# Patient Record
Sex: FEMALE | Race: WHITE | Employment: PART TIME | URBAN - METROPOLITAN AREA
[De-identification: names, ages, dates, MRNs, and addresses within clinical notes are randomized per-mention and may not be internally consistent; named-entity substitution may affect disease eponyms.]

---

## 2017-06-27 ENCOUNTER — TRANSFERRED RECORDS (OUTPATIENT)
Dept: HEALTH INFORMATION MANAGEMENT | Facility: CLINIC | Age: 52
End: 2017-06-27

## 2017-09-07 ENCOUNTER — OFFICE VISIT (OUTPATIENT)
Dept: OTHER | Facility: OUTPATIENT CENTER | Age: 52
End: 2017-09-07

## 2017-09-07 DIAGNOSIS — F64.9 GENDER IDENTITY DISORDER, UNSPECIFIED: ICD-10-CM

## 2017-09-07 DIAGNOSIS — F52.21 ERECTILE DISORDER, LIFELONG, GENERALIZED, MODERATE: Primary | ICD-10-CM

## 2017-09-07 ASSESSMENT — ANXIETY QUESTIONNAIRES
1. FEELING NERVOUS, ANXIOUS, OR ON EDGE: SEVERAL DAYS
7. FEELING AFRAID AS IF SOMETHING AWFUL MIGHT HAPPEN: SEVERAL DAYS
GAD7 TOTAL SCORE: 4
3. WORRYING TOO MUCH ABOUT DIFFERENT THINGS: SEVERAL DAYS
5. BEING SO RESTLESS THAT IT IS HARD TO SIT STILL: NOT AT ALL
6. BECOMING EASILY ANNOYED OR IRRITABLE: NOT AT ALL
2. NOT BEING ABLE TO STOP OR CONTROL WORRYING: SEVERAL DAYS

## 2017-09-07 ASSESSMENT — PATIENT HEALTH QUESTIONNAIRE - PHQ9: 5. POOR APPETITE OR OVEREATING: NOT AT ALL

## 2017-09-07 NOTE — MR AVS SNAPSHOT
After Visit Summary   9/7/2017    Fred Flores    MRN: [id number]           Patient Information     Date Of Birth          1965        Visit Information        Provider Department      9/7/2017 3:00 PM Brooklynn Zhang, PhD [facility]        Today's Diagnoses     Erectile disorder, lifelong, generalized, moderate    -  1    Gender dysphoria, unspecified           Follow-ups after your visit        Your next 10 appointments already scheduled     Oct 02, 2017 11:00 AM CDT   INDIVIDUAL THERAPY with Brooklynn Zhang, PhD   [facility] ([facility])    [address]  [address]  [facility] [address]   [phone]            Oct 16, 2017 11:00 AM CDT   INDIVIDUAL THERAPY with Brooklynn Zhang, PhD   [facility] ([facility])    [address]  [address]  [facility] [address]   [phone]            Nov 02, 2017  5:00 PM CDT   INDIVIDUAL THERAPY with Brooklynn Zhang, PhD   [facility] ([facility])    [address]  [address]  [facility] [address]   [phone]            Nov 16, 2017  4:00 PM CST   INDIVIDUAL THERAPY with Brooklynn Zhang, PhD   [facility] ([facility])    [address]  [address]  [facility] [address]   [phone]            Nov 30, 2017  4:00 PM CST   INDIVIDUAL THERAPY with Brooklynn Zhang, PhD   [facility] ([facility])    [address]  [address]  [facility] [address]   [phone]              Who to contact     Please call your clinic at [phone] to:    Ask questions about your health    Make or cancel appointments    Discuss your medicines    Learn about your test results    Speak to your doctor   If you have compliments or concerns about an experience at your clinic, or if you wish to file a complaint, please contact [facility] Patient Relations at [phone] or email us at [email or web address]         Additional Information About Your Visit        [employer] Information     [email or web address] is an electronic gateway that provides easy, online access to your medical records. With [email or web address], you can request a clinic appointment, read your test results, renew a prescription or communicate with your care team.     To sign up for [email or web address] visit the website at [email or web address]   You will be asked to enter the access code listed below, as well as some personal information. Please follow the directions to create your username and password.     Your access code is: [email or web address]  Expires: 2017 12:17 PM     Your access code will  in 90 days. If you need help or a new code, please contact your [facility] or call [phone] for assistance.        Care EveryWhere ID     This is your Care EveryWhere ID. This could be used by other organizations to access your [facility] medical records  [id number]         Blood Pressure from Last 3 Encounters:   No data found for BP    Weight from Last 3 Encounters:   No data found for Wt              We Performed the Following     Diagnostic Assessment (complete) [31777]        Primary Care Provider    None Specified       No primary provider on file.        Equal Access to Services     REINALDO GLOVER : Hadii rao yipo Soamandaali, waaxda luqadaha, qaybta kaalmada adeegyada, charisse ruelas. So [facility] [phone].    ATENCIÓN: Si habla español, tiene a smith disposición servicios gratuitos de asistencia lingüística. Llcarlito al [phone].    We comply with applicable federal civil rights laws and [place] laws. We do not discriminate on the basis of race, color, national origin, age, disability sex, sexual orientation or gender identity.            Thank you!     Thank you for choosing [facility]  for your care. Our  goal is always to provide you with excellent care. Hearing back from our patients is one way we can continue to improve our services. Please take a few minutes to complete the written survey that you may receive in the mail after your visit with us. Thank you!             Your Updated Medication List - Protect others around you: Learn how to safely use, store and throw away your medicines at www.disposemymeds.org.      Notice  As of 9/7/2017 11:59 PM    You have not been prescribed any medications.

## 2017-09-11 NOTE — PROGRESS NOTES
"[facility]  Program in Human Sexuality  Department of Family Medicine & Community Health  [employer] [employer] [facility] [employer]  [address]  [place] [address]  Phone: [phone]  Fax: [phone]  [email or web address]       Diagnostic Assessment Interview    Date of Service: 9/07/17  Client Name: Fred Flores  YOB: 1965  Age: 52 year old  MRN:  [id number]  Sexual Orientation: Heterosexual (attracted to women)  Treating Provider: Brooklynn Zhang, PhD, LP  Program: REST, TG  Type of Session: Assessment  Present in Session: Client only  Number of Minutes: 60   Referral Source: Doctor       Ethnicity:    _X_Caucasian  __   __Latino/  __Native American        __Asian American  __ Multi-racial: __ Other (Describe): _______________________________________________________________    Any relevant cultural issues? (Minority stress, immigration history, level of acculturation, social orientation, time orientation, verbal communication style, spiritual beliefs, etc.) __Fred reported a \"Religious background\" but no current vasiliy/Mandaen affiliation___________________________________ ________________________________________________________________________________________        A description of the assessment process, the 50-55 minute hour, and client confidentiality was reviewed.      PRESENTING PROBLEM & GOALS  \"Exploration of both the physiological and psychological factors that leave me erectionless\" - Fred shared that his wife of many years passed away in February 2016 and he has since started dating, only to find that longstanding sexual functioning concerns have continued. In additional to erectile concerns, Fred also noted during the assessment that he is interested in exploring questions about his gender identity and sexual interests. Fred reported the following goals for treatment: (1) peace of mind, (2) improved physical health, and " "(3) erections.    History of Presenting Problem: Fred stated that his \"entire adult life (since first partnered sexual experience at age 21) achieving and maintaining erection has been difficult.\" He shared that he has had 3 sexual partners (all women, see Relationship & Sexual History below) in his life and experienced erectile difficulties with each of them. He further noted that erectile concerns have worsened with age. See Sexual Behaviors & Functioning, as well as Gender-Related Concerns sections below for additional background and history.     RELATIONSHIP & SEXUAL HISTORY    Fred reported having 3 sexual partners (all cisgender women) in his life to date. He shared that his first partnered sexual experience occurred at age 21 with his college girlfriend (Bailey) who was \"someone I loved.\" He noted that \"it didn't want to work,\" clarifying that he was only briefly able to achieve erection during this first sexual experience, but not able to maintain the erection long enough for penetrative sex. He noted that it \"worked the next morning\" when he experienced a spontaneous morning erection. Fred reported dating Bailey for about four years before she ended the relationship because \"we were young and stupid.\"    Fred reported that he  his second sexual partner, Inez, when he was 29 years old after the two had dated for about a year. He shared that sexual activity with Inez \"typically felt like a race\" between achieving erection through foreplay and \"getting to\" penetration without first losing the erection. Fred and Inez were together for 22 years before her death last year (February 2016) due to complications of chemotherapy (infection) to treat lymphoma. He reported that, while their marriage \"was wonderful,\" their sexual life \"was the pits.\" Fred shared that he disliked partnered sex due to feelings of embarrassment and anxiety about being able to \"perform.\" Between the erectile functioning " "concerns and Fred's disclosure of interest in forced feminization erotica (see Gender-Related Concerns below), he noted that Inez believed him to be more interested in the gender-based erotica than her. He shared with tearfulness that his \"biggest regret\" in the marriage was that the partners \"never really talked about\" their sexual concerns or interests. He noted that, once Inez became ill, her health issues \"made it easier to sleep in separate rooms\" and to further ignore sexual concerns within the relationship.     Fred shared that he was \"surprised\" to find that he has \"fallen in love again\" since his wife's death last year and was tearful discussing these experiences. He reported meeting his current partner, Tawny, through mutual friends and shared that they have been dating for approximately 6 months. Fred described an \"excellent\" relationship with Tawny, noting that \"good communication is very important to both of us\" and, thus, he has disclosed \"pretty much everything\" to her about his gender exploration and sexual interests. Rfed shared that Tawny seemed to receive this information well and is willing to be part of his treatment, if needed/recommended. Fred noted that he has been sexually intimate with Tawny since April 2017. This has reportedly included penetrative sex on only one occasion when his erection \"cooperated.\" Fred noted, however, that the couple has explored many other, non-penetrative sexual activities that he has found to be positive and pleasurable.     Children: Fred had two children with his wife Inez: Ashleigh (age 20) and Vandana (age 17). He reported \"excellent\" relationships with both daughters, noting that \"the three of us have done a good job pulling together since wife's/mom's death.\"    Unpleasant/Traumatic Sexual Experiences: Fred denied any history of abuse or trauma, including sexual, physical, emotional, or verbal abuse.     SEXUAL BEHAVIORS & FUNCTIONING  See Relationship & " "Sexual History above for description of partnered sexual behaviors within Fred's relationships.     Masturbation: Fred reported that he currently masturbates 2-3x per week while reading online forced feminization-type erotica (see Gender-Related Concerns below). He reported \"no ability\" or that it is \"very rare\" for him to achieve erection during these experiences, though he can occasionally reach orgasm/ejaculation. He noted that despite masturbating at this frequency he considers his sex drive to be low or \"frustrated.\"  See also Gender-Related Concerns below.     Erectile Functioning: Fred reported \"lifelong\" difficulties with both achieving and maintaining erections, beginning with his first partnered sexual experience at age 21. He noted that the use of Viagra and Cialis 2-3 years ago was costly and inconsistently helpful with his erectile functioning so he discontinued use. Fred shared that he continues to experience full morning erections and occasionally will wake up to erections in the middle of the night. Self-stimulated erections, however, typically reach hardness of only 2/10. Fred noted that, since dating his current partner, he has experienced what he describes as a \"phantom erection,\" in which he feels sensations of a full erection that is not present.     A recent consultation with urologist, Dr. Kenn Romano ([facility]) led to a prescription for testosterone and, again, Viagra, as well as referral for preventive cardiology care. See summary notes for this urology visit in client's chart.               Ejaculatory Functioning: Fred reported that he is able to reach orgasm/ejaculation with erectile hardness at a 4-5/10. This level of erection, however, does not occur often and, thus, he is not often able to reach climax alone or with a partner.  reported that he does not experience orgasm without ejaculation or vice versa.      Pain During Sex/Genital Pain: None reported.     Sexual " "Aversion/Avoidance: None reported.     Same-Sex Experiences/Fantasies: None reported    Compulsive Sexuality: Fred does not currently appear to be engaging in compulsive sexual behaviors (e.g., anonymous/multiple sexual encounters; compulsive fixation on unattainable partner, obsessive fantasizing, multiple love affairs, compulsive masturbation, compulsive use of pornography, etc.)    GENDER-RELATED CONCERNS  Fred stated: \"I don't know where I fall on the [gender] spectrum.\" He shared that he feels \"pretty strongly male\" and \"I like being a amanda,\" though also reported longstanding interest in cross-dressing and exploring femininity. Fred reported that around age 12 or 13 he tried on his mother's clothing while home alone. He noted dressing in a full outfit of feminine clothing, but being \"mostly intrigued by undergarments.\" Fred described that this practice of cross-dressing continued approximately 2-3x per week during his teenage years and he never discussed it with anyone. Eventually, he noted, the cross-dressing behavior also began to include masturbation. Fred reported no erectile problems during this time, though noted a belief that he masturbated in a \"weird way\" - laying down on his stomach while using his hand to masturbate. He also reported that, upon orgasm/ejaculation, he became immediately aware of feelings of \"disgust\" and shame.     Fred shared that there was a period of time before his marriage to Inez in which he had started to buy and wear feminine clothing at home. He noted, however, that he purged these items shortly after beginning to date his wife. Fred noted: \"I knew that any long term partner needed to be aware of this.\" As such, he reported telling his wife about the cross-dressing behaviors about 2 weeks after they started dating. He recalled that she was \"confused and sad\" and that over the course of their relationship/marriage it \"became an issue we never talked about.\"     Fred shared " "that he has \"adopted\" the name Sunni for \"that part of me\" that desires to be more feminine and enjoys cross-dressing. He indicated that, since his wife , he has again been purchasing feminine clothing. Fred shared that his \"linebacker's build\" reportedly \"bothers\" him when he is dressed in feminine clothing and that this frustration has increased over the years. He stated that his interest in cross-dressing has changed over time in that it \"used to be primarily sexual and now my driving force is finding out more about Sunni.\" He reported that he has also disclosed these gender-related concerns to his current partner and has, thus far, been met with support.     MENTAL HEALTH HISTORY   Fred reported working with JULIA Clark for individual psychotherapy from 2015 to 2017 (at which point she \"stepped away\" from providing therapy). He stated that, during his wife's illness, he \"knew that I needed to get to some kind of healthy place and needed to talk about it.\" Fred stated that he worked to address both anxiety and depression in therapy, stating that he has been successful in decreasing his shame and \"always being so hard on myself.\" He reported that he continues to see this provider through attending a biweekly wellness class that she now teaches. Prior to working with this provider he noted completing individual psychotherapy \"off and on\" but never felt comfortable discussing his sexuality and gender concerns.    Fred indicated that he is currently prescribed and taking effexor, wellbutrin, and trazadone for medication management of depression, anxiety, and to help him sleep. These medications are prescribed by Diamond Hughes MD ([facility]). Fred reported that the medication is helpful for him, though he is aware that it may be contributing to current erectile concerns.     Though Fred reported a history of depression, he noted only one instance of \"fleeting\" suicidal ideation \"in my " "life\" when he was about 46 years old. He denied recent or current suicidal ideation, plan, or intent. He denied any history of suicide attempts, self-harm, or history of psychiatric hospitalizations. At the time of this assessment, Fred endorsed a minimal level of both depression and anxiety symptoms, including: anhedonia; feeling down; feeling bad about himself; feeling nervous, anxious, or on edge; difficulty controlling worry; worrying about many different things; and feeling afraid, as if something awful might happen (see PHQ-9 and SEMAJ-7 below).    PHQ-9:  PHQ-9 ([employer]) 9/7/2017   1.  Little interest or pleasure in doing things 1   2.  Feeling down, depressed, or hopeless 1   3.  Trouble falling or staying asleep, or sleeping too much 0   4.  Feeling tired or having little energy 0   5.  Poor appetite or overeating 0   6.  Feeling bad about yourself 1   7.  Trouble concentrating 0   8.  Moving slowly or restless 0   9.  Suicidal or self-harm thoughts 0   PHQ-9 Total Score 3      Interpretation of Total Score  Total Score Depression Severity and Recommendations   0-9 No Major Depression   10-14 Moderate.  Initial weekly follow up.  If patient is responding, monthly contacts.  Meds or therapy.   15-19 Moderate severe.  Initial weekly follow up.  If patient is responding, 2-4 week contacts.  Meds and/or therapy.   >20 Severe.  Weekly contact.  Meds and therapy.     SEMAJ-7:  1. Feeling nervous, anxious, or on edge: Several days  2. Not being able to stop or control worrying: Several days  3. Worrying too much about different things: Several days  4. Trouble relaxing: Not at all  5. Being so restless that it is hard to sit still: Not at all  6. Becoming easily annoyed or irritable: Not at all  7. Feeling afraid, as if something awful might happen: Several days  SEMAJ-7 Total Score: 4    Interpretation of Total Score  0 - 5     Minimal anxiety  6 - 10   Mild anxiety  11 - 15 Moderate anxiety  16 - 21 Severe " "anxiety    SUBSTANCE USE   Fred reported that he consumes 2-3 alcoholic beverages per week. He denied the use of any illicit or non-prescribed drugs and denied any history of substance abuse/chemical dependency concerns or treatment.    CAGE  Have you ever felt you should cut down on your drinking or drug use?: No  Have people annoyed you by criticizing your drinking or drug use?: No  Have you ever felt bad or guilty about your drinking or drug use?: No  Have you ever had a drink or used drugs first thing in the morning to steady your nerves or to get rid of a hangover? (Eye opener): No  CAGE-AID SCORE: 0     MEDICAL HISTORY  Fred shared that he had a hernia repaired at 6 months of age. He experienced tonsillitis in spring/summer , however did not undergo any surgical removal. He reported no additional significant medical history, illnesses, surgeries, or allergies. Fred noted that he sees a primary care provider at [facility] in [facility].     SOCIAL & FAMILY HISTORY    Social/Leisure: Fred reported that he exercises at the [place] and enjoys singing and \"home projects.\" He noted \"few\" friends.     Educational/Occupational History: Fred earned a B.A. in Psychology from [employer] in . 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 He is not currently employed and indicated some related short term financial stress.      Family History: Fred reported that his parents were  and he lived with his mother (age 78) as a child. He reported a close relationship with her throughout his life and indicated that she is currently in good health (though a tobacco user). Fred reported that his father  at age 80 and that their relationship was \"emotionally and physically\" distant. Both parents received some college education. Fred's mother worked as " an , whereas his father worked as a .      Fred is the youngest of his family, with an elder sister (Mira, age 57) and brother (Guy, age 55). He noted little contact with his sister, who reportedly has a history of drug and alcohol abuse. Fred indicated that his is closest with his brother Guy, though they see one another infrequently.     LEGAL ISSUES  None reported.     _________________________________________________________________________    CONCLUSIONS    STRENGTHS & LIABILITIES   Fred reported the following personal strengths: kind, thoughtful, intentionality. He noted the following areas for growth: procrastination, getting started and following through. Regarding treatment, Fred appears open, honest, emotionally intelligent, and motivated to address current sexuality, relationship, and gender concerns.     SYMPTOMS  Lifelong difficulty achieving and maintaining erections that also impact ability to reach orgasm/ejaculation; Dissociation from erection; Questioning gender and desire to wear feminine clothing; Fred endorsed a minimal level of both depression and anxiety symptoms, including: anhedonia; feeling down; feeling bad about himself; feeling nervous, anxious, or on edge; difficulty controlling worry; worrying about many different things; and feeling afraid, as if something awful might happen    MENTAL STATUS  Appearance: Casually dressed, adequately groomed, appears younger than stated age  Behavior/relationship to examiner/demeanor: Cooperative, engaged, pleasant  Orientation: Oriented to person, place, time and situation  Speech Rate: Normal  Speech Spontaneity: Normal  Mood: Appeared anxious and nervous, particularly upon first arriving to session; Friendly and pleasant throughout assessment; Appeared sad and was tearful in discussing the death of his wife  Affect:  Appropriate/mood-congruent; Full range of emotions congruent with session content  Thought  "Process (Associations):  Logical, linear, goal-directed  Thought Content: Denies suicidal ideation, intent or thoughts; Does not appear to be responding to internal stimuli  Abnormal Perception: None  Attention/Concentration: Fair  Language: Intact  Insight: Adequate  Judgment: Good      Interactive Complexity: Communication difficulties present during current the psychiatric procedure included: None    PRELIMINARY DSM-5 (ICD-10) DIAGNOSES         302.72 (F52.21) - Erectile disorder, lifelong, generalized, moderate       302.6 (F64.9) - Unspecified gender dysphoria    SUMMARY & RECOMMENDATIONS  Fred reported \"lifelong\" difficulties with both achieving and maintaining erections, beginning with his first partnered sexual experience at age 21. These concerns are present during both masturbation and with all previous/current sexual partners. Fred reported that the use of Viagra and Cialis 2-3 years ago was costly and inconsistently helpful with his erectile functioning so he discontinued use. He shared that he continues to experience full morning erections and occasionally will wake up to erections in the middle of the night. Self-stimulated erections, however, typically reach hardness of only 2/10. Fred noted that, since dating his current partner, he has experienced what he describes as a \"phantom erection,\" in which he feels sensations of a full erection that is not present. Fred reported that he is able to reach orgasm/ejaculation with erectile hardness at a 4-5/10. This level of erection, however, does not occur often and, thus, he is not often able to reach climax alone or with a partner.    Fred further noted that erectile concerns have worsened with age. He shared that his wife of many years passed away in February 2016 and he has since started dating, only to find that longstanding sexual functioning concerns have continued. He reported meeting his current partner, Tawny, through mutual friends and shared that " "they have been dating for approximately 6 months and sexually intimate since 2017. This has reportedly included penetrative sex on only one occasion when his erection \"cooperated.\" Fred noted, however, that the couple has explored many other, non-penetrative sexual activities that he has found to be positive and pleasurable. A recent consultation with urologist, Dr. Kenn Romano ([facility]) led to a prescription for testosterone and, again, Viagra, as well as referral for preventive cardiology care in order to address erectile functioning.     In additional to erectile concerns, Fred also noted that he is interested in exploring questions about his gender and sexual interests. He reported that he began dressing in feminine clothing around age 12 or 13, and that this practice eventually became paired with masturbation through his teenage years. Fred reported no erectile problems during this time, though noted a belief that he masturbated in a \"weird way\" - laying down on his stomach while using his hand to masturbate. He also reported that, upon orgasm/ejaculation, he became immediately aware of feelings of \"disgust\" and shame. Fred shared that he has \"adopted\" the name Sunni for \"that part of me\" that desires to be more feminine and enjoys cross-dressing. He indicated that, since his wife , he has again been purchasing feminine clothing and having previous purged such items. Fred shared that his \"linebacker's build\" reportedly \"bothers\" him when he is dressed in feminine clothing and that this frustration has increased over the years. He stated that his interest in cross-dressing has changed over time in that it \"used to be primarily sexual and now my driving force is finding out more about Sunni.\" He reported that he has also disclosed these gender-related concerns to his current partner and has, thus far, been met with support.     Fred is currently prescribed and taking Effexor, Wellbutrin, and " Trazadone for medication management of depression, anxiety, and to help him sleep. He reported that the medication is helpful for him, though he is aware that it may be contributing to current erectile concerns. At the time of this assessment, Fred endorsed a minimal level of both depression and anxiety symptoms, including: anhedonia; feeling down; feeling bad about himself; feeling nervous, anxious, or on edge; difficulty controlling worry; worrying about many different things; and feeling afraid, as if something awful might happen. He reported the following goals for treatment: (1) peace of mind, (2) improved physical health, and (3) erections.    Based on this initial interview, the following recommendations are made:     (1). Complete REST program questionnaires to further assess current symptoms and sexual history/functioning.   (2). [facility] is [facility] to address sexual functioning concerns, as well as Fred's reported questions regarding gender and sexual interests.   (3). Family therapy is recommended, as needed, to help Fred address sexual and gender concerns within the dynamic of his new, current relationship.   (4). Fred is strongly encouraged to continue follow-up with his medication management provider, as current medication regime appears to be adequately controlling mood and anxiety symptoms.   (5). Consider preventive cardiology follow-up per Urology recommendation addressing erectile concerns.         Brooklynn Zhang, PhD, LP  Licensed Psychologist

## 2017-09-13 ASSESSMENT — PATIENT HEALTH QUESTIONNAIRE - PHQ9: SUM OF ALL RESPONSES TO PHQ QUESTIONS 1-9: 3

## 2017-09-14 ASSESSMENT — ANXIETY QUESTIONNAIRES: GAD7 TOTAL SCORE: 4

## 2017-09-18 ENCOUNTER — OFFICE VISIT (OUTPATIENT)
Dept: OTHER | Facility: OUTPATIENT CENTER | Age: 52
End: 2017-09-18

## 2017-09-18 DIAGNOSIS — F64.9 GENDER IDENTITY DISORDER: Primary | ICD-10-CM

## 2017-09-18 DIAGNOSIS — F52.21 ERECTILE DISORDER, LIFELONG, GENERALIZED, MODERATE: ICD-10-CM

## 2017-09-18 NOTE — MR AVS SNAPSHOT
After Visit Summary   9/18/2017    Fred Flores    MRN: [id number]           Patient Information     Date Of Birth          1965        Visit Information        Provider Department      9/18/2017 11:00 AM Brooklynn Zhang, PhD [facility]        Today's Diagnoses     Unspecified gender dysphoria    -  1    Erectile disorder, lifelong, generalized, moderate           Follow-ups after your visit        Your next 10 appointments already scheduled     Oct 16, 2017  2:00 PM CDT   INDIVIDUAL THERAPY with Brooklynn Zhang, PhD   [facility] ([facility])    [address]  [address]  [facility] [address]   [phone]            Nov 02, 2017  5:00 PM CDT   INDIVIDUAL THERAPY with Brooklynn Zhang, PhD   [facility] ([facility])    [address]  [address]  [facility] [address]   [phone]            Nov 16, 2017  4:00 PM CST   INDIVIDUAL THERAPY with Brooklynn Zhang, PhD   [facility] ([facility])    [address]  [address]  [facility] [address]   [phone]            Nov 30, 2017  4:00 PM CST   INDIVIDUAL THERAPY with Brooklynn Zhang, PhD   [facility] ([facility])    [address]  [address]  [facility] [address]   [phone]            Dec 14, 2017  5:00 PM CST   INDIVIDUAL THERAPY with Brooklynn Zhang, PhD   [facility] ([facility])    [address]  [address]  [facility] [address]   [phone]              Who to contact     Please call your clinic at [phone] to:    Ask questions about your health    Make or cancel appointments    Discuss your medicines    Learn about your test results    Speak to your doctor   If you have compliments or concerns about an experience at your clinic, or if you wish to file a complaint, please contact [facility]  Physicians Patient Relations at [phone] or email us at [email or web address]         Additional Information About Your Visit        [employer] Information     Kensho is an electronic gateway that provides easy, online access to your medical records. With Kensho, you can request a clinic appointment, read your test results, renew a prescription or communicate with your care team.     To sign up for Kensho visit the website at [email or web address]   You will be asked to enter the access code listed below, as well as some personal information. Please follow the directions to create your username and password.     Your access code is: [email or web address]  Expires: 2017 12:17 PM     Your access code will  in 90 days. If you need help or a new code, please contact your [facility] or call [phone] for assistance.        Care EveryWhere ID     This is your Care EveryWhere ID. This could be used by other organizations to access your [place] medical records  [id number]         Blood Pressure from Last 3 Encounters:   No data found for BP    Weight from Last 3 Encounters:   No data found for Wt              We Performed the Following     Individual Psychotherapy (53+ min) [57197]     Mental Health Tx Plan Scan (HIM Scan)        Primary Care Provider    None Specified       No primary provider on file.        Equal Access to Services     [facility] : Hadleigh Tinajero, waaxda luqadaha, qaybta kaalmada syed, charisse steve . So [facility] [phone].    ATENCIÓN: Si habla español, tiene a smith disposición servicios gratuitos de asistencia lingüística. Llame al [phone].    We comply with applicable federal civil rights laws and [place] laws. We do not discriminate on the basis of race, color, national origin, age, disability, sex, sexual orientation, or gender identity.            Thank you!     Thank you for choosing  [facility]  for your care. Our goal is always to provide you with excellent care. Hearing back from our patients is one way we can continue to improve our services. Please take a few minutes to complete the written survey that you may receive in the mail after your visit with us. Thank you!             Your Updated Medication List - Protect others around you: Learn how to safely use, store and throw away your medicines at www.disposemymeds.org.      Notice  As of 9/18/2017 11:59 PM    You have not been prescribed any medications.

## 2017-09-19 ASSESSMENT — ANXIETY QUESTIONNAIRES
GAD7 TOTAL SCORE: 3
7. FEELING AFRAID AS IF SOMETHING AWFUL MIGHT HAPPEN: NOT AT ALL
6. BECOMING EASILY ANNOYED OR IRRITABLE: NOT AT ALL
2. NOT BEING ABLE TO STOP OR CONTROL WORRYING: SEVERAL DAYS
3. WORRYING TOO MUCH ABOUT DIFFERENT THINGS: SEVERAL DAYS
1. FEELING NERVOUS, ANXIOUS, OR ON EDGE: NOT AT ALL
5. BEING SO RESTLESS THAT IT IS HARD TO SIT STILL: NOT AT ALL

## 2017-09-19 ASSESSMENT — PATIENT HEALTH QUESTIONNAIRE - PHQ9: 5. POOR APPETITE OR OVEREATING: SEVERAL DAYS

## 2017-10-02 ENCOUNTER — OFFICE VISIT (OUTPATIENT)
Dept: OTHER | Facility: OUTPATIENT CENTER | Age: 52
End: 2017-10-02

## 2017-10-02 DIAGNOSIS — F64.9 GENDER IDENTITY DISORDER: Primary | ICD-10-CM

## 2017-10-02 DIAGNOSIS — F52.21 ERECTILE DISORDER, LIFELONG, GENERALIZED, MODERATE: ICD-10-CM

## 2017-10-02 NOTE — MR AVS SNAPSHOT
After Visit Summary   10/2/2017    Fred Flores    MRN: [id number]           Patient Information     Date Of Birth          1965        Visit Information        Provider Department      10/2/2017 11:00 AM Brooklynn Zhang, PhD [facility]        Today's Diagnoses     Unspecified gender dysphoria    -  1    Erectile disorder, lifelong, generalized, moderate           Follow-ups after your visit        Your next 10 appointments already scheduled     Nov 02, 2017  5:00 PM CDT   INDIVIDUAL THERAPY with Brooklynn Zhang, PhD   [facility] ([facility])    [address]  [address]  [facility] [address]   [phone]            Nov 16, 2017  4:00 PM CST   INDIVIDUAL THERAPY with Brooklynn Zhang, PhD   [facility] ([facility])    [address]  [address]  [facility] [address]   [phone]            Nov 30, 2017  4:00 PM CST   INDIVIDUAL THERAPY with Brooklynn Zhang, PhD   [facility] ([facility])    [address]  [address]  [facility] [address]   [phone]            Dec 14, 2017  5:00 PM CST   INDIVIDUAL THERAPY with Brooklynn Zhang, PhD   [facility] ([facility])    [address]  [address]  [facility] [address]   [phone]              Who to contact     Please call your clinic at [phone] to:    Ask questions about your health    Make or cancel appointments    Discuss your medicines    Learn about your test results    Speak to your doctor   If you have compliments or concerns about an experience at your clinic, or if you wish to file a complaint, please contact [facility] Patient Relations at [phone] or email us at [email or web address]         Additional Information About Your Visit        MyChart Information     [employer] is an electronic gateway that provides  easy, online access to your medical records. With [employer]wl [employer], you can request a clinic appointment, read your test results, renew a prescription or communicate with your care team.     To sign up for GreenOwl Mobile visit the website at [email or web address]   You will be asked to enter the access code listed below, as well as some personal information. Please follow the directions to create your username and password.     Your access code is: [email or web address]  Expires: 2017 12:17 PM     Your access code will  in 90 days. If you need help or a new code, please contact your [facility] or call [phone] for assistance.        Care EveryWhere ID     This is your Care EveryWhere ID. This could be used by other organizations to access your [place] medical records  [id number]         Blood Pressure from Last 3 Encounters:   No data found for BP    Weight from Last 3 Encounters:   No data found for Wt              We Performed the Following     Individual Psychotherapy (53+ min) [81562]        Primary Care Provider    None Specified       No primary provider on file.        Equal Access to Services     [facility]: Hadii rao Tinajero, waaxda lucassie, qaybta kaalkayce lynch, charisse steve . So [facility] [phone].    ATENCIÓN: Si habla español, tiene a smith disposición servicios gratuitos de asistencia lingüística. Llame al [phone].    We comply with applicable federal civil rights laws and [place] laws. We do not discriminate on the basis of race, color, national origin, age, disability, sex, sexual orientation, or gender identity.            Thank you!     Thank you for choosing [facility]  for your care. Our goal is always to provide you with excellent care. Hearing back from our patients is one way we can continue to improve our services. Please take a few minutes to complete the written survey that you may receive in  the mail after your visit with us. Thank you!             Your Updated Medication List - Protect others around you: Learn how to safely use, store and throw away your medicines at www.disposemymeds.org.      Notice  As of 10/2/2017 11:59 PM    You have not been prescribed any medications.

## 2017-10-05 PROBLEM — F52.21: Status: ACTIVE | Noted: 2017-10-05

## 2017-10-05 NOTE — PROGRESS NOTES
"[facility]  [facility]  [address], [address]  [place] [address]  Outpatient Progress Note      Session Date: 17  Client Name: Fred Flores  YOB: 1965  MRN: [id number]  Provider: Brooklynn Zhang, PhD   Type of Session: Individual   Present in Session: Client only   Number of Minutes: 55   Treatment Plan Due: 2018      Current Symptoms/Status:   Fred reported lifelong difficulties with both achieving and maintaining erections, beginning with his first partnered sexual experience at age 21. These concerns are present during both masturbation and with all sexual partners, including new dating partner (Tawny). Fred reported that the use of Viagra and Cialis 2-3 years ago was costly and inconsistently helpful with his erectile functioning so he discontinued use. He continues to experience full morning erections and occasionally will wake up to erections in the middle of the night. Self-stimulated erections, however, typically reach hardness of only 2/10. Fred reported that he is able to reach orgasm/ejaculation with erectile hardness at a 4-5/10; however, this does not occur often and, thus, he is rarely able to reach climax alone or with a partner. In additional to erectile concerns, Fred also noted that he is interested in exploring questions about his gender and sexual interests. Fred has \"adopted\" the name Sunni for \"that part of me\" that desires to be more feminine and enjoys cross-dressing. He indicated that, since his wife , he has again been purchasing feminine clothing and has been supported in exploring a feminine gender expression by Tawny.      Progress Toward Treatment Goals:   First session following diagnostic intake      Intervention & Response:   Interpersonal, client-centered, and CBT techniques utilized to address diagnostic feedback and treatment planning. Therapist and client reviewed preliminary diagnoses, treatment " "recommendations, and steps for contacting insurance to determine coverage of services. Fred noted that, though he has considered preventative cardiology follow-up (per Urology consult, see records in chart), he does not feel inclined to move forward with this at present time given low risk factors in his family medical history and lifelong nature of erectile difficulties. Therapist and client will continue to discuss. Otherwise, Fred was open to all treatment recommendations. For homework, he was asked to review materials related to the Sexual Health Model and consider sexual functioning goals. Fred shared that, since intake, he has actually felt more focused on gender exploration and goals. Identified that, in some ways, this is beginning to feel like more of a priority to him in terms of treatment direction. Discussed treatment plan with regard to erecile functioning as well as gender concerns. Fred appeared surprised at his own statement that, if he \"lived in a vacuum,\" he would present as female 100% of the time. Discussed fears related to his young adult children and other family members finding out about his gender exploration. Fred shared that his partner, Tawny, has been very supportive of his exploration and was tearful in noting his gratitude for this, given that the topic of gender was not discussed with his wife. Fred shared an intention to reach out to a local make-up artist, who works with trans people, to discuss \"what can be done\" around feminizing his appearance. Due to client's nervousness in session and need to continue building therapeutic rapport, formal Treatment Plan was completed without client signature (see chart), though treatment planning was discussed throughout session. Discussed beginning sessions on a biweekly basis, with regular reassessment as treatment goals are met. Fred voiced agreement with this plan and, overall, was active and engaged throughout " session.      Assignment:   (1). Review Sexual Health Model and related therapeutic goals      Diagnosis:        302.6 (F64.9) - Unspecified gender dysphoria       302.72 (F52.21) - Erectile disorder, lifelong, generalized, moderate      Interactive Complexity:  None.       Plan / Need for Future Services:    Return for individual therapy in 2 weeks.             Brooklynn Zhang, PhD,   Licensed Psychologist

## 2017-10-10 ASSESSMENT — PATIENT HEALTH QUESTIONNAIRE - PHQ9: SUM OF ALL RESPONSES TO PHQ QUESTIONS 1-9: 12

## 2017-10-11 ASSESSMENT — ANXIETY QUESTIONNAIRES: GAD7 TOTAL SCORE: 3

## 2017-10-16 ENCOUNTER — OFFICE VISIT (OUTPATIENT)
Dept: OTHER | Facility: OUTPATIENT CENTER | Age: 52
End: 2017-10-16

## 2017-10-16 DIAGNOSIS — F52.21 ERECTILE DISORDER, LIFELONG, GENERALIZED, MODERATE: ICD-10-CM

## 2017-10-16 DIAGNOSIS — F64.9 GENDER IDENTITY DISORDER: Primary | ICD-10-CM

## 2017-10-16 NOTE — MR AVS SNAPSHOT
After Visit Summary   10/16/2017    Fred Flores    MRN: [id number]           Patient Information     Date Of Birth          1965        Visit Information        Provider Department      10/16/2017 2:00 PM Brooklynn Zhang, PhD [facility]         Follow-ups after your visit        Your next 10 appointments already scheduled     Nov 02, 2017  5:00 PM CDT   INDIVIDUAL THERAPY with Brooklynn Zhang, PhD   [facility] ([facility])    [address]  [address]  [facility] [address]   [phone]            Nov 16, 2017  4:00 PM CST   INDIVIDUAL THERAPY with Brooklynn Zhang,    [facility] ([facility])    [address]  [address]  [facility] [address]   [phone]            Nov 30, 2017  4:00 PM CST   INDIVIDUAL THERAPY with Brooklynn Zhang, PhD   [facility] ([facility])    [address]  [address]  [facility] [address]   [phone]            Dec 14, 2017  5:00 PM CST   INDIVIDUAL THERAPY with Brooklynn Zhang,    [facility] ([facility])    [address]  [address]  [facility] [address]   [phone]              Who to contact     Please call your clinic at [phone] to:    Ask questions about your health    Make or cancel appointments    Discuss your medicines    Learn about your test results    Speak to your doctor   If you have compliments or concerns about an experience at your clinic, or if you wish to file a complaint, please contact [facility] Patient Relations at [phone] or email us at [email or web address]         Additional Information About Your Visit        [employer] Information     [employer] is an electronic gateway that provides easy, online access to your medical records. With [employer], you can request a clinic appointment, read your test results,  renew a prescription or communicate with your care team.     To sign up for Gr[employer] Vidiyal Micro Financehart visit the website at [email or web address]   You will be asked to enter the access code listed below, as well as some personal information. Please follow the directions to create your username and password.     Your access code is: [email or web address]  Expires: 2017 12:17 PM     Your access code will  in 90 days. If you need help or a new code, please contact your [facility] or call [phone] for assistance.        Care EveryWhere ID     This is your Care EveryWhere ID. This could be used by other organizations to access your [place] medical records  [id number]         Blood Pressure from Last 3 Encounters:   No data found for BP    Weight from Last 3 Encounters:   No data found for Wt              Today, you had the following     No orders found for display       Primary Care Provider    None Specified       No primary provider on file.        Equal Access to Services     [facility] : Hadii rao Tinajero, waaxda sara, qaybta kaalmada syed, charisse steve . So [facility] [phone].    ATENCIÓN: Si habla español, tiene a smith disposición servicios gratuitos de asistencia lingüística. Llame al [phone].    We comply with applicable federal civil rights laws and [place] laws. We do not discriminate on the basis of race, color, national origin, age, disability, sex, sexual orientation, or gender identity.            Thank you!     Thank you for choosing [facility]  for your care. Our goal is always to provide you with excellent care. Hearing back from our patients is one way we can continue to improve our services. Please take a few minutes to complete the written survey that you may receive in the mail after your visit with us. Thank you!             Your Updated Medication List - Protect others around you: Learn how to safely  use, store and throw away your medicines at www.disposemymeds.org.      Notice  As of 10/16/2017 11:59 PM    You have not been prescribed any medications.

## 2017-10-16 NOTE — PROGRESS NOTES
"[facility]uality  [facility]  [address], [address]  [place] [address]  Outpatient Progress Note      Session Date: 10/02/17  Client Name: Fred Flores  YOB: 1965  MRN: [id number]  Provider: Brooklynn Zhang, PhD   Type of Session: Individual   Present in Session: Client only   Number of Minutes: 55   Treatment Plan Due: 2018      Current Symptoms/Status:   Fred reported lifelong difficulties with both achieving and maintaining erections, beginning with his first partnered sexual experience at age 21. These concerns are present during both masturbation and with all sexual partners, including new dating partner (Tawny). Fred reported that the use of Viagra and Cialis 2-3 years ago was costly and inconsistently helpful with his erectile functioning so he discontinued use. He continues to experience full morning erections and occasionally will wake up to erections in the middle of the night. Self-stimulated erections, however, typically reach hardness of only 2/10. Fred reported that he is able to reach orgasm/ejaculation with erectile hardness at a 4-5/10; however, this does not occur often and, thus, he is rarely able to reach climax alone or with a partner. In additional to erectile concerns, Fred also noted that he is interested in exploring questions about his gender and sexual interests. Fred has \"adopted\" the name Sunni for \"that part of me\" that desires to be more feminine and enjoys cross-dressing. He indicated that, since his wife  in , he has again been purchasing feminine clothing and has been supported in exploring a feminine gender expression by Tawny.      Progress Toward Treatment Goals:   Increased gender exploration      Intervention & Response:   Interpersonal, client-centered, and CBT techniques utilized to address client's current status. Fred shared that, as mentioned last session, he met with a local  to discuss options " "for exploring feminine makeup and presentation. Explored his mixed reactions (e.g., excitement, fear) during this experience and sense that he will be moving forward with these plans soon. Per homework assignment, therapist and client reviewed the explored the Sexual Health Model and related goals for care. Fred shared that, since his intake appt, he has realized that his main goal is to address identity, including both sexual and gender identity questions and concerns. He highlighted a desire to increase sexual functioning (specificially ED concerns), though wondered if this may be related to gender dysphoria. Per the Sexual Health Model, he noted a belief that he already has a good grasp on basic sexual knowledge and acceptance. In continuing to discuss the increased importance to him of exploring gender identity, Fred expressed that this seems to be moving quickly due to finally having a supportive partner with whom he can discuss his lifelong desires and experiences around cross-dressing and gender. Fred expressed curiosity, excitement, anxiety, and apprehension when therapist noted that sessions might be used to explore the use of \"Sunni\" and feminine pronouns. He reported that he did not feel ready to try this, as it would make existing gender concerns (and \"hopes\") \"more real.\" Discussed moving forward with gender exploration between sessions and Fred was open to bibliotherapy assignments: My Gender Workbook (Lizzie) & How to Understand Your Gender (Julianna Guzman). Overall, he was active and engaged throughout session.      Assignment:   (1). Obtain and begin reading/completing My Gender Workbook (Lizzie) & How to Understand Your Gender (Julianna Guzman)      Diagnosis:        302.6 (F64.9) - Unspecified gender dysphoria       302.72 (F52.21) - Erectile disorder, lifelong, generalized, moderate      Interactive Complexity:  None.       Plan / Need for Future Services:    Return for individual " therapy in 2 weeks.             Brooklynn Zhang, PhD,   Licensed Psychologist

## 2017-10-23 NOTE — PROGRESS NOTES
"[facility]  [facility]  [address] [place] [address], [address]  [place] [address]  Outpatient Progress Note      Session Date: 10/16/17  Client Name: Fred Flores (exploring \"Sunni\" and she/her pronouns beginning next session)  YOB: 1965  MRN: [id number]  Provider: Brooklynn Zhang, PhD   Type of Session: Individual   Present in Session: Client only   Number of Minutes: 55   Treatment Plan Due: 2018      Current Symptoms/Status:   Fred reported lifelong difficulties with both achieving and maintaining erections, beginning with his first partnered sexual experience at age 21. These concerns are present during both masturbation and with all sexual partners, including new dating partner (Tawny). Fred reported that the use of Viagra and Cialis 2-3 years ago was costly and inconsistently helpful with his erectile functioning so he discontinued use. He continues to experience full morning erections and occasionally will wake up to erections in the middle of the night. Self-stimulated erections, however, typically reach hardness of only 2/10. Fred reported that he is able to reach orgasm/ejaculation with erectile hardness at a 4-5/10; however, this does not occur often and, thus, he is rarely able to reach climax alone or with a partner. In additional to erectile concerns, Fred also noted that he is interested in exploring questions about his gender and sexual interests. Fred has \"adopted\" the name Sunni for \"that part of me\" that desires to be more feminine and enjoys cross-dressing. He indicated that, since his wife  in , he has again been purchasing feminine clothing and has been supported in exploring a feminine gender expression by Tawny.      Progress Toward Treatment Goals:   Increased gender exploration      Intervention & Response:   Interpersonal, client-centered, and CBT techniques utilized to address client's current status. Fred shared that the last therapy " "session  really through me for a loop,  referring to therapist's questions about interest in exploring feminine name/pronouns at this clinic and in sessions. Fred shared that he has been considering this \"a lot\" since last appointment and decided that he would like to try it (starting next session) to  see how it feels.  Therapist noted that she would let the  know about preferred name/pronouns, which Fred agreed to. Therapist and client spent time processing Fred's reading, to date, of My Gender Workbook. He shared a realization that his wife was likely hurt and angry by his crossdressing behavior and that he has internalized her negative reactions. Discussed that Fred has been feeling better able to  let go of  shame and negative self-judgments about his interests following this realization. He shared self-exploration and questions as to whether he is a \"just\" a cisgender heterosexual man who enjoys crossdressing. Identified that  is aware of local support groups (e.g., [employer]) that may aid in further exploration, but has reportedly not considered going. Identified and discussed awareness that attending therapy sessions at this clinic has made  real  many of Fred s thoughts about gender possibilities. Introduced and explored The Gender Unicorn handout, which was provided to Fred to take home. He shared a recent experience in which his current partner appeared to react negatively when his breast forms accidentally touched her. Celebrated and reinforced that he was able to address this with her, however, and feels that she continues to be very supportive of him. For homework,  agreed to continue reading assignment. He noted that he is waiting for the IaValchemyi book to arrive and looking forward to reading that as well. Overall, Fred was active and engaged throughout session. Next session: Return to conversation about potential exploration of [place] [employer] support group.  "       Assignment:   (1). Continue reading/completing My Gender Workbook (Lizzie) & How to Understand Your Gender (Julianna & Megan)      Diagnosis:        302.6 (F64.9) - Unspecified gender dysphoria       302.72 (F52.21) - Erectile disorder, lifelong, generalized, moderate      Interactive Complexity:  None.       Plan / Need for Future Services:    Return for individual therapy in 2 weeks.             Brooklynn Zhang, PhD, LP  Licensed Psychologist

## 2017-11-02 ENCOUNTER — OFFICE VISIT (OUTPATIENT)
Dept: OTHER | Facility: OUTPATIENT CENTER | Age: 52
End: 2017-11-02

## 2017-11-02 DIAGNOSIS — F64.9 GENDER IDENTITY DISORDER: Primary | ICD-10-CM

## 2017-11-02 DIAGNOSIS — F52.21 ERECTILE DISORDER, LIFELONG, GENERALIZED, MODERATE: ICD-10-CM

## 2017-11-02 NOTE — MR AVS SNAPSHOT
After Visit Summary   11/2/2017    Fred Flores    MRN: [id number]           Patient Information     Date Of Birth          1965        Visit Information        Provider Department      11/2/2017 5:00 PM Brooklynn Zhang, PhD [facility]        Today's Diagnoses     Unspecified gender dysphoria    -  1    Erectile disorder, lifelong, generalized, moderate           Follow-ups after your visit        Your next 10 appointments already scheduled     Nov 16, 2017  4:00 PM CST   INDIVIDUAL THERAPY with Brooklynn Zhang PhD   [facility] ([facility])    [address]  [address]  [facility] [address]   [phone]            Nov 30, 2017  4:00 PM CST   INDIVIDUAL THERAPY with Brooklynn Zhang PhD   [facility] ([facility])    [address]  [address]  [facility] [address]   [phone]            Dec 14, 2017  5:00 PM CST   INDIVIDUAL THERAPY with Brooklynn Zhang PhD   [facility] ([facility])    [address]  [address]  [facility] [address]   [phone]            Jan 03, 2018 11:00 AM CST   INDIVIDUAL THERAPY with Brooklynn Zhang, PhD   [facility] ([facility])    [address]  [address]  [facility] [address]   [phone]            Jan 17, 2018  4:00 PM CST   INDIVIDUAL THERAPY with Brooklynn Zhang PhD   [facility] ([facility])    [address]  [address]  [facility] [address]   [phone]            Jan 31, 2018  4:00 PM CST   INDIVIDUAL THERAPY with Brooklynn Zhang, PhD   [facility] ([facility])    [address]  [address]  [facility] [address]   [phone]              Who to contact     Please call your clinic at [phone] to:    Ask questions about your health    Make or cancel appointments    Discuss your  medicines    Learn about your test results    Speak to your doctor   If you have compliments or concerns about an experience at your clinic, or if you wish to file a complaint, please contact [facility] Patient Relations at [phone] or email us at [email or web address]         Additional Information About Your Visit        Fogg MobileharNOLA J&B Information     Vaccsyst is an electronic gateway that provides easy, online access to your medical records. With ncyclo, you can request a clinic appointment, read your test results, renew a prescription or communicate with your care team.     To sign up for Vaccsyst visit the website at [email or web address]   You will be asked to enter the access code listed below, as well as some personal information. Please follow the directions to create your username and password.     Your access code is: [email or web address]  Expires: 2017 11:17 AM     Your access code will  in 90 days. If you need help or a new code, please contact your [facility] or call [phone] for assistance.        Care EveryWhere ID     This is your Care EveryWhere ID. This could be used by other organizations to access your [place] medical records  [id number]         Blood Pressure from Last 3 Encounters:   No data found for BP    Weight from Last 3 Encounters:   No data found for Wt              We Performed the Following     Individual Psychotherapy (53+ min) [44169]        Primary Care Provider    None Specified       No primary provider on file.        Equal Access to Services     [facility] : Hadii rao yipo Somitzi, waaxda luqadaha, qaybta kaalmada jordanyada, charisse steve . So [facility] [place] [facility] [phone].    ATENCIÓN: Si habla español, tiene a smith disposición servicios gratuitos de asistencia lingüística. Llame al [phone].    We comply with applicable federal civil rights laws and [place] laws. We do  not discriminate on the basis of race, color, national origin, age, disability, sex, sexual orientation, or gender identity.            Thank you!     Thank you for choosing [facility]  for your care. Our goal is always to provide you with excellent care. Hearing back from our patients is one way we can continue to improve our services. Please take a few minutes to complete the written survey that you may receive in the mail after your visit with us. Thank you!             Your Updated Medication List - Protect others around you: Learn how to safely use, store and throw away your medicines at www.disposemymeds.org.      Notice  As of 11/2/2017 11:59 PM    You have not been prescribed any medications.

## 2017-11-09 NOTE — PROGRESS NOTES
"Program in Human Sexuality  [facility]  [address] [place] [address], [address]  [place] [address]  Outpatient Progress Note      Session Date: 17  Client Name: Fred Flores (\"Sunni;\" she/her pronouns)  YOB: 1965  MRN: [id number]  Provider: Brooklynn Zhang, PhD   Type of Session: Individual   Present in Session: Client only   Number of Minutes: 55   Treatment Plan Due: 2018      Current Symptoms/Status:   Sunni reported lifelong difficulties with both achieving and maintaining erections, beginning with her first partnered sexual experience at age 21. These concerns are present during both masturbation and with all sexual partners, including new dating partner (Tawny). Sunni reported that the use of Viagra and Cialis 2-3 years ago was costly and inconsistently helpful with erectile functioning so she discontinued use. Continues to experience full morning erections and occasionally will wake up to erections in the middle of the night. Self-stimulated erections, however, typically reach hardness of only 2/10. Able to reach orgasm/ejaculation with erectile hardness at a 4-5/10; however, this does not occur often and, thus, Sunni is rarely able to reach climax alone or with a partner. In additional to erectile concerns, Sunni also noted that she is interested in exploring questions about gender and sexual interests. She has \"adopted\" the name Sunni for \"that part of me\" that desires to be more feminine and enjoys cross-dressing. She indicated that, since her wife  in , she has again been purchasing feminine clothing and has been supported in exploring a feminine gender expression by Tawny.      Progress Toward Treatment Goals:   Increased gender exploration, feminine presentation to session and first time in public      Intervention & Response:   Interpersonal, client-centered, and CBT techniques utilized to address client's current status. Sunni presented to session in feminine " "clothing, which had not been previously planned or discussed with therapist. She noted a belief that she \"might as well try it\" given plan to begin exploring use of feminine name and pronouns at this clinic. Assisted client in discussing her choice of clothing for her first public outing with a feminine gender expression. Sunni noted feeling  relief  about wearing feminine clothing but also simultaneous fear about the reactions of others. Sunni shared narrative about a positive trip home to see her family, during which Tawny met her family for the first time. Explored Sunni's growing uncertainty about whether to fill testosterone prescription to potentially improve erectile functioning, giving that she is now questioning  if that s the hormone I really want.  She noted a desire to lose weight but worry about gaining muscle, especially around shoulders/arms as she believes this will make her appear less feminine. Therapist and client also discussed Sunni's awareness of male privilege in her life to date and fear about potential appropriation by  just taking the fun things  that she perceives as related to womanhood. Discussed her ongoing exploration of gender identity and feelings, utilizing reactions to bibliotherapy assignments. Sunni shared that her feeling of being  a cisgender man who cross dresses\" might be  shifting  toward a more trans-identified label. Discussed her willingness to attend a local [employer] meeting to further exploration and support. Sunni was open to contacting the group via email to ask questions about attending a meeting. At session end, she noted that she would be meeting Tawny for dinner tonight and did not bring change of masculine clothes. Discussed what she planned to do to keep most of feminine clothing on but appear more androgynous while at a restaurant. Overall, Sunni was active and engaged throughout session.       Assignment:   (1). Continue reading/completing My Gender Workbook " (Lizzie) & How to Understand Your Gender (Julianna & Megan)  (2). Contact [facility] support group to ask about meeting attendance.       Diagnosis:        302.6 (F64.9) - Unspecified gender dysphoria       302.72 (F52.21) - Erectile disorder, lifelong, generalized, moderate      Interactive Complexity:  None.       Plan / Need for Future Services:    Return for individual therapy in 2 weeks.             Brooklynn Zhang, PhD, LP  Licensed Psychologist

## 2017-11-16 ENCOUNTER — OFFICE VISIT (OUTPATIENT)
Dept: OTHER | Facility: OUTPATIENT CENTER | Age: 52
End: 2017-11-16

## 2017-11-16 DIAGNOSIS — F64.9 GENDER IDENTITY DISORDER: Primary | ICD-10-CM

## 2017-11-16 DIAGNOSIS — F52.21 ERECTILE DISORDER, LIFELONG, GENERALIZED, MODERATE: ICD-10-CM

## 2017-11-16 NOTE — MR AVS SNAPSHOT
After Visit Summary   11/16/2017    Fred Flores    MRN: [id number]           Patient Information     Date Of Birth          1965        Visit Information        Provider Department      11/16/2017 4:00 PM Brooklynn Zhang, PhD [facility]        Today's Diagnoses     Unspecified gender dysphoria    -  1    Erectile disorder, lifelong, generalized, moderate           Follow-ups after your visit        Your next 10 appointments already scheduled     Nov 30, 2017  4:00 PM CST   INDIVIDUAL THERAPY with Brooklynn Zhang PhD   [facility] ([facility])    [address]  [address]  [facility] [address]   [phone]            Dec 14, 2017  5:00 PM CST   INDIVIDUAL THERAPY with Brooklynn Zhang PhD   [facility] ([facility])    [address]  [address]  [facility] [address]   [phone]            Jan 03, 2018 11:00 AM CST   INDIVIDUAL THERAPY with Brooklynn Zhang, PhD   [facility] ([facility])    [address]  [address]  [facility] [address]   [phone]            Jan 17, 2018  4:00 PM CST   INDIVIDUAL THERAPY with Brooklynn Zhang, PhD   [facility] ([facility])    [address]  [address]  [facility] [address]   [phone]            Jan 31, 2018  4:00 PM CST   INDIVIDUAL THERAPY with Brooklynn Zhang, PhD   [facility] ([facility])    [address]  [address]  [facility] [address]   [phone]              Who to contact     Please call your clinic at [phone] to:    Ask questions about your health    Make or cancel appointments    Discuss your medicines    Learn about your test results    Speak to your doctor   If you have compliments or concerns about an experience at your clinic, or if you wish to file a complaint, please contact [facility]  [facility] Patient Relations at [phone] or email us at [email or web address]         Additional Information About Your Visit        FERTILE EARTH SYSTEMSharTubaloo Information     Reclog is an electronic gateway that provides easy, online access to your medical records. With Reclog, you can request a clinic appointment, read your test results, renew a prescription or communicate with your care team.     To sign up for Reclog visit the website at [email or web address]   You will be asked to enter the access code listed below, as well as some personal information. Please follow the directions to create your username and password.     Your access code is: [email or web address]  Expires: 2017 11:17 AM     Your access code will  in 90 days. If you need help or a new code, please contact your [facility] or call [phone] for assistance.        Care EveryWhere ID     This is your Care EveryWhere ID. This could be used by other organizations to access your [place] medical records  [id number]         Blood Pressure from Last 3 Encounters:   No data found for BP    Weight from Last 3 Encounters:   No data found for Wt              We Performed the Following     Individual Psychotherapy (53+ min) [08826]        Primary Care Provider    None Specified       No primary provider on file.        Equal Access to Services     REINALDO GLOVER : Hadii rao yipo Somitzi, waaxda luqadaha, qaybta kaalmada adejen, charisse ruelas. So [facility] [phone].    ATENCIÓN: Si habla español, tiene a smith disposición servicios gratuitos de asistencia lingüística. Llcarlito al [phone].    We comply with applicable federal civil rights laws and [place] laws. We do not discriminate on the basis of race, color, national origin, age, disability, sex, sexual orientation, or gender identity.            Thank you!     Thank you for choosing [facility]  for your care.  Our goal is always to provide you with excellent care. Hearing back from our patients is one way we can continue to improve our services. Please take a few minutes to complete the written survey that you may receive in the mail after your visit with us. Thank you!             Your Updated Medication List - Protect others around you: Learn how to safely use, store and throw away your medicines at www.disposemymeds.org.      Notice  As of 11/16/2017 11:59 PM    You have not been prescribed any medications.

## 2017-11-22 NOTE — PROGRESS NOTES
"Program in Human Sexuality  [facility]  [address] [place] [address], [address]  [place] [address]  Outpatient Progress Note      Session Date: 17  Client Name: Fred Flores (\"Sunni;\" she/her pronouns)  YOB: 1965  MRN: [id number]  Provider: Brooklynn Zhang, PhD   Type of Session: Individual   Present in Session: Client only   Number of Minutes: 55   Treatment Plan Due: 2018      Current Symptoms/Status:   Sunni reported lifelong difficulties with both achieving and maintaining erections, beginning with her first partnered sexual experience at age 21. These concerns are present during both masturbation and with all sexual partners, including new dating partner (Tawny). Sunni reported that the use of Viagra and Cialis 2-3 years ago was costly and inconsistently helpful with erectile functioning so she discontinued use. Continues to experience full morning erections and occasionally will wake up to erections in the middle of the night. Self-stimulated erections, however, typically reach hardness of only 2/10. Able to reach orgasm/ejaculation with erectile hardness at a 4-5/10; however, this does not occur often and, thus, Sunni is rarely able to reach climax alone or with a partner. In additional to erectile concerns, Sunni also noted that she is interested in exploring questions about gender and sexual interests. She has \"adopted\" the name Sunni for \"that part of me\" that desires to be more feminine and enjoys cross-dressing. She indicated that, since her wife  in , she has again been purchasing feminine clothing and has been supported in exploring a feminine gender expression by Tawny.      Progress Toward Treatment Goals:   Increased gender exploration and comfort      Intervention & Response:   Interpersonal, client-centered, and CBT techniques utilized to address client's current status. Sunni did not present in feminine clothing for today's session and reported disappointment " "about her inability to do so. She stated that  the sands keep shifting  with regard to gender exploration. Discussed several gendered experiences since last session including Sunni's awareness of   jealousy  and  envy  upon seeing young woman next to her recently at a coffee shop. 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 Therapist and client also discussed aspects of bibliotherapy homework that have resonated with Sunni. Sunni shared updates related to contacting [facility] for homework. She noted plans to move forward in meeting the  in order to be vetted to attend a meeting. Sunni shared several times that she has been feeling more compelled to disclose gender concerns to her adult daughters. Identified, however, that she is unsure what she wants/needs to say but believes that such a disclosure would allow greater room for her exploration (e.g., social transition options). She reported feeling motivated by this and  less concerned  about the reactions of others with regard to gender, as she continues to feel less and less ashamed. Discussed, however, ongoing worry about her daughters  reactions to the information, given the loss of their mother. For homework, Sunni was open to beginning to draft language about what she might share with her daughters as related to gender. Overall, she was active and engaged throughout session.       Assignment:   (1). Continue reading/completing My Gender Workbook (Lizzie) & How to Understand Your Gender (Julianna & Megan)  (2). Meet with [facility] leader to discuss meeting attendance.   (3). Writing/journaling about disclosure to daughters.      Diagnosis:        302.6 (F64.9) - Unspecified gender dysphoria       302.72 (F52.21) - Erectile disorder, lifelong, " generalized, moderate      Interactive Complexity:  None.       Plan / Need for Future Services:    Return for individual therapy in 2 weeks.             Brooklynn Zhang, PhD, LP  Licensed Psychologist

## 2017-11-30 ENCOUNTER — OFFICE VISIT (OUTPATIENT)
Dept: OTHER | Facility: OUTPATIENT CENTER | Age: 52
End: 2017-11-30

## 2017-11-30 DIAGNOSIS — F52.21 ERECTILE DISORDER, LIFELONG, GENERALIZED, MODERATE: ICD-10-CM

## 2017-11-30 DIAGNOSIS — F64.9 GENDER IDENTITY DISORDER: Primary | ICD-10-CM

## 2017-11-30 NOTE — MR AVS SNAPSHOT
After Visit Summary   11/30/2017    Fred Flores    MRN: [id number]           Patient Information     Date Of Birth          1965        Visit Information        Provider Department      11/30/2017 4:00 PM Brooklynn Zhang, PhD [facility]        Today's Diagnoses     Unspecified gender dysphoria    -  1    Erectile disorder, lifelong, generalized, moderate           Follow-ups after your visit        Your next 10 appointments already scheduled     Dec 14, 2017  5:00 PM CST   INDIVIDUAL THERAPY with Brooklynn Zhang, PhD   [facility] ([facility])    [address]  [address]  [facility] [address]   [phone]            Jan 03, 2018 11:00 AM CST   INDIVIDUAL THERAPY with Brooklynn Zhang PhD   [facility] ([facility])    [address]  [address]  [facility] [address]   [phone]            Jan 17, 2018  4:00 PM CST   INDIVIDUAL THERAPY with Brooklynn Zhang, PhD   [facility] ([facility])    [address]  [address]  [facility] [address]   [phone]            Jan 31, 2018  4:00 PM CST   INDIVIDUAL THERAPY with Brooklynn Zhang, PhD   [facility] ([facility])    [address]  [address]  [facility] [address]   [phone]              Who to contact     Please call your clinic at [phone] to:    Ask questions about your health    Make or cancel appointments    Discuss your medicines    Learn about your test results    Speak to your doctor   If you have compliments or concerns about an experience at your clinic, or if you wish to file a complaint, please contact [facility] Patient Relations at [phone] or email us at [email or web address]         Additional Information About Your Visit        Receptorhart Information     Sweetspot Intelligence is an electronic gateway that provides  easy, online access to your medical records. With [employer], you can request a clinic appointment, read your test results, renew a prescription or communicate with your care team.     To sign up for [employer] visit the website at [email or web address]   You will be asked to enter the access code listed below, as well as some personal information. Please follow the directions to create your username and password.     Your access code is: [email or web address]  Expires: 2017 11:17 AM     Your access code will  in 90 days. If you need help or a new code, please contact your [facility] or call [phone] for assistance.        Care EveryWhere ID     This is your Care EveryWhere ID. This could be used by other organizations to access your [place] medical records  [id number]         Blood Pressure from Last 3 Encounters:   No data found for BP    Weight from Last 3 Encounters:   No data found for Wt              We Performed the Following     Individual Psychotherapy (53+ min) [95642]        Primary Care Provider    None Specified       No primary provider on file.        Equal Access to Services     : Hadii rao Tinajero, waaxda lucassie, qaybta kaalkayce lynch, charisse steve . So [facility] [phone].    ATENCIÓN: Si habla español, tiene a smith disposición servicios gratuitos de asistencia lingüística. Llame al [phone].    We comply with applicable federal civil rights laws and [place] laws. We do not discriminate on the basis of race, color, national origin, age, disability, sex, sexual orientation, or gender identity.            Thank you!     Thank you for choosing [place] [facility]  for your care. Our goal is always to provide you with excellent care. Hearing back from our patients is one way we can continue to improve our services. Please take a few minutes to complete the written survey that you may receive in  the mail after your visit with us. Thank you!             Your Updated Medication List - Protect others around you: Learn how to safely use, store and throw away your medicines at www.disposemymeds.org.      Notice  As of 11/30/2017 11:59 PM    You have not been prescribed any medications.

## 2017-12-03 NOTE — PROGRESS NOTES
"Program [facility] Sexuality  [facility]  [address] [place] [address], [address]  [place] [address]  Outpatient Progress Note      Session Date: 17  Client Name: Fred Flores (\"Sunni;\" she/her pronouns)  YOB: 1965  MRN: [id number]  Provider: Brooklynn Zhang, PhD   Type of Session: Individual   Present in Session: Client only   Number of Minutes: 55   Treatment Plan Due: 2018    Health Maintenance Summary - Mental Health Treatment Plan       Status Date      Mental Health Tx Plan Q11 MOS Next Due 2018      Done 2017           Current Symptoms/Status:   Sunni reported lifelong difficulties with both achieving and maintaining erections, beginning with her first partnered sexual experience at age 21. These concerns are present during both masturbation and with all sexual partners, including new dating partner (Tawny). Sunni reported that the use of Viagra and Cialis 2-3 years ago was costly and inconsistently helpful with erectile functioning so she discontinued use. Continues to experience full morning erections and occasionally will wake up to erections in the middle of the night. Self-stimulated erections, however, typically reach hardness of only 2/10. Able to reach orgasm/ejaculation with erectile hardness at a 4-5/10; however, this does not occur often and, thus, Sunni is rarely able to reach climax alone or with a partner. In additional to erectile concerns, Sunni also noted that she is interested in exploring questions about gender and sexual interests. She has \"adopted\" the name Sunni for \"that part of me\" that desires to be more feminine and enjoys cross-dressing. She indicated that, since her wife  in , she has again been purchasing feminine clothing and has been supported in exploring a feminine gender expression by Tawny.      Progress Toward Treatment Goals:   Increased gender exploration and comfort, including feminine presentation to " "sessions      Intervention & Response:   Interpersonal, client-centered, and CBT techniques utilized to address client's current status. Sunni reported feeling  excited  to again be presenting in feminine clothing to today's appointment. She shared that, in planning her outfit last night with her partner, she noticed herself smiling and acknowledged a feeling that  this is right  and that she felt happy. Sunni also reported a small  triumph  in her decision today to walk from her house to the garage in feminine clothing and  caring less and less whether people know.  Therapist and client discussed updates related to Sunni attending a [employer] meeting on Saturday. She reported interest and excitement about this meeting, especially around meeting others who cross-dress and taking steps to determine whether/how this identity label (\"cross-dresser\") might fit for her. Sunni read aloud to provider the letter that she has started drafting to address gender exploration with her young adult daughters. Processed this decision at length and therapist provided feedback about the letter. Sunni reported that both of her daughters identify as LGBTQ allies and, as such, she suspects that they will be accepting. Discussed, however, Sunni's worry about hurting them after their mother's death. Discussed potential questions that may arise from Sunni's children (e.g., about medical transition options) and assisted Sunni in considering her answers. She noted plans to read the letter to her daughters in about two weeks, when they are both home for the holidays, and indicated a sense that waiting any longer would restrict her ability to explore further (e.g., feminine dress at home/in public). Processed how Sunni would like this coming out experience to go (e.g., talking together with both children versus separately, giving them the option to  see Sunni,  etc.) and how she plans to finish her letter. Overall, Sunni was open to feedback, active, and " engaged throughout session.       Assignment:   (1). Continue reading/completing My Gender Workbook (Lizzie) & How to Understand Your Gender (Julianna & Megan)  (2). Attend [place] [facility] meeting   (3). Complete disclosure letter to daughters.      Diagnosis:        302.6 (F64.9) - Unspecified gender dysphoria       302.72 (F52.21) - Erectile disorder, lifelong, generalized, moderate      Interactive Complexity:  None.       Plan / Need for Future Services:    Return for individual therapy in 2 weeks.             Brooklynn Zhang, PhD,   Licensed Psychologist

## 2017-12-14 ENCOUNTER — OFFICE VISIT (OUTPATIENT)
Dept: OTHER | Facility: OUTPATIENT CENTER | Age: 52
End: 2017-12-14
Payer: COMMERCIAL

## 2017-12-14 DIAGNOSIS — F64.9 GENDER IDENTITY DISORDER: Primary | ICD-10-CM

## 2017-12-14 DIAGNOSIS — F52.21 ERECTILE DISORDER, LIFELONG, GENERALIZED, MODERATE: ICD-10-CM

## 2017-12-14 NOTE — MR AVS SNAPSHOT
After Visit Summary   12/14/2017    Fred Flores    MRN: [id number]           Patient Information     Date Of Birth          1965        Visit Information        Provider Department      12/14/2017 5:00 PM Brooklynn Zhang, PhD [facility]        Today's Diagnoses     Unspecified gender dysphoria    -  1    Erectile disorder, lifelong, generalized, moderate           Follow-ups after your visit        Your next 10 appointments already scheduled     Jan 03, 2018 11:00 AM CST   INDIVIDUAL THERAPY with Brooklynn Zhang, PhD   [facility] ([facility])    [address]  [address]  [facility] [address]   [phone]            Jan 17, 2018  4:00 PM CST   INDIVIDUAL THERAPY with Brooklynn Zhang, PhD   [facility] ([facility])    [address]  [address]  [facility] [address]   [phone]            Jan 31, 2018  4:00 PM CST   INDIVIDUAL THERAPY with Brooklynn Zhang, PhD   [facility] ([facility])    [address]  [address]  [facility] [address]   [phone]            Feb 14, 2018  4:00 PM CST   INDIVIDUAL THERAPY with Brooklynn Zhang, PhD   [facility] ([facility])    [address]  [address]  [facility] [address]   [phone]            Feb 28, 2018  4:00 PM CST   INDIVIDUAL THERAPY with Brooklynn Zhang, PhD   [facility] ([facility])    [address]  [address]  [facility] [address]   [phone]              Who to contact     Please call your clinic at [phone] to:    Ask questions about your health    Make or cancel appointments    Discuss your medicines    Learn about your test results    Speak to your doctor   If you have compliments or concerns about an experience at your clinic, or if you wish to file a complaint, please contact [facility] Patient Relations at [phone] or email us at [email or web address]         Additional Information About Your Visit        AppZeroharFilip Technologies Information     Convene is an electronic gateway that provides easy, online access to your medical records. With Convene, you can request a clinic appointment, read your test results, renew a prescription or communicate with your care team.     To sign up for Convene visit the website at [email or web address]   You will be asked to enter the access code listed below, as well as some personal information. Please follow the directions to create your username and password.     Your access code is: [email or web address]  Expires: 3/17/2018  5:49 PM     Your access code will  in 90 days. If you need help or a new code, please contact your [facility] or call [phone] for assistance.        Care EveryWhere ID     This is your Care EveryWhere ID. This could be used by other organizations to access your [place] medical records  [id number]         Blood Pressure from Last 3 Encounters:   No data found for BP    Weight from Last 3 Encounters:   No data found for Wt              We Performed the Following     Individual Psychotherapy (53+ min) [89115]        Primary Care Provider    None Specified       No primary provider on file.        Equal Access to Services     REINALDO GLOVER : Hadii rao yipo Tanvi, waaxda luqadaha, qaybta kaalmada adejen, charisse ruelas. So [facility] [phone].    ATENCIÓN: Si habla español, tiene a smith disposición servicios gratuitos de asistencia lingüística. Llcarlito al [phone].    We comply with applicable federal civil rights laws and [place] laws. We do not discriminate on the basis of race, color, national origin, age, disability, sex, sexual orientation, or gender identity.            Thank you!     Thank you for choosing [place] [facility]  for your care.  Our goal is always to provide you with excellent care. Hearing back from our patients is one way we can continue to improve our services. Please take a few minutes to complete the written survey that you may receive in the mail after your visit with us. Thank you!             Your Updated Medication List - Protect others around you: Learn how to safely use, store and throw away your medicines at www.disposemymeds.org.      Notice  As of 12/14/2017 11:59 PM    You have not been prescribed any medications.

## 2017-12-17 NOTE — PROGRESS NOTES
"Program in Human Sexuality  [facility]  [address] [place] [address], [address]  [place] [address]  Outpatient Progress Note      Session Date: 17  Client Name: Fred Flores (\"Sunni;\" she/her pronouns)  YOB: 1965  MRN: [id number]  Provider: Brooklynn Zhang, PhD   Type of Session: Individual   Present in Session: Client only   Number of Minutes: 55   Treatment Plan Due: 2018    Health Maintenance Summary - Mental Health Treatment Plan       Status Date      Mental Health Tx Plan Q11 MOS Next Due 2018      Done 2017           Current Symptoms/Status:   Sunni reported lifelong difficulties with both achieving and maintaining erections, beginning with her first partnered sexual experience at age 21. These concerns are present during both masturbation and with all sexual partners, including new dating partner (Tawny). Sunni reported that the use of Viagra and Cialis 2-3 years ago was costly and inconsistently helpful with erectile functioning so she discontinued use. Continues to experience full morning erections and occasionally will wake up to erections in the middle of the night. Self-stimulated erections, however, typically reach hardness of only 2/10. Able to reach orgasm/ejaculation with erectile hardness at a 4-5/10; however, this does not occur often and, thus, Sunni is rarely able to reach climax alone or with a partner. In additional to erectile concerns, Sunni also noted that she is interested in exploring questions about gender and sexual interests. She has \"adopted\" the name Sunni for \"that part of me\" that desires to be more feminine and enjoys cross-dressing. She indicated that, since her wife  in , she has again been purchasing feminine clothing and has been supported in exploring a feminine gender expression by Tawny.      Progress Toward Treatment Goals:   Increased gender exploration and comfort, including feminine presentation to " "sessions      Intervention & Response:   Interpersonal, client-centered, and CBT techniques utilized to address client's current status. Sunni presented in feminine clothing to session, commenting that it felt \"good\" but she was also aware of some anxiety while sitting in the patient waiting room. Sunni shared a completed copy of her coming out letter (see chart), which she reportedly read aloud to her daughters yesterday morning. Therapist and client processed Sunni's reactions to this experience, at length, including worry about her youngest daughter s (16 yo) reactions. She reported feeling relieved that her eldest daughter hugged her and told Sunni that she was proud of her. Identified and discussed steps for moving forward and continuing conversations around gender with her daughters. Therapist and client also discussed Sunni's recent meeting with local [employer] leader, which her partner attended as well. Sunni shared that she plans to follow-up on this and engage with the national organization in order to begin attending local chapter meetings. Sunni was tearful at session end in thanking provider and noting that, despite nervousness about sharing gender-related concerns with her daughters, she  feels great  and is happy to \"finally\" be exploring a longstanding part of her identity. Reflected, validated, and normalized client reactions and emotions throughout session. Overall, Sunni was open to feedback, active, and engaged throughout session.       Assignment:   (1). Continue reading/completing My Gender Workbook (Lizzie) & How to Understand Your Gender (Julianna & Megan)  (2). Follow up with [employer] in order to begin attending [place] meetings      Diagnosis:        302.6 (F64.9) - Unspecified gender dysphoria       302.72 (F52.21) - Erectile disorder, lifelong, generalized, moderate      Interactive Complexity:  None.       Plan / Need for Future Services:    Return for individual therapy in 2 weeks. "             Brooklynn Zhang, PhD,   Licensed Psychologist

## 2018-01-03 ENCOUNTER — OFFICE VISIT (OUTPATIENT)
Dept: OTHER | Facility: OUTPATIENT CENTER | Age: 53
End: 2018-01-03
Payer: COMMERCIAL

## 2018-01-03 DIAGNOSIS — F52.21 ERECTILE DISORDER, LIFELONG, GENERALIZED, MODERATE: ICD-10-CM

## 2018-01-03 DIAGNOSIS — F64.9 GENDER IDENTITY DISORDER: Primary | ICD-10-CM

## 2018-01-03 NOTE — MR AVS SNAPSHOT
After Visit Summary   1/3/2018    Fred Flores    MRN: [id number]           Patient Information     Date Of Birth          1965        Visit Information        Provider Department      1/3/2018 11:00 AM Brooklynn Zhang, PhD [facility]        Today's Diagnoses     Unspecified gender dysphoria    -  1    Erectile disorder, lifelong, generalized, moderate           Follow-ups after your visit        Your next 10 appointments already scheduled     Jan 17, 2018  4:00 PM CST   INDIVIDUAL THERAPY with Brooklynn Zhang PhD   [facility] ([facility])    [address]  [address]  [facility] [address]   [phone]            Jan 31, 2018  4:00 PM CST   INDIVIDUAL THERAPY with Brooklynn Zhang PhD   [facility] ([facility])    [address]  [address]  [facility] [address]   [phone]            Feb 14, 2018  4:00 PM CST   INDIVIDUAL THERAPY with Brooklynn Zhang, PhD   [facility] ([facility])    [address]  [address]  [facility] [address]   [phone]            Feb 28, 2018  4:00 PM CST   INDIVIDUAL THERAPY with Brooklynn Zhang, PhD   [facility] ([facility])    [address]  [address]  [facility] [address]   [phone]            Mar 14, 2018  4:00 PM CDT   INDIVIDUAL THERAPY with Brooklynn Zhang, PhD   [facility] ([facility])    [address]  [address]  [facility] [address]   [phone]            Mar 28, 2018  4:00 PM CDT   INDIVIDUAL THERAPY with Brooklynn Zhang, PhD   [facility] ([facility])    [address]  [address]  [facility] [address]   [phone]              Who to contact     Please call your clinic at [phone] to:    Ask questions about your health    Make or cancel appointments    Discuss your  medicines    Learn about your test results    Speak to your doctor   If you have compliments or concerns about an experience at your clinic, or if you wish to file a complaint, please contact [facility] Patient Relations at [phone] or email us at [email or web address]         Additional Information About Your Visit        Stayhound[employer]vo International [employer] Information     Antenna Softwaret is an electronic gateway that provides easy, online access to your medical records. With Labcyte, you can request a clinic appointment, read your test results, renew a prescription or communicate with your care team.     To sign up for Antenna Softwaret visit the website at [email or web address]   You will be asked to enter the access code listed below, as well as some personal information. Please follow the directions to create your username and password.     Your access code is: [email or web address]  Expires: 3/17/2018  5:49 PM     Your access code will  in 90 days. If you need help or a new code, please contact your [facility] or call [phone] for assistance.        Care EveryWhere ID     This is your Care EveryWhere ID. This could be used by other organizations to access your [place] medical records  [id number]         Blood Pressure from Last 3 Encounters:   No data found for BP    Weight from Last 3 Encounters:   No data found for Wt              We Performed the Following     Individual Psychotherapy (53+ min) [69261]        Primary Care Provider    None Specified       No primary provider on file.        Equal Access to Services     [facility] Hospital[facility] : Hadii rao Tinajero, waaxda luqadaha, qaybta kaalmada yolandada, charisse steve . So [facility] [phone].    ATENCIÓN: Si habla español, tiene a smith disposición servicios gratuitos de asistencia lingüística. Llame al [phone].    We comply with applicable federal civil rights laws and [place] laws. We do  not discriminate on the basis of race, color, national origin, age, disability, sex, sexual orientation, or gender identity.            Thank you!     Thank you for choosing [place] [facility]  for your care. Our goal is always to provide you with excellent care. Hearing back from our patients is one way we can continue to improve our services. Please take a few minutes to complete the written survey that you may receive in the mail after your visit with us. Thank you!             Your Updated Medication List - Protect others around you: Learn how to safely use, store and throw away your medicines at www.disposemymeds.org.      Notice  As of 1/3/2018 11:59 PM    You have not been prescribed any medications.

## 2018-01-10 NOTE — PROGRESS NOTES
"[facility]  [facility]  [address] [place] [address], [address]  [place] [address]  Outpatient Progress Note      Session Date: 18  Client Name: Fred Flores (\"Sunni;\" she/her pronouns)  YOB: 1965  MRN: [id number]  Provider: Brooklynn Zhang, PhD   Type of Session: Individual   Present in Session: Client only   Number of Minutes: 55   Treatment Plan Due: 2018    Health Maintenance Summary - Mental Health Treatment Plan       Status Date      Mental Health Tx Plan Q11 MOS Next Due 2018      Done 2017           Current Symptoms/Status:   Sunni reported lifelong difficulties with both achieving and maintaining erections, beginning with her first partnered sexual experience at age 21. These concerns are present during both masturbation and with all sexual partners, including new dating partner (Tawny). Sunni reported that the use of Viagra and Cialis 2-3 years ago was costly and inconsistently helpful with erectile functioning so she discontinued use. Continues to experience full morning erections and occasionally will wake up to erections in the middle of the night. Self-stimulated erections, however, typically reach hardness of only 2/10. Able to reach orgasm/ejaculation with erectile hardness at a 4-5/10; however, this does not occur often and, thus, Sunni is rarely able to reach climax alone or with a partner. In additional to erectile concerns, Sunni also noted that she is interested in exploring questions about gender and sexual interests. She has \"adopted\" the name Sunni for \"that part of me\" that desires to be more feminine and enjoys cross-dressing. She indicated that, since her wife  in , she has again been purchasing feminine clothing and has been supported in exploring a feminine gender expression by Tawny.      Progress Toward Treatment Goals:   Increased gender exploration and comfort, including consistent feminine presentation to " "sessions      Intervention & Response:   Interpersonal, client-centered, and CBT techniques utilized to address client's current status. Sunni indicated experiencing some  melancholy  upon arriving and  feeling like a bad parent  due to leaving her daughter sick at home today (with her partner and other daughter) in order to attend session. Identified and processed that Sunni is feeling  selfish  about her own needs around gender being put first, despite knowing that her daughter is well supported by the rest of the family. Discussed family dynamics since Sunni came out as trans/gender questioning to her daughters before the holidays. She reported a neutral to positive reaction, acknowledging some \"disappointment\" but understanding that her daughters have not displayed overt excitement about Sunni's identity.  Discussed that she has stopped hiding feminine clothing around the house in order to slowly allow her daughters to see these aspects of her daily life and gender exploration. Sunni reported continuing to feel positive about feminine dress/presentation and indicated that she plans to explore obtaining a feminine wig and would also like to learn more about make-up application. Identified and discussed potential strategies for approaching these goals, for which Sunni's partner has reportedly expressed support. Sunni shared that she still needs to follow-up with [facility] in order to begin attending regular meetings and this was reassigned for homework. Reflected, validated, and normalized client reactions and emotions throughout session. Overall, Sunni was open to feedback, active, and engaged throughout session.       Assignment:   (1). Continue reading/completing My Gender Workbook (Lizzie) & How to Understand Your Gender (Julianna & Megan)  (2). Follow up with [facility] in order to begin attending [place] meetings      Diagnosis:        302.6 (F64.9) - Unspecified gender dysphoria       302.72 (F52.21) - Erectile " disorder, lifelong, generalized, moderate      Interactive Complexity:  None.       Plan / Need for Future Services:    Return for individual therapy in 2 weeks.             Brooklynn Zhang, PhD, LP  Licensed Psychologist

## 2018-01-17 ENCOUNTER — OFFICE VISIT (OUTPATIENT)
Dept: OTHER | Facility: OUTPATIENT CENTER | Age: 53
End: 2018-01-17
Payer: COMMERCIAL

## 2018-01-17 DIAGNOSIS — F52.21 ERECTILE DISORDER, LIFELONG, GENERALIZED, MODERATE: ICD-10-CM

## 2018-01-17 DIAGNOSIS — F64.9 GENDER IDENTITY DISORDER: Primary | ICD-10-CM

## 2018-01-17 NOTE — MR AVS SNAPSHOT
After Visit Summary   1/17/2018    Fred Flores    MRN: [id number]           Patient Information     Date Of Birth          1965        Visit Information        Provider Department      1/17/2018 4:00 PM Brooklynn Zhang, PhD [facility]        Today's Diagnoses     Unspecified gender dysphoria    -  1    Erectile disorder, lifelong, generalized, moderate           Follow-ups after your visit        Your next 10 appointments already scheduled     Jan 31, 2018  4:00 PM CST   INDIVIDUAL THERAPY with Brooklynn Zhang, PhD   [facility] ([facility])    [address]  [address]  [facility] [address]   [phone]            Feb 14, 2018  4:00 PM CST   INDIVIDUAL THERAPY with Brooklynn Zhang, PhD   [facility] ([facility])    [address]  [address]  [facility] [address]   [phone]            Feb 26, 2018  2:00 PM CST   INDIVIDUAL THERAPY with Brooklynn Zhang, PhD   [facility] ([facility])    [address]  [address]  [facility] [address]   [phone]            Mar 14, 2018  4:00 PM CDT   INDIVIDUAL THERAPY with Brooklynn Zhang, PhD   [facility] ([facility])    [address]  [address]  [facility] [address]   [phone]            Mar 28, 2018  4:00 PM CDT   INDIVIDUAL THERAPY with Brooklynn Zhang, PhD   [facility] ([facility])    [address]  [address]  [facility] [address]   [phone]              Who to contact     Please call your clinic at [phone] to:    Ask questions about your health    Make or cancel appointments    Discuss your medicines    Learn about your test results    Speak to your doctor   If you have compliments or concerns about an experience at your clinic, or if you wish to file a complaint, please contact [facility] Patient Relations at [phone] or email us at [email or web address]         Additional Information About Your Visit        [employer] Information     Scentbird is an electronic gateway that provides easy, online access to your medical records. With Scentbird, you can request a clinic appointment, read your test results, renew a prescription or communicate with your care team.     To sign up for Scentbird visit the website at [email or web address]   You will be asked to enter the access code listed below, as well as some personal information. Please follow the directions to create your username and password.     Your access code is: [email or web address]  Expires: 3/17/2018  5:49 PM     Your access code will  in 90 days. If you need help or a new code, please contact your [facility] or call [phone] for assistance.        Care EveryWhere ID     This is your Care EveryWhere ID. This could be used by other organizations to access your [place] medical records  [id number]         Blood Pressure from Last 3 Encounters:   No data found for BP    Weight from Last 3 Encounters:   No data found for Wt              We Performed the Following     Individual Psychotherapy (53+ min) [11484]        Primary Care Provider    None Specified       No primary provider on file.        Equal Access to Services     REINALDO GLOVER : Hadii rao yipo Tanvi, waaxda luqadaha, qaybta kaalmada adejen, charisse ruelas. So [facility] [phone].    ATENCIÓN: Si habla español, tiene a smith disposición servicios gratuitos de asistencia lingüística. Llcarlito al [phone].    We comply with applicable federal civil rights laws and [place] laws. We do not discriminate on the basis of race, color, national origin, age, disability, sex, sexual orientation, or gender identity.            Thank you!     Thank you for choosing [facility]  for your care.  Our goal is always to provide you with excellent care. Hearing back from our patients is one way we can continue to improve our services. Please take a few minutes to complete the written survey that you may receive in the mail after your visit with us. Thank you!             Your Updated Medication List - Protect others around you: Learn how to safely use, store and throw away your medicines at www.disposemymeds.org.      Notice  As of 1/17/2018 11:59 PM    You have not been prescribed any medications.

## 2018-01-21 NOTE — PROGRESS NOTES
"Program in Human Sexuality  [facility]  [address] [place] [address], [address]  [place] [address]  Outpatient Progress Note      Session Date: 18  Client Name: Fred Flores (\"Sunni;\" she/her pronouns)  YOB: 1965  MRN: [id number]  Provider: Brooklynn Zhang, PhD   Type of Session: Individual   Present in Session: Client only   Number of Minutes: 55   Treatment Plan Due: 2018    Health Maintenance Summary - Mental Health Treatment Plan       Status Date      Mental Health Tx Plan Q11 MOS Next Due 2018      Done 2017           Current Symptoms/Status:   Sunni reported lifelong difficulties with both achieving and maintaining erections, beginning with her first partnered sexual experience at age 21. These concerns are present during both masturbation and with all sexual partners, including new dating partner (Tawny). Sunni reported that the use of Viagra and Cialis 2-3 years ago was costly and inconsistently helpful with erectile functioning so she discontinued use. Continues to experience full morning erections and occasionally will wake up to erections in the middle of the night. Self-stimulated erections, however, typically reach hardness of only 2/10. Able to reach orgasm/ejaculation with erectile hardness at a 4-5/10; however, this does not occur often and, thus, Sunni is rarely able to reach climax alone or with a partner. In additional to erectile concerns, Sunni also noted that she is interested in exploring questions about gender and sexual interests. She has \"adopted\" the name Sunni for \"that part of me\" that desires to be more feminine and enjoys cross-dressing. She indicated that, since her wife  in , she has again been purchasing feminine clothing and has been supported in exploring a feminine gender expression by Tawny.      Progress Toward Treatment Goals:   Increased gender exploration and comfort, including consistent feminine presentation to " "sessions      Intervention & Response:   Interpersonal, client-centered, and CBT techniques utilized to address client's current status. Sunni appeared down and sad upon arriving for session. She explained that, for the first time today, she did not have assistance from her partner in selecting a feminine outfit and felt both self-conscious and \"stuck\" with regard to gender. Explored these feelings further with Sunni noting that, if not for fear of judgment and a negative culture, she would fully transition and present feminine full time. Discussed and processed, however, ways in which she \"enjoys being a amanda,\" especially in spending time with male friends. Identified aspects of traditional gender role stereotypes in these experiences (e.g., feeling more free to curse, watching sports) and discussed how this fits into Sunni's identity. Though Sunni expressed that she struggles with \"ambiguity,\" therapist and client continued to discuss and explore genderfluid and non-binary identities. Sunni was able to identify ways in which these labels resonate with her and ways in which they confuse and frustrate her as related to being raised with a binary construct of gender. Discussed that it may be useful for Sunni to begin meeting and talking with others with have experienced similar gender-related questions. Sunni was open to this and therapist provided information for several local support groups (e.g., [employer] [place]). Also continued to encourage Sunni to reach out to [facility], as previously discussed. Reflected, validated, and normalized client reactions and emotions throughout session. Sunni thanked provider at session end and noted feeling \"better.\" She also shared that, per last session, she has purchased a number of makeup products and plans to begin practicing with application. Overall, she was open to feedback, active, and engaged throughout session.       Assignment:   (1). Continue reading/completing My Gender Workbook " (Lizzie) & How to Understand Your Gender (Julianna & Megan)  (2). Follow up with [facility] in order to begin attending [place] meetings. Look into other community support groups of interest.       Diagnosis:        302.6 (F64.9) - Unspecified gender dysphoria       302.72 (F52.21) - Erectile disorder, lifelong, generalized, moderate      Interactive Complexity:  None.       Plan / Need for Future Services:    Return for individual therapy in 2 weeks.             Brooklynn Zhang, PhD, LP  Licensed Psychologist

## 2018-01-31 ENCOUNTER — OFFICE VISIT (OUTPATIENT)
Dept: OTHER | Facility: OUTPATIENT CENTER | Age: 53
End: 2018-01-31
Payer: COMMERCIAL

## 2018-01-31 DIAGNOSIS — F64.9 GENDER IDENTITY DISORDER: Primary | ICD-10-CM

## 2018-01-31 DIAGNOSIS — F52.21 ERECTILE DISORDER, LIFELONG, GENERALIZED, MODERATE: ICD-10-CM

## 2018-02-14 ENCOUNTER — OFFICE VISIT (OUTPATIENT)
Dept: OTHER | Facility: OUTPATIENT CENTER | Age: 53
End: 2018-02-14
Payer: COMMERCIAL

## 2018-02-14 DIAGNOSIS — F64.9 GENDER IDENTITY DISORDER: Primary | ICD-10-CM

## 2018-02-14 DIAGNOSIS — F52.21 ERECTILE DISORDER, LIFELONG, GENERALIZED, MODERATE: ICD-10-CM

## 2018-02-14 NOTE — MR AVS SNAPSHOT
After Visit Summary   2/14/2018    Fred Flores    MRN: [id number]           Patient Information     Date Of Birth          1965        Visit Information        Provider Department      2/14/2018 4:00 PM Brooklynn Zhang, PhD [facility]        Today's Diagnoses     Unspecified gender dysphoria    -  1    Erectile disorder, lifelong, generalized, moderate           Follow-ups after your visit        Your next 10 appointments already scheduled     Feb 26, 2018  2:00 PM CST   INDIVIDUAL THERAPY with Brooklynn Zhang, PhD   [facility] ([facility])    [address]  [address]  [facility] [address]   [phone]            Mar 14, 2018  4:00 PM CDT   INDIVIDUAL THERAPY with Brooklynn Zhang, PhD   [facility] ([facility])    [address]  [address]  [facility] [address]   [phone]            Mar 28, 2018  4:00 PM CDT   INDIVIDUAL THERAPY with Brooklynn Zhang, PhD   [facility] ([facility])    [address]  [address]  [facility] [address]   [phone]            Apr 11, 2018  4:00 PM CDT   INDIVIDUAL THERAPY with Brooklynn Zhang, PhD   [facility] ([facility])    [address]  [address]  [facility] [address]   [phone]            Apr 25, 2018  4:00 PM CDT   INDIVIDUAL THERAPY with rBooklynn Zhang, PhD   [facility] ([facility])    [address]  [address]  [facility] [address]   [phone]              Who to contact     Please call your clinic at [phone] to:    Ask questions about your health    Make or cancel appointments    Discuss your medicines    Learn about your test results    Speak to your doctor            Additional Information About Your Visit        [employer] Information     [employer] is an electronic gateway that provides easy, online access to  your medical records. With Logopro, you can request a clinic appointment, read your test results, renew a prescription or communicate with your care team.     To sign up for Logopro visit the website at [email or web address]   You will be asked to enter the access code listed below, as well as some personal information. Please follow the directions to create your username and password.     Your access code is: [email or web address]  Expires: 3/17/2018  5:49 PM     Your access code will  in 90 days. If you need help or a new code, please contact your [facility] or call [phone] for assistance.        Care EveryWhere ID     This is your Care EveryWhere ID. This could be used by other organizations to access your [place] medical records  [id number]         Blood Pressure from Last 3 Encounters:   No data found for BP    Weight from Last 3 Encounters:   No data found for Wt              We Performed the Following     Individual Psychotherapy (53+ min) [940[address]]        Primary Care Provider    None Specified       No primary provider on file.        Equal Access to Services     [facility] : Hadii rao Tinajero, waaxda sara, qaybta kaalkayce lynch, charisse steve . So [facility] [phone].    ATENCIÓN: Si habla español, tiene a smith disposición servicios gratuitos de asistencia lingüística. Llame al [phone].    We comply with applicable federal civil rights laws and [place] laws. We do not discriminate on the basis of race, color, national origin, age, disability, sex, sexual orientation, or gender identity.            Thank you!     Thank you for choosing [facility]  for your care. Our goal is always to provide you with excellent care. Hearing back from our patients is one way we can continue to improve our services. Please take a few minutes to complete the written survey that you may receive in the mail after your visit  with us. Thank you!             Your Updated Medication List - Protect others around you: Learn how to safely use, store and throw away your medicines at www.disposemymeds.org.      Notice  As of 2/14/2018 11:59 PM    You have not been prescribed any medications.

## 2018-02-25 NOTE — PROGRESS NOTES
"Program in Human Sexuality  [facility]  [address] [place] [address], [address]  [place] [address]  Outpatient Progress Note      Session Date: 18  Client Name: Fred Flores (\"Sunni;\" she/her pronouns)  YOB: 1965  MRN: [id number]  Provider: Brooklynn Zhang, PhD   Type of Session: Individual   Present in Session: Client and, briefly, client's daughter (Vandana)  Number of Minutes: 55   Treatment Plan Due: 2018    Health Maintenance Summary - Mental Health Treatment Plan       Status Date      Mental Health Tx Plan Q11 MOS Next Due 2018      Done 2017           Current Symptoms/Status:   uSnni reported lifelong difficulties with both achieving and maintaining erections, beginning with her first partnered sexual experience at age 21. These concerns are present during both masturbation and with all sexual partners, including new dating partner (Tawny). Sunni reported that the use of Viagra and Cialis 2-3 years ago was costly and inconsistently helpful with erectile functioning so she discontinued use. Continues to experience full morning erections and occasionally will wake up to erections in the middle of the night. Self-stimulated erections, however, typically reach hardness of only 2/10. Able to reach orgasm/ejaculation with erectile hardness at a 4-5/10; however, this does not occur often and, thus, Sunni is rarely able to reach climax alone or with a partner. In additional to erectile concerns, Sunni also noted that she is interested in exploring questions about gender and sexual interests. She has \"adopted\" the name Sunni for \"that part of me\" that desires to be more feminine and enjoys cross-dressing. She indicated that, since her wife  in 2016, she has again been purchasing feminine clothing and has been supported in exploring a feminine gender expression by Tawny.      Progress Toward Treatment Goals:   Increased gender exploration and comfort, including consistent " "feminine presentation to sessions. First session wearing feminine wig.       Intervention & Response:   Interpersonal, client-centered, and CBT techniques utilized to address client's current status. Sunni presented to session in masculine clothing and with her daughter, Vandana. She noted a desire to introduce her daughter to the provider; Vandana left following brief introductions. Sunni shared that she and Vandana have spent the day together in the [facility], as today is the 2 year anniversary of Sunni's wife s death. She asked to take time in session to discuss this further and tearfully shared the narrative surrounding her wife's cancer treatment, sudden illness, and death. Processed with Sunni ongoing feelings of grief, as well as some guilt and anger related to the circumstances surrounding her wife's death. Therapist and client also discussed updates related to Sunni's gender goals. She shared that she will be bringing her partner, Tawny, to our next therapy session. Afterward, the two reportedly have plans to attend another make-up consultation together and then go out to dinner with Sunni presenting fully feminine in public for the first time with Tawny. Identified and discussed that Sunni is feeling somewhat nervous but \"mostly excited\" about these plans. Discussed that she has also taken steps to move forward with [employer] membership and group attendance. Overall, Sunni was open to feedback, active, and engaged throughout session.       Assignment:   (1). Continue reading/completing My Gender Workbook (Lizzie) & How to Understand Your Gender (Julianna & Megan)  (2). Begin attending [place] [employer] meetings. Look into other community support groups of interest.       Diagnosis:        302.6 (F64.9) - Unspecified gender dysphoria       302.72 (F52.21) - Erectile disorder, lifelong, generalized, moderate      Interactive Complexity:  None.       Plan / Need for Future Services:    Return for individual therapy in " 2 weeks.             Brooklynn Zhang, PhD,   Licensed Psychologist

## 2018-02-26 ENCOUNTER — OFFICE VISIT (OUTPATIENT)
Dept: OTHER | Facility: OUTPATIENT CENTER | Age: 53
End: 2018-02-26
Payer: COMMERCIAL

## 2018-02-26 DIAGNOSIS — F64.9 GENDER IDENTITY DISORDER: Primary | ICD-10-CM

## 2018-02-26 DIAGNOSIS — F52.21 ERECTILE DISORDER, LIFELONG, GENERALIZED, MODERATE: ICD-10-CM

## 2018-02-26 NOTE — MR AVS SNAPSHOT
After Visit Summary   2/26/2018    Fred Flores    MRN: [id number]           Patient Information     Date Of Birth          1965        Visit Information        Provider Department      2/26/2018 2:00 PM Brooklynn Zhang, PhD [facility]        Today's Diagnoses     Unspecified gender dysphoria    -  1    Erectile disorder, lifelong, generalized, moderate           Follow-ups after your visit        Your next 10 appointments already scheduled     Mar 22, 2018 11:00 AM CDT   Individual Therapy 53+ minutes with Brooklynn Zhang, PhD   [facility] ([facility])    [address]  [address]  [facility] [address]   [phone]            Mar 28, 2018  4:00 PM CDT   Individual Therapy 53+ minutes with Brooklynn Zhang, PhD   [facility] ([facility])    [address]  [address]  [facility] [address]   [phone]            Apr 11, 2018  4:00 PM CDT   Individual Therapy 53+ minutes with Brooklynn Zhang, PhD   [facility] ([facility])    [address]  [address]  [facility] [address]   [phone]            Apr 25, 2018  4:00 PM CDT   Individual Therapy 53+ minutes with Brooklynn Zhang, PhD   [facility] ([facility])    [address]  [address]  [facility] [address]   [phone]              Who to contact     Please call your clinic at [phone] to:    Ask questions about your health    Make or cancel appointments    Discuss your medicines    Learn about your test results    Speak to your doctor            Additional Information About Your Visit        [employer] Information     [employer] is an electronic gateway that provides easy, online access to your medical records. With [employer], you can request a clinic appointment, read your test results, renew a prescription or communicate with your care team.     To sign up for  Rickyt visit the website at [email or web address]   You will be asked to enter the access code listed below, as well as some personal information. Please follow the directions to create your username and password.     Your access code is: [email or web address]  Expires: 3/17/2018  6:49 PM     Your access code will  in 90 days. If you need help or a new code, please contact your [facility] or call [phone] for assistance.        Care EveryWhere ID     This is your Care EveryWhere ID. This could be used by other organizations to access your [place] medical records  [id number]         Blood Pressure from Last 3 Encounters:   No data found for BP    Weight from Last 3 Encounters:   No data found for Wt              We Performed the Following     Individual Psychotherapy (53+ min) [47943]        Primary Care Provider    None Specified       No primary provider on file.        Equal Access to Services     [facility]: Hadii rao Tinajero, waabby ruiz, stella lynch, charisse steve . So [facility] [phone].    ATENCIÓN: Si habla español, tiene a smith disposición servicios gratuitos de asistencia lingüística. Llame al [phone].    We comply with applicable federal civil rights laws and [place] laws. We do not discriminate on the basis of race, color, national origin, age, disability, sex, sexual orientation, or gender identity.            Thank you!     Thank you for choosing [facility]  for your care. Our goal is always to provide you with excellent care. Hearing back from our patients is one way we can continue to improve our services. Please take a few minutes to complete the written survey that you may receive in the mail after your visit with us. Thank you!             Your Updated Medication List - Protect others around you: Learn how to safely use, store and throw away your medicines at  www.disposemymeds.org.      Notice  As of 2/26/2018 11:59 PM    You have not been prescribed any medications.

## 2018-03-12 NOTE — PROGRESS NOTES
"Program [facility] Sexuality  [facility]  [address] [place] [address], [address]  [place] [address]  Outpatient Progress Note      Session Date: 18  Client Name: Fred Flores (\"Sunni;\" she/her pronouns)  YOB: 1965  MRN: [id number]  Provider: Brooklynn Zhang, PhD   Type of Session: Individual   Present in Session: Client and, briefly, client's partner (Tawny)  Number of Minutes: 55   Treatment Plan Due: 2018    Health Maintenance Summary - Mental Health Treatment Plan       Status Date      Mental Health Tx Plan Q11 MOS Next Due 2018      Done 2017           Current Symptoms/Status:   Sunni reported lifelong difficulties with both achieving and maintaining erections, beginning with her first partnered sexual experience at age 21. These concerns are present during both masturbation and with all sexual partners, including new dating partner (Tawny). Sunni reported that the use of Viagra and Cialis 2-3 years ago was costly and inconsistently helpful with erectile functioning so she discontinued use. Continues to experience full morning erections and occasionally will wake up to erections in the middle of the night. Self-stimulated erections, however, typically reach hardness of only 2/10. Able to reach orgasm/ejaculation with erectile hardness at a 4-5/10; however, this does not occur often and, thus, Sunni is rarely able to reach climax alone or with a partner. In additional to erectile concerns, Sunni also noted that she is interested in exploring questions about gender and sexual interests. She has \"adopted\" the name Sunni for \"that part of me\" that desires to be more feminine and enjoys cross-dressing. She indicated that, since her wife  in 2016, she has again been purchasing feminine clothing and has been supported in exploring a feminine gender expression by Tawny.      Progress Toward Treatment Goals:   Increased gender exploration and comfort, including consistent " feminine presentation to sessions.        Intervention & Response:   Interpersonal, client-centered, and CBT techniques utilized to address client's current status. Sunni presented to session with her partner, Tawny, who joined the appointment for about 20 minutes to share the progress she has seen in Sunni's comfort levels and to thank provider for her support. Sunni and Tawny discussed their excitement to go out to dinner tonight following Sunni's make-up application, as this will be the first time that she has fully presented as feminine in public and with Tawny. Partner appeared very supportive of Sunni. After Tawny left session, Sunni noted that she has been increasingly thinking about the impact of low dose femininizing hormones. Discussed her questions about this intervention and the potential usefulness of a consultation session with hormone provider, Dr. Hi Rodríguez. Therapist also provided Sunni with additional reading/consent form related to hormone therapy to review for homework. Given partner presence at today's session, revisited Sunni's goals as related to sexual functioning. She shared that, despite lack of pressure from Tawny, she would like to address ED (without the introduction of masculinizing hormones or medication) in order to have the option of penetrative partnered sex. Discussed the need for Sunni to explore other forms and levels of stimulation, given her long history of rubbing her penis on the bed and the inability to easily replicate this with a partner. Discussed the possibility of using a penile vibrator/cuff and perhaps going to [employer] this evening with Tawny to explore these options. Sunni reported openness to doing this and, overall, was active and engaged throughout session.       Assignment:   (1). Continue reading/completing My Gender Workbook (Lizzie) & How to Understand Your Gender (Julianna & Megan)  (2). Begin attending [place] [facility] meetings. Look into other  community support groups of interest.       Diagnosis:        302.6 (F64.9) - Unspecified gender dysphoria       302.72 (F52.21) - Erectile disorder, lifelong, generalized, moderate      Interactive Complexity:  None.       Plan / Need for Future Services:    Return for individual therapy in 2 weeks.             Brooklynn Zhang, PhD, LP  Licensed Psychologist

## 2018-03-22 ENCOUNTER — OFFICE VISIT (OUTPATIENT)
Dept: OTHER | Facility: OUTPATIENT CENTER | Age: 53
End: 2018-03-22
Payer: COMMERCIAL

## 2018-03-22 DIAGNOSIS — F64.9 GENDER IDENTITY DISORDER: Primary | ICD-10-CM

## 2018-03-22 DIAGNOSIS — F52.21 ERECTILE DISORDER, LIFELONG, GENERALIZED, MODERATE: ICD-10-CM

## 2018-03-22 NOTE — MR AVS SNAPSHOT
After Visit Summary   3/22/2018    Fred Flores    MRN: [id number]           Patient Information     Date Of Birth          1965        Visit Information        Provider Department      3/22/2018 11:00 AM Brooklynn Zhang, PhD [facility]        Today's Diagnoses     Unspecified gender dysphoria    -  1    Erectile disorder, lifelong, generalized, moderate           Follow-ups after your visit        Your next 10 appointments already scheduled     Apr 11, 2018  4:00 PM CDT   Individual Therapy 53+ minutes with Brooklynn Zhang, PhD   [facility] ([facility])    [address]  [address]  [facility] [address]   [phone]            Apr 25, 2018  4:00 PM CDT   Individual Therapy 53+ minutes with Brooklynn Zhang, PhD   [facility] ([facility])    [address]  [address]  [facility] [address]   [phone]            May 09, 2018  1:00 PM CDT   Individual Therapy 53+ minutes with Brooklynn Zhang, PhD   [facility] ([facility])    [address]  [address]  [facility] [address]   [phone]            May 24, 2018 10:00 AM CDT   Individual Therapy 53+ minutes with Brooklynn Zhang, PhD   [facility] ([facility])    [address]  [address]  [facility] [address]   [phone]              Who to contact     Please call your clinic at [phone] to:    Ask questions about your health    Make or cancel appointments    Discuss your medicines    Learn about your test results    Speak to your doctor            Additional Information About Your Visit        [employer] Information     [employer] is an electronic gateway that provides easy, online access to your medical records. With [employer], you can request a clinic appointment, read your test results, renew a prescription or communicate with your care team.     To sign up  for MyChart visit the website at [email or web address]   You will be asked to enter the access code listed below, as well as some personal information. Please follow the directions to create your username and password.     Your access code is: [email or web address]  Expires: 7/10/2018  2:21 PM     Your access code will  in 90 days. If you need help or a new code, please contact your [facility] or call [phone] for assistance.        Care EveryWhere ID     This is your Care EveryWhere ID. This could be used by other organizations to access your [place] medical records  [id number]         Blood Pressure from Last 3 Encounters:   No data found for BP    Weight from Last 3 Encounters:   No data found for Wt              We Performed the Following     Individual Psychotherapy (53+ min) [44546]        Primary Care Provider    None Specified       No primary provider on file.        Equal Access to Services     [facility]: Hadii rao Tinajero, wadeliada sara, nirajta aronalkayec lynch, charisse steve . So [facility] [phone].    ATENCIÓN: Si habla español, tiene a smith disposición servicios gratuitos de asistencia lingüística. Llame al [phone].    We comply with applicable federal civil rights laws and [place] laws. We do not discriminate on the basis of race, color, national origin, age, disability, sex, sexual orientation, or gender identity.            Thank you!     Thank you for choosing [facility]  for your care. Our goal is always to provide you with excellent care. Hearing back from our patients is one way we can continue to improve our services. Please take a few minutes to complete the written survey that you may receive in the mail after your visit with us. Thank you!             Your Updated Medication List - Protect others around you: Learn how to safely use, store and throw away your medicines at  www.disposemymeds.org.      Notice  As of 3/22/2018 11:59 PM    You have not been prescribed any medications.

## 2018-04-11 ENCOUNTER — OFFICE VISIT (OUTPATIENT)
Dept: OTHER | Facility: OUTPATIENT CENTER | Age: 53
End: 2018-04-11
Payer: COMMERCIAL

## 2018-04-11 DIAGNOSIS — F64.9 GENDER IDENTITY DISORDER: Primary | ICD-10-CM

## 2018-04-11 DIAGNOSIS — F52.21 ERECTILE DISORDER, LIFELONG, GENERALIZED, MODERATE: ICD-10-CM

## 2018-04-11 NOTE — PROGRESS NOTES
"Program in Human Sexuality  [facility]  [address] [place] [address], [address]  [place] [address]  Outpatient Progress Note      Session Date: 3/22/18  Client Name: Fred Flores (\"Sunni;\" she/her pronouns)  YOB: 1965  MRN: [id number]  Provider: Brooklynn Zhang, PhD   Type of Session: Individual   Present in Session: Client only  Number of Minutes: 55   Treatment Plan Due: 2018    Health Maintenance Summary - Mental Health Treatment Plan       Status Date      Mental Health Tx Plan Q11 MOS Next Due 2018      Done 2017           Current Symptoms/Status:   Sunni reported lifelong questions about her gender identity and struggles with her sex assigned at birth. Sunni reported that she is interested in exploring questions about gender and sexual interests. She has \"adopted\" the name Sunni for \"that part of me\" that desires to be more feminine and enjoys cross-dressing. She indicated that, since her wife  in , she has again been purchasing feminine clothing and has been supported in exploring a feminine gender expression by her new dating partner, Tawny. In addition to gender concerns, Sunni reported difficulties with both achieving and maintaining erections, beginning with her first partnered sexual experience at age 21. These concerns are present during both masturbation (which consists of stimulation by rubbing penis on the bed) and with all sexual partners. Sunni reported that the use of Viagra and Cialis 2-3 years ago was costly and inconsistently helpful with erectile functioning so she discontinued use. Continues to experience full morning erections and occasionally will wake up to erections in the middle of the night. Self-stimulated erections, however, typically reach hardness of only 2/10. Able to reach orgasm/ejaculation with erectile hardness at a 4-5/10; however, this does not occur often and, thus, Sunni is rarely able to reach climax alone or with a " "partner.    Progress Toward Treatment Goals:   Increased gender exploration and comfort, including consistent feminine presentation to sessions. Considering possibility of low dose feminizing hormones.       Intervention & Response:   Interpersonal, client-centered, and CBT techniques utilized to address client's current status. Per last session, therapist and client explored Sunni's experiences and reactions to receiving a professional makeup application and presenting as feminine in public with her partner. Celebrated that this experience went well, with Sunni reportedly feeling safe, accepted, and comfortable with the feminine gender expression. She noted that she has continued to consider the possibility of low dose hormones, given continued fear of overt physical changes but desire to explore whether a low dose could decrease dysphoria. Discussed the reading/consent form provided last session, including Sunni's questions about the process of obtaining hormone therapy. Discussed the possibility of scheduling an information session with Hi Rodríguez MD, PhD, which Sunni reported she will consider. She shared feeling as if her \"end goal\" for treatment has shifted as she experiences less shame around gender and gender expression, noting that she is now beginning to consider small medical interventions for gender. Explored how this shifting process has felt for Sunni. Reflected, validated, and normalized client's reactions and feelings throughout session. Sunni was open to feedback, active, and engaged throughout session.       Assignment:   (1). Continue reading/completing My Gender Workbook (Lizzie) & How to Understand Your Gender (Julianna & Megan)  (2). Begin attending [facility] meetings. Look into other community support groups of interest.       Diagnosis:        302.6 (F64.9) - Unspecified gender dysphoria       302.72 (F52.21) - Erectile disorder, lifelong, generalized, moderate      Interactive " Complexity:  None.       Plan / Need for Future Services:    Return for individual therapy in 2 weeks.             Brooklynn Zhang, PhD, LP  Licensed Psychologist

## 2018-04-11 NOTE — MR AVS SNAPSHOT
After Visit Summary   4/11/2018    Fred Flores    MRN: [id number]           Patient Information     Date Of Birth          1965        Visit Information        Provider Department      4/11/2018 4:00 PM Brooklynn Zhang, PhD [facility]        Today's Diagnoses     Unspecified gender dysphoria    -  1    Erectile disorder, lifelong, generalized, moderate           Follow-ups after your visit        Your next 10 appointments already scheduled     May 09, 2018  1:00 PM CDT   Individual Therapy 53+ minutes with Brooklynn Zhang, PhD   [facility] ([facility])    [address]  [address]  [facility] [address]   [phone]            May 24, 2018 10:00 AM CDT   Individual Therapy 53+ minutes with Brooklynn Zhang, PhD   [facility] ([facility])    [address]  [address]  [facility] [address]   [phone]            May 29, 2018 11:20 AM CDT   New Patient Visit with Hi Rodríguez MD   [facility] ([facility])    [address]  [address]  [facility] [address]   [phone]            Jun 07, 2018  3:00 PM CDT   Individual Therapy 53+ minutes with Brooklynn Zhang, PhD   [facility] ([facility])    [address]  [address]  [facility] [address]   [phone]            Jun 25, 2018 11:00 AM CDT   Individual Therapy 53+ minutes with Brooklynn Zhang, PhD   [facility] ([facility])    [address]  [address]  [facility] [address]   [phone]            Jul 09, 2018  4:00 PM CDT   Individual Therapy 53+ minutes with Brooklynn Zhang, PhD   [facility] ([facility])    [address]  [address]  [facility] [address]   [phone]            Jul 23, 2018  4:00 PM CDT   Individual Therapy 53+ minutes with Brooklynn Zhang, PhD    [facility] ([facility])    [address]  [address]  [facility] [address]   [phone]              Who to contact     Please call your clinic at [phone] to:    Ask questions about your health    Make or cancel appointments    Discuss your medicines    Learn about your test results    Speak to your doctor            Additional Information About Your Visit        MyChart Information     [employer] is an electronic gateway that provides easy, online access to your medical records. With [employer], you can request a clinic appointment, read your test results, renew a prescription or communicate with your care team.     To sign up for [employer] visit the website at [email or web address]   You will be asked to enter the access code listed below, as well as some personal information. Please follow the directions to create your username and password.     Your access code is: [email or web address]  Expires: 7/10/2018  2:21 PM     Your access code will  in 90 days. If you need help or a new code, please contact your [facility] or call [phone] for assistance.        Care EveryWhere ID     This is your Care EveryWhere ID. This could be used by other organizations to access your [place] medical records  [id number]         Blood Pressure from Last 3 Encounters:   No data found for BP    Weight from Last 3 Encounters:   No data found for Wt              We Performed the Following     Individual Psychotherapy (53+ min) [43995]        Primary Care Provider    None Specified       No primary provider on file.        Equal Access to Services     [facility] : Hadii rao Tinajero, waaxda sara, qaybta kaalmada syed, charisse steve . So [facility] [phone].    ATENCIÓN: Si habla español, tiene a smith disposición servicios gratuitos de asistencia lingüística. Llame al [phone].    We comply with applicable  federal civil rights laws and [place] laws. We do not discriminate on the basis of race, color, national origin, age, disability, sex, sexual orientation, or gender identity.            Thank you!     Thank you for choosing [facility]  for your care. Our goal is always to provide you with excellent care. Hearing back from our patients is one way we can continue to improve our services. Please take a few minutes to complete the written survey that you may receive in the mail after your visit with us. Thank you!             Your Updated Medication List - Protect others around you: Learn how to safely use, store and throw away your medicines at www.disposemymeds.org.      Notice  As of 4/11/2018 11:59 PM    You have not been prescribed any medications.

## 2018-04-12 ENCOUNTER — TEAM CONFERENCE (OUTPATIENT)
Dept: OTHER | Facility: OUTPATIENT CENTER | Age: 53
End: 2018-04-12

## 2018-04-12 NOTE — TELEPHONE ENCOUNTER
Medical/Psychiatric/Psyhological Consultation Form    Date:  2018     Name:  Fred Flores (client goes by Sunni with she/her pronouns)  :  1965  MRN:  [id number]    To:  Dr. Rodríguez    Reason for Consult:  Physical for hormone Therapy Review (60)    DSM-5 Diagnoses:         302.6 (F64.9) - Unspecified gender dysphoria       302.72 (F52.21) - Erectile disorder, lifelong, generalized, moderate    Consult reason / list test to be given/interpreted and additional information:       Client would like to have an information session to discuss the possibility of beginning low-dose, feminizing hormones. Hormone consent form has been provided and discussed, but not yet signed by the client.     OK for Medical Student to sit in: Please ask client.         Brooklynn Zhang, PhD,   Licensed Psychologist

## 2018-04-25 ENCOUNTER — OFFICE VISIT (OUTPATIENT)
Dept: OTHER | Facility: OUTPATIENT CENTER | Age: 53
End: 2018-04-25
Payer: COMMERCIAL

## 2018-04-25 DIAGNOSIS — F64.9 GENDER IDENTITY DISORDER: Primary | ICD-10-CM

## 2018-04-25 DIAGNOSIS — F52.21 ERECTILE DISORDER, LIFELONG, GENERALIZED, MODERATE: ICD-10-CM

## 2018-04-25 NOTE — MR AVS SNAPSHOT
After Visit Summary   4/25/2018    Fred Flores    MRN: [id number]           Patient Information     Date Of Birth          1965        Visit Information        Provider Department      4/25/2018 4:00 PM Brooklynn Zhang, PhD [facility]        Today's Diagnoses     Unspecified gender dysphoria    -  1    Erectile disorder, lifelong, generalized, moderate           Follow-ups after your visit        Your next 10 appointments already scheduled     May 09, 2018  1:00 PM CDT   Individual Therapy 53+ minutes with Brooklynn Zhang, PhD   [facility] ([facility])    [address]  [address]  [facility] [address]   [phone]            May 24, 2018 10:00 AM CDT   Individual Therapy 53+ minutes with Brooklynn Zhang, PhD   [facility] ([facility])    [address]  [address]  [facility] [address]   [phone]            May 29, 2018 11:20 AM CDT   New Patient Visit with Hi Rodríguez MD   [facility] ([facility])    [address]  [address]  [facility] [address]   [phone]            Jun 07, 2018  3:00 PM CDT   Individual Therapy 53+ minutes with Brooklynn Zhang, PhD   [facility] ([facility])    [address]  [address]  [facility] [address]   [phone]            Jun 25, 2018 11:00 AM CDT   Individual Therapy 53+ minutes with Brooklynn Zhang, PhD   [facility] ([facility])    [address]  [address]  [facility] [address]   [phone]            Jul 09, 2018  4:00 PM CDT   Individual Therapy 53+ minutes with Brooklynn Zhang, PhD   [facility] ([facility])    [address]  [address]  [facility] [address]   [phone]            Jul 23, 2018  4:00 PM CDT   Individual Therapy 53+ minutes with Brooklynn Zhang, PhD    [place] [facility] ([facility])    [address]  [address]  [facility] [address]   [phone]              Who to contact     Please call your clinic at [phone] to:    Ask questions about your health    Make or cancel appointments    Discuss your medicines    Learn about your test results    Speak to your doctor            Additional Information About Your Visit        MyChart Information     ThirstyVIPt is an electronic gateway that provides easy, online access to your medical records. With My Team Zone, you can request a clinic appointment, read your test results, renew a prescription or communicate with your care team.     To sign up for ThirstyVIPt visit the website at [email or web address]   You will be asked to enter the access code listed below, as well as some personal information. Please follow the directions to create your username and password.     Your access code is: [email or web address]  Expires: 7/10/2018  2:21 PM     Your access code will  in 90 days. If you need help or a new code, please contact your [facility] or call [phone] for assistance.        Care EveryWhere ID     This is your Care EveryWhere ID. This could be used by other organizations to access your [place] medical records  [id number]         Blood Pressure from Last 3 Encounters:   No data found for BP    Weight from Last 3 Encounters:   No data found for Wt              We Performed the Following     Individual Psychotherapy (53+ min) [07083]        Primary Care Provider    None Specified       No primary provider on file.        Equal Access to Services     [facility] : Hadii rao Tinajero, waaxda sara, qaybta kaalmada syed, charisse steve . So [facility] [phone].    ATENCIÓN: Si habla español, tiene a smith disposición servicios gratuitos de asistencia lingüística. Llame al [phone].    We comply with applicable  federal civil rights laws and [place] laws. We do not discriminate on the basis of race, color, national origin, age, disability, sex, sexual orientation, or gender identity.            Thank you!     Thank you for choosing [facility]  for your care. Our goal is always to provide you with excellent care. Hearing back from our patients is one way we can continue to improve our services. Please take a few minutes to complete the written survey that you may receive in the mail after your visit with us. Thank you!             Your Updated Medication List - Protect others around you: Learn how to safely use, store and throw away your medicines at www.disposemymeds.org.      Notice  As of 4/25/2018 11:59 PM    You have not been prescribed any medications.

## 2018-05-02 ENCOUNTER — OFFICE VISIT (OUTPATIENT)
Dept: OTHER | Facility: OUTPATIENT CENTER | Age: 53
End: 2018-05-02
Payer: COMMERCIAL

## 2018-05-02 DIAGNOSIS — F64.9 GENDER IDENTITY DISORDER: Primary | ICD-10-CM

## 2018-05-02 DIAGNOSIS — F52.21 ERECTILE DISORDER, LIFELONG, GENERALIZED, MODERATE: ICD-10-CM

## 2018-05-02 NOTE — MR AVS SNAPSHOT
After Visit Summary   5/2/2018    Fred Flores    MRN: [id number]           Patient Information     Date Of Birth          1965        Visit Information        Provider Department      5/2/2018 11:00 AM Brooklynn Zhang, PhD [facility]        Today's Diagnoses     Unspecified gender dysphoria    -  1    Erectile disorder, lifelong, generalized, moderate           Follow-ups after your visit        Your next 10 appointments already scheduled     May 09, 2018  1:00 PM CDT   Individual Therapy 53+ minutes with Brooklynn Zhang, PhD   [facility] ([facility])    [address]  [address]  [facility] [address]   [phone]            May 24, 2018 10:00 AM CDT   Individual Therapy 53+ minutes with Brooklynn Zhang, PhD   [facility] ([facility])    [address]  [address]  [facility] [address]   [phone]            May 29, 2018 11:20 AM CDT   New Patient Visit with Hi Rodríguez MD   [facility] ([facility])    [address]  [address]  [facility] [address]   [phone]            Jun 07, 2018  3:00 PM CDT   Individual Therapy 53+ minutes with Brooklynn Zhang, PhD   [facility] ([facility])    [address]  [address]  [facility] [address]   [phone]            Jun 25, 2018 11:00 AM CDT   Individual Therapy 53+ minutes with Brooklynn Zhang, PhD   [facility] ([facility])    [address]  [address]  [facility] [address]   [phone]            Jul 09, 2018  4:00 PM CDT   Individual Therapy 53+ minutes with Brooklynn Zhang, PhD   [facility] ([facility])    [address]  [address]  [facility] [address]   [phone]            Jul 23, 2018  4:00 PM CDT   Individual Therapy 53+ minutes with Brooklynn Zhang, PhD    [place] [facility] ([facility])    [address]  Mail [address] 7521  [facility] [address]   [phone]              Who to contact     Please call your clinic at [phone] to:    Ask questions about your health    Make or cancel appointments    Discuss your medicines    Learn about your test results    Speak to your doctor            Additional Information About Your Visit        MyChart Information     Discerat is an electronic gateway that provides easy, online access to your medical records. With i-design Multimedia, you can request a clinic appointment, read your test results, renew a prescription or communicate with your care team.     To sign up for Discerat visit the website at [email or web address]   You will be asked to enter the access code listed below, as well as some personal information. Please follow the directions to create your username and password.     Your access code is: [email or web address]  Expires: 7/10/2018  2:21 PM     Your access code will  in 90 days. If you need help or a new code, please contact your [facility] or call [phone] for assistance.        Care EveryWhere ID     This is your Care EveryWhere ID. This could be used by other organizations to access your [place] medical records  [id number]         Blood Pressure from Last 3 Encounters:   No data found for BP    Weight from Last 3 Encounters:   No data found for Wt              We Performed the Following     Individual Psychotherapy (53+ min) [75886]        Primary Care Provider    None Specified       No primary provider on file.        Equal Access to Services     [facility] : Hadii rao Tinajero, waaxda sara, qaybta kaalmada syed, charisse steve . So [facility] [phone].    ATENCIÓN: Si habla español, tiene a smith disposición servicios gratuitos de asistencia lingüística. Llame al [phone].    We comply with applicable  federal civil rights laws and [place] laws. We do not discriminate on the basis of race, color, national origin, age, disability, sex, sexual orientation, or gender identity.            Thank you!     Thank you for choosing [facility]  for your care. Our goal is always to provide you with excellent care. Hearing back from our patients is one way we can continue to improve our services. Please take a few minutes to complete the written survey that you may receive in the mail after your visit with us. Thank you!             Your Updated Medication List - Protect others around you: Learn how to safely use, store and throw away your medicines at www.disposemymeds.org.      Notice  As of 5/2/2018 11:59 PM    You have not been prescribed any medications.

## 2018-05-03 PROBLEM — F64.9 GENDER IDENTITY DISORDER: Status: ACTIVE | Noted: 2018-05-03

## 2018-05-03 NOTE — PROGRESS NOTES
"Program [facility]uality  [facility]  [address] [place] [address], [address]  [place] [address]  Outpatient Progress Note      Session Date: 18  Client Name: Fred Flores (\"Sunni;\" she/her pronouns)  YOB: 1965  MRN: [id number]  Provider: Brooklynn Zhang, PhD   Type of Session: Individual   Present in Session: Client only  Number of Minutes: 55   Treatment Plan Due: 2018    Health Maintenance Summary - Mental Health Treatment Plan       Status Date      Mental Health Tx Plan Q11 MOS Next Due 2018      Done 2017           Current Symptoms/Status:   Sunni reported lifelong questions about her gender identity and struggles with her sex assigned at birth. Sunni reported that she is interested in exploring questions about gender and sexual interests. She has \"adopted\" the name Sunni for \"that part of me\" that desires to be more feminine and enjoys cross-dressing. She indicated that, since her wife  in , she has again been purchasing feminine clothing and has been supported in exploring a feminine gender expression by her new dating partner, Tawyn. In addition to gender concerns, Sunni reported difficulties with both achieving and maintaining erections, beginning with her first partnered sexual experience at age 21. These concerns are present during both masturbation (which consists of stimulation by rubbing penis on the bed) and with all sexual partners. Sunni reported that the use of Viagra and Cialis 2-3 years ago was costly and inconsistently helpful with erectile functioning so she discontinued use. Continues to experience full morning erections and occasionally will wake up to erections in the middle of the night. Self-stimulated erections, however, typically reach hardness of only 2/10. Able to reach orgasm/ejaculation with erectile hardness at a 4-5/10; however, this does not occur often and, thus, Sunin is rarely able to reach climax alone or with a " "partner.    Progress Toward Treatment Goals:   Increased gender exploration and comfort, including consistent feminine presentation to therapy sessions. Hormone consultation scheduled with Dr. Hi Rodríguez (5/29/18)       Intervention & Response:   Interpersonal, client-centered, and CBT techniques utilized to address client's current status. Sunni shared ongoing feelings of positivity and happiness related to presenting in feminine attire around her house, and exploring makeup application. Discussed that she has continued to minimize dressing in front of her daughter, though being seen has also gone \"ok.\" Explored Sunni's concerns about her daughter's recent moodiness/potential depression, as well as recognition that her daughter has \"senioritis\" and is eager to leave for college. Sunni expressed awareness that her daughter's irritability is likely not about her disclosure around gender or exploration of gender expression. Sunni committed to revisiting this topic with her daughter, however, in order to check in, which was supported and encouraged. Sunni shared that she has scheduled her hormone therapy consultation with Dr. Hi Rodríguez and is feeling excited about the appointment, wishing it were sooner. Therapist and client continued to discuss Sunni's thoughts about and goals for beginning feminizing hormones (e.g., possibly starting with low dose). Therapist also assisted Sunni in making plans to complete her assignment to attend a local [employer] meeting. Reflected, validated, and normalized client's reactions and feelings throughout session. Sunni committed to attending the next Tri-Ess meeting and, overall, was open to feedback, active, and engaged throughout session.     Assignment:   (1). Continue reading/completing My Gender Workbook (Lizzie) & How to Understand Your Gender (Julianna & Megan)  (2). Attend [place] Tri-Ess meeting. Look into other community support groups of interest.       Diagnosis:        " 302.6 (F64.9) - Unspecified gender dysphoria       302.72 (F52.21) - Erectile disorder, lifelong, generalized, moderate      Interactive Complexity:  None.       Plan / Need for Future Services:    Return for individual therapy in 2 weeks.             Brooklynn Zhang, PhD,   Licensed Psychologist

## 2018-05-03 NOTE — PROGRESS NOTES
"Program in Human Sexuality  [facility]  [address] [place] [address], [address]  [place] [address]  Outpatient Progress Note      Session Date: 18  Client Name: Fred Flores (\"Sunni;\" she/her pronouns)  YOB: 1965  MRN: [id number]  Provider: Brooklynn Zhang, PhD   Type of Session: Individual   Present in Session: Client only  Number of Minutes: 55   Treatment Plan Due: 2018    Health Maintenance Summary - Mental Health Treatment Plan       Status Date      Mental Health Tx Plan Q11 MOS Next Due 2018      Done 2017           Current Symptoms/Status:   Sunni reported lifelong questions about her gender identity and struggles with her sex assigned at birth. Sunni reported that she is interested in exploring questions about gender and sexual interests. She has \"adopted\" the name Sunni for \"that part of me\" that desires to be more feminine and enjoys cross-dressing. She indicated that, since her wife  in , she has again been purchasing feminine clothing and has been supported in exploring a feminine gender expression by her new dating partner, Tawny. In addition to gender concerns, Sunni reported difficulties with both achieving and maintaining erections, beginning with her first partnered sexual experience at age 21. These concerns are present during both masturbation (which consists of stimulation by rubbing penis on the bed) and with all sexual partners. Sunni reported that the use of Viagra and Cialis 2-3 years ago was costly and inconsistently helpful with erectile functioning so she discontinued use. Continues to experience full morning erections and occasionally will wake up to erections in the middle of the night. Self-stimulated erections, however, typically reach hardness of only 2/10. Able to reach orgasm/ejaculation with erectile hardness at a 4-5/10; however, this does not occur often and, thus, Sunni is rarely able to reach climax alone or with a " "partner.    Progress Toward Treatment Goals:   Increased gender exploration and comfort, including consistent feminine presentation to therapy sessions. Hormone consultation scheduled with Dr. Hi Rodríguez (18)       Intervention & Response:   Interpersonal, client-centered, and CBT techniques utilized to address client's current status. Sunni shared that she has been feeling anxious since last session \"but not in a bad way.\" She reported scheduling an additional session today because she has come to the decision that:  I want to transition.  Sunni appeared relieved upon making this statement, noting that this is the first time she has stated her goal aloud to anyone and, last session, had not felt quite ready to openly acknowledge it. Celebrated Sunni's report that gender transition feels like the most healthy and happy option for her at this time. When questioned about what transition means for her, Sunni indicated that she still feels unsure about all aspects of this, which was normalized. She reported, however, that she \"definitely\" wants to pursue feminizing hormones that will create physical changes - rather than a low dose, as she had been considering previously. Sunni also shared what she has been considering in terms of coming out to others about her gender identity. Identified and discussed worry about coming out to her  wife's siblings and having their marriage perceived as \"a sham\" due to Sunni's gender. Discussed options for Sunni to preemptively address this worry and to reiterate her love for her wife to this part of her family. Therapist and client also began to discuss Sunni's feelings about a potential name change, to which she was able to identify both pros and cons. Per last session, Sunni shared that she successfully utilized the women's restroom at this clinic and felt confident and positive doing so (though did not see any other patrons while there, which minimized anxiety). Reflected, " validated, and normalized client's reactions and feelings throughout session. Overall, Sunni was open to feedback, active, and engaged throughout session.     Assignment:   (1). Continue reading/completing My Gender Workbook (Lizzie) & How to Understand Your Gender (Julianna & Megan)      Diagnosis:        302.6 (F64.9) - Unspecified gender dysphoria       302.72 (F52.21) - Erectile disorder, lifelong, generalized, moderate      Interactive Complexity:  None.       Plan / Need for Future Services:    Return for individual therapy in 2 weeks.             Brooklynn Zhang, PhD,   Licensed Psychologist

## 2018-05-03 NOTE — PROGRESS NOTES
"Program in Human Sexuality  [facility]  [address] [place] [address], [address]  [place] [address]  Outpatient Progress Note      Session Date: 18  Client Name: Fred Flores (\"Sunni;\" she/her pronouns)  YOB: 1965  MRN: [id number]  Provider: Brooklynn Zhang, PhD   Type of Session: Individual   Present in Session: Client only  Number of Minutes: 55   Treatment Plan Due: 2018    Health Maintenance Summary - Mental Health Treatment Plan       Status Date      Mental Health Tx Plan Q11 MOS Next Due 2018      Done 2017           Current Symptoms/Status:   Sunni reported lifelong questions about her gender identity and struggles with her sex assigned at birth. Sunni reported that she is interested in exploring questions about gender and sexual interests. She has \"adopted\" the name Sunni for \"that part of me\" that desires to be more feminine and enjoys cross-dressing. She indicated that, since her wife  in , she has again been purchasing feminine clothing and has been supported in exploring a feminine gender expression by her new dating partner, Tawny. In addition to gender concerns, Sunni reported difficulties with both achieving and maintaining erections, beginning with her first partnered sexual experience at age 21. These concerns are present during both masturbation (which consists of stimulation by rubbing penis on the bed) and with all sexual partners. Sunni reported that the use of Viagra and Cialis 2-3 years ago was costly and inconsistently helpful with erectile functioning so she discontinued use. Continues to experience full morning erections and occasionally will wake up to erections in the middle of the night. Self-stimulated erections, however, typically reach hardness of only 2/10. Able to reach orgasm/ejaculation with erectile hardness at a 4-5/10; however, this does not occur often and, thus, Sunni is rarely able to reach climax alone or with a " "partner.    Progress Toward Treatment Goals:   Increased gender exploration and comfort, including consistent feminine presentation to therapy sessions. Hormone consultation scheduled with Dr. Hi Rodríguez (5/29/18)       Intervention & Response:   Interpersonal, client-centered, and CBT techniques utilized to address client's current status. Therapist and client processed Sunni's experience attending a local [employer] meeting, and discussed other community support groups that she plans to attend moving forward. She shared both positive and negative reactions to Tri-Ess noting, however, that her main reaction to the group was: \"I'm not that. I'm not a cisgender crossdresser.\" Explored and discussed that this more definitive stance feels new for Sunni who, for many years, believed she was \"just a crossdresser.\" Identified and discussed that Sunni's shame around her interest in more traditionally feminine expression has largely faded and that she has started to embrace this piece of her identity. Sunni was tearful in sharing that she believes she wishes to \"go further\" related to gender, but noted several times in session that she is currently \"unable to say it.\" \"It\" is presumably that she wishes to socially and medically transition, though Sunni would not confirm or deny this when questioned directly. Rather, therapist and client spent time exploring how Sunni's shifting feelings about gender and gender expression have impacted her. She reported \"loving\" the ability to present in feminine clothing at this clinic and a desire to do so more often in her day-to-day life. Identified anxiety related to using the women's restroom at this clinic when presenting as feminine. Therapist encouraged Sunni to explore use of the women's restroom, should the unisex restroom be occupied in the future. She reported being open to this challenge, though indicating that her main concern is that she would make other women uncomfortable. " Reflected, validated, and normalized client's reactions and feelings throughout session. Overall, Sunni was open to feedback, active, and engaged throughout session.     Assignment:   (1). Continue reading/completing My Gender Workbook (Lizzie) & How to Understand Your Gender (Julianna & Megan)      Diagnosis:        302.6 (F64.9) - Unspecified gender dysphoria       302.72 (F52.21) - Erectile disorder, lifelong, generalized, moderate      Interactive Complexity:  None.       Plan / Need for Future Services:    Return for individual therapy in 2 weeks.             Brooklynn Zhang, PhD,   Licensed Psychologist

## 2018-05-09 ENCOUNTER — OFFICE VISIT (OUTPATIENT)
Dept: OTHER | Facility: OUTPATIENT CENTER | Age: 53
End: 2018-05-09
Payer: COMMERCIAL

## 2018-05-09 DIAGNOSIS — F64.9 GENDER IDENTITY DISORDER: Primary | ICD-10-CM

## 2018-05-09 NOTE — MR AVS SNAPSHOT
After Visit Summary   5/9/2018    Fred Flores    MRN: [id number]           Patient Information     Date Of Birth          1965        Visit Information        Provider Department      5/9/2018 1:00 PM Brooklynn Zhang, PhD [facility]        Today's Diagnoses     Unspecified gender dysphoria    -  1       Follow-ups after your visit        Your next 10 appointments already scheduled     May 24, 2018 10:00 AM CDT   Individual Therapy 53+ minutes with Brooklynn Zhang, PhD   [facility] ([facility])    [address]  [address]  [facility] [place] [address]   [phone]            May 29, 2018 11:20 AM CDT   New Patient Visit with Hi Rodríguez MD   [facility] ([facility])    [address]  [address]  [facility] [place] [address]   [phone]            Jun 07, 2018  3:00 PM CDT   Individual Therapy 53+ minutes with Brooklynn Zhang, PhD   [facility] ([facility])    [address]  [address]  [facility] [place] [address]   [phone]            Jun 25, 2018 11:00 AM CDT   Individual Therapy 53+ minutes with Brooklynn Zhang, PhD   [facility] ([facility])    [address]  [address]  [facility] [place] [address]   [phone]            Jul 09, 2018  4:00 PM CDT   Individual Therapy 53+ minutes with Brooklynn Zhang, PhD   [facility] ([facility])    [address]  [address]  [facility] [place] [address]   [phone]            Jul 23, 2018  4:00 PM CDT   Individual Therapy 53+ minutes with Brooklynn Zhang, PhD   [facility] ([facility])    [address]  [address]  [facility] [place] [address]   [phone]              Who to contact     Please call your clinic at [phone] to:    Ask questions about your health    Make or cancel appointments    Discuss your  medicines    Learn about your test results    Speak to your doctor            Additional Information About Your Visit        MyChart Information     Wynlinkhart is an electronic gateway that provides easy, online access to your medical records. With PadMatcher, you can request a clinic appointment, read your test results, renew a prescription or communicate with your care team.     To sign up for PhotoTherat visit the website at [email or web address]   You will be asked to enter the access code listed below, as well as some personal information. Please follow the directions to create your username and password.     Your access code is: [email or web address]  Expires: 7/10/2018  2:21 PM     Your access code will  in 90 days. If you need help or a new code, please contact your [facility] or call [phone] for assistance.        Care EveryWhere ID     This is your Care EveryWhere ID. This could be used by other organizations to access your [place] medical records  [id number]         Blood Pressure from Last 3 Encounters:   No data found for BP    Weight from Last 3 Encounters:   No data found for Wt              We Performed the Following     Individual Psychotherapy (53+ min) [71500]        Primary Care Provider    None Specified       No primary provider on file.        Equal Access to Services     [facility] : Hadii rao Tinajero, wadeliada sara, qaybta kaalmada syed, charisse ruelas. So [facility] [phone].    ATENCIÓN: Si habla español, tiene a smith disposición servicios gratuitos de asistencia lingüística. Llame al [phone].    We comply with applicable federal civil rights laws and [place] laws. We do not discriminate on the basis of race, color, national origin, age, disability, sex, sexual orientation, or gender identity.            Thank you!     Thank you for choosing [facility]  for your care. Our goal is always to  provide you with excellent care. Hearing back from our patients is one way we can continue to improve our services. Please take a few minutes to complete the written survey that you may receive in the mail after your visit with us. Thank you!             Your Updated Medication List - Protect others around you: Learn how to safely use, store and throw away your medicines at www.disposemymeds.org.      Notice  As of 5/9/2018 11:59 PM    You have not been prescribed any medications.

## 2018-05-23 NOTE — PROGRESS NOTES
"[facility]  [facility]  [address], [address]  [place] [address]  Outpatient Progress Note      Session Date: 18  Client Name: Fred Flores (\"Sunni;\" she/her pronouns)  YOB: 1965  MRN: [id number]  Provider: Brooklynn Zhang, PhD   Type of Session: Individual   Present in Session: Client only  Number of Minutes: 55   Treatment Plan Due: 2018    Health Maintenance Summary - Mental Health Treatment Plan       Status Date      Mental Health Tx Plan Q11 MOS Next Due 2018      Done 2017           Current Symptoms/Status:   Sunni reported lifelong questions about her gender identity and discomfort with her sex assigned at birth. At intake, Sunni noted concerns about sexual functioning (erection/orgasm) and reported that she is also interested in exploring questions about gender. She has \"adopted\" the name Sunni for \"that part of me\" that desires to be more feminine and enjoys cross-dressing. She indicated that, since her wife  in , she has again been purchasing feminine clothing and has been supported in exploring a feminine gender expression by her new dating partner, Tawny. Sunni has taken steps to come out as transgender to her daughters and peer group, and has been met with support. She reports a desire to further explore social and medical transition options.     Progress Toward Treatment Goals:   Increased gender exploration and comfort, including consistent feminine presentation to therapy sessions. Hormone consultation scheduled with Dr. Hi Rodríguez (18)       Intervention & Response:   Interpersonal, client-centered, and CBT techniques utilized to address client's current status. Sunni shared with provider and read aloud a coming out email that she had drafted and sent to her H-care friend group since last session. Sunni also read the numerous positive email responses that she received in reply. She was tearful (with happiness) " "reading these messages and expressing feeling loved and supported by this important peer group. Celebrated this response, and Sunni's growing sense of identity and gender-related goals. Discussed her statement in session that she increasingly feels that she \"cannot hide\" her gender identity or expression any longer and Sunni's desire to live as her \"authentic self\" as much as possible. Therapist and client spent the remainder of session exploring and discussing Sunni's feelings and expectations about various medical transition options. She was able to identify both what she is and is not interested in pursuing, as well as options that she wants to learn more about. Identified that Sunni is feeling excited and somewhat impatient about her upcoming hormone therapy consultation. She reported that she is looking forward to starting feminizing hormones. Assisted her in considering questions that she has for her upcoming appt. Therapist and client also agreed to revisit and discuss hormone consent form in our next session. Reflected, validated, and normalized client's reactions and feelings throughout session. Overall, Sunni was open to feedback, active, and engaged throughout session.     Of note: Discussed in today's session that Sunni no longer feels distressed about her erectile functioning, particularly as her goals have clarified around gender and a desire to pursue femininizing transition. As such, identified and discussed the need to remove this diagnosis, for which she no longer meets criteria. Client was in agreement with this plan.     Assignment:   (1). Continue reading/completing My Gender Workbook (Lizzie) & How to Understand Your Gender (Julianna & Megan)      Diagnosis:        302.6 (F64.9) - Unspecified gender dysphoria      Interactive Complexity:  None.       Plan / Need for Future Services:    Return for individual therapy in 2 weeks.             Brooklynn Zhang, PhD, LP  Licensed Psychologist  "

## 2018-05-24 ENCOUNTER — OFFICE VISIT (OUTPATIENT)
Dept: OTHER | Facility: OUTPATIENT CENTER | Age: 53
End: 2018-05-24
Payer: COMMERCIAL

## 2018-05-24 DIAGNOSIS — F64.9 GENDER IDENTITY DISORDER: Primary | ICD-10-CM

## 2018-05-24 NOTE — MR AVS SNAPSHOT
After Visit Summary   5/24/2018    Fred Flores    MRN: [id number]           Patient Information     Date Of Birth          1965        Visit Information        Provider Department      5/24/2018 10:00 AM Brooklynn Zhang, PhD [facility]        Today's Diagnoses     Unspecified gender dysphoria    -  1       Follow-ups after your visit        Your next 10 appointments already scheduled     Jun 07, 2018 10:30 AM CDT   LAB VISIT with [facility]   [facility] ([facility])    [facility]  [address]  [facility] [address]   [phone]           If you are coming in for fasting labs, you will need to fast for 10-12 hours prior to your appt. Fasting labs include lipids, cholesterol, glucose, complete metabolic panel, basic metabolic panel, and triglycerides. Do not drink coffee or any other fluids. Water with medications are okay. Do not chew gum as well. If you have any further questions, please contact your health care team.              Jun 07, 2018  3:00 PM CDT   Individual Therapy 53+ minutes with Brooklynn Zhang, PhD   [facility] ([facility])    [address]  [address]  [facility] [address]   [phone]            Jun 25, 2018 11:00 AM CDT   Individual Therapy 53+ minutes with Brooklynn Zhang, PhD   [facility] ([facility])    [address]  [address]  [facility] [address]   [phone]            Jul 09, 2018  4:00 PM CDT   Individual Therapy 53+ minutes with Brooklynn Zhang, PhD   [facility] ([facility])    [address]  [address]  [facility] [address]   [phone]            Jul 23, 2018  4:00 PM CDT   Individual Therapy 53+ minutes with Brooklynn Zhang, PhD   [facility] ([facility])    [address]  [address]  [facility] [address]   [phone]               Future tests that were ordered for you today     Open Future Orders        Priority Expected Expires Ordered    Testosterone Free and Total Routine 2018    Lipid Panel ([place]) [[id number]] Routine 2018    Comprehensive Metabolic Panel ([place]) [[id number]] Routine 2018            Who to contact     Please call your clinic at [phone] to:    Ask questions about your health    Make or cancel appointments    Discuss your medicines    Learn about your test results    Speak to your doctor            Additional Information About Your Visit        CollegeZenhart Information     KO-SUt is an electronic gateway that provides easy, online access to your medical records. With [employer], you can request a clinic appointment, read your test results, renew a prescription or communicate with your care team.     To sign up for KO-SUt visit the website at [email or web address]   You will be asked to enter the access code listed below, as well as some personal information. Please follow the directions to create your username and password.     Your access code is: [email or web address]  Expires: 7/10/2018  2:21 PM     Your access code will  in 90 days. If you need help or a new code, please contact your [facility] or call [phone] for assistance.        Care EveryWhere ID     This is your Care EveryWhere ID. This could be used by other organizations to access your [place] medical records  [id number]         Blood Pressure from Last 3 Encounters:   18 (!) 151/104    Weight from Last 3 Encounters:   18 115.2 kg (254 lb)              We Performed the Following     Individual Psychotherapy (53+ min) [43800]        Primary Care Provider    None Specified       No address on file        Equal Access to Services     REINALDO GLOVER : christy Royal qaybta kaalmada  charisse lynchjamieyoel la'aabhupinder ah. Chrissy [facility] [phone].    ATENCIÓN: Si habla freida, tiene a smith disposición servicios gratuitos de asistencia lingüística. Nani al [phone].    We comply with applicable federal civil rights laws and [place] laws. We do not discriminate on the basis of race, color, national origin, age, disability, sex, sexual orientation, or gender identity.            Thank you!     Thank you for choosing [facility]  for your care. Our goal is always to provide you with excellent care. Hearing back from our patients is one way we can continue to improve our services. Please take a few minutes to complete the written survey that you may receive in the mail after your visit with us. Thank you!             Your Updated Medication List - Protect others around you: Learn how to safely use, store and throw away your medicines at www.disposemymeds.org.      Notice  As of 5/24/2018 11:59 PM    You have not been prescribed any medications.

## 2018-05-29 ENCOUNTER — OFFICE VISIT (OUTPATIENT)
Dept: OTHER | Facility: OUTPATIENT CENTER | Age: 53
End: 2018-05-29
Payer: COMMERCIAL

## 2018-05-29 VITALS
SYSTOLIC BLOOD PRESSURE: 151 MMHG | BODY MASS INDEX: 36.36 KG/M2 | WEIGHT: 254 LBS | HEIGHT: 70 IN | DIASTOLIC BLOOD PRESSURE: 104 MMHG | HEART RATE: 99 BPM

## 2018-05-29 DIAGNOSIS — F64.0 GENDER DYSPHORIA IN ADOLESCENT AND ADULT: Primary | ICD-10-CM

## 2018-05-29 RX ORDER — BUPROPION HYDROCHLORIDE 150 MG/1
TABLET ORAL
COMMUNITY
Start: 2018-03-20 | End: 2019-05-06

## 2018-05-29 RX ORDER — IBUPROFEN 400 MG/1
2 TABLET, FILM COATED ORAL PRN
Status: ON HOLD | COMMUNITY
Start: 2017-06-28 | End: 2021-04-14

## 2018-05-29 RX ORDER — VENLAFAXINE HYDROCHLORIDE 75 MG/1
300 CAPSULE, EXTENDED RELEASE ORAL EVERY MORNING
Status: ON HOLD | COMMUNITY
Start: 2017-02-08 | End: 2021-04-08

## 2018-05-29 NOTE — MR AVS SNAPSHOT
After Visit Summary   5/29/2018    Fred Flores    MRN: [id number]           Patient Information     Date Of Birth          1965        Visit Information        Provider Department      5/29/2018 11:20 AM Hi Rodríguez MD [facility]        Today's Diagnoses     Gender dysphoria in adolescent and adult    -  1       Follow-ups after your visit        Follow-up notes from your care team     Return when ready for hormones.      Your next 10 appointments already scheduled     Jun 25, 2018 11:00 AM CDT   Individual Therapy 53+ minutes with Brooklynn Zhang, PhD   [facility] ([facility])    [address]  [address]  [facility] [address]   [phone]            Jul 09, 2018  4:00 PM CDT   Individual Therapy 53+ minutes with Brooklynn Zhang, PhD   [facility] ([facility])    [address]  [address]  [facility] [address]   [phone]            Jul 23, 2018  4:00 PM CDT   Individual Therapy 53+ minutes with Brooklynn Zhang, PhD   [facility] ([facility])    [address]  [address]  [facility] [address]   [phone]            Aug 06, 2018  3:00 PM CDT   Individual Therapy 53+ minutes with Brooklynn Zhang, PhD   [facility] ([facility])    [address]  [address]  [facility] [address]   [phone]              Who to contact     Please call your clinic at [phone] to:    Ask questions about your health    Make or cancel appointments    Discuss your medicines    Learn about your test results    Speak to your doctor            Additional Information About Your Visit        MyChart Information     BTI Payments is an electronic gateway that provides easy, online access to your medical records. With BTI Payments, you can request a clinic appointment, read your test results, renew a prescription or communicate with your care  "team.     To sign up for Membrane Instruments and Technologyhart visit the website at [email or web address]   You will be asked to enter the access code listed below, as well as some personal information. Please follow the directions to create your username and password.     Your access code is: [email or web address]  Expires: 2018  2:43 PM     Your access code will  in 90 days. If you need help or a new code, please contact your [facility] or call [phone] for assistance.        Care EveryWhere ID     This is your Care EveryWhere ID. This could be used by other organizations to access your [place] medical records  [id number]        Your Vitals Were     Pulse Height BMI (Body Mass Index)             99 1.778 m (5' 10\") 36.45 kg/m2          Blood Pressure from Last 3 Encounters:   18 (!) 151/104    Weight from Last 3 Encounters:   18 115.2 kg (254 lb)               Primary Care Provider    Paul Swift       No address on file        Equal Access to Services     RON LGOVER : Hadii rao ku hadasho Soomaali, waaxda luqadaha, qaybta kaalmada adeegyada, charisse steve . So [facility] [phone].    ATENCIÓN: Si habla español, tiene a smith disposición servicios gratuitos de asistencia lingüística. Llame al [phone].    We comply with applicable federal civil rights laws and [place] laws. We do not discriminate on the basis of race, color, national origin, age, disability, sex, sexual orientation, or gender identity.            Thank you!     Thank you for choosing [facility]  for your care. Our goal is always to provide you with excellent care. Hearing back from our patients is one way we can continue to improve our services. Please take a few minutes to complete the written survey that you may receive in the mail after your visit with us. Thank you!             Your Updated Medication List - Protect others around you: Learn how to safely use, store and " throw away your medicines at www.disposemymeds.org.          This list is accurate as of 5/29/18 11:59 PM.  Always use your most recent med list.                   Brand Name Dispense Instructions for use Diagnosis    buPROPion 150 MG 24 hr tablet    WELLBUTRIN XL          ibuprofen 400 MG tablet    ADVIL/MOTRIN     Take 2 tablets by mouth        traZODone 50 MG tablet    DESYREL     TAKE TWO TABLETS BY MOUTH DAILY AT BEDTIME        venlafaxine 75 MG 24 hr capsule    EFFEXOR-XR     Take 2 capsules by mouth

## 2018-05-29 NOTE — NURSING NOTE
"Chief Complaint   Patient presents with     Consult     TG       Vitals:    05/29/18 1125   BP: (!) 151/104   Pulse: 99   Weight: 115.2 kg (254 lb)   Height: 1.778 m (5' 10\")       Body mass index is 36.45 kg/(m^2).      Mira Turner CMA    "

## 2018-05-30 NOTE — PROGRESS NOTES
"[facility]  [facility]  [address], [address]  [place] [address]  Outpatient Progress Note      Session Date: 18  Client Name: Fred Flores (\"Sunni;\" she/her pronouns)  YOB: 1965  MRN: [id number]  Provider: Brooklynn Zhang, PhD   Type of Session: Individual   Present in Session: Client only  Number of Minutes: 55   Treatment Plan Due: 2018    Health Maintenance Summary - Mental Health Treatment Plan       Status Date      Mental Health Tx Plan Q11 MOS Next Due 2018      Done 2017           Current Symptoms/Status:   Sunni reports lifelong questions about her gender identity and discomfort with her sex assigned at birth. At intake, Sunni noted concerns about sexual functioning (erection/orgasm) though these has subsided as gender has been further explored. She has \"adopted\" the name Sunni for \"that part of me\" that desires to be more feminine and enjoys cross-dressing. She indicated that, since her wife  in , she has again been purchasing feminine clothing and has been supported in exploring a feminine gender expression by her new dating partner, Tawny. Sunni has taken steps to come out as transgender to her daughters and peer group, and has been met with support. She reports a desire to further explore social and medical transition options.     Progress Toward Treatment Goals:   Increased gender exploration and comfort, including consistent feminine presentation to therapy sessions and beginning facial hair removal. Hormone consultation scheduled with Dr. Hi Rodríguez (18)       Intervention & Response:   Interpersonal, client-centered, and CBT techniques utilized to address client's current status. Therapist and client processed Sunni's recent experience revisiting a conversation about gender with her daughters. She reported disclosing to them that she plans to move forward with medical transition options and felt that her " daughters were supportive. Identified and discussed that Sunni also plans to come out to her mother next week. She reported feeling nervous but determined to share her authentic self and confident that her mother will be supportive. Sunni also shared about recent, positive experiences coming out to the family of her  wife. Therapist and client briefly discussed Sunni's first experience this week with laser facial hair removal. Sunni noted that she is interested in learning more about vocal therapy and a resource list was provided. She stated that she continues to feel more comfortable with her feminine presentation and shared several positive experiences of presenting in public outside of this clinic. Celebrated these major steps in confidence building and decreasing shame. Therapist and client discussed Sunni's reported readiness to also begin exploring the possibility of a legal name/gender marker change. Resources for this process (e.g., [employer] [place] website) were provided and discussed with Sunni. Identified and discussed her excitement about a hormone therapy consultation next week with Dr. Rodríguez. Given time restraints, therapist and client were unable to fully review feminizing hormone consent document in today's session, but planned to finish doing so in next session. Reflected, validated, and normalized client's reactions and feelings throughout session. Overall, Sunni was open to feedback, active, and engaged throughout session.     Assignment:   (1). Continue reading/completing My Gender Workbook (Lizzie) & How to Understand Your Gender (Julianna & Megan)  (2). Research into legal name/gender marker change process      Diagnosis:        302.6 (F64.9) - Unspecified gender dysphoria      Interactive Complexity:  None.       Plan / Need for Future Services:    Return for individual therapy in 2 weeks.             Brooklynn Zhang, PhD, LP  Licensed Psychologist

## 2018-06-07 ENCOUNTER — OFFICE VISIT (OUTPATIENT)
Dept: OTHER | Facility: OUTPATIENT CENTER | Age: 53
End: 2018-06-07
Payer: COMMERCIAL

## 2018-06-07 DIAGNOSIS — F64.0 GENDER DYSPHORIA IN ADULT: Primary | ICD-10-CM

## 2018-06-07 DIAGNOSIS — F64.0 GENDER DYSPHORIA IN ADOLESCENT AND ADULT: ICD-10-CM

## 2018-06-07 LAB
ALBUMIN SERPL-MCNC: 4 G/DL (ref 3.3–4.6)
ALP SERPL-CCNC: 72 U/L (ref 40–150)
ALT SERPL-CCNC: 31 U/L (ref 0–70)
AST SERPL-CCNC: 22 U/L (ref 0–55)
BILIRUB SERPL-MCNC: 0.8 MG/DL (ref 0.2–1.3)
BUN SERPL-MCNC: 22 MG/DL (ref 7–30)
CALCIUM SERPL-MCNC: 9.1 MG/DL (ref 8.5–10.4)
CHLORIDE SERPLBLD-SCNC: 108 MMOL/L (ref 94–109)
CHOLEST SERPL-MCNC: 174 MG/DL (ref 0–200)
CHOLEST/HDLC SERPL: 4.6 {RATIO} (ref 0–5)
CO2 SERPL-SCNC: 26 MMOL/L (ref 20–32)
CREAT SERPL-MCNC: 1.4 MG/DL (ref 0.8–1.5)
EGFR CALCULATED (BLACK REFERENCE): 68.4
EGFR CALCULATED (NON BLACK REFERENCE): 56.6
FASTING SPECIMEN: YES
GLUCOSE SERPL-MCNC: 104 MG/DL (ref 60–109)
HDLC SERPL-MCNC: 38 MG/DL
LDLC SERPL CALC-MCNC: 106 MG/DL (ref 0–129)
POTASSIUM SERPL-SCNC: 4.1 MMOL/L (ref 3.4–5.3)
PROT SERPL-MCNC: 7.3 G/DL (ref 6.8–8.8)
SODIUM SERPL-SCNC: 141 MMOL/L (ref 133–144)
TRIGL SERPL-MCNC: 156 MG/DL (ref 0–150)
VLDL-CHOLESTEROL: 31 (ref 7–32)

## 2018-06-07 NOTE — MR AVS SNAPSHOT
After Visit Summary   6/7/2018    Fred Flores    MRN: [id number]           Patient Information     Date Of Birth          1965        Visit Information        Provider Department      6/7/2018 3:00 PM Brooklynn Zhang, PhD [facility]        Today's Diagnoses     Gender dysphoria in adult    -  1       Follow-ups after your visit        Additional Services     COMPREHENSIVE GENDER CARE REFERRAL       Your provider has referred you for Comprehensive Gender Care:  Plastic Surgery    Additional details/notes for coordinator: Client - Sunni - is interested in talking with the care coordinator about vaginoplasty at [facility]. She will be starting feminizing hormone therapy soon with Dr. Rodríguez and is eager to gather information about vaginoplasty and possibly schedule a consultation appointment.     Please be aware that coverage of these services is subject to the terms and limitations of your health insurance plan.  Call member services at your health plan with any benefit or coverage questions.      Please bring the following with you to your appointment:      (1) List of current medications   (2) This referral request   (3) Any documents/labs given to you for this referral                  Your next 10 appointments already scheduled     Jun 25, 2018 11:00 AM CDT   Individual Therapy 53+ minutes with Brooklynn Zhang, PhD   [facility] ([facility])    [address]  [address] 75[address]  [facility] [address]   [phone]            Jul 09, 2018  4:00 PM CDT   Individual Therapy 53+ minutes with Brooklynn Zhang, PhD   [facility] ([facility])    [address]  [address]  [facility] [address]   [phone]            Jul 23, 2018  4:00 PM CDT   Individual Therapy 53+ minutes with Brooklynn Zhang, PhD   [facility] ([facility])    [address]  [address]  [facility]  [address]   [phone]            Aug 06, 2018  3:00 PM CDT   Individual Therapy 53+ minutes with Brooklynn Zhang, PhD   [facility] ([facility])    [address]  [address]  [facility] [address]   [phone]              Who to contact     Please call your clinic at [phone] to:    Ask questions about your health    Make or cancel appointments    Discuss your medicines    Learn about your test results    Speak to your doctor            Additional Information About Your Visit        [email or web address] Information     Talkito is an electronic gateway that provides easy, online access to your medical records. With Talkito, you can request a clinic appointment, read your test results, renew a prescription or communicate with your care team.     To sign up for Talkito visit the website at [email or web address]   You will be asked to enter the access code listed below, as well as some personal information. Please follow the directions to create your username and password.     Your access code is: [email or web address]  Expires: 2018  2:43 PM     Your access code will  in 90 days. If you need help or a new code, please contact your [facility] or call [phone] for assistance.        Care EveryWhere ID     This is your Care EveryWhere ID. This could be used by other organizations to access your [place] medical records  [id number]         Blood Pressure from Last 3 Encounters:   18 (!) 151/104    Weight from Last 3 Encounters:   18 115.2 kg (254 lb)              We Performed the Following     COMPREHENSIVE GENDER CARE REFERRAL     Individual Psychotherapy (53+ min) [11623]        Primary Care Provider    Paul Swift       No address on file        Equal Access to Services     REINALDO GLOVER : Geetha Tinajero, christy ruiz, charisse ramos . So [facility]  [phone].    ATENCIÓN: Si margret guan, tiene a smith disposición servicios gratuitos de asistencia lingüística. Nani frias [phone].    We comply with applicable federal civil rights laws and [place] laws. We do not discriminate on the basis of race, color, national origin, age, disability, sex, sexual orientation, or gender identity.            Thank you!     Thank you for choosing [facility]  for your care. Our goal is always to provide you with excellent care. Hearing back from our patients is one way we can continue to improve our services. Please take a few minutes to complete the written survey that you may receive in the mail after your visit with us. Thank you!             Your Updated Medication List - Protect others around you: Learn how to safely use, store and throw away your medicines at www.disposemymeds.org.          This list is accurate as of 6/7/18 11:59 PM.  Always use your most recent med list.                   Brand Name Dispense Instructions for use Diagnosis    buPROPion 150 MG 24 hr tablet    WELLBUTRIN XL          ibuprofen 400 MG tablet    ADVIL/MOTRIN     Take 2 tablets by mouth        traZODone 50 MG tablet    DESYREL     TAKE TWO TABLETS BY MOUTH DAILY AT BEDTIME        venlafaxine 75 MG 24 hr capsule    EFFEXOR-XR     Take 2 capsules by mouth

## 2018-06-08 NOTE — PROGRESS NOTES
"Program in [facility] Sexuality  [facility]  [address], [address]  [place] [address]  Outpatient Progress Note      Session Date: 18  Client Name: Fred Flores (\"Sunni;\" she/her pronouns)  YOB: 1965  MRN: [id number]  Provider: Brooklynn Zhang, PhD   Type of Session: Individual   Present in Session: Client only  Number of Minutes: 55   Treatment Plan Due: 2018    Health Maintenance Summary - Mental Health Treatment Plan       Status Date      Mental Health Tx Plan Q11 MOS Next Due 2018      Done 2017           Current Symptoms/Status:   Sunni reports lifelong questions about her gender identity and discomfort with her sex assigned at birth. At intake, Sunni noted concerns about sexual functioning (erection/orgasm) though these has subsided as gender has been further explored. She has \"adopted\" the name Sunni for \"that part of me\" that desires to be more feminine and enjoys cross-dressing. She indicated that, since her wife  in , she has again been purchasing feminine clothing and has been supported in exploring a feminine gender expression by her new dating partner, Tawny. Sunni has taken steps to come out as transgender to her daughters and peer group, and has been met with support. She reports a desire to further explore social and medical transition.     Progress Toward Treatment Goals:   Increased gender exploration and comfort, including consistent feminine presentation to therapy sessions and beginning facial hair removal. Hormone consultation completed with Dr. Hi Rodríguez (18) and moving forward with legal name/gender marker change. Exploring options/plans for vaginoplasty.       Intervention & Response:   Interpersonal, client-centered, and CBT techniques utilized to address client's current status. Therapist disclosed that she will be leaving clinic in August to take a new position. Processed and expressed sadness abut this news, and " "therapist and client expressed care and well wishes for one another. Sunni noted some fears about working with a new provider, but agreed to continuing discussing transfer of care in subsequent sessions. As planned, therapist and client processed Sunni's experience completing hormone therapy consultation with Dr. Rodríguez. She reported that she is currently waiting on her lab results to come back, but is looking forward to likely beginning hormone care. Therapist and client reviewed and signed consent form for hormone therapy (see chart), as well as WPATH SOC7 for both hormone therapy and genital surgeries given Sunni's expressed interest in both. Sunni shared feeling increasingly impatient and her desire to quickly move forward with medical transition now that she has become more confident and open about her identity. She reported that coming out to her mother went fairly well, though she does not expect that her mother  will ever call me Sunni  or use feminine pronouns. Identified that she feels \"ok\" about this, given her mother's age. Sunni expressed that she would like to move forward with exploring vaginoplasty at this point in time. Discussed the likelihood that she will need to be on hormones for some time (per SOC) and also carefully consider providers, after care plans, etc. Discussed, however the possibility of scheduling a consultation appointment to learn more. Agreed that provider will send referral information to Devin Hawkins [facility] Coordinator to contact Sunni, which she expressed excitement about. She also shared that she plans to look into possible surgical consults at [facility] as well. Discussed that Sunni would like to begin presenting full time on or around July 4 and is working on the legal name/gender marker change process to correspond with this goal. Reflected, validated, and normalized client's reactions and feelings throughout session. Overall, Sunni was open to feedback, active, and " engaged throughout session.     Assignment:   (1). Continue reading/completing My Gender Workbook (Lizzie) & How to Understand Your Gender (Julianna & Megan)  (2). Continue legal name/gender marker change process; Make follow-up appt with Anne for hormone therapy.       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       Plan / Need for Future Services:    Return for individual therapy in 2 weeks.             Brooklynn Zhang, PhD, LP  Licensed Psychologist

## 2018-06-08 NOTE — PROGRESS NOTES
Transgender medical consultation at the request of Dr. Brooklynn Zhang.      IDENTIFICATION:  53-year-old transwoman.      CHIEF CONCERN:  Feminizing hormone therapy.      HISTORY OF PRESENT ILLNESS:  Patient has a longstanding history of gender dysphoria.  The diagnostic assessment of Dr. Zhang was reviewed.  Sunni has no prior history of hormone use or gender-related surgeries.  She desires to feminize as much as possible with hormone therapy.  Current medical problems include depression and anxiety, history of possible decreased testosterone levels and erectile dysfunction diagnosed at [place] in the past.  She does get morning erections.      CURRENT MEDICATIONS:  Effexor 150 mg, Wellbutrin 75 mg, trazadone.      ALLERGIES:  No known drug allergies.      PAST MEDICAL HISTORY:  Hernia repair at 18 months and vasectomy.      FAMILY HISTORY:  Mother is well, father  at age 79 with an MI, hypertension.  Half sister with alcohol and chemical dependency, brother as well.  Paternal grandfather with stroke, maternal grandmother with breast cancer and intestinal cancer.      SOCIAL HISTORY:  Patient eats a standard diet including dairy.  Never smoked.  Alcohol use 2-3 times a week, 1 drink per sitting.  Active with working out, cardio mainly 3-4 times a week.  No history of substance use.  Patient is .  Wife  2-1/2 years ago.  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  Has 2 adult daughters.        SEXUAL HISTORY:  The patient is currently sexually active with a partner.  All prior partners have been female.  Has tested HIV negative in the past, no history of STIs.  Has been tested negative for hepatitis C.  Unsure about hep B vaccine status.      REVIEW OF SYSTEMS:  Notable for rare migraines about once a year.  No aura.      PHYSICAL EXAM:   GENERAL:  Patient is alert and in no apparent distress.   VITAL SIGNS:  Blood pressure was elevated at 151/104, pulse 99.  BMI of  36.45.  Of note, previous blood pressures within the last year have been within normal limits.  Mood is mildly anxious.  Speech regular rate and rhythm.  No other psychomotor changes.     HEENT:  Pupils equal and reactive to light, disks sharp bilaterally.  Oropharynx with no lesions, no erythema.   NECK:  Supple, no thyroid enlargement, no carotid bruits, no adenopathy.   HEART:  S1, S2, no murmurs.   LUNGS:  Clear to auscultation bilaterally.   ABDOMEN:  Soft, nontender, normal bowel sounds, no liver or spleen enlargement, no masses.   EXTREMITIES:  Positive pedal pulses bilaterally, no edema.   NEUROLOGIC:  Cranial nerves II-XII are intact.  Deep tendon reflexes 2/4 and symmetric throughout.  Strength 5/5 and symmetric throughout.  Gait and sensation grossly intact.   RECTAL:  Exam was performed and normal sphincter tone, prostate is of normal size and firmness.  No discrete masses.        ASSESSMENT/PLAN:     1.  Gender dysphoria.   2.  Elevated blood pressure.        The risks and benefits of feminizing hormone therapy were discussed at length with the patient, including expected feminizing effects, the variability of these effects and expected timeline.  The potential side effects including impact on fertility and sexual function, potential for venous thromboembolic events, metabolic changes, cardiovascular risks and potential for unknown potential cancer risk.  Will get baseline labs including comprehensive metabolic panel, lipid panel and baseline testosterone.  Given patient's absolutely normal previous blood pressures, will simply monitor blood pressure and repeat at next visit.  Address if continues to be elevated.  Followup when ready for hormone therapy.

## 2018-06-09 LAB
SHBG SERPL-SCNC: 13 NMOL/L (ref 11–80)
TESTOST FREE SERPL-MCNC: 7.3 NG/DL (ref 4.7–24.4)
TESTOST SERPL-MCNC: 247 NG/DL (ref 240–950)

## 2018-06-12 ENCOUNTER — TELEPHONE (OUTPATIENT)
Dept: PLASTIC SURGERY | Facility: CLINIC | Age: 53
End: 2018-06-12

## 2018-06-12 NOTE — TELEPHONE ENCOUNTER
Called pt due to [facility] referral from Dr Zhang at [facility] who see's pt for mental healthcare. Pt stated she has worked with Dr. Zhang for last year to come out as trans and is now ready to move forward with entire transition including vaginoplasty. Pt states she hid in the shadows too long and is ready to make some noise to start her transition; pt also recognizes she is wanting to move faster than what is realistic but appreciates being lenin to discuss process and make future plans.     Pt reports not being on hormones but met with Dr. Rodríguez, waiting to hear back to possibly start hormones as her blood work is complete. Pt was informed that WPATH standards of care do recommend 12 months of hormones, unless medically contraindicated. Pt was informed needing 2 LOS from mental health providers prior to scheduling lower surgery consult. Pt denies any other gender related medial interventions to date.     Plan: Writer will call pt to invite to next info session. Pt will work with Dr. Rodríguez to begin hormone therapy, if appropriate. Pt will work with Dr. Rodríguez and progress towards 12 months of hormones and obtaining 2 LOS prior to being scheduled for a lower surgery consult.

## 2018-06-13 ENCOUNTER — TELEPHONE (OUTPATIENT)
Dept: OTHER | Facility: OUTPATIENT CENTER | Age: 53
End: 2018-06-13

## 2018-06-13 NOTE — TELEPHONE ENCOUNTER
Called Sunni in response to her [employer] message (see chart) noting anxiety about lab results for potential hormone therapy. Communicated that Dr. Rodríguez will contact Sunni regarding any concerns and, if not, she will likely receive a letter with results in the next week or so. Discussed getting scheduled with Dr. Rodríguez for a follow-up appointment to address next steps. Sunni shared about her recent conversation with Care Coordinator, Devin Hawkins, with regard to waiting about a year for vaginoplasty interventions so that hormones can take effect. She noted feeling disappointed but understanding the reasons.       Brooklynn Zhang, PhD, LP  Licensed Psychologist

## 2018-06-25 ENCOUNTER — OFFICE VISIT (OUTPATIENT)
Dept: OTHER | Facility: OUTPATIENT CENTER | Age: 53
End: 2018-06-25
Payer: COMMERCIAL

## 2018-06-25 ENCOUNTER — MYC MEDICAL ADVICE (OUTPATIENT)
Dept: OTHER | Facility: OUTPATIENT CENTER | Age: 53
End: 2018-06-25

## 2018-06-25 DIAGNOSIS — F64.0 GENDER DYSPHORIA IN ADULT: Primary | ICD-10-CM

## 2018-06-25 NOTE — MR AVS SNAPSHOT
After Visit Summary   6/25/2018    Fred Flores    MRN: [id number]           Patient Information     Date Of Birth          1965        Visit Information        Provider Department      6/25/2018 11:00 AM Brooklynn Zhang, PhD [facility]        Today's Diagnoses     Gender dysphoria in adult    -  1       Follow-ups after your visit        Your next 10 appointments already scheduled     Jul 09, 2018  4:00 PM CDT   Individual Therapy 53+ minutes with Brooklynn Zhang, PhD   [facility] ([facility])    [address]  [address]  [facility] [address]   [phone]            Jul 17, 2018  4:00 PM CDT   Return Visit with Hi Rodríguez MD   [facility] ([facility])    [address]  [address]  [facility] [address]   [phone]            Jul 23, 2018  4:00 PM CDT   Individual Therapy 53+ minutes with Brooklynn Zhang,    [facility] ([facility])    [address]  [address]  [facility] [address]   [phone]            Aug 06, 2018  3:00 PM CDT   Individual Therapy 53+ minutes with Brooklynn Zhang,    [facility] ([facility])    [address]  [address]  [facility] [address]   [phone]            Sep 05, 2018 10:00 AM CDT   Individual Therapy 53+ minutes with Sara Velasquez LP   [facility] ([facility])    [address]  [address]  [facility] [address]   [phone]            Sep 20, 2018 11:00 AM CDT   Individual Therapy 53+ minutes with Sara Velasquez LP   [facility] ([facility])    [address]  [address]  [facility] [address]   [phone]              Who to contact     Please call your clinic at [phone] to:    Ask questions about your health    Make or cancel appointments    Discuss your medicines    Learn  about your test results    Speak to your doctor            Additional Information About Your Visit        AltspaceVRhart Information     [employer] gives you secure access to your electronic health record. If you see a primary care provider, you can also send messages to your care team and make appointments. If you have questions, please call your primary care clinic.  If you do not have a primary care provider, please call [phone] and they will assist you.      V[employer] is an electronic gateway that provides easy, online access to your medical records. With [employer], you can request a clinic appointment, read your test results, renew a prescription or communicate with your care team.     To access your existing account, please contact your [facility] or call [phone] for assistance.        Care EveryWhere ID     This is your Care EveryWhere ID. This could be used by other organizations to access your [place] medical records  [id number]         Blood Pressure from Last 3 Encounters:   05/29/18 (!) 151/104    Weight from Last 3 Encounters:   05/29/18 115.2 kg (254 lb)              We Performed the Following     Individual Psychotherapy (53+ min) [15010]        Primary Care Provider    Paul Swift       No address on file        Equal Access to Services     RON GLOVER : Hadii rao ku hadasho Soomaali, waaxda luqadaha, qaybta kaalmada adejen, charisse steve . So [facility] [phone].    ATENCIÓN: Si habla español, tiene a smith disposición servicios gratuitos de asistencia lingüística. Llame al [phone].    We comply with applicable federal civil rights laws and [place] laws. We do not discriminate on the basis of race, color, national origin, age, disability, sex, sexual orientation, or gender identity.            Thank you!     Thank you for choosing [facility]  for your care. Our goal is always to provide you with excellent care. Hearing  back from our patients is one way we can continue to improve our services. Please take a few minutes to complete the written survey that you may receive in the mail after your visit with us. Thank you!             Your Updated Medication List - Protect others around you: Learn how to safely use, store and throw away your medicines at www.disposemymeds.org.          This list is accurate as of 6/25/18 11:59 PM.  Always use your most recent med list.                   Brand Name Dispense Instructions for use Diagnosis    buPROPion 150 MG 24 hr tablet    WELLBUTRIN XL          ibuprofen 400 MG tablet    ADVIL/MOTRIN     Take 2 tablets by mouth        traZODone 50 MG tablet    DESYREL     TAKE TWO TABLETS BY MOUTH DAILY AT BEDTIME        venlafaxine 75 MG 24 hr capsule    EFFEXOR-XR     Take 2 capsules by mouth

## 2018-06-28 ENCOUNTER — MEDICAL CORRESPONDENCE (OUTPATIENT)
Dept: HEALTH INFORMATION MANAGEMENT | Facility: CLINIC | Age: 53
End: 2018-06-28

## 2018-07-05 PROBLEM — F64.9 GENDER IDENTITY DISORDER: Status: RESOLVED | Noted: 2018-05-03 | Resolved: 2018-07-05

## 2018-07-05 NOTE — PROGRESS NOTES
"[facility]  [facility]  [address], [address]  [place] [address]  Outpatient Progress Note      Session Date: 18  Client Name: Fred Flores (\"Sunni;\" she/her pronouns)  YOB: 1965  MRN: [id number]  Provider: Brooklynn Zhang, PhD   Type of Session: Individual   Present in Session: Client only  Number of Minutes: 55   Treatment Plan Due: 2018    Health Maintenance Summary - Mental Health Treatment Plan       Status Date      Mental Health Tx Plan Q11 MOS Next Due 2018      Done 2017           Current Symptoms/Status:   Sunni reports lifelong questions about her gender identity and discomfort with her sex assigned at birth. At intake, Sunni noted concerns about sexual functioning (erection/orgasm) though these has subsided as gender has been further explored. She has \"adopted\" the name Sunni for \"that part of me\" that desires to be more feminine and enjoys cross-dressing. She indicated that, since her wife  in , she has again been purchasing feminine clothing and has been supported in exploring a feminine gender expression by her new dating partner, Tawny. Sunni has taken steps to come out as transgender to her daughters and peer group, and has been met with support. She reports a desire to further explore social and medical transition.     Progress Toward Treatment Goals:   Increased gender exploration and comfort, including consistent feminine presentation to therapy sessions and beginning facial hair removal. Hormone consultation completed with Dr. Hi Rodríguez (18) and moving forward with legal name/gender marker change. Exploring options/plans for vaginoplasty.       Intervention & Response:   Interpersonal, client-centered, and CBT techniques utilized to address client's current status. Per last session, therapist and client processed Sunni's conversation with Devin Hawkins regarding future vaginoplasty plans. Identified and " "processed anxiety and impatience that has risen as related to medical transition interventions now that Sunni feels accepting and comfortable with her gender identity. She shared narrative regarding \"self-induced\" anxiety, as related to not hearing back about her hormone therapy consult. Sunni shared catastrophizing thoughts about being ineligible for hormones (despite no such feedback), which led to a brief experience of suicidal ideation. She reported that this is when she reached out to this provider for reassurance (see MyChart messages). Sunni denied any suicidal plan, intent, or attempts and reported that ideation has not returned since learning that she could schedule a follow-up appt regarding hormone therapy. She was receptive to letting therapist know ASAP if suicidal ideation returns, sharing that it \"scared\" her. Therapist and client discussed that Sunni is still aiming for a July 4 date of going \"full time\" in terms of feminine gender expression and is currently in the process of applying for a court date to change her name/gender marker. Explored her sense that her daughters are \"ready' for her to go full time and have expressed support, as has her partner. Per last session, therapist and client continued to process and discuss transfer of care, given therapist's upcoming departure. Sunni shared feeling \"slightly\" angry about the therapist's choice to leave, and was able to explore feelings of frustration and sadness. She expressed a wish that she could be friends with the provider outside of the clinic, while also recognizing that boundaries prevent this from happening. Sunni reported that she has researched other providers at this clinic and would prefer to transfer care to Dr. Sara Velasquez, which this provider supported. Discussed logistics for getting scheduled with Dr. Velasquez. Reflected, validated, and normalized client's reactions and feelings throughout session. Overall, Sunni was open to " feedback, active, and engaged throughout session.     Assignment:   (1). Continue reading/completing My Gender Workbook (Lizzie) & How to Understand Your Gender (Julianna & Megan)  (2). Continue legal name/gender marker change process; Attend follow-up appt with Dr. Rodríguez for hormone therapy.       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       Plan / Need for Future Services:    Return for individual therapy in 2 weeks.             Brooklynn Zhang, PhD, LP  Licensed Psychologist

## 2018-07-09 ENCOUNTER — OFFICE VISIT (OUTPATIENT)
Dept: OTHER | Facility: OUTPATIENT CENTER | Age: 53
End: 2018-07-09
Payer: COMMERCIAL

## 2018-07-09 DIAGNOSIS — F64.0 GENDER DYSPHORIA IN ADULT: Primary | ICD-10-CM

## 2018-07-09 NOTE — MR AVS SNAPSHOT
After Visit Summary   7/9/2018    Fred Flores    MRN: [id number]           Patient Information     Date Of Birth          1965        Visit Information        Provider Department      7/9/2018 4:00 PM Brooklynn Zhang, PhD [facility]        Today's Diagnoses     Gender dysphoria in adult    -  1       Follow-ups after your visit        Your next 10 appointments already scheduled     Jul 17, 2018  4:00 PM CDT   Return Visit with Hi Rodríguez MD   [facility] ([facility])    [address]  [address]  [facility] [address]   [phone]            Jul 23, 2018  4:00 PM CDT   Individual Therapy 53+ minutes with Brooklynn Zhang, PhD   [facility] ([facility])    [address]  [address]  [facility] [address]   [phone]            Aug 06, 2018  3:00 PM CDT   Individual Therapy 53+ minutes with Brooklynn Zhang, PhD   [facility] ([facility])    [address]  [address]  [facility] [address]   [phone]            Sep 05, 2018 10:00 AM CDT   Individual Therapy 53+ minutes with Sara Velasquez LP   [facility] ([facility])    [address]  [address]  [facility] [address]   [phone]            Sep 20, 2018 11:00 AM CDT   Individual Therapy 53+ minutes with Sara Velasquez LP   [facility] ([facility])    [address]  [address]  [facility] [address]   [phone]              Who to contact     Please call your clinic at [phone] to:    Ask questions about your health    Make or cancel appointments    Discuss your medicines    Learn about your test results    Speak to your doctor            Additional Information About Your Visit        MyChart Information     [employer] gives you secure access to your electronic health record. If you see a primary care provider, you  can also send messages to your care team and make appointments. If you have questions, please call your primary care clinic.  If you do not have a primary care provider, please call [phone] and they will assist you.      [employer] is an electronic gateway that provides easy, online access to your medical records. With [employer], you can request a clinic appointment, read your test results, renew a prescription or communicate with your care team.     To access your existing account, please contact your [facility] or call [phone] for assistance.        Care EveryWhere ID     This is your Care EveryWhere ID. This could be used by other organizations to access your [place] medical records  [id number]         Blood Pressure from Last 3 Encounters:   05/29/18 (!) 151/104    Weight from Last 3 Encounters:   05/29/18 115.2 kg (254 lb)              We Performed the Following     Individual Psychotherapy (53+ min) [43009]        Primary Care Provider    Paul Swift       No address on file        Equal Access to Services     [facility] : Hadii aad ku hadasho Soomaali, waaxda luqadaha, qaybta kaalmada adeegyada, waxay marioin haysivakumar steve . So [facility] [phone].    ATENCIÓN: Si habla español, tiene a smith disposición servicios gratuitos de asistencia lingüística. Llame al [phone].    We comply with applicable federal civil rights laws and [place] laws. We do not discriminate on the basis of race, color, national origin, age, disability, sex, sexual orientation, or gender identity.            Thank you!     Thank you for choosing [facility]  for your care. Our goal is always to provide you with excellent care. Hearing back from our patients is one way we can continue to improve our services. Please take a few minutes to complete the written survey that you may receive in the mail after your visit with us. Thank you!             Your Updated Medication  List - Protect others around you: Learn how to safely use, store and throw away your medicines at www.disposemymeds.org.          This list is accurate as of 7/9/18 11:59 PM.  Always use your most recent med list.                   Brand Name Dispense Instructions for use Diagnosis    buPROPion 150 MG 24 hr tablet    WELLBUTRIN XL          ibuprofen 400 MG tablet    ADVIL/MOTRIN     Take 2 tablets by mouth        traZODone 50 MG tablet    DESYREL     TAKE TWO TABLETS BY MOUTH DAILY AT BEDTIME        venlafaxine 75 MG 24 hr capsule    EFFEXOR-XR     Take 2 capsules by mouth

## 2018-07-11 NOTE — PROGRESS NOTES
"[facility]  [facility]  [address], [address]  [place] [address]  Outpatient Progress Note      Session Date: 18  Client Name: Fred Flores (\"Sunni;\" she/her pronouns)  YOB: 1965  MRN: [id number]  Provider: Brooklynn Zhang, PhD   Type of Session: Individual   Present in Session: Client only  Number of Minutes: 55   Treatment Plan Due: 2018    Health Maintenance Summary - Mental Health Treatment Plan       Status Date      Mental Health Tx Plan Q11 MOS Next Due 2018      Done 2017           Current Symptoms/Status:   Sunni reports lifelong questions about her gender identity and discomfort with her sex assigned at birth, noting that she identifies as a woman. At intake, Sunni noted concerns about sexual functioning (erection/orgasm) though these have subsided as gender has been further explored. She indicated that, since her wife  in , she increasingly desired to further explore gender concerns and has been supported in this by her current partner, Tawny. Sunni has taken steps to come out as transgender to her daughters and peer group, and has been met with support. She reports a desire to further explore options for social and medical transition.     Progress Toward Treatment Goals:   Increased gender exploration and comfort, including coming out as transfeminine and presenting full time starting 2018. Has started facial hair removal. Hormone consultation completed with Dr. Hi Rodríguez (18) and moving forward with legal name/gender marker change. Exploring options/plans for vaginoplasty.       Intervention & Response:   Interpersonal, client-centered, and CBT techniques utilized to address client's current status. Per last session, Sunni shared that she has indeed come out and been presenting feminine full-time since  - \"my  day.\" She shared experiencing some confusion from her neighbors, but overall " "feeling validated and supported by those around her. She shared a narrative about being correctly gendered while eat a restaurant and how positive and affirming this felt. She also shared with provider that she had her ears pierced and felt very supported when one of her daughters recently gave her earrings as a gift. Identified and discussed that the remaining people she wishes to come out to are her partner's family members. Explored plans to do this in the coming months, as well as her decision to present masculine (a final time) at an upcoming family event focused on an older relative of her  wife. Sunni shared feeling \"fine\" with this decision, though acknowledged that it has grown increasingly difficult for her to present in \"Fred drag.\" Therapist and client discussed Sunni's progress with getting a court date for her name/gender marker change. Sunni reported excitement for her upcoming appointment with Dr. Rodríguez, in which she hopes to receive her hormone prescription. She continues to note impatience with this process, though reported that suicidal ideation has not returned. Sunni noted that, since going full-time, she has noticed greater discomfort with her genitals (including fears related to public restroom use) and she feels increasingly motivated to pursue vaginoplasty when she is able to do so. She noted that she would also consider breast augmentation depending upon the results of hormone therapy. Therapist and client spent time discussing the referral process around gender-related surgeries and provider answered client's questions about determining insurance coverage for such interventions. Discussed ways in which Sunni might begin to research surgeons in an effort to decide who she might like to see in the future. Reflected, validated, and normalized client's reactions and feelings throughout session. Per last session, Sunni reported that she was able to schedule with Dr. Velasquez to transfer care " beginning in August. Overall, Sunni was open to feedback, active, and engaged throughout session.     Assignment:   (1). Continue reading/completing My Gender Workbook (Lizzie) & How to Understand Your Gender (Julianna & Megan)  (2). Continue legal name/gender marker change process; Attend follow-up appt with Dr. Rodríguez for hormone therapy.       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       Plan / Need for Future Services:    Return for individual therapy in 2 weeks.             Brooklynn Zhang, PhD,   Licensed Psychologist

## 2018-07-16 ENCOUNTER — TRANSFERRED RECORDS (OUTPATIENT)
Dept: HEALTH INFORMATION MANAGEMENT | Facility: CLINIC | Age: 53
End: 2018-07-16

## 2018-07-17 ENCOUNTER — OFFICE VISIT (OUTPATIENT)
Dept: OTHER | Facility: OUTPATIENT CENTER | Age: 53
End: 2018-07-17
Payer: COMMERCIAL

## 2018-07-17 ENCOUNTER — CARE COORDINATION (OUTPATIENT)
Dept: OTHER | Facility: OUTPATIENT CENTER | Age: 53
End: 2018-07-17

## 2018-07-17 VITALS
HEIGHT: 70 IN | WEIGHT: 249.8 LBS | BODY MASS INDEX: 35.76 KG/M2 | SYSTOLIC BLOOD PRESSURE: 143 MMHG | DIASTOLIC BLOOD PRESSURE: 87 MMHG | HEART RATE: 101 BPM

## 2018-07-17 DIAGNOSIS — F64.0 GENDER DYSPHORIA IN ADOLESCENT AND ADULT: Primary | ICD-10-CM

## 2018-07-17 RX ORDER — ESTRADIOL 0.1 MG/D
1 FILM, EXTENDED RELEASE TRANSDERMAL
Qty: 8 PATCH | Refills: 1 | Status: SHIPPED | OUTPATIENT
Start: 2018-07-19 | End: 2018-08-07

## 2018-07-17 ASSESSMENT — ENCOUNTER SYMPTOMS
HEADACHES: 0
WEAKNESS: 0
DYSPHORIC MOOD: 0
UNEXPECTED WEIGHT CHANGE: 0
FEVER: 0
FREQUENCY: 0
ABDOMINAL PAIN: 0
POLYDIPSIA: 0
PALPITATIONS: 0
CHEST TIGHTNESS: 0
NUMBNESS: 0
VOMITING: 0
LIGHT-HEADEDNESS: 0
CHILLS: 0
SHORTNESS OF BREATH: 0
NERVOUS/ANXIOUS: 0

## 2018-07-17 NOTE — MR AVS SNAPSHOT
After Visit Summary   7/17/2018    Fred Flores    MRN: [id number]           Patient Information     Date Of Birth          1965        Visit Information        Provider Department      7/17/2018 4:00 PM Hi Rodríguez MD [facility]        Today's Diagnoses     Gender dysphoria in adolescent and adult    -  1       Follow-ups after your visit        Follow-up notes from your care team     Return in about 1 month (around 8/17/2018).      Your next 10 appointments already scheduled     Aug 06, 2018  3:00 PM CDT   Individual Therapy 53+ minutes with Brooklynn Zhang, PhD   [facility] ([facility])    [address]  [address]  [facility] [address]   [phone]            Aug 14, 2018  1:20 PM CDT   Return Visit with Hi Rodríguez MD   [facility] ([facility])    [address]  [address]  [facility] [address]   [phone]            Sep 05, 2018 10:00 AM CDT   Individual Therapy 53+ minutes with Sara Velasquez LP   [facility] ([facility])    [address]  [address]  [facility] [address]   [phone]            Sep 20, 2018 11:00 AM CDT   Individual Therapy 53+ minutes with Sara Velasquez LP   [facility] ([facility])    [address]  [address]  [facility] [address]   [phone]              Who to contact     Please call your clinic at [phone] to:    Ask questions about your health    Make or cancel appointments    Discuss your medicines    Learn about your test results    Speak to your doctor            Additional Information About Your Visit        MyChart Information     [employer] gives you secure access to your electronic health record. If you see a primary care provider, you can also send messages to your care team and make appointments. If you have questions, please call your primary care clinic.  If you  "do not have a primary care provider, please call [phone] and they will assist you.      [employer] is an electronic gateway that provides easy, online access to your medical records. With [employer], you can request a clinic appointment, read your test results, renew a prescription or communicate with your care team.     To access your existing account, please contact your [facility] or call [phone] for assistance.        Care EveryWhere ID     This is your Care EveryWhere ID. This could be used by other organizations to access your [place] medical records  [id number]        Your Vitals Were     Pulse Height BMI (Body Mass Index)             101 1.778 m (5' 10\") 35.84 kg/m2          Blood Pressure from Last 3 Encounters:   07/17/18 143/87   05/29/18 (!) 151/104    Weight from Last 3 Encounters:   07/17/18 113.3 kg (249 lb 12.8 oz)   05/29/18 115.2 kg (254 lb)              Today, you had the following     No orders found for display         Today's Medication Changes          These changes are accurate as of 7/17/18 11:59 PM.  If you have any questions, ask your nurse or doctor.               Start taking these medicines.        Dose/Directions    estradiol 0.1 MG/24HR BIW patch   Commonly known as:  VIVELLE-DOT   Used for:  Gender dysphoria in adolescent and adult   Started by:  Hi Rodríguez MD        Dose:  1 patch   Place 1 patch onto the skin twice a week   Quantity:  8 patch   Refills:  1            Where to get your medicines      These medications were sent to [id number] [address]  [address] [facility]) [place] [address]     Phone:  [phone]     estradiol 0.1 MG/24HR BIW patch                Primary Care Provider    Paul Swift       No address on file        Equal Access to Services     REINALDO GLOVER AH: Geetha Tinajero, christy ruiz, qasathish kaalkayce lynch, charisse ortega " willow steve ah. So [facility] [phone].    ATENCIÓN: Si margret guan, tiene a smith disposición servicios gratuitos de asistencia lingüística. Nani al [phone].    We comply with applicable federal civil rights laws and [place] laws. We do not discriminate on the basis of race, color, national origin, age, disability, sex, sexual orientation, or gender identity.            Thank you!     Thank you for choosing [facility]  for your care. Our goal is always to provide you with excellent care. Hearing back from our patients is one way we can continue to improve our services. Please take a few minutes to complete the written survey that you may receive in the mail after your visit with us. Thank you!             Your Updated Medication List - Protect others around you: Learn how to safely use, store and throw away your medicines at www.disposemymeds.org.          This list is accurate as of 7/17/18 11:59 PM.  Always use your most recent med list.                   Brand Name Dispense Instructions for use Diagnosis    buPROPion 150 MG 24 hr tablet    WELLBUTRIN XL          estradiol 0.1 MG/24HR BIW patch    VIVELLE-DOT    8 patch    Place 1 patch onto the skin twice a week    Gender dysphoria in adolescent and adult       ibuprofen 400 MG tablet    ADVIL/MOTRIN     Take 2 tablets by mouth        traZODone 50 MG tablet    DESYREL     TAKE TWO TABLETS BY MOUTH DAILY AT BEDTIME        venlafaxine 75 MG 24 hr capsule    EFFEXOR-XR     Take 2 capsules by mouth

## 2018-07-17 NOTE — PROGRESS NOTES
KRISHNA Moeller is a 53 year old individual that uses pronouns She/Her/Hers/Herself that presents today for follow up of:  feminizing hormone therapy.   Gender identity: female.   Started Hormone  therapy  n/a  Continues on n/a  Any special concerns today?    No concerns,ready to start hormone therapy    On hormones?  No  Gender related body changes since last visit: n/a    Breakthrough bleeding? Does Not Apply  Activity: work out 3x/wk  New health concerns since last visit:  none  ---    No past surgical history on file.    Patient Active Problem List   Diagnosis     Gender dysphoria in adult       Current Outpatient Prescriptions   Medication Sig Dispense Refill     buPROPion (WELLBUTRIN XL) 150 MG 24 hr tablet        ibuprofen (ADVIL/MOTRIN) 400 MG tablet Take 2 tablets by mouth       traZODone (DESYREL) 50 MG tablet TAKE TWO TABLETS BY MOUTH DAILY AT BEDTIME        venlafaxine (EFFEXOR-XR) 75 MG 24 hr capsule Take 2 capsules by mouth         History   Smoking Status     Never Smoker   Smokeless Tobacco     Never Used        No Known Allergies    Health Maintenance Due   Topic Date Due     TETANUS IMMUNIZATION (SYSTEM ASSIGNED)  01/09/1984     HIV SCREEN (SYSTEM ASSIGNED)  01/09/1984     COLON CANCER SCREEN (SYSTEM ASSIGNED)  01/09/2016         Problem, Medication and Allergy Lists were   reviewed and are current.     Patient Active Problem List    Diagnosis Date Noted     Gender dysphoria in adult 07/05/2018     Priority: Medium   .         Review of Systems:   Review of Systems   Constitutional: Negative for chills, fever and unexpected weight change.   Eyes: Negative for visual disturbance.   Respiratory: Negative for chest tightness and shortness of breath.    Cardiovascular: Negative for chest pain, palpitations and leg swelling.   Gastrointestinal: Negative for abdominal pain and vomiting.   Endocrine: Negative for polydipsia and polyuria.   Genitourinary: Negative for frequency.   Neurological:  "Negative for weakness, light-headedness, numbness and headaches.   Psychiatric/Behavioral: Negative for dysphoric mood and suicidal ideas. The patient is not nervous/anxious.             Physical Exam:     Vitals:    07/17/18 1554   BP: 143/87   Pulse: 101   Weight: 113.3 kg (249 lb 12.8 oz)   Height: 1.778 m (5' 10\")     BMI= Body mass index is 35.84 kg/(m^2).   Wt Readings from Last 10 Encounters:   07/17/18 113.3 kg (249 lb 12.8 oz)   05/29/18 115.2 kg (254 lb)     BP at PCP office 110's/low 80's in May per Care Everywhere chart  Appearance: Female appearance and dress    GENERAL:: healthy, alert and no distress  RESP: lungs clear to auscultation - no rales, no rhonchi, no wheezes  CV: regular rates and rhythm, normal S1 S2, no S3 or S4 and no murmur, no click or rub -   Breast, hips per flowsheet    Affect: Appropriate/mood-congruent           Labs:   Results from last visit:  Orders Only on 06/07/2018   Component Date Value Ref Range Status     Testosterone Total 06/07/2018 247  240 - 950 ng/dL Final    Comment: This test was developed and its performance characteristics determined by the   [facility],  Special Chemistry Laboratory. It has   not been cleared or approved by the FDA. The laboratory is regulated under   CLIA as qualified to perform high-complexity testing. This test is used for   clinical purposes. It should not be regarded as investigational or for   research.       Sex Hormone Binding Globulin 06/07/2018 13  11 - 80 nmol/L Final     Free Testosterone Calculated 06/07/2018 7.30  4.7 - 24.4 ng/dL Final     FASTING SPECIMEN 06/07/2018 yes   Final     Cholesterol 06/07/2018 174.0  0.0 - 200.0 Final     HDL Cholesterol 06/07/2018 38.0* >40.0 Final     Triglycerides 06/07/2018 156.0* 0.0 - 150.0 Final     Cholesterol/HDL Ratio 06/07/2018 4.6  0.0 - 5.0 Final     LDL Cholesterol Direct 06/07/2018 106.0  0.0 - 129.0 Final     VLDL-Cholesterol 06/07/2018 31.0  7.0 - 32.0 Final "     Glucose 06/07/2018 104.0  60.0 - 109.0 mg/dL Final     Urea Nitrogen 06/07/2018 22.0  7.0 - 30.0 mg/dL Final     Calcium 06/07/2018 9.1  8.5 - 10.4 mg/dL Final     Creatinine 06/07/2018 1.4  0.8 - 1.5 mg/dL Final     eGFR Calculated (Non Black Referen* 06/07/2018 56.6* >60.0 Final     eGFR Calculated (Black Reference) 06/07/2018 68.4  >60.0 Final     Sodium 06/07/2018 141.0  133.0 - 144.0 mmol/L Final     Potassium 06/07/2018 4.1  3.4 - 5.3 mmol/L Final     Chloride 06/07/2018 108.0  94.0 - 109.0 mmol/L Final     Carbon Dioxide 06/07/2018 26.0  20.0 - 32.0 mmol/L Final     Albumin 06/07/2018 4.0  3.3 - 4.6 g/dL Final     Alkaline Phosphatase 06/07/2018 72.0  40.0 - 150.0 U/L Final     ALT 06/07/2018 31.0  0.0 - 70.0 U/L Final     AST 06/07/2018 22.0  0.0 - 55.0 U/L Final     Bilirubin Total 06/07/2018 0.8  0.2 - 1.3 mg/dL Final     Protein Total 06/07/2018 7.3  6.8 - 8.8 g/dL Final       Assessment and Plan   1. Gender dysphoria  Written consent reviewed and obtained  Start vivelle dot  0.1 mg 2x/wk  Discussed process for possible desired surgeries in [facility] including vaginoplasty, breast augmentaiton, abd. Liposuction  Counseled patient regarding need for 1 yr. Hormones, need for experiencing effects of suppressed testosterone and increased estradiol on body.   Need for time for seeing if adequate  Breast development      Follow up:  Follow up in 1 month.  Results by mychart  Questions were elicited and answered.     Hi Rodríguez MD

## 2018-07-17 NOTE — NURSING NOTE
"Chief Complaint   Patient presents with     RECHECK     TG       Vitals:    07/17/18 1554   BP: 143/87   Pulse: 101   Weight: 113.3 kg (249 lb 12.8 oz)   Height: 1.778 m (5' 10\")       Body mass index is 35.84 kg/(m^2).      Mira Turner CMA    "

## 2018-07-23 ENCOUNTER — OFFICE VISIT (OUTPATIENT)
Dept: OTHER | Facility: OUTPATIENT CENTER | Age: 53
End: 2018-07-23
Payer: COMMERCIAL

## 2018-07-23 DIAGNOSIS — F64.0 GENDER DYSPHORIA IN ADULT: Primary | ICD-10-CM

## 2018-07-23 NOTE — MR AVS SNAPSHOT
After Visit Summary   7/23/2018    Fred Flores    MRN: [id number]           Patient Information     Date Of Birth          1965        Visit Information        Provider Department      7/23/2018 4:00 PM Brooklynn Zhang, PhD [facility]        Today's Diagnoses     Gender dysphoria in adult    -  1       Follow-ups after your visit        Your next 10 appointments already scheduled     Sep 05, 2018 10:00 AM CDT   Individual Therapy 53+ minutes with Sara Velasquez LP   [facility] ([facility])    [address]  [address]  [facility] [address]   [phone]            Sep 20, 2018 11:00 AM CDT   Individual Therapy 53+ minutes with Sara Velasquez LP   [place] [facility] ([facility])    [address]  [address]  [facility] [address]   [phone]            Oct 04, 2018  1:00 PM CDT   Individual Therapy 53+ minutes with Sara Velasquez LP   [place] [facility] ([facility])    [address]  [address]  [facility] [address]   [phone]            Oct 22, 2018  1:00 PM CDT   Individual Therapy 53+ minutes with Sara Velasquez LP   [place] [facility] ([facility])    [address]  [address]  [facility] [address]   [phone]              Future tests that were ordered for you today     Open Future Orders        Priority Expected Expires Ordered    Testosterone Free and Total Routine 9/7/2018 1/7/2019 8/7/2018    Basic Metabolic Panel ([place]) [[id number]] Routine 9/7/2018 1/7/2019 8/7/2018            Who to contact     Please call your clinic at [phone] to:    Ask questions about your health    Make or cancel appointments    Discuss your medicines    Learn about your test results    Speak to your doctor            Additional Information About Your Visit        MyChart Information     MyChart gives you secure access to your  electronic health record. If you see a primary care provider, you can also send messages to your care team and make appointments. If you have questions, please call your primary care clinic.  If you do not have a primary care provider, please call [phone] and they will assist you.      [employer] is an electronic gateway that provides easy, online access to your medical records. With [employer], you can request a clinic appointment, read your test results, renew a prescription or communicate with your care team.     To access your existing account, please contact your [facility] or call [phone] for assistance.        Care EveryWhere ID     This is your Care EveryWhere ID. This could be used by other organizations to access your [facility] medical records  [id number]         Blood Pressure from Last 3 Encounters:   08/07/18 (!) 142/98   07/17/18 143/87   05/29/18 (!) 151/104    Weight from Last 3 Encounters:   08/07/18 112.5 kg (248 lb)   07/17/18 113.3 kg (249 lb 12.8 oz)   05/29/18 115.2 kg (254 lb)              We Performed the Following     Individual Psychotherapy (53+ min) [10014]        Primary Care Provider    Paul Swift       No address on file        Equal Access to Services     REINALDO GLOVER : Hadii rao abraham hadasho Soomaali, waaxda luqadaha, qaybta kaalmada adeegyada, charisse steve . So [facility] [phone].    ATENCIÓN: Si habla español, tiene a smith disposición servicios gratuitos de asistencia lingüística. Llame al [phone].    We comply with applicable federal civil rights laws and [place] laws. We do not discriminate on the basis of race, color, national origin, age, disability, sex, sexual orientation, or gender identity.            Thank you!     Thank you for choosing [facility]  for your care. Our goal is always to provide you with excellent care. Hearing back from our patients is one way we can continue to improve our  services. Please take a few minutes to complete the written survey that you may receive in the mail after your visit with us. Thank you!             Your Updated Medication List - Protect others around you: Learn how to safely use, store and throw away your medicines at www.disposemymeds.org.          This list is accurate as of 7/23/18 11:59 PM.  Always use your most recent med list.                   Brand Name Dispense Instructions for use Diagnosis    buPROPion 150 MG 24 hr tablet    WELLBUTRIN XL          ibuprofen 400 MG tablet    ADVIL/MOTRIN     Take 2 tablets by mouth        traZODone 50 MG tablet    DESYREL     TAKE TWO TABLETS BY MOUTH DAILY AT BEDTIME        venlafaxine 75 MG 24 hr capsule    EFFEXOR-XR     Take 2 capsules by mouth

## 2018-08-06 ENCOUNTER — OFFICE VISIT (OUTPATIENT)
Dept: OTHER | Facility: OUTPATIENT CENTER | Age: 53
End: 2018-08-06
Payer: COMMERCIAL

## 2018-08-06 DIAGNOSIS — F64.0 GENDER DYSPHORIA IN ADULT: Primary | ICD-10-CM

## 2018-08-06 NOTE — MR AVS SNAPSHOT
After Visit Summary   8/6/2018    Fred Flores    MRN: [id number]           Patient Information     Date Of Birth          1965        Visit Information        Provider Department      8/6/2018 3:00 PM Brooklynn Zhang, PhD [facility]        Today's Diagnoses     Gender dysphoria in adult    -  1       Follow-ups after your visit        Your next 10 appointments already scheduled     Sep 05, 2018 10:00 AM CDT   Individual Therapy 53+ minutes with Sara Velasquez LP   [facility] ([facility])    [address]  [address]  [facility] [address]   [phone]            Sep 20, 2018 11:00 AM CDT   Individual Therapy 53+ minutes with Sara Velasquez LP   [place] [facility] ([facility])    [address]  [address]  [facility] [address]   [phone]            Oct 04, 2018  1:00 PM CDT   Individual Therapy 53+ minutes with Sara Velasquez LP   [place] [facility] ([facility])    [address]  [address]  [facility] [address]   [phone]            Oct 22, 2018  1:00 PM CDT   Individual Therapy 53+ minutes with Sara Velasquez LP   [place] [facility] ([facility])    [address]  [address]  [facility] [address]   [phone]              Future tests that were ordered for you today     Open Future Orders        Priority Expected Expires Ordered    Testosterone Free and Total Routine 9/7/2018 1/7/2019 8/7/2018    Basic Metabolic Panel ([place]) [[id number]] Routine 9/7/2018 1/7/2019 8/7/2018            Who to contact     Please call your clinic at [phone] to:    Ask questions about your health    Make or cancel appointments    Discuss your medicines    Learn about your test results    Speak to your doctor            Additional Information About Your Visit        MyChart Information     MyChart gives you secure access to your  electronic health record. If you see a primary care provider, you can also send messages to your care team and make appointments. If you have questions, please call your primary care clinic.  If you do not have a primary care provider, please call [phone] and they will assist you.      [employer] is an electronic gateway that provides easy, online access to your medical records. With [employer], you can request a clinic appointment, read your test results, renew a prescription or communicate with your care team.     To access your existing account, please contact your [facility] or call [phone] for assistance.        Care EveryWhere ID     This is your Care EveryWhere ID. This could be used by other organizations to access your [facility] medical records  [id number]         Blood Pressure from Last 3 Encounters:   08/07/18 (!) 142/98   07/17/18 143/87   05/29/18 (!) 151/104    Weight from Last 3 Encounters:   08/07/18 112.5 kg (248 lb)   07/17/18 113.3 kg (249 lb 12.8 oz)   05/29/18 115.2 kg (254 lb)              We Performed the Following     Individual Psychotherapy (53+ min) [78116]        Primary Care Provider    Paul Swift       No address on file        Equal Access to Services     REINALDO GLOVER : Hadii rao abraham hadasho Soomaali, waaxda luqadaha, qaybta kaalmada adeegyada, charisse steve . So [facility] [phone].    ATENCIÓN: Si habla español, tiene a smith disposición servicios gratuitos de asistencia lingüística. Llame al [phone].    We comply with applicable federal civil rights laws and [place] laws. We do not discriminate on the basis of race, color, national origin, age, disability, sex, sexual orientation, or gender identity.            Thank you!     Thank you for choosing [facility]  for your care. Our goal is always to provide you with excellent care. Hearing back from our patients is one way we can continue to improve our  services. Please take a few minutes to complete the written survey that you may receive in the mail after your visit with us. Thank you!             Your Updated Medication List - Protect others around you: Learn how to safely use, store and throw away your medicines at www.disposemymeds.org.          This list is accurate as of 8/6/18 11:59 PM.  Always use your most recent med list.                   Brand Name Dispense Instructions for use Diagnosis    buPROPion 150 MG 24 hr tablet    WELLBUTRIN XL          ibuprofen 400 MG tablet    ADVIL/MOTRIN     Take 2 tablets by mouth        traZODone 50 MG tablet    DESYREL     TAKE TWO TABLETS BY MOUTH DAILY AT BEDTIME        venlafaxine 75 MG 24 hr capsule    EFFEXOR-XR     Take 2 capsules by mouth

## 2018-08-07 ENCOUNTER — OFFICE VISIT (OUTPATIENT)
Dept: OTHER | Facility: OUTPATIENT CENTER | Age: 53
End: 2018-08-07
Payer: COMMERCIAL

## 2018-08-07 VITALS
HEIGHT: 70 IN | HEART RATE: 98 BPM | SYSTOLIC BLOOD PRESSURE: 142 MMHG | WEIGHT: 248 LBS | DIASTOLIC BLOOD PRESSURE: 98 MMHG | BODY MASS INDEX: 35.5 KG/M2

## 2018-08-07 DIAGNOSIS — F64.0 GENDER DYSPHORIA IN ADOLESCENT AND ADULT: Primary | ICD-10-CM

## 2018-08-07 DIAGNOSIS — R03.0 ELEVATED BLOOD PRESSURE READING WITHOUT DIAGNOSIS OF HYPERTENSION: ICD-10-CM

## 2018-08-07 RX ORDER — SPIRONOLACTONE 100 MG/1
100 TABLET, FILM COATED ORAL DAILY
Qty: 30 TABLET | Refills: 3 | Status: SHIPPED | OUTPATIENT
Start: 2018-08-07 | End: 2018-11-15

## 2018-08-07 RX ORDER — ESTRADIOL 0.1 MG/D
1 FILM, EXTENDED RELEASE TRANSDERMAL
Qty: 8 PATCH | Refills: 2 | Status: SHIPPED | OUTPATIENT
Start: 2018-08-09 | End: 2018-10-25

## 2018-08-07 ASSESSMENT — ENCOUNTER SYMPTOMS
SHORTNESS OF BREATH: 0
HEADACHES: 0
PALPITATIONS: 0
LIGHT-HEADEDNESS: 0
ABDOMINAL PAIN: 0
DYSPHORIC MOOD: 0
CHEST TIGHTNESS: 0
VOMITING: 0
CHILLS: 0
FEVER: 0
NERVOUS/ANXIOUS: 0
FREQUENCY: 0
UNEXPECTED WEIGHT CHANGE: 0
POLYDIPSIA: 0
WEAKNESS: 0
NUMBNESS: 0

## 2018-08-07 NOTE — PROGRESS NOTES
KRISHNA Moeller is a 53 year old individual that uses pronouns She/Her/Hers/Herself that presents today for follow up of:  feminizing hormone therapy.   Gender identity: female.   Started Hormone  therapy  7/17/2018  Continues on . Vivelle dot 0.1 mg patch 2x/wk    .   Any special concerns today?    No concerns  On hormones?  YES +++ Shot day of the week? Not applicable-taking pills/patch/gel      Due for labs?  Yes      +++ Refills of meds needed?  Yes  Gender related body changes since last visit:   Nipples larger and sensitive, more emotional    Breakthrough bleeding? Does Not Apply  Activity: no change  New health concerns since last visit:  ---none    No past surgical history on file.    Patient Active Problem List   Diagnosis     Gender dysphoria in adult       Current Outpatient Prescriptions   Medication Sig Dispense Refill     buPROPion (WELLBUTRIN XL) 150 MG 24 hr tablet        estradiol (VIVELLE-DOT) 0.1 MG/24HR BIW patch Place 1 patch onto the skin twice a week 8 patch 1     ibuprofen (ADVIL/MOTRIN) 400 MG tablet Take 2 tablets by mouth       traZODone (DESYREL) 50 MG tablet TAKE TWO TABLETS BY MOUTH DAILY AT BEDTIME        venlafaxine (EFFEXOR-XR) 75 MG 24 hr capsule Take 2 capsules by mouth         History   Smoking Status     Never Smoker   Smokeless Tobacco     Never Used        No Known Allergies    Health Maintenance Due   Topic Date Due     TETANUS IMMUNIZATION (SYSTEM ASSIGNED)  01/09/1984     HIV SCREEN (SYSTEM ASSIGNED)  01/09/1984     COLON CANCER SCREEN (SYSTEM ASSIGNED)  01/09/2016         Problem, Medication and Allergy Lists were reviewed and are current..         Review of Systems:   Review of Systems   Constitutional: Negative for chills, fever and unexpected weight change.   Eyes: Negative for visual disturbance.   Respiratory: Negative for chest tightness and shortness of breath.    Cardiovascular: Negative for chest pain, palpitations and leg swelling.   Gastrointestinal: Negative  "for abdominal pain and vomiting.   Endocrine: Negative for polydipsia and polyuria.   Genitourinary: Negative for frequency.   Neurological: Negative for weakness, light-headedness, numbness and headaches.   Psychiatric/Behavioral: Negative for dysphoric mood and suicidal ideas. The patient is not nervous/anxious.             Physical Exam:     Vitals:    08/07/18 1605   BP: (!) 142/98   Pulse: 98   Weight: 112.5 kg (248 lb)   Height: 1.778 m (5' 10\")     BMI= Body mass index is 35.58 kg/(m^2).   Wt Readings from Last 10 Encounters:   08/07/18 112.5 kg (248 lb)   07/17/18 113.3 kg (249 lb 12.8 oz)   05/29/18 115.2 kg (254 lb)     Appearance: Female appearance and dress    GENERAL:: healthy, alert and no distress   Breast, hips per flowsheet    Affect: Appropriate/mood-congruent           Labs:   Results from last visit:  Orders Only on 06/07/2018   Component Date Value Ref Range Status     Testosterone Total 06/07/2018 247  240 - 950 ng/dL Final    Comment: This test was developed and its performance characteristics determined by the   [facility],  Special Chemistry Laboratory. It has   not been cleared or approved by the FDA. The laboratory is regulated under   CLIA as qualified to perform high-complexity testing. This test is used for   clinical purposes. It should not be regarded as investigational or for   research.       Sex Hormone Binding Globulin 06/07/2018 13  11 - 80 nmol/L Final     Free Testosterone Calculated 06/07/2018 7.30  4.7 - 24.4 ng/dL Final     FASTING SPECIMEN 06/07/2018 yes   Final     Cholesterol 06/07/2018 174.0  0.0 - 200.0 Final     HDL Cholesterol 06/07/2018 38.0* >40.0 Final     Triglycerides 06/07/2018 156.0* 0.0 - 150.0 Final     Cholesterol/HDL Ratio 06/07/2018 4.6  0.0 - 5.0 Final     LDL Cholesterol Direct 06/07/2018 106.0  0.0 - 129.0 Final     VLDL-Cholesterol 06/07/2018 31.0  7.0 - 32.0 Final     Glucose 06/07/2018 104.0  60.0 - 109.0 mg/dL Final     " Urea Nitrogen 06/07/2018 22.0  7.0 - 30.0 mg/dL Final     Calcium 06/07/2018 9.1  8.5 - 10.4 mg/dL Final     Creatinine 06/07/2018 1.4  0.8 - 1.5 mg/dL Final     eGFR Calculated (Non Black Referen* 06/07/2018 56.6* >60.0 Final     eGFR Calculated (Black Reference) 06/07/2018 68.4  >60.0 Final     Sodium 06/07/2018 141.0  133.0 - 144.0 mmol/L Final     Potassium 06/07/2018 4.1  3.4 - 5.3 mmol/L Final     Chloride 06/07/2018 108.0  94.0 - 109.0 mmol/L Final     Carbon Dioxide 06/07/2018 26.0  20.0 - 32.0 mmol/L Final     Albumin 06/07/2018 4.0  3.3 - 4.6 g/dL Final     Alkaline Phosphatase 06/07/2018 72.0  40.0 - 150.0 U/L Final     ALT 06/07/2018 31.0  0.0 - 70.0 U/L Final     AST 06/07/2018 22.0  0.0 - 55.0 U/L Final     Bilirubin Total 06/07/2018 0.8  0.2 - 1.3 mg/dL Final     Protein Total 06/07/2018 7.3  6.8 - 8.8 g/dL Final       1. Assessment and Plan   1. Gender dysphoria  Tolerating estrogen well  Start spironolactone 100 mg daily  Check BMP, testosterone in 1 month    2. Elevated BP  Check bp outside office 3-5x's at [facility] or pharmacy; , has appt with PCP as well coming up  Reviewed goal bp 130/80    Follow up:  Follow up in 3 months.  Results by mychart  Questions were elicited and answered.     Hi Rodríguez MD

## 2018-08-07 NOTE — NURSING NOTE
"Chief Complaint   Patient presents with     RECHECK     TG       Vitals:    08/07/18 1605   BP: (!) 142/98   Pulse: 98   Weight: 112.5 kg (248 lb)   Height: 1.778 m (5' 10\")       Body mass index is 35.58 kg/(m^2).      Mira Turner CMA    "

## 2018-08-07 NOTE — MR AVS SNAPSHOT
After Visit Summary   8/7/2018    Fred Flores    MRN: [id number]           Patient Information     Date Of Birth          1965        Visit Information        Provider Department      8/7/2018 4:00 PM Hi Rodríguez MD [facility]        Today's Diagnoses     Gender dysphoria in adolescent and adult    -  1    Elevated blood pressure reading without diagnosis of hypertension           Follow-ups after your visit        Follow-up notes from your care team     Return in about 3 months (around 11/7/2018).      Your next 10 appointments already scheduled     Sep 05, 2018 10:00 AM CDT   Individual Therapy 53+ minutes with Sara Velasquez LP   [facility] ([facility])    [address]  Mail [address] 7521  [facility] [address]   [phone]            Sep 20, 2018 11:00 AM CDT   Individual Therapy 53+ minutes with Sara Velasquez LP   [facility] ([facility])    [address]  [address] 75[address]  [facility] [address]   [phone]            Oct 04, 2018  1:00 PM CDT   Individual Therapy 53+ minutes with Sara Velasquez LP   [facility] ([facility])    [address]  [address] 75[address]  [facility] [address]   [phone]            Oct 22, 2018  1:00 PM CDT   Individual Therapy 53+ minutes with Sara Velasquez LP   [facility] ([facility])    [address]  [address]  [facility] [address]   [phone]              Who to contact     Please call your clinic at [phone] to:    Ask questions about your health    Make or cancel appointments    Discuss your medicines    Learn about your test results    Speak to your doctor            Additional Information About Your Visit        MyChart Information     Article[employer]le[employer] gives you secure access to your electronic health record. If you see a primary care provider, you can also send messages to your care team  "and make appointments. If you have questions, please call your primary care clinic.  If you do not have a primary care provider, please call [phone] and they will assist you.      Art-Exchange is an electronic gateway that provides easy, online access to your medical records. With [employer], you can request a clinic appointment, read your test results, renew a prescription or communicate with your care team.     To access your existing account, please contact your [facility] or call [phone] for assistance.        Care EveryWhere ID     This is your Care EveryWhere ID. This could be used by other organizations to access your [place] medical records  [id number]        Your Vitals Were     Pulse Height BMI (Body Mass Index)             98 1.778 m (5' 10\") 35.58 kg/m2          Blood Pressure from Last 3 Encounters:   08/07/18 (!) 142/98   07/17/18 143/87   05/29/18 (!) 151/104    Weight from Last 3 Encounters:   08/07/18 112.5 kg (248 lb)   07/17/18 113.3 kg (249 lb 12.8 oz)   05/29/18 115.2 kg (254 lb)                 Today's Medication Changes          These changes are accurate as of 8/7/18 11:59 PM.  If you have any questions, ask your nurse or doctor.               Start taking these medicines.        Dose/Directions    spironolactone 100 MG tablet   Commonly known as:  ALDACTONE   Used for:  Gender dysphoria in adolescent and adult        Dose:  100 mg   Take 1 tablet (100 mg) by mouth daily   Quantity:  30 tablet   Refills:  3            Where to get your medicines      These medications were sent to [id number] [place] - [address]  [address]) [place] [address]     Phone:  [phone]     estradiol 0.1 MG/24HR BIW patch    spironolactone 100 MG tablet                Primary Care Provider    Paul Swift       No address on file        Equal Access to Services     REINALDO GLOVER AH: Geetha Tinajero, " wadeliasouleymane ruiz, qaybta kamj lynch, charisse zaragozabhupinder ah. So [facility] [phone].    ATENCIÓN: Si margret guan, tiene a smith disposición servicios gratuitos de asistencia lingüística. Nani al [phone].    We comply with applicable federal civil rights laws and [place] laws. We do not discriminate on the basis of race, color, national origin, age, disability, sex, sexual orientation, or gender identity.            Thank you!     Thank you for choosing [facility]  for your care. Our goal is always to provide you with excellent care. Hearing back from our patients is one way we can continue to improve our services. Please take a few minutes to complete the written survey that you may receive in the mail after your visit with us. Thank you!             Your Updated Medication List - Protect others around you: Learn how to safely use, store and throw away your medicines at www.disposemymeds.org.          This list is accurate as of 8/7/18 11:59 PM.  Always use your most recent med list.                   Brand Name Dispense Instructions for use Diagnosis    buPROPion 150 MG 24 hr tablet    WELLBUTRIN XL          estradiol 0.1 MG/24HR BIW patch    VIVELLE-DOT    8 patch    Place 1 patch onto the skin twice a week    Gender dysphoria in adolescent and adult       ibuprofen 400 MG tablet    ADVIL/MOTRIN     Take 2 tablets by mouth        spironolactone 100 MG tablet    ALDACTONE    30 tablet    Take 1 tablet (100 mg) by mouth daily    Gender dysphoria in adolescent and adult       traZODone 50 MG tablet    DESYREL     TAKE TWO TABLETS BY MOUTH DAILY AT BEDTIME        venlafaxine 75 MG 24 hr capsule    EFFEXOR-XR     Take 2 capsules by mouth

## 2018-08-08 NOTE — PROGRESS NOTES
"[facility]  [facility]  [address]  [place] [address]  Outpatient Progress Note      Session Date: 18  Client Name: Fred Flores (\"Sunni;\" she/her pronouns)  YOB: 1965  MRN: [id number]  Provider: Brooklynn Zhang, PhD   Type of Session: Individual   Present in Session: Client only  Number of Minutes: 55   Treatment Plan Due: 2018    Health Maintenance Summary - Mental Health Treatment Plan       Status Date      Mental Health Tx Plan Q11 MOS Next Due 2018      Done 2017           Current Symptoms/Status:   Sunni reports lifelong discomfort with her sex assigned at birth, and identifies as a woman. At intake, Sunni also noted concerns about sexual functioning (erection/orgasm) though these have subsided as gender has been further explored. She indicated that, since her wife  in , she increasingly desired to further explore gender concerns and has been supported in this by her current partner, Tawny. Sunni has taken steps to come out as transgender to her daughters and peer group, and has been met with support. She reports a desire to further explore options for social and medical transition.     Progress Toward Treatment Goals:   Increased gender exploration and comfort, including coming out as transfeminine and presenting full time beginning 2018. Has started facial hair removal and vocal therapy. Hormone therapy started (2018) with Dr. Hi Rodríguez and moving forward with legal name/gender marker change. Exploring options/plans for vaginoplasty in the future and has connected with the [facility] Trans Care Coordinator.       Intervention & Response:   Interpersonal, client-centered, and CBT techniques utilized to address client's current status. Sunni shared updates regarding her legal name/gender marker change. She acknowledged feeling that she is  dragging my feet  and was able to explore this further with the " "therapist. Therapist and client also processed Sunni's first experience attending vocal therapy. She reported that she has been completing her at-home vocal exercises and is excited to attend her second appointment this week. Sunni shared about her recent success coming out as trans on Facebook and being met with strong support. She shared her coming out post in session with the provider. Sunni also reported that, per last session, she was able to come out to her mother-in-law as planned, with the support of her brother-in-law. Identified and celebrated that this went \"very well\" and that Sunni felt her mother-in-law was supportive. Identified that the only remaining people in her life who are unaware of Sunni's identity and transition are her current partner s siblings. Sunni has reportedly asked her partner to address this with her siblings and feels confident that she will do so soon. Toward session end, therapist and client took time to explore reactions to saying goodbye given that this is the last session with this provider, as well as to share appreciation and well wishes. Sunni was tearful in thanking therapist for her support and session was ended with a goodbye hug. Reflected, validated, and normalized client's reactions and feelings throughout session. Overall, Sunni was open to feedback, active, and engaged.     Assignment:   (1). Continue reading/completing My Gender Workbook (Lizzie) & How to Understand Your Gender (Julianna & Megan)  (2). Continue legal name/gender marker change process. Continue vocal therapy.       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       Plan / Need for Future Services:    Return for individual therapy in 2-3 weeks. Final session with this provider; transfer care to Dr. Aicha Velasquez.             Brooklynn Zhang, PhD, LP  Licensed Psychologist  "

## 2018-08-15 PROBLEM — R03.0 ELEVATED BLOOD PRESSURE READING WITHOUT DIAGNOSIS OF HYPERTENSION: Status: ACTIVE | Noted: 2018-08-15

## 2018-09-05 ENCOUNTER — OFFICE VISIT (OUTPATIENT)
Dept: OTHER | Facility: OUTPATIENT CENTER | Age: 53
End: 2018-09-05
Payer: COMMERCIAL

## 2018-09-05 DIAGNOSIS — F64.0 GENDER DYSPHORIA IN ADULT: Primary | ICD-10-CM

## 2018-09-05 NOTE — MR AVS SNAPSHOT
After Visit Summary   9/5/2018    Fred Flores    MRN: [id number]           Patient Information     Date Of Birth          1965        Visit Information        Provider Department      9/5/2018 10:00 AM Sara Velasquez LP [facility]        Today's Diagnoses     Gender dysphoria in adult    -  1       Follow-ups after your visit        Your next 10 appointments already scheduled     Sep 20, 2018 11:00 AM CDT   Individual Therapy 53+ minutes with Sara Velasquez LP   [facility]    [address]  [address] 75[address]  [facility] [address]   [phone]            Oct 04, 2018  1:00 PM CDT   Individual Therapy 53+ minutes with Sara Velasquez LP   [facility]    [address]  [address]  [facility] [address]   [phone]            Oct 22, 2018  1:00 PM CDT   Individual Therapy 53+ minutes with Sara Velasquez LP   [facility]    [address]  [address]  [facility] [address]   [phone]            Nov 15, 2018 10:00 AM CST   Return Visit with Hi Rodríguez MD   [facility]    [address]  [address]  [facility] [address]   [phone]            Nov 15, 2018 11:00 AM CST   Individual Therapy 53+ minutes with Sara Velasquez LP   [facility]    [address]  [address]  [facility] [address]   [phone]            Nov 29, 2018 11:00 AM CST   Individual Therapy 53+ minutes with Sara Velasquez LP   [facility]    [address]  [address]  [facility] [address]   [phone]              Who to contact     Please call your clinic at [phone] to:    Ask questions about your health    Make or cancel appointments    Discuss your medicines    Learn about your test  results    Speak to your doctor            Additional Information About Your Visit        City Voicehart Information     [employer] gives you secure access to your electronic health record. If you see a primary care provider, you can also send messages to your care team and make appointments. If you have questions, please call your primary care clinic.  If you do not have a primary care provider, please call [phone] and they will assist you.      [employer] is an electronic gateway that provides easy, online access to your medical records. With [employer], you can request a clinic appointment, read your test results, renew a prescription or communicate with your care team.     To access your existing account, please contact your [facility] or call [phone] for assistance.        Care EveryWhere ID     This is your Care EveryWhere ID. This could be used by other organizations to access your [place] medical records  [id number]         Blood Pressure from Last 3 Encounters:   08/07/18 (!) 142/98   07/17/18 143/87   05/29/18 (!) 151/104    Weight from Last 3 Encounters:   08/07/18 112.5 kg (248 lb)   07/17/18 113.3 kg (249 lb 12.8 oz)   05/29/18 115.2 kg (254 lb)              We Performed the Following     Individual Psychotherapy (53+ min) [66981]        Primary Care Provider    Paul Swift       No address on file        Equal Access to Services     RON GLOVER : Hadii aad ku hadasho Soomaali, waaxda luqadaha, qaybta kaalmada adeegyada, waxay idiin hayaan jordan steve . So [facility] [phone].    ATENCIÓN: Si habla español, tiene a smith disposición servicios gratuitos de asistencia lingüística. Llame al [phone].    We comply with applicable federal civil rights laws and [place] laws. We do not discriminate on the basis of race, color, national origin, age, disability, sex, sexual orientation, or gender identity.            Thank you!     Thank you for choosing [facility]  for your care. Our goal is always to provide you with excellent care. Hearing back from our patients is one way we can continue to improve our services. Please take a few minutes to complete the written survey that you may receive in the mail after your visit with us. Thank you!             Your Updated Medication List - Protect others around you: Learn how to safely use, store and throw away your medicines at www.disposemymeds.org.          This list is accurate as of 9/5/18 11:59 PM.  Always use your most recent med list.                   Brand Name Dispense Instructions for use Diagnosis    buPROPion 150 MG 24 hr tablet    WELLBUTRIN XL          estradiol 0.1 MG/24HR BIW patch    VIVELLE-DOT    8 patch    Place 1 patch onto the skin twice a week    Gender dysphoria in adolescent and adult       ibuprofen 400 MG tablet    ADVIL/MOTRIN     Take 2 tablets by mouth        spironolactone 100 MG tablet    ALDACTONE    30 tablet    Take 1 tablet (100 mg) by mouth daily    Gender dysphoria in adolescent and adult       traZODone 50 MG tablet    DESYREL     TAKE TWO TABLETS BY MOUTH DAILY AT BEDTIME        venlafaxine 75 MG 24 hr capsule    EFFEXOR-XR     Take 2 capsules by mouth

## 2018-09-05 NOTE — PROGRESS NOTES
"[facility]  [facility]  [address], [address]  [place] [address]  Outpatient Progress Note      Session Date: 18  Client Name: Fred Flores (\"Sunni;\" she/her pronouns)  YOB: 1965  MRN: [id number]  Provider: Aicha Velasquez, PhD, LP  Type of Session: Individual   Present in Session: Client only  Number of Minutes: 55   Treatment Plan Due: 2018    Health Maintenance Summary - Mental Health Treatment Plan       Status Date      Mental Health Tx Plan Q11 MOS Overdue 2018      Done 2017           Current Symptoms/Status:   Sunni reports lifelong discomfort with her sex assigned at birth, and identifies as a woman. At intake, Sunni also noted concerns about sexual functioning (erection/orgasm) though these have subsided as gender has been further explored. She indicated that, since her wife  in , she increasingly desired to further explore gender concerns and has been supported in this by her current partner, Tawny. Sunni has taken steps to come out as transgender to her daughters and peer group, and has been met with support. She reports a desire to further explore options for social and medical transition.     Progress Toward Treatment Goals:   Increased gender exploration and comfort, including coming out as transfeminine and presenting full time beginning 2018. Has started facial hair removal and vocal therapy. Hormone therapy started (2018) with Dr. Hi Rodríguez and moving forward with legal name/gender marker change. Exploring options/plans for vaginoplasty in the future and has connected with the [facility] Trans Care Coordinator.       Intervention & Response:   Interpersonal, client-centered, and CBT techniques utilized to address client's current status. Sunni shared her feelings about transferring care to this provider from Dr. Zhang. Sunni shared her history of her wife passing away a few yearsa go and her subsequent " realization that she desired to socially and medically transition. She reported she came out as transgender the past spring and has been full time as female since June, 2018. Stated she has started hormones and would like to have surgery in the future. Explored her experiences in psychotherapy and goals for continued work. Overall, Sunni was open to feedback, active, and engaged.     Assignment:   (1). Continue reading/completing My Gender Workbook (Lizzie) & How to Understand Your Gender (Julianna & Megan)  (2). Continue legal name/gender marker change process. Continue vocal therapy.       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       Plan / Need for Future Services:    Return for individual therapy in 2-3 weeks.           Aicha Velasquez, PhD, LP

## 2018-09-20 ENCOUNTER — OFFICE VISIT (OUTPATIENT)
Dept: OTHER | Facility: OUTPATIENT CENTER | Age: 53
End: 2018-09-20
Payer: COMMERCIAL

## 2018-09-20 DIAGNOSIS — F64.0 GENDER DYSPHORIA IN ADULT: Primary | ICD-10-CM

## 2018-09-20 NOTE — MR AVS SNAPSHOT
After Visit Summary   9/20/2018    Fred Flores    MRN: [id number]           Patient Information     Date Of Birth          1965        Visit Information        Provider Department      9/20/2018 11:00 AM Sara Velasquez LP [facility]        Today's Diagnoses     Gender dysphoria in adult    -  1       Follow-ups after your visit        Your next 10 appointments already scheduled     Oct 05, 2018 12:00 PM CDT   Diagnostic Update with Sara Velasquez LP   [facility] ([facility])    [address]  [address]  [facility] [address]   [phone]            Oct 22, 2018  1:00 PM CDT   Individual Therapy 53+ minutes with Sara Velasquez LP   [place]    [address]  [address]  [facility] [address]   [phone]            Nov 15, 2018 10:00 AM CST   Return Visit with Hi Rodríguez MD   [facility]    [address]  [address]  [facility] [address]   [phone]            Nov 15, 2018 11:00 AM CST   Individual Therapy 53+ minutes with Sara Velasquez LP   [facility] ([facility])    [address]  [address]  [facility] [address]   [phone]            Nov 29, 2018 11:00 AM CST   Individual Therapy 53+ minutes with Sara Velasquez LP   [facility] ([facility])    [address]  [address]  [facility] [address]   [phone]              Who to contact     Please call your clinic at [phone] to:    Ask questions about your health    Make or cancel appointments    Discuss your medicines    Learn about your test results    Speak to your doctor            Additional Information About Your Visit        MyChart Information     [employer] gives you secure access to your electronic health record. If you see a primary care provider, you can also send  messages to your care team and make appointments. If you have questions, please call your primary care clinic.  If you do not have a primary care provider, please call [phone] and they will assist you.      [employer] is an electronic gateway that provides easy, online access to your medical records. With [employer], you can request a clinic appointment, read your test results, renew a prescription or communicate with your care team.     To access your existing account, please contact your [facility] or call [phone] for assistance.        Care EveryWhere ID     This is your Care EveryWhere ID. This could be used by other organizations to access your [place] medical records  [id number]         Blood Pressure from Last 3 Encounters:   08/07/18 (!) 142/98   07/17/18 143/87   05/29/18 (!) 151/104    Weight from Last 3 Encounters:   08/07/18 112.5 kg (248 lb)   07/17/18 113.3 kg (249 lb 12.8 oz)   05/29/18 115.2 kg (254 lb)              We Performed the Following     Individual Psychotherapy (53+ min) [51665]        Primary Care Provider    Paul Swift       No address on file        Equal Access to Services     [facility] : Hadii aad ku hadasho Soomaali, waaxda luqadaha, qaybta kaalmada adeegyada, charisse steve . So [facility] [phone].    ATENCIÓN: Si habla español, tiene a smith disposición servicios gratuitos de asistencia lingüística. Llame al [phone].    We comply with applicable federal civil rights laws and [place] laws. We do not discriminate on the basis of race, color, national origin, age, disability, sex, sexual orientation, or gender identity.            Thank you!     Thank you for choosing [place] [facility]  for your care. Our goal is always to provide you with excellent care. Hearing back from our patients is one way we can continue to improve our services. Please take a few minutes to complete the written survey that you may  receive in the mail after your visit with us. Thank you!             Your Updated Medication List - Protect others around you: Learn how to safely use, store and throw away your medicines at www.disposemymeds.org.          This list is accurate as of 9/20/18  1:24 PM.  Always use your most recent med list.                   Brand Name Dispense Instructions for use Diagnosis    buPROPion 150 MG 24 hr tablet    WELLBUTRIN XL          estradiol 0.1 MG/24HR BIW patch    VIVELLE-DOT    8 patch    Place 1 patch onto the skin twice a week    Gender dysphoria in adolescent and adult       ibuprofen 400 MG tablet    ADVIL/MOTRIN     Take 2 tablets by mouth        spironolactone 100 MG tablet    ALDACTONE    30 tablet    Take 1 tablet (100 mg) by mouth daily    Gender dysphoria in adolescent and adult       traZODone 50 MG tablet    DESYREL     TAKE TWO TABLETS BY MOUTH DAILY AT BEDTIME        venlafaxine 75 MG 24 hr capsule    EFFEXOR-XR     Take 2 capsules by mouth

## 2018-09-20 NOTE — PROGRESS NOTES
"[facility]  [facility]  [address], [address]  [place] [address]  Outpatient Progress Note      Session Date: 18  Client Name: Fred Flores (\"Sunni;\" she/her pronouns)  YOB: 1965  MRN: [id number]  Provider: Aicha Velasquez, PhD, LP  Type of Session: Individual   Present in Session: Client only  Number of Minutes: 55   Treatment Plan Due: 2018    Health Maintenance Summary - Mental Health Treatment Plan       Status Date      Mental Health Tx Plan Q11 MOS Overdue 2018      Done 2017           Current Symptoms/Status:   Sunni reports lifelong discomfort with her sex assigned at birth, and identifies as a woman. At intake, Sunni also noted concerns about sexual functioning (erection/orgasm) though these have subsided as gender has been further explored. She indicated that, since her wife  in , she increasingly desired to further explore gender concerns and has been supported in this by her current partner, Tawny. Sunni has taken steps to come out as transgender to her daughters and peer group, and has been met with support. She reports a desire to further explore options for social and medical transition.     Progress Toward Treatment Goals:   Increased gender exploration and comfort, including coming out as transfeminine and presenting full time beginning 2018. Has started facial hair removal and vocal therapy. Hormone therapy started (2018) with Dr. Hi Rodríguez and moving forward with legal name/gender marker change. Exploring options/plans for vaginoplasty in the future and has connected with the [facility] Trans Care Coordinator.       Intervention & Response:   Interpersonal, client-centered, and CBT techniques utilized to address client's current status. Sunni stated she is looking forward to ehr name change and gender marker change. Asked for letter, and discussed ACT letter. Stated she is nervous about getting approved for " gender marker change. Stated she feels she needs to tell her mother, and talked about the struggle with her family to accept her as female. Discussed connecting more to trans community. Overall, Sunni was open to feedback, active, and engaged.     Assignment:   (1). Continue reading/completing My Gender Workbook (Lizzie) & How to Understand Your Gender (Julianna & Megan)  (2). Continue legal name/gender marker change process. Continue vocal therapy.       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       Plan / Need for Future Services:    Return for individual therapy in 2-3 weeks.           Aicha Velasquez, PhD, LP

## 2018-10-05 ENCOUNTER — OFFICE VISIT (OUTPATIENT)
Dept: OTHER | Facility: OUTPATIENT CENTER | Age: 53
End: 2018-10-05
Payer: COMMERCIAL

## 2018-10-05 DIAGNOSIS — F64.0 GENDER DYSPHORIA IN ADULT: Primary | ICD-10-CM

## 2018-10-05 NOTE — MR AVS SNAPSHOT
After Visit Summary   10/5/2018    Fred Flores    MRN: [id number]           Patient Information     Date Of Birth          1965        Visit Information        Provider Department      10/5/2018 12:00 PM Sara Velasquez LP [facility]        Today's Diagnoses     Gender dysphoria in adult    -  1       Follow-ups after your visit        Your next 10 appointments already scheduled     Oct 22, 2018  1:00 PM CDT   Individual Therapy 53+ minutes with Sara Velasquez LP   [facility] ([facility])    [address]  [address]  [facility] [address]   [phone]            Nov 15, 2018 10:00 AM CST   Return Visit with Hi Rodríguez MD   [facility] ([facility])    [address]  [address]  [facility] [address]   [phone]            Nov 15, 2018 11:00 AM CST   Individual Therapy 53+ minutes with Sara Velasquez LP   [facility] ([facility])    [address]  [address]  [facility] [address]   [phone]            Nov 29, 2018 11:00 AM CST   Individual Therapy 53+ minutes with Sara Velasquez LP   [facility] ([facility])    [address]  [address]  [facility] [address]   [phone]            Dec 17, 2018  3:00 PM CST   Individual Therapy 53+ minutes with Sara Velasquez LP   [facility] ([facility])    [address]  [address]  [facility] [address]   [phone]            Dec 31, 2018  3:00 PM CST   Individual Therapy 53+ minutes with Sara Velasquez LP   [facility] ([facility])    [address]  [address]  [facility] [address]   [phone]              Who to contact     Please call your clinic at [phone] to:    Ask questions about your health    Make or cancel appointments    Discuss your medicines    Learn about your  test results    Speak to your doctor            Additional Information About Your Visit        [employer] Information     [facility] [employer] gives you secure access to your electronic health record. If you see a primary care provider, you can also send messages to your care team and make appointments. If you have questions, please call your primary care clinic.  If you do not have a primary care provider, please call [phone] and they will assist you.      [facility] is an electronic gateway that provides easy, online access to your medical records. With [facility] you can request a clinic appointment, read your test results, renew a prescription or communicate with your care team.     To access your existing account, please contact your [facility] or call [phone] for assistance.        Care EveryWhere ID     This is your Care EveryWhere ID. This could be used by other organizations to access your [place] medical records  [id number]         Blood Pressure from Last 3 Encounters:   08/07/18 (!) 142/98   07/17/18 143/87   05/29/18 (!) 151/104    Weight from Last 3 Encounters:   08/07/18 112.5 kg (248 lb)   07/17/18 113.3 kg (249 lb 12.8 oz)   05/29/18 115.2 kg (254 lb)              We Performed the Following     Individual Psychotherapy (53+ min) [98719]     Mental Health Tx Plan Scan (HIM Scan)        Primary Care Provider    Paul Swift       No address on file        Equal Access to Services     REINALDO GLOVER : Hadii aad ku hadasho Soomaali, waaxda luqadaha, qaybta kaalmada adeegyada, charisse steve . So [facility] [phone].    ATENCIÓN: Si habla español, tiene a smith disposición servicios gratuitos de asistencia lingüística. Llame al [phone].    We comply with applicable federal civil rights laws and [place] laws. We do not discriminate on the basis of race, color, national origin, age, disability, sex, sexual orientation, or gender identity.            Thank  you!     Thank you for choosing [facility]  for your care. Our goal is always to provide you with excellent care. Hearing back from our patients is one way we can continue to improve our services. Please take a few minutes to complete the written survey that you may receive in the mail after your visit with us. Thank you!             Your Updated Medication List - Protect others around you: Learn how to safely use, store and throw away your medicines at www.disposemymeds.org.          This list is accurate as of 10/5/18 11:59 PM.  Always use your most recent med list.                   Brand Name Dispense Instructions for use Diagnosis    buPROPion 150 MG 24 hr tablet    WELLBUTRIN XL          estradiol 0.1 MG/24HR BIW patch    VIVELLE-DOT    8 patch    Place 1 patch onto the skin twice a week    Gender dysphoria in adolescent and adult       ibuprofen 400 MG tablet    ADVIL/MOTRIN     Take 2 tablets by mouth        spironolactone 100 MG tablet    ALDACTONE    30 tablet    Take 1 tablet (100 mg) by mouth daily    Gender dysphoria in adolescent and adult       traZODone 50 MG tablet    DESYREL     TAKE TWO TABLETS BY MOUTH DAILY AT BEDTIME        venlafaxine 75 MG 24 hr capsule    EFFEXOR-XR     Take 2 capsules by mouth

## 2018-10-05 NOTE — PROGRESS NOTES
"Program [facility] Human Sexuality  [facility]  [address], [address]  [place] [address]  Outpatient Progress Note      Session Date: 10/05/18  Client Name: Fred Flores (\"Sunni;\" she/her pronouns)  YOB: 1965  MRN: [id number]  Provider: Aicha Velasquez, PhD,   Type of Session: Individual   Present in Session: Client only  Number of Minutes: 55   Treatment Plan Due: 2018    Health Maintenance Summary - Mental Health Treatment Plan       Status Date      Mental Health Tx Plan Q11 MOS Overdue 2018      Done 2017           Current Symptoms/Status:   Sunni reports lifelong discomfort with her sex assigned at birth, and identifies as a woman. At intake, Sunni also noted concerns about sexual functioning (erection/orgasm) though these have subsided as gender has been further explored. She indicated that, since her wife  in , she increasingly desired to further explore gender concerns and has been supported in this by her current partner, Tawny. Sunni has taken steps to come out as transgender to her daughters and peer group, and has been met with support. She reports a desire to further explore options for social and medical transition.     Progress Toward Treatment Goals:   Completed her name change and her application for a court order to change her gender marker. Disucssed her process in aplying for the changes and her time in court. Was happy to be able to be supported and felt positive about the court order. She will be using her court order [place] change [place]. Stated a lesbian was vocal in supporting her and this made her feel more connected to the LGBTQ community. Discussed how the support has helped her accept herself and feel more comfortable in being herself.      Completed new treatment plan. See file.      Exploring options/plans for vaginoplasty in the future and has connected with the [facility] Trans Care Coordinator.       Intervention & " Response:   Interpersonal, client-centered, and CBT techniques utilized to address client's current status. Sunni stated she is looking forward to ehr name change and gender marker change. Asked for letter, and discussed ACT letter. Stated she is nervous about getting approved for gender marker change. Stated she feels she needs to tell her mother, and talked about the struggle with her family to accept her as female. Discussed connecting more to trans community. Overall, Sunni was open to feedback, active, and engaged.     Assignment:   (1). Continue reading/completing My Gender Workbook (Lizzie) & How to Understand Your Gender (Julianna & Megan)  (2). Continue legal name/gender marker change process. Continue vocal therapy.       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       Plan / Need for Future Services:    Return for individual therapy in 2-3 weeks.           Aicha Velasquez, PhD, LP

## 2018-10-16 ENCOUNTER — TRANSFERRED RECORDS (OUTPATIENT)
Dept: HEALTH INFORMATION MANAGEMENT | Facility: CLINIC | Age: 53
End: 2018-10-16

## 2018-10-17 ENCOUNTER — MEDICAL CORRESPONDENCE (OUTPATIENT)
Dept: OTHER | Facility: OUTPATIENT CENTER | Age: 53
End: 2018-10-17

## 2018-10-17 ENCOUNTER — CARE COORDINATION (OUTPATIENT)
Dept: OTHER | Facility: OUTPATIENT CENTER | Age: 53
End: 2018-10-17

## 2018-10-22 ENCOUNTER — OFFICE VISIT (OUTPATIENT)
Dept: OTHER | Facility: OUTPATIENT CENTER | Age: 53
End: 2018-10-22
Payer: COMMERCIAL

## 2018-10-22 DIAGNOSIS — F64.9 GENDER DYSPHORIA: Primary | ICD-10-CM

## 2018-10-22 NOTE — MR AVS SNAPSHOT
After Visit Summary   10/22/2018    Fred Flores    MRN: [id number]           Patient Information     Date Of Birth          1965        Visit Information        Provider Department      10/22/2018 1:00 PM Sara Velasquez LP [facility]        Today's Diagnoses     Gender dysphoria    -  1       Follow-ups after your visit        Your next 10 appointments already scheduled     Nov 15, 2018 10:00 AM CST   Return Visit with Hi Rodríguez MD   [facility] ([facility])    [address]  [address]  [facility] [address]   [phone]            Nov 15, 2018 11:00 AM CST   Individual Therapy 53+ minutes with Sara Velasquez LP   [facility] ([facility])    [address]  [address]  [facility] [address]   [phone]            Nov 29, 2018 11:00 AM CST   Individual Therapy 53+ minutes with Sara Velasquez LP   [facility] ([facility])    [address]  [address]  [facility] [address]   [phone]            Dec 17, 2018  3:00 PM CST   Individual Therapy 53+ minutes with Sara Velasquez LP   [facility] ([facility])    [address]  [address]  [facility] [address]   [phone]            Dec 31, 2018  3:00 PM CST   Individual Therapy 53+ minutes with Sara Velasquez LP   [facility] ([facility])    [address]  [address]  [facility] [address]   [phone]            Pastor 10, 2019 10:00 AM CST   Individual Therapy 53+ minutes with Sara Velasquez LP   [facility] ([facility])    [address]  [address]  [facility] [address]   [phone]            Jan 24, 2019 11:00 AM CST   Individual Therapy 53+ minutes with Sara Velasquez LP   [facility] ([facility])    [address]  Mail [address]  [address]  [facility] [address]   [phone]              Who to contact     Please call your clinic at [phone] to:    Ask questions about your health    Make or cancel appointments    Discuss your medicines    Learn about your test results    Speak to your doctor            Additional Information About Your Visit        [employer] Information     [employer] gives you secure access to your electronic health record. If you see a primary care provider, you can also send messages to your care team and make appointments. If you have questions, please call your primary care clinic.  If you do not have a primary care provider, please call [phone] and they will assist you.      [employer] is an electronic gateway that provides easy, online access to your medical records. With [employer], you can request a clinic appointment, read your test results, renew a prescription or communicate with your care team.     To access your existing account, please contact your [facility] or call [phone] for assistance.        Care EveryWhere ID     This is your Care EveryWhere ID. This could be used by other organizations to access your [place] medical records  [id number]         Blood Pressure from Last 3 Encounters:   08/07/18 (!) 142/98   07/17/18 143/87   05/29/18 (!) 151/104    Weight from Last 3 Encounters:   08/07/18 112.5 kg (248 lb)   07/17/18 113.3 kg (249 lb 12.8 oz)   05/29/18 115.2 kg (254 lb)              We Performed the Following     Individual Psychotherapy (53+ min) [75581]        Primary Care Provider    Paul Swift       No address on file        Equal Access to Services     REINALDO GLOVER : Hadii aad ku hadasho Soomaali, waaxda luqadaha, qaybta kaalmada adeegyada, charisse steve . So [facility] [phone].    ATENCIÓN: Si habla español, tiene a smith disposición servicios gratuitos de asistencia lingüística. Llame al [phone].    We comply with applicable federal  civil rights laws and [place] laws. We do not discriminate on the basis of race, color, national origin, age, disability, sex, sexual orientation, or gender identity.            Thank you!     Thank you for choosing [facility]  for your care. Our goal is always to provide you with excellent care. Hearing back from our patients is one way we can continue to improve our services. Please take a few minutes to complete the written survey that you may receive in the mail after your visit with us. Thank you!             Your Updated Medication List - Protect others around you: Learn how to safely use, store and throw away your medicines at www.disposemymeds.org.          This list is accurate as of 10/22/18 11:59 PM.  Always use your most recent med list.                   Brand Name Dispense Instructions for use Diagnosis    buPROPion 150 MG 24 hr tablet    WELLBUTRIN XL          ibuprofen 400 MG tablet    ADVIL/MOTRIN     Take 2 tablets by mouth        spironolactone 100 MG tablet    ALDACTONE    30 tablet    Take 1 tablet (100 mg) by mouth daily    Gender dysphoria in adolescent and adult       traZODone 50 MG tablet    DESYREL     TAKE TWO TABLETS BY MOUTH DAILY AT BEDTIME        venlafaxine 75 MG 24 hr capsule    EFFEXOR-XR     Take 2 capsules by mouth

## 2018-10-22 NOTE — PROGRESS NOTES
"Program in Human Sexuality  [facility]  [address], [address]  [place] [address]  Outpatient Progress Note      Session Date: 10/22/18  Client Name: Fred Flores (\"Sunni;\" she/her pronouns)  YOB: 1965  MRN: [id number]  Provider: Aicha Velasquez, PhD, LP  Type of Session: Individual   Present in Session: Client only  Number of Minutes: 55   Treatment Plan Due: 2018    Health Maintenance Summary - Mental Health Treatment Plan       Status Date      Mental Health Tx Plan Q11 MOS Overdue 2018      Done 2017           Current Symptoms/Status:   Sunni reports lifelong discomfort with her sex assigned at birth, and identifies as a woman. At intake, Sunni also noted concerns about sexual functioning (erection/orgasm) though these have subsided as gender has been further explored. She indicated that, since her wife  in , she increasingly desired to further explore gender concerns and has been supported in this by her current partner, Tawny. Sunni has taken steps to come out as transgender to her daughters and peer group, and has been met with support. She reports a desire to further explore options for social and medical transition.     Progress Toward Treatment Goals:   Sunni stated she has been distraught about the recent [employer] policies about transgender people. Discussed feeling exhausted and stigmatized.     Shared about her speech therapy and her noticing gender differences in how men and women connumicate.     Exploring options/plans for vaginoplasty in the future and has connected with the [facility] Trans Care Coordinator.       Intervention & Response:   Interpersonal, client-centered, and CBT techniques utilized to address client's current status. Sunni stated she had received her name and gender marker change and felt elated by it. Stated she had gone to see her family and discussed how it was to spend time with her family. Stated overall she feels " they accept her but she felt her family ahs enmeshment and poor communication issues. Overall, Sunni was open to feedback, active, and engaged.     Assignment:   (1). Continue reading/completing My Gender Workbook (Lizzie) & How to Understand Your Gender (Julianna & Megan)  (2). Continue legal name/gender marker change process. Continue vocal therapy.       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       Plan / Need for Future Services:    Return for individual therapy in 2-3 weeks.           Aicha Velasquez, PhD, LP

## 2018-10-25 DIAGNOSIS — F64.0 GENDER DYSPHORIA IN ADOLESCENT AND ADULT: ICD-10-CM

## 2018-10-25 RX ORDER — ESTRADIOL 0.1 MG/D
1 FILM, EXTENDED RELEASE TRANSDERMAL
Qty: 8 PATCH | Refills: 1 | Status: SHIPPED | OUTPATIENT
Start: 2018-10-25 | End: 2018-11-15

## 2018-10-25 NOTE — TELEPHONE ENCOUNTER
Requested Medication:  Estradiol Tranderman  Dose:   0.1  Quantity:  8  Refills:    Last filled 9/28/2018    1 patch twice weekly    Last seen at [facility]:  8/7 - 3 mo follow up  Next Appointment with Provider:  11/15  Call to patient reminding them of labs needed prior to appointment    Mira Turner CMA

## 2018-10-30 DIAGNOSIS — F64.0 GENDER DYSPHORIA IN ADOLESCENT AND ADULT: ICD-10-CM

## 2018-10-30 LAB
BUN SERPL-MCNC: 18 MG/DL (ref 7–30)
CALCIUM SERPL-MCNC: 9.1 MG/DL (ref 8.5–10.4)
CHLORIDE SERPLBLD-SCNC: 108 MMOL/L (ref 94–109)
CO2 SERPL-SCNC: 28 MMOL/L (ref 20–32)
CREAT SERPL-MCNC: 1.3 MG/DL (ref 0.8–1.5)
EGFR CALCULATED (BLACK REFERENCE): 74.6
EGFR CALCULATED (NON BLACK REFERENCE): 61.6
GLUCOSE SERPL-MCNC: 96 MG/DL (ref 60–109)
POTASSIUM SERPL-SCNC: 3.5 MMOL/L (ref 3.4–5.3)
SODIUM SERPL-SCNC: 144 MMOL/L (ref 133–144)

## 2018-11-01 LAB
SHBG SERPL-SCNC: 16 NMOL/L (ref 11–80)
TESTOST FREE SERPL-MCNC: 1.76 NG/DL (ref 4.7–24.4)
TESTOST SERPL-MCNC: 68 NG/DL (ref 240–950)

## 2018-11-13 NOTE — PROGRESS NOTES
Dear Sunni,   Here are your recent results which are within the expected range--your testosterone is just above the normal female range. Please continue with your current plan of care until your next appointment    Hi Rodríguez MD

## 2018-11-15 ENCOUNTER — OFFICE VISIT (OUTPATIENT)
Dept: OTHER | Facility: OUTPATIENT CENTER | Age: 53
End: 2018-11-15
Payer: COMMERCIAL

## 2018-11-15 VITALS
HEART RATE: 88 BPM | SYSTOLIC BLOOD PRESSURE: 129 MMHG | RESPIRATION RATE: 16 BRPM | WEIGHT: 255.4 LBS | DIASTOLIC BLOOD PRESSURE: 84 MMHG | TEMPERATURE: 97 F | BODY MASS INDEX: 36.65 KG/M2 | OXYGEN SATURATION: 100 %

## 2018-11-15 DIAGNOSIS — F64.0 GENDER DYSPHORIA IN ADULT: Primary | ICD-10-CM

## 2018-11-15 DIAGNOSIS — F64.0 GENDER DYSPHORIA IN ADOLESCENT AND ADULT: ICD-10-CM

## 2018-11-15 RX ORDER — ESTRADIOL 0.1 MG/D
2 FILM, EXTENDED RELEASE TRANSDERMAL
Qty: 16 PATCH | Refills: 3 | Status: SHIPPED | OUTPATIENT
Start: 2018-11-15 | End: 2019-02-21

## 2018-11-15 RX ORDER — SPIRONOLACTONE 100 MG/1
200 TABLET, FILM COATED ORAL DAILY
Qty: 60 TABLET | Refills: 3 | Status: SHIPPED | OUTPATIENT
Start: 2018-11-15 | End: 2019-02-21

## 2018-11-15 ASSESSMENT — ENCOUNTER SYMPTOMS
LIGHT-HEADEDNESS: 0
HEADACHES: 0
UNEXPECTED WEIGHT CHANGE: 0
NERVOUS/ANXIOUS: 1
DYSPHORIC MOOD: 0
POLYDIPSIA: 0
CHEST TIGHTNESS: 0
NUMBNESS: 0
FREQUENCY: 0
VOMITING: 0
ABDOMINAL PAIN: 0
CHILLS: 0
FEVER: 0
PALPITATIONS: 0
SHORTNESS OF BREATH: 0
WEAKNESS: 0

## 2018-11-15 NOTE — MR AVS SNAPSHOT
After Visit Summary   11/15/2018    Sunni Flores    MRN: [id number]           Patient Information     Date Of Birth          1965        Visit Information        Provider Department      11/15/2018 11:00 AM Sara Velasquez LP [facility]        Today's Diagnoses     Gender dysphoria in adult    -  1       Follow-ups after your visit        Your next 10 appointments already scheduled     Nov 29, 2018 11:00 AM CST   Individual Therapy 53+ minutes with Sara Velasquez LP   [facility] ([facility])    [address]  Mail [address] 7521  [facility] [address]   [phone]            Dec 17, 2018  3:00 PM CST   Individual Therapy 53+ minutes with Sara Velasquez LP   [facility] ([facility])    [address]  Mail [address] 7521  [facility] [address]   [phone]            Dec 31, 2018  3:00 PM CST   Individual Therapy 53+ minutes with Sara Velasquez LP   [facility] ([facility])    [address]  Mail [address] 7521  [facility] [address]   [phone]            Pastor 10, 2019 10:00 AM CST   Individual Therapy 53+ minutes with Sara Velasquez LP   [facility] ([facility])    [address]  Mail [address] 7521  [facility] [address]   [phone]            Jan 24, 2019 11:00 AM CST   Individual Therapy 53+ minutes with Sara Velasquez LP   [place] [facility] ([facility])    [address]  Mail Code 7521  [facility] [address]   [phone]              Who to contact     Please call your clinic at [phone] to:    Ask questions about your health    Make or cancel appointments    Discuss your medicines    Learn about your test results    Speak to your doctor            Additional Information About Your Visit        MyChart Information     [employer] gives you secure access to your electronic health record. If you see a primary  care provider, you can also send messages to your care team and make appointments. If you have questions, please call your primary care clinic.  If you do not have a primary care provider, please call [phone] and they will assist you.      [employer] is an electronic gateway that provides easy, online access to your medical records. With [employer], you can request a clinic appointment, read your test results, renew a prescription or communicate with your care team.     To access your existing account, please contact your [facility] or call [phone] for assistance.        Care EveryWhere ID     This is your Care EveryWhere ID. This could be used by other organizations to access your [facility] medical records  [id number]         Blood Pressure from Last 3 Encounters:   11/15/18 129/84   08/07/18 (!) 142/98   07/17/18 143/87    Weight from Last 3 Encounters:   11/15/18 115.8 kg (255 lb 6.4 oz)   08/07/18 112.5 kg (248 lb)   07/17/18 113.3 kg (249 lb 12.8 oz)              We Performed the Following     Individual Psychotherapy (53+ min) [87386]          Today's Medication Changes          These changes are accurate as of 11/15/18 11:59 PM.  If you have any questions, ask your nurse or doctor.               These medicines have changed or have updated prescriptions.        Dose/Directions    estradiol 0.1 MG/24HR BIW patch   Commonly known as:  VIVELLE-DOT   This may have changed:  how much to take   Used for:  Gender dysphoria in adolescent and adult   Changed by:  Hi Rodríguez MD        Dose:  2 patch   Place 2 patches onto the skin twice a week   Quantity:  16 patch   Refills:  3       spironolactone 100 MG tablet   Commonly known as:  ALDACTONE   This may have changed:  how much to take   Used for:  Gender dysphoria in adolescent and adult   Changed by:  Hi Rodríguez MD        Dose:  200 mg   Take 2 tablets (200 mg) by mouth daily   Quantity:  60 tablet   Refills:  3             Where to get your medicines      These medications were sent to [id number] - [place] ([place] [place] - [address]  [address] [facility], [place] ([place]) [place] [address]     Phone:  [phone]     estradiol 0.1 MG/24HR BIW patch    spironolactone 100 MG tablet                Primary Care Provider    Paul Swift       No address on file        Equal Access to Services     [facility]: Hadii aad ku hadasho Soomaali, waaxda luqadaha, qaybta kaalmada adeegyada, waxay idiin hayaan adeeg khjamieyoel lapriscilla . So [facility] [phone].    ATENCIÓN: Si habla español, tiene a smith disposición servicios gratuitos de asistencia lingüística. [facility] [phone].    We comply with applicable federal civil rights laws and [place] laws. We do not discriminate on the basis of race, color, national origin, age, disability, sex, sexual orientation, or gender identity.            Thank you!     Thank you for choosing [facility]  for your care. Our goal is always to provide you with excellent care. Hearing back from our patients is one way we can continue to improve our services. Please take a few minutes to complete the written survey that you may receive in the mail after your visit with us. Thank you!             Your Updated Medication List - Protect others around you: Learn how to safely use, store and throw away your medicines at www.disposemymeds.org.          This list is accurate as of 11/15/18 11:59 PM.  Always use your most recent med list.                   Brand Name Dispense Instructions for use Diagnosis    buPROPion 150 MG 24 hr tablet    WELLBUTRIN XL          estradiol 0.1 MG/24HR BIW patch    VIVELLE-DOT    16 patch    Place 2 patches onto the skin twice a week    Gender dysphoria in adolescent and adult       ibuprofen 400 MG tablet    ADVIL/MOTRIN     Take 2 tablets by mouth        spironolactone 100 MG tablet    ALDACTONE    60 tablet    Take 2 tablets (200 mg) by mouth  daily    Gender dysphoria in adolescent and adult       traZODone 50 MG tablet    DESYREL     TAKE TWO TABLETS BY MOUTH DAILY AT BEDTIME        venlafaxine 75 MG 24 hr capsule    EFFEXOR-XR     Take 2 capsules by mouth

## 2018-11-15 NOTE — PROGRESS NOTES
KRISHNA Moeller is a 53 year old individual that uses pronouns She/Her/Hers/Herself that presents today for follow up of:  feminizing hormone therapy.   Gender identity: female.   Started Hormone  therapy  7/2018  Continues on .Vivelle dot 0.1 mg patch 2x/wk    Spironlactone 100* mg daily      .  Any special concerns today?    1. Did not check bp outside office, working out 2-3x/wk treadmill, bike. 1 hour      On hormones?  YES +++ Shot day of the week? Not applicable-taking pills/patch/gel      Due for labs?  Yes      +++ Refills of meds needed?  Yes  Gender related body changes since last visit:   Body odor different  Losing strength--noticed it playing pool, lifting dog food bag  Breast growth  , increased butt/hips, more emotional    Breakthrough bleeding? Does Not Apply  Activity: above  New health concerns since last visit:  Noticed increase in brooding/anxiety since hormone therapy, but also corresponds with political environment.     ---    History reviewed. No pertinent surgical history.    Patient Active Problem List   Diagnosis     Gender dysphoria in adult     Elevated blood pressure reading without diagnosis of hypertension       Current Outpatient Prescriptions   Medication Sig Dispense Refill     buPROPion (WELLBUTRIN XL) 150 MG 24 hr tablet        estradiol (VIVELLE-DOT) 0.1 MG/24HR BIW patch Place 1 patch onto the skin twice a week 8 patch 1     ibuprofen (ADVIL/MOTRIN) 400 MG tablet Take 2 tablets by mouth       spironolactone (ALDACTONE) 100 MG tablet Take 1 tablet (100 mg) by mouth daily 30 tablet 3     traZODone (DESYREL) 50 MG tablet TAKE TWO TABLETS BY MOUTH DAILY AT BEDTIME        venlafaxine (EFFEXOR-XR) 75 MG 24 hr capsule Take 2 capsules by mouth         History   Smoking Status     Never Smoker   Smokeless Tobacco     Never Used        No Known Allergies    Health Maintenance Due   Topic Date Due     Mental Health Tx Plan Q11 MOS  08/18/2018         Problem, Medication and  Allergy Lists were reviewed and are current..         Review of Systems:   Review of Systems   Constitutional: Negative for chills, fever and unexpected weight change.   Eyes: Negative for visual disturbance.   Respiratory: Negative for chest tightness and shortness of breath.    Cardiovascular: Negative for chest pain, palpitations and leg swelling.   Gastrointestinal: Negative for abdominal pain and vomiting.   Endocrine: Negative for polydipsia and polyuria.   Genitourinary: Negative for frequency.   Neurological: Negative for weakness, light-headedness, numbness and headaches.   Psychiatric/Behavioral: Negative for dysphoric mood and suicidal ideas. The patient is nervous/anxious.             Physical Exam:     Vitals:    11/15/18 0957   BP: 129/84   Pulse: 88   Resp: 16   Temp: 97  F (36.1  C)   TempSrc: Oral   SpO2: 100%   Weight: 115.8 kg (255 lb 6.4 oz)     BMI= Body mass index is 36.65 kg/(m^2).   Wt Readings from Last 10 Encounters:   11/15/18 115.8 kg (255 lb 6.4 oz)   08/07/18 112.5 kg (248 lb)   07/17/18 113.3 kg (249 lb 12.8 oz)   05/29/18 115.2 kg (254 lb)     Appearance: Female appearance and dress    GENERAL:: healthy, alert and no distress  RESP: lungs clear to auscultation - no rales, no rhonchi, no wheezes  CV: regular rates and rhythm, normal S1 S2, no S3 or S4 and no murmur, no click or rub -  / Breast, hips per flowsheet    Affect: Appropriate/mood-congruent           Labs:   Results from last visit:  Orders Only on 10/30/2018   Component Date Value Ref Range Status     Testosterone Total 10/30/2018 68* 240 - 950 ng/dL Final    Comment: This test was developed and its performance characteristics determined by the   [facility]  [facility]. It has   not been cleared or approved by the FDA. The laboratory is regulated under   CLIA as qualified to perform high-complexity testing. This test is used for   clinical purposes. It should not be regarded as  investigational or for   research.       Sex Hormone Binding Globulin 10/30/2018 16  11 - 80 nmol/L Final     Free Testosterone Calculated 10/30/2018 1.76* 4.7 - 24.4 ng/dL Final     Glucose 10/30/2018 96.0  60.0 - 109.0 mg/dL Final     Urea Nitrogen 10/30/2018 18.0  7.0 - 30.0 mg/dL Final     Calcium 10/30/2018 9.1  8.5 - 10.4 mg/dL Final     Creatinine 10/30/2018 1.3  0.8 - 1.5 mg/dL Final     eGFR Calculated (Non Black Referen* 10/30/2018 61.6  >60.0 Final     eGFR Calculated (Black Reference) 10/30/2018 74.6  >60.0 Final     Sodium 10/30/2018 144.0  133.0 - 144.0 mmol/L Final     Potassium 10/30/2018 3.5  3.4 - 5.3 mmol/L Final     Chloride 10/30/2018 108.0  94.0 - 109.0 mmol/L Final     Carbon Dioxide 10/30/2018 28.0  20.0 - 32.0 mmol/L Final       Assessment and Plan   1. Gender dysphoria  Modest feminization but testosterone not currently suppressed  Increase to 2 patches of estradiol at once 2x/wk, 200 mg spironolactone daily  2 patches, 200 mg grabiel  Labs: CMP, estradiol, testosterone, lipids  in 1 month    Follow up:  Follow up in 3 months.  Results by mychart  Questions were elicited and answered.     Hi Rodríguez MD

## 2018-11-15 NOTE — MR AVS SNAPSHOT
After Visit Summary   11/15/2018    Sunni Flores    MRN: [id number]           Patient Information     Date Of Birth          1965        Visit Information        Provider Department      11/15/2018 10:00 AM Hi Rodríguez MD [facility]        Today's Diagnoses     Gender dysphoria in adolescent and adult           Follow-ups after your visit        Follow-up notes from your care team     Return in about 3 months (around 2/15/2019).      Your next 10 appointments already scheduled     Nov 29, 2018 11:00 AM CST   Individual Therapy 53+ minutes with Sara Velasquez LP   [facility] ([facility])    [address]  Mail Code 7521  [facility] [address]   [phone]            Dec 17, 2018  3:00 PM CST   Individual Therapy 53+ minutes with Sara Velasquez LP   [facility] ([facility])    [address]  Mail Code 7521  [facility] [address]   [phone]            Dec 31, 2018  3:00 PM CST   Individual Therapy 53+ minutes with Sara Velasquez LP   [facility] ([facility])    [address]  Mail [address] 7521  [facility] [address]   [phone]            Pastor 10, 2019 10:00 AM CST   Individual Therapy 53+ minutes with Sara Velasquez LP   [facility] ([facility])    [address]  Mail [address] 7521  [facility] [address]   [phone]            Jan 24, 2019 11:00 AM CST   Individual Therapy 53+ minutes with Sara Velasquez LP   [facility] ([facility])    [address]  Mail [address] 7521  [facility] [address]   [phone]              Who to contact     Please call your clinic at [phone] to:    Ask questions about your health    Make or cancel appointments    Discuss your medicines    Learn about your test results    Speak to your doctor            Additional Information About Your Visit        Shelli  Information     [employer] gives you secure access to your electronic health record. If you see a primary care provider, you can also send messages to your care team and make appointments. If you have questions, please call your primary care clinic.  If you do not have a primary care provider, please call [phone] and they will assist you.      [employer] is an electronic gateway that provides easy, online access to your medical records. With [employer], you can request a clinic appointment, read your test results, renew a prescription or communicate with your care team.     To access your existing account, please contact your [facility] or call [phone] for assistance.        Care EveryWhere ID     This is your Care EveryWhere ID. This could be used by other organizations to access your [facility] medical records  [id number]        Your Vitals Were     Pulse Temperature Respirations Pulse Oximetry BMI (Body Mass Index)       88 97  F (36.1  C) (Oral) 16 100% 36.65 kg/m2        Blood Pressure from Last 3 Encounters:   11/15/18 129/84   08/07/18 (!) 142/98   07/17/18 143/87    Weight from Last 3 Encounters:   11/15/18 115.8 kg (255 lb 6.4 oz)   08/07/18 112.5 kg (248 lb)   07/17/18 113.3 kg (249 lb 12.8 oz)                 Today's Medication Changes          These changes are accurate as of 11/15/18 11:59 PM.  If you have any questions, ask your nurse or doctor.               These medicines have changed or have updated prescriptions.        Dose/Directions    estradiol 0.1 MG/24HR BIW patch   Commonly known as:  VIVELLE-DOT   This may have changed:  how much to take   Used for:  Gender dysphoria in adolescent and adult   Changed by:  Hi Rodríguez MD        Dose:  2 patch   Place 2 patches onto the skin twice a week   Quantity:  16 patch   Refills:  3       spironolactone 100 MG tablet   Commonly known as:  ALDACTONE   This may have changed:  how much to take   Used for:  Gender  dysphoria in adolescent and adult   Changed by:  Hi Rodríguez MD        Dose:  200 mg   Take 2 tablets (200 mg) by mouth daily   Quantity:  60 tablet   Refills:  3            Where to get your medicines      These medications were sent to [id number] - [place] ([place]), [place] - [address]  [address], [place] ([place]) [place] [address]     Phone:  [phone]     estradiol 0.1 MG/24HR BIW patch    spironolactone 100 MG tablet                Primary Care Provider    Paul Swift       No address on file        Equal Access to Services     [facility]: Hadii aad ku hadasho Soomaali, waaxda luqadaha, qaybta kaalmada adeegyada, waxay vernon steve . So [facility] [phone].    ATENCIÓN: Si habla español, tiene a smith disposición servicios gratuitos de asistencia lingüística. [facility] [phone].    We comply with applicable federal civil rights laws and [place] laws. We do not discriminate on the basis of race, color, national origin, age, disability, sex, sexual orientation, or gender identity.            Thank you!     Thank you for choosing [facility]  for your care. Our goal is always to provide you with excellent care. Hearing back from our patients is one way we can continue to improve our services. Please take a few minutes to complete the written survey that you may receive in the mail after your visit with us. Thank you!             Your Updated Medication List - Protect others around you: Learn how to safely use, store and throw away your medicines at www.disposemymeds.org.          This list is accurate as of 11/15/18 11:59 PM.  Always use your most recent med list.                   Brand Name Dispense Instructions for use Diagnosis    buPROPion 150 MG 24 hr tablet    WELLBUTRIN XL          estradiol 0.1 MG/24HR BIW patch    VIVELLE-DOT    16 patch    Place 2 patches onto the skin twice a week    Gender dysphoria in adolescent and adult        ibuprofen 400 MG tablet    ADVIL/MOTRIN     Take 2 tablets by mouth        spironolactone 100 MG tablet    ALDACTONE    60 tablet    Take 2 tablets (200 mg) by mouth daily    Gender dysphoria in adolescent and adult       traZODone 50 MG tablet    DESYREL     TAKE TWO TABLETS BY MOUTH DAILY AT BEDTIME        venlafaxine 75 MG 24 hr capsule    EFFEXOR-XR     Take 2 capsules by mouth

## 2018-11-26 ASSESSMENT — ANXIETY QUESTIONNAIRES
3. WORRYING TOO MUCH ABOUT DIFFERENT THINGS: SEVERAL DAYS
GAD7 TOTAL SCORE: 6
7. FEELING AFRAID AS IF SOMETHING AWFUL MIGHT HAPPEN: NOT AT ALL
5. BEING SO RESTLESS THAT IT IS HARD TO SIT STILL: SEVERAL DAYS
2. NOT BEING ABLE TO STOP OR CONTROL WORRYING: MORE THAN HALF THE DAYS
6. BECOMING EASILY ANNOYED OR IRRITABLE: NOT AT ALL
1. FEELING NERVOUS, ANXIOUS, OR ON EDGE: MORE THAN HALF THE DAYS

## 2018-11-26 ASSESSMENT — PATIENT HEALTH QUESTIONNAIRE - PHQ9
5. POOR APPETITE OR OVEREATING: NOT AT ALL
SUM OF ALL RESPONSES TO PHQ QUESTIONS 1-9: 2

## 2018-11-27 ASSESSMENT — ANXIETY QUESTIONNAIRES: GAD7 TOTAL SCORE: 6

## 2018-11-27 NOTE — PROGRESS NOTES
"[facility]  [facility]  [address], [address]  [place] [address]  Outpatient Progress Note      Session Date: 11/15/18  Client Name: Fred Flores (\"Sunni;\" she/her pronouns)  YOB: 1965  MRN: [id number]  Provider: Aicha Velasquez, PhD, LP  Type of Session: Individual   Present in Session: Client only  Number of Minutes: 55   Treatment Plan Due: 2018    Health Maintenance Summary - Mental Health Treatment Plan       Status Date      Mental Health Tx Plan Q11 MOS Overdue 2018      Done 2017           Current Symptoms/Status:   Sunni reports lifelong discomfort with her sex assigned at birth, and identifies as a woman. At intake, Sunni also noted concerns about sexual functioning (erection/orgasm) though these have subsided as gender has been further explored. She indicated that, since her wife  in , she increasingly desired to further explore gender concerns and has been supported in this by her current partner, Tawny. Sunni has taken steps to come out as transgender to her daughters and peer group, and has been met with support. She reports a desire to further explore options for social and medical transition.     Progress Toward Treatment Goals:   Continues social and medical transition. Exploring options/plans for vaginoplasty in the future and has connected with the [facility] Trans Care Coordinator.       Intervention & Response:   Interpersonal, client-centered, and CBT techniques utilized to address client's current status. Sunni stated she has been exploring her sexuality and thinking about her sexual needs since transition. Stated she has shame about her sexuality tied to her interests in crossdressing and forced feminization. Normalized her interests and explored how she could integrate her sexual interests in her current sexual functioning.  Overall, Sunni was open to feedback, active, and engaged.     Assignment:   (1). Continue " reading/completing My Gender Workbook (Lizzie) & How to Understand Your Gender (Julianna & Megan)  (2). Continue legal name/gender marker change process. Continue vocal therapy.       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       Plan / Need for Future Services:    Return for individual therapy in 2-3 weeks.           Aicha Velasquez, PhD, LP

## 2018-11-29 ENCOUNTER — OFFICE VISIT (OUTPATIENT)
Dept: OTHER | Facility: OUTPATIENT CENTER | Age: 53
End: 2018-11-29
Payer: COMMERCIAL

## 2018-11-29 DIAGNOSIS — F64.9 GENDER DYSPHORIA: Primary | ICD-10-CM

## 2018-11-29 NOTE — PROGRESS NOTES
"Program in [facility] Sexuality  [facility]  [address]  [place] [address]  Outpatient Progress Note      Session Date: 18  Client Name: Fred Flores (\"Sunni;\" she/her pronouns)  YOB: 1965  MRN: [id number]  Provider: Aicha Velasquez, PhD,   Type of Session: Individual   Present in Session: Client only  Number of Minutes: 55   Treatment Plan Due: 2018    Health Maintenance Summary - Mental Health Treatment Plan       Status Date      Mental Health Tx Plan Q11 MOS Next Due 2019      Done 10/5/2018 See Scan     Done 2017           Current Symptoms/Status:   Sunni reports lifelong discomfort with her sex assigned at birth, and identifies as a woman. At intake, Sunni also noted concerns about sexual functioning (erection/orgasm) though these have subsided as gender has been further explored. She indicated that, since her wife  in , she increasingly desired to further explore gender concerns and has been supported in this by her current partner, Tawny. Sunni has taken steps to come out as transgender to her daughters and peer group, and has been met with support. She reports a desire to further explore options for social and medical transition.     Progress Toward Treatment Goals:   Continues social and medical transition. Exploring options/plans for vaginoplasty in the future and has connected with the [facility] Trans Care Coordinator.       Intervention & Response:   Interpersonal, client-centered, and CBT techniques utilized to address client's current status. Sunni stated she had been exploring her feelings of grief and trauma from when her wife . Explored her grief and processing her grief, how it impacts her relationship with her partner. Talked about her sexual relationship with her partner and trying muffing.     Assignment:   (1). Continue reading/completing My Gender Workbook (Lizzie) & How to Understand Your Gender (Julianna & Megan)  (2). " Continue legal name/gender marker change process. Continue vocal therapy.       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       Plan / Need for Future Services:    Return for individual therapy in 2-3 weeks.           Aicha Velasquez, PhD, LP

## 2018-11-29 NOTE — MR AVS SNAPSHOT
After Visit Summary   11/29/2018    Sunni Flores    MRN: [id number]           Patient Information     Date Of Birth          1965        Visit Information        Provider Department      11/29/2018 11:00 AM Sara Velasquez LP [facility]        Today's Diagnoses     Gender dysphoria    -  1       Follow-ups after your visit        Your next 10 appointments already scheduled     Dec 17, 2018  3:00 PM CST   Individual Therapy 53+ minutes with Sara Velasquez LP   [facility] ([facility])    [address]  Mail [address] 7521  [facility] [address]   [phone]            Dec 31, 2018  3:00 PM CST   Individual Therapy 53+ minutes with Sara Velasquez LP   [facility] ([facility])    [address]  [address]  [facility] [address]   [phone]            Pastor 10, 2019 10:00 AM CST   Individual Therapy 53+ minutes with Sara Velasquez LP   [facility] ([facility])    [address]  [address] 7521  [facility] [address]   [phone]            Jan 24, 2019 11:00 AM CST   Individual Therapy 53+ minutes with Sara Velasquez LP   [facility] ([facility])    [address]  [address]  [facility] [address]   [phone]              Who to contact     Please call your clinic at [phone] to:    Ask questions about your health    Make or cancel appointments    Discuss your medicines    Learn about your test results    Speak to your doctor            Additional Information About Your Visit        [employer] Information     [employer] gives you secure access to your electronic health record. If you see a primary care provider, you can also send messages to your care team and make appointments. If you have questions, please call your primary care clinic.  If you do not have a primary care provider, please call [phone] and they will assist  you.      [employer] is an electronic gateway that provides easy, online access to your medical records. With [employer], you can request a clinic appointment, read your test results, renew a prescription or communicate with your care team.     To access your existing account, please contact your [facility] or call [phone] for assistance.        Care EveryWhere ID     This is your Care EveryWhere ID. This could be used by other organizations to access your [facility] medical records  [id number]         Blood Pressure from Last 3 Encounters:   11/15/18 129/84   08/07/18 (!) 142/98   07/17/18 143/87    Weight from Last 3 Encounters:   11/15/18 115.8 kg (255 lb 6.4 oz)   08/07/18 112.5 kg (248 lb)   07/17/18 113.3 kg (249 lb 12.8 oz)              We Performed the Following     Individual Psychotherapy (53+ min) [69180]        Primary Care Provider    Paul Swift       No address on file        Equal Access to Services     RON GLOVER : Hadii aad ku hadasho Soomaali, waaxda luqadaha, qaybta kaalmada adeegyada, waxay vernon steve . So [facility] [phone].    ATENCIÓN: Si habla español, tiene a smith disposición servicios gratuitos de asistencia lingüística. Llame al [phone].    We comply with applicable federal civil rights laws and [place] laws. We do not discriminate on the basis of race, color, national origin, age, disability, sex, sexual orientation, or gender identity.            Thank you!     Thank you for choosing [facility]  for your care. Our goal is always to provide you with excellent care. Hearing back from our patients is one way we can continue to improve our services. Please take a few minutes to complete the written survey that you may receive in the mail after your visit with us. Thank you!             Your Updated Medication List - Protect others around you: Learn how to safely use, store and throw away your medicines at  www.disposemymeds.org.          This list is accurate as of 11/29/18  1:54 PM.  Always use your most recent med list.                   Brand Name Dispense Instructions for use Diagnosis    buPROPion 150 MG 24 hr tablet    WELLBUTRIN XL          estradiol 0.1 MG/24HR bi-weekly patch    VIVELLE-DOT    16 patch    Place 2 patches onto the skin twice a week    Gender dysphoria in adolescent and adult       ibuprofen 400 MG tablet    ADVIL/MOTRIN     Take 2 tablets by mouth        spironolactone 100 MG tablet    ALDACTONE    60 tablet    Take 2 tablets (200 mg) by mouth daily    Gender dysphoria in adolescent and adult       traZODone 50 MG tablet    DESYREL     TAKE TWO TABLETS BY MOUTH DAILY AT BEDTIME        venlafaxine 75 MG 24 hr capsule    EFFEXOR-XR     Take 2 capsules by mouth

## 2018-12-17 ENCOUNTER — OFFICE VISIT (OUTPATIENT)
Dept: OTHER | Facility: OUTPATIENT CENTER | Age: 53
End: 2018-12-17
Payer: COMMERCIAL

## 2018-12-17 DIAGNOSIS — F64.9 GENDER DYSPHORIA: Primary | ICD-10-CM

## 2018-12-17 NOTE — PROGRESS NOTES
"Program [facility] Sexuality  [facility]  [address], [address]  [place] [address]  Outpatient Progress Note      Session Date: 18  Client Name: Fred Flores (\"Sunni;\" she/her pronouns)  YOB: 1965  MRN: [id number]  Provider: Aicha Velasquez, PhD, LP  Type of Session: Individual   Present in Session: Client only  Number of Minutes: 55   Treatment Plan Due: 2018    Health Maintenance Summary - Mental Health Treatment Plan       Status Date      Mental Health Tx Plan Q11 MOS Next Due 2019      Done 10/5/2018 See Scan     Done 2017           Current Symptoms/Status:   Sunni reports lifelong discomfort with her sex assigned at birth, and identifies as a woman. At intake, Sunni also noted concerns about sexual functioning (erection/orgasm) though these have subsided as gender has been further explored. She indicated that, since her wife  in , she increasingly desired to further explore gender concerns and has been supported in this by her current partner, Tawny. Sunni has taken steps to come out as transgender to her daughters and peer group, and has been met with support. She reports a desire to further explore options for social and medical transition.     Progress Toward Treatment Goals:   Continues social and medical transition. Exploring options/plans for vaginoplasty in the future and has connected with the [facility] Trans Care Coordinator.       Intervention & Response:   Interpersonal, client-centered, and CBT techniques utilized to address client's current status.     Sunni stated she has been missing her father and feeling more grief about her fathers death. Discussed her ambivalence towards her father and this has impacted ehr grief.     Stated she feels like an imposter in her gender soemtimes, and being misgendered is uncomfortable and distressing. Stated her inside voice is critical and harsh about how she meets or does not meet feminine norms. Discussed " her inner critc and working on accepting herself as at [employer] and accepting the time it takes to adjust to her transition.     Assignment:   (1). Continue reading/completing My Gender Workbook (Lizzie) & How to Understand Your Gender (Julianna & Megan)  (2). Continue legal name/gender marker change process. Continue vocal therapy.       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       Plan / Need for Future Services:    Return for individual therapy in 2-3 weeks.           Aicha Velasquez, PhD, LP

## 2018-12-31 ENCOUNTER — OFFICE VISIT (OUTPATIENT)
Dept: OTHER | Facility: OUTPATIENT CENTER | Age: 53
End: 2018-12-31
Payer: COMMERCIAL

## 2018-12-31 DIAGNOSIS — F64.9 GENDER DYSPHORIA: Primary | ICD-10-CM

## 2018-12-31 NOTE — PROGRESS NOTES
"Program [facility] Human Sexuality  [facility]  [address], [address]  [place] [address]  Outpatient Progress Note      Session Date: 18  Client Name: Fred Flores (\"Sunni;\" she/her pronouns)  YOB: 1965  MRN: [id number]  Provider: Aicha Velasquez, PhD, LP  Type of Session: Individual   Present in Session: Client only  Number of Minutes: 55   Treatment Plan Due: 2018    Health Maintenance Summary - Mental Health Treatment Plan       Status Date      Mental Health Tx Plan Q11 MOS Next Due 2019      Done 10/5/2018 See Scan     Done 2017           Current Symptoms/Status:   Sunni reports lifelong discomfort with her sex assigned at birth, and identifies as a woman. At intake, Sunni also noted concerns about sexual functioning (erection/orgasm) though these have subsided as gender has been further explored. She indicated that, since her wife  in , she increasingly desired to further explore gender concerns and has been supported in this by her current partner, Tawny. Sunni has taken steps to come out as transgender to her daughters and peer group, and has been met with support. She reports a desire to further explore options for social and medical transition.     Progress Toward Treatment Goals:   Continues social and medical transition. Exploring options/plans for vaginoplasty in the future and has connected with the [facility] Trans Care Coordinator.       Intervention & Response:   Interpersonal, client-centered, and CBT techniques utilized to address client's current status.     Sunni stated she has her two daughters home this past week and she is struggling with how to be a supportive parent to them. Discussed her fear that her kids would be better off with their mother still alive, and not sure how to talk to them about their feelings. Addressed how to talk with them, invite them to talk about their geelings about their mothers death and her transition. Affirmed her " role as a parent with them and how to move their relationship forward as they get older.     Stated she feels envious of cis women and their comfort with their femaleness. Stated she has more dysphoria since transitioning and desires surgery as oson as posibsle.     Assignment:   (1). Continue reading/completing My Gender Workbook (Lizzie) & How to Understand Your Gender (Julianna & Megan)  (2). Continue legal name/gender marker change process. Continue vocal therapy.       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       Plan / Need for Future Services:    Return for individual therapy in 2-3 weeks.           Aicha Velasquez, PhD, LP

## 2019-01-08 ENCOUNTER — TELEPHONE (OUTPATIENT)
Dept: OTHER | Facility: OUTPATIENT CENTER | Age: 54
End: 2019-01-08

## 2019-01-08 NOTE — LETTER
January 10, 2019    I, Hi Rodríguez, ([place] medical license [id number], CAROL # [id number]), am a family medicine physician and the attending physician of Sunni Flores (formerly Hugh Gilford Henry,  1965), with whom I have a doctor/patient relationship. Sunni  has had appropriate clinical treatment for gender transition to the new gender female.  I declare under penalty of perjury under the laws of the [place] that the forgoing is true and correct.    Sincerely,      Hi Rodríguez MD, PhD

## 2019-01-09 NOTE — TELEPHONE ENCOUNTER
Spoke with patient.  Gender Marker letter:    Birth name: Hugh Gilford Henry    Chosen: Sunni Flores    Would like to pick it up tomorrow when they are here seeing Aicha. 10AM appointment.    Leaving for [place] in early March      Mira Turner CMA

## 2019-01-10 ENCOUNTER — OFFICE VISIT (OUTPATIENT)
Dept: OTHER | Facility: OUTPATIENT CENTER | Age: 54
End: 2019-01-10
Payer: COMMERCIAL

## 2019-01-10 DIAGNOSIS — F64.0 GENDER DYSPHORIA IN ADOLESCENT AND ADULT: ICD-10-CM

## 2019-01-10 DIAGNOSIS — F64.9 GENDER DYSPHORIA: Primary | ICD-10-CM

## 2019-01-10 LAB
ALBUMIN SERPL-MCNC: 4.2 G/DL (ref 3.4–5)
ALP SERPL-CCNC: 76 U/L (ref 40–150)
ALT SERPL W P-5'-P-CCNC: 33 U/L (ref 0–70)
ANION GAP SERPL CALCULATED.3IONS-SCNC: 7 MMOL/L (ref 3–14)
AST SERPL W P-5'-P-CCNC: 13 U/L (ref 0–45)
BILIRUB SERPL-MCNC: 0.4 MG/DL (ref 0.2–1.3)
BUN SERPL-MCNC: 30 MG/DL (ref 7–30)
CALCIUM SERPL-MCNC: 8.9 MG/DL (ref 8.5–10.1)
CHLORIDE SERPL-SCNC: 103 MMOL/L (ref 94–109)
CHOLEST SERPL-MCNC: 167 MG/DL
CO2 SERPL-SCNC: 26 MMOL/L (ref 20–32)
CREAT SERPL-MCNC: 1.35 MG/DL (ref 0.66–1.25)
ESTRADIOL SERPL-MCNC: 118 PG/ML (ref 6–50)
GFR SERPL CREATININE-BSD FRML MDRD: 60 ML/MIN/{1.73_M2}
GLUCOSE SERPL-MCNC: 86 MG/DL (ref 70–99)
HDLC SERPL-MCNC: 46 MG/DL
LDLC SERPL CALC-MCNC: 104 MG/DL
NONHDLC SERPL-MCNC: 122 MG/DL
POTASSIUM SERPL-SCNC: 5 MMOL/L (ref 3.4–5.3)
PROT SERPL-MCNC: 7.8 G/DL (ref 6.8–8.8)
SODIUM SERPL-SCNC: 136 MMOL/L (ref 133–144)
TRIGL SERPL-MCNC: 87 MG/DL

## 2019-01-10 NOTE — PROGRESS NOTES
"Program in Human Sexuality  [facility]  [address], [address]  [place] [address]  Outpatient Progress Note      Session Date: 1/10/19  Client Name: Fred Flores (\"Sunni;\" she/her pronouns)  YOB: 1965  MRN: [id number]  Provider: Aicha Velasquez, PhD, LP  Type of Session: Individual   Present in Session: Client only  Number of Minutes: 55   Treatment Plan Due: 2018    Health Maintenance Summary - Mental Health Treatment Plan       Status Date      Mental Health Tx Plan Q11 MOS Next Due 2019      Done 10/5/2018 See Scan     Done 2017           Current Symptoms/Status:   Sunni reports lifelong discomfort with her sex assigned at birth, and identifies as a woman. At intake, Sunni also noted concerns about sexual functioning (erection/orgasm) though these have subsided as gender has been further explored. She indicated that, since her wife  in , she increasingly desired to further explore gender concerns and has been supported in this by her current partner, Tawny. Sunni has taken steps to come out as transgender to her daughters and peer group, and has been met with support. She reports a desire to further explore options for social and medical transition.     Progress Toward Treatment Goals:   Continues social and medical transition. Exploring options/plans for vaginoplasty in the future and has connected with the [facility] Trans Care Coordinator.       Intervention & Response:   Interpersonal, client-centered, and CBT techniques utilized to address client's current status.     Sunni stated she just had a birthday and turned 54. Stated she had taken her daughters back to college and had a conversation with them before they left. STated she is working on developing meaning and structure in her life. Explored what brings her meaning and how to structure her life. Stated she identifies as an introvert, and she often has a fear of the unknown and starting new things. 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     Stated she feels envious of cis women and their comfort with their femaleness. Stated she has more dysphoria since transitioning and desires surgery as oson as posibsle.     Assignment:   At least one blog entry between visits  Have coffee with theatre friend before next session  Have two things to discuss in the afternoon      Diagnosis:        302.5 (F64.0) - Gender dysphoria in adults      Interactive Complexity:  None.       Plan / Need for Future Services:    Return for individual therapy in 2-3 weeks.           Aicha Velasquez, PhD, LP

## 2019-01-12 LAB
SHBG SERPL-SCNC: 15 NMOL/L (ref 11–80)
TESTOST FREE SERPL-MCNC: 0.18 NG/DL (ref 4.7–24.4)
TESTOST SERPL-MCNC: 7 NG/DL (ref 240–950)

## 2019-01-16 ENCOUNTER — TELEPHONE (OUTPATIENT)
Dept: OTHER | Facility: OUTPATIENT CENTER | Age: 54
End: 2019-01-16

## 2019-01-16 NOTE — TELEPHONE ENCOUNTER
Pt is wanting to know what kinds of ICD-9 and ICD-10 codes Dr. Velasquez uses for her care here at [facility].

## 2019-01-18 NOTE — RESULT ENCOUNTER NOTE
Dear Sunni,   Here are your recent results which are within the expected range--testosterone is suppressed and estradiol in normal female range. Please continue with your current plan of care.       Hi Rodríguez MD

## 2019-01-24 ENCOUNTER — OFFICE VISIT (OUTPATIENT)
Dept: OTHER | Facility: OUTPATIENT CENTER | Age: 54
End: 2019-01-24
Payer: COMMERCIAL

## 2019-01-24 DIAGNOSIS — F64.9 GENDER DYSPHORIA: Primary | ICD-10-CM

## 2019-01-24 NOTE — PROGRESS NOTES
"Program in Human Sexuality  [facility]  [address], [address]  [place] [address]  Outpatient Progress Note      Session Date: 19  Client Name: Fred Flores (\"Sunni;\" she/her pronouns)  YOB: 1965  MRN: [id number]  Provider: Aicha Velasquez, PhD, LP  Type of Session: Individual   Present in Session: Client only  Number of Minutes: 55   Treatment Plan Due: 2018    Health Maintenance Summary - Mental Health Treatment Plan       Status Date      Mental Health Tx Plan Q11 MOS Next Due 2019      Done 10/5/2018 See Scan     Done 2017           Current Symptoms/Status:   Sunni reports lifelong discomfort with her sex assigned at birth, and identifies as a woman. At intake, Sunni also noted concerns about sexual functioning (erection/orgasm) though these have subsided as gender has been further explored. She indicated that, since her wife  in , she increasingly desired to further explore gender concerns and has been supported in this by her current partner, Tawny. Sunni has taken steps to come out as transgender to her daughters and peer group, and has been met with support. She reports a desire to further explore options for social and medical transition.     Progress Toward Treatment Goals:   Continues social and medical transition. Exploring options/plans for vaginoplasty in the future and has connected with the [facility] Trans Care Coordinator.       Intervention & Response:   Interpersonal, client-centered, and CBT techniques utilized to address client's current status.     Sunni stated she just had a birthday and turned 54. Stated she had taken her daughters back to college and had a conversation with them before they left. STated she is working on developing meaning and structure in her life. Explored what brings her meaning and how to structure her life. Stated she identifies as an introvert, and she often has a fear of the unknown and starting new things. 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     Stated she feels envious of cis women and their comfort with their femaleness. Stated she has more dysphoria since transitioning and desires surgery as oson as posibsle.     Assignment:   At least one blog entry between visits  Have coffee with theatre friend before next session  Have two things to discuss in the afternoon      Diagnosis:        302.5 (F64.0) - Gender dysphoria in adults      Interactive Complexity:  None.       Plan / Need for Future Services:    Return for individual therapy in 2-3 weeks.           Aicha Velasquez, PhD, LP

## 2019-02-04 ENCOUNTER — OFFICE VISIT (OUTPATIENT)
Dept: OTHER | Facility: OUTPATIENT CENTER | Age: 54
End: 2019-02-04
Payer: COMMERCIAL

## 2019-02-04 DIAGNOSIS — F64.9 GENDER DYSPHORIA: Primary | ICD-10-CM

## 2019-02-04 NOTE — PROGRESS NOTES
"Program in Human Sexuality  [facility]  [address], [address]  [place] [address]  Outpatient Progress Note      Session Date: 19  Client Name: Fred Flores (\"Sunni;\" she/her pronouns)  YOB: 1965  MRN: [id number]  Provider: Aicha Velasquez, PhD, LP  Type of Session: Individual   Present in Session: Client only  Number of Minutes: 55   Treatment Plan Due: 2018    Health Maintenance Summary - Mental Health Treatment Plan       Status Date      Mental Health Tx Plan Q11 MOS Next Due 2019      Done 10/5/2018 See Scan     Done 2017           Current Symptoms/Status:   Sunni reports lifelong discomfort with her sex assigned at birth, and identifies as a woman. At intake, Sunni also noted concerns about sexual functioning (erection/orgasm) though these have subsided as gender has been further explored. She indicated that, since her wife  in , she increasingly desired to further explore gender concerns and has been supported in this by her current partner, Tawny. Sunni has taken steps to come out as transgender to her daughters and peer group, and has been met with support. She reports a desire to further explore options for social and medical transition.     Progress Toward Treatment Goals:   Continues social and medical transition. Exploring options/plans for vaginoplasty in the future and has connected with the [facility] Trans Care Coordinator.       Intervention & Response:   Interpersonal, client-centered, and CBT techniques utilized to address client's current status.     Sunni stated she had talked to Tawny about her grief about her wife's death two years ago. Stated the conversation had gone well and she felt supported. Stated she has been practicing using the bathrooms and not waiting in order to challenge her fear of being called out for being trans.     Stated she had not done the self pompassion homework yet but will be next session. Stated she has felt less " self conscious about her female gender presentation and able to be in public more comfortably. Stated she feels more integrated in her female identity.     Assignment:   At least one blog entry between visits  Have coffee with theatre friend before next session  Have two things to discuss in the afternoon  Complete self compassion website quiz      Diagnosis:        302.5 (F64.0) - Gender dysphoria in adults      Interactive Complexity:  None.       Plan / Need for Future Services:    Return for individual therapy in 2-3 weeks.           Aicha Velasquez, PhD, LP

## 2019-02-21 ENCOUNTER — OFFICE VISIT (OUTPATIENT)
Dept: OTHER | Facility: OUTPATIENT CENTER | Age: 54
End: 2019-02-21
Payer: COMMERCIAL

## 2019-02-21 VITALS
HEART RATE: 91 BPM | SYSTOLIC BLOOD PRESSURE: 133 MMHG | HEIGHT: 70 IN | WEIGHT: 257 LBS | DIASTOLIC BLOOD PRESSURE: 81 MMHG | BODY MASS INDEX: 36.79 KG/M2

## 2019-02-21 DIAGNOSIS — F64.0 GENDER DYSPHORIA IN ADOLESCENT AND ADULT: ICD-10-CM

## 2019-02-21 DIAGNOSIS — F64.9 GENDER DYSPHORIA: Primary | ICD-10-CM

## 2019-02-21 RX ORDER — ESTRADIOL 0.1 MG/D
2 FILM, EXTENDED RELEASE TRANSDERMAL
Qty: 16 PATCH | Refills: 3 | Status: SHIPPED | OUTPATIENT
Start: 2019-02-21 | End: 2019-05-06

## 2019-02-21 RX ORDER — SPIRONOLACTONE 100 MG/1
200 TABLET, FILM COATED ORAL DAILY
Qty: 60 TABLET | Refills: 3 | Status: SHIPPED | OUTPATIENT
Start: 2019-02-21 | End: 2019-05-06

## 2019-02-21 ASSESSMENT — ENCOUNTER SYMPTOMS
FREQUENCY: 0
CHILLS: 0
UNEXPECTED WEIGHT CHANGE: 0
NERVOUS/ANXIOUS: 0
CHEST TIGHTNESS: 0
POLYDIPSIA: 0
ABDOMINAL PAIN: 0
FEVER: 0
HEADACHES: 0
PALPITATIONS: 0
NUMBNESS: 0
SHORTNESS OF BREATH: 0
WEAKNESS: 0
VOMITING: 0
DYSPHORIC MOOD: 0
LIGHT-HEADEDNESS: 0

## 2019-02-21 ASSESSMENT — MIFFLIN-ST. JEOR: SCORE: 2016.99

## 2019-02-21 NOTE — PROGRESS NOTES
"Program in Human Sexuality  [facility]  [address], [address]  [place] [address]  Outpatient Progress Note      Session Date: 19  Client Name: Fred Flores (\"Sunni;\" she/her pronouns)  YOB: 1965  MRN: [id number]  Provider: Aicha Velasquez, PhD, LP  Type of Session: Individual   Present in Session: Client only  Number of Minutes: 55   Treatment Plan Due: 2018    Health Maintenance Summary - Mental Health Treatment Plan       Status Date      Mental Health Tx Plan Q11 MOS Next Due 2019      Done 10/5/2018 See Scan     Done 2017           Current Symptoms/Status:   Sunni reports lifelong discomfort with her sex assigned at birth, and identifies as a woman. At intake, Sunni also noted concerns about sexual functioning (erection/orgasm) though these have subsided as gender has been further explored. She indicated that, since her wife  in , she increasingly desired to further explore gender concerns and has been supported in this by her current partner, Tawny. Sunni has taken steps to come out as transgender to her daughters and peer group, and has been met with support. She reports a desire to further explore options for social and medical transition.     Progress Toward Treatment Goals:   Continues social and medical transition. Exploring options/plans for vaginoplasty in the future and has connected with the [facility] Trans Care Coordinator.       Intervention & Response:   Interpersonal, client-centered, and CBT techniques utilized to address client's current status.     Sunni stated she had done her self-compassion homework and had bought the self-compassion handbook. Stated she had emailed her friend and is going to talk to her about joining theatre. Stated she had done a DNA swab about her medications and her enzyme structure. Stated she has changed her meds and started a folic acid supplement. STated she feels more energy and less depressed. Switched " anti-depressants.     Discussed writing her WPATH letters of referral and process and timing. Agreed to write the letter in her next session.     Stated she had a memory of wishing to be a girl when she was 14 yo. That this confirmed her gender identity.     Stated she has felt less self conscious about her female gender presentation and able to be in public more comfortably. Stated she feels more integrated in her female identity.     Assignment:   At least one blog entry between visits  Have coffee with theatre friend before next session  Have two things to discuss in the afternoon  Complete self compassion website quiz      Diagnosis:        302.5 (F64.0) - Gender dysphoria in adults      Interactive Complexity:  None.       Plan / Need for Future Services:    Return for individual therapy in 2-3 weeks.           Aicha Velasquez, PhD, LP

## 2019-02-21 NOTE — PROGRESS NOTES
KRISHNA Moeller is a 54 year old individual that uses pronouns She/Her/Hers/Herself that presents today for follow up of:  feminizing hormone therapy.   Alone or accompanied by: accompanied today by .  Gender identity: female  Started Hormone  therapy  7/2018  Continues on .  Vivelle dot 0.1 x 2 mg patch 2x/wk    Spironlactone 200* mg daily     .  Any special concerns today?    No problems with medications  Getting ready to move forward for bottom surgery, dysphoria more focused there, has talked with Karrie, care coordinator  Consider breast augmentation    On hormones?  YES +++ Shot day of the week? Not applicable-taking pills/patch/gel      Due for labs?  Yes      +++ Refills of meds needed?  Yes  Gender related body changes since last visit:   Breast development  Mood is calmer    Breakthrough bleeding? Does Not Apply  Activity: gyjm 3x/wk, walk dogs x 1mile  New health concerns since last visit:  Did genetic metabolic testing for psychiatric medications, discontinued wellbutrin and added folic and improved symptoms, helped with Effexor benefit      ---    No past surgical history on file.    Patient Active Problem List   Diagnosis     Gender dysphoria in adult     Elevated blood pressure reading without diagnosis of hypertension       Current Outpatient Medications   Medication Sig Dispense Refill     estradiol (VIVELLE-DOT) 0.1 MG/24HR BIW patch Place 2 patches onto the skin twice a week 16 patch 3     ibuprofen (ADVIL/MOTRIN) 400 MG tablet Take 2 tablets by mouth       spironolactone (ALDACTONE) 100 MG tablet Take 2 tablets (200 mg) by mouth daily 60 tablet 3     traZODone (DESYREL) 50 MG tablet TAKE TWO TABLETS BY MOUTH DAILY AT BEDTIME        venlafaxine (EFFEXOR-XR) 75 MG 24 hr capsule Take 3 capsules by mouth        buPROPion (WELLBUTRIN XL) 150 MG 24 hr tablet          History   Smoking Status     Never Smoker   Smokeless Tobacco     Never Used          Allergies   Allergen Reactions     Allopurinol  "Other (See Comments)     Patient is positive for HLA-B 58:01. Increased risk of allopurinol-induced severe cutaneous reactions.      Clopidogrel Other (See Comments)     Patient is a [id number] metabolizer. Clopidogrel would be predicted to lack efficacy       There are no preventive care reminders to display for this patient.      Problem, Medication and Allergy Lists were reviewed and are current..         Review of Systems:   Review of Systems   Constitutional: Negative for chills, fever and unexpected weight change.   Eyes: Negative for visual disturbance.   Respiratory: Negative for chest tightness and shortness of breath.    Cardiovascular: Negative for chest pain, palpitations and leg swelling.   Gastrointestinal: Negative for abdominal pain and vomiting.   Endocrine: Negative for polydipsia and polyuria.   Genitourinary: Negative for frequency.   Neurological: Negative for weakness, light-headedness, numbness and headaches.   Psychiatric/Behavioral: Negative for dysphoric mood and suicidal ideas. The patient is not nervous/anxious.             Physical Exam:     Vitals:    02/21/19 1044   BP: 133/81   Pulse: 91   Weight: 116.6 kg (257 lb)   Height: 1.778 m (5' 10\")     BMI= Body mass index is 36.88 kg/m .   Wt Readings from Last 10 Encounters:   02/21/19 116.6 kg (257 lb)   11/15/18 115.8 kg (255 lb 6.4 oz)   08/07/18 112.5 kg (248 lb)   07/17/18 113.3 kg (249 lb 12.8 oz)   05/29/18 115.2 kg (254 lb)     Appearance: Female appearance and dress    GENERAL:: healthy, alert and no distress  RESP: lungs clear to auscultation - no rales, no rhonchi, no wheezes  CV: regular rates and rhythm, normal S1 S2, no S3 or S4 and no murmur, no click or rub -  / Breast, hips per flowsheet    Affect: Appropriate/mood-congruent           Labs:   Results from last visit:  Orders Only on 01/10/2019   Component Date Value Ref Range Status     Estradiol 01/10/2019 118* 6 - 50 pg/mL Final     Testosterone Total 01/10/2019 7* 240 - " 950 ng/dL Final    Comment: This test was developed and its performance characteristics determined by the   [facility],  Special Chemistry Laboratory. It has   not been cleared or approved by the FDA. The laboratory is regulated under   CLIA as qualified to perform high-complexity testing. This test is used for   clinical purposes. It should not be regarded as investigational or for   research.       Sex Hormone Binding Globulin 01/10/2019 15  11 - 80 nmol/L Final     Free Testosterone Calculated 01/10/2019 0.18* 4.7 - 24.4 ng/dL Final     Sodium 01/10/2019 136  133 - 144 mmol/L Final     Potassium 01/10/2019 5.0  3.4 - 5.3 mmol/L Final     Chloride 01/10/2019 103  94 - 109 mmol/L Final     Carbon Dioxide 01/10/2019 26  20 - 32 mmol/L Final     Anion Gap 01/10/2019 7  3 - 14 mmol/L Final     Glucose 01/10/2019 86  70 - 99 mg/dL Final     Urea Nitrogen 01/10/2019 30  7 - 30 mg/dL Final     Creatinine 01/10/2019 1.35* 0.66 - 1.25 mg/dL Final     GFR Estimate 01/10/2019 60* >60 mL/min/[1.73_m2] Final    Comment: Non  GFR Calc  Starting 12/18/2018, serum creatinine based estimated GFR (eGFR) will be   calculated using the Chronic Kidney Disease Epidemiology Collaboration   (CKD-EPI) equation.       GFR Estimate If Black 01/10/2019 69  >60 mL/min/[1.73_m2] Final    Comment:  GFR Calc  Starting 12/18/2018, serum creatinine based estimated GFR (eGFR) will be   calculated using the Chronic Kidney Disease Epidemiology Collaboration   (CKD-EPI) equation.       Calcium 01/10/2019 8.9  8.5 - 10.1 mg/dL Final     Bilirubin Total 01/10/2019 0.4  0.2 - 1.3 mg/dL Final     Albumin 01/10/2019 4.2  3.4 - 5.0 g/dL Final     Protein Total 01/10/2019 7.8  6.8 - 8.8 g/dL Final     Alkaline Phosphatase 01/10/2019 76  40 - 150 U/L Final     ALT 01/10/2019 33  0 - 70 U/L Final     AST 01/10/2019 13  0 - 45 U/L Final     Cholesterol 01/10/2019 167  <200 mg/dL Final     Triglycerides  01/10/2019 87  <150 mg/dL Final     HDL Cholesterol 01/10/2019 46  >39 mg/dL Final     LDL Cholesterol Calculated 01/10/2019 104* <100 mg/dL Final    Comment: Above desirable:  100-129 mg/dl  Borderline High:  130-159 mg/dL  High:             160-189 mg/dL  Very high:       >189 mg/dl       Non HDL Cholesterol 01/10/2019 122  <130 mg/dL Final       Assessment and Plan   1. Gender dysphoria  Significant feminizing changes on current dose hormones, estradiol in appropriate female range  Creatinine mildly elevated with K+ at upper end normal range  Push fluids and and avoid any K+ supplements/additatives    Follow up:  Follow up in 3 months.  Results by mychart  Questions were elicited and answered.     Hi Rodríguez MD

## 2019-02-21 NOTE — NURSING NOTE
"Chief Complaint   Patient presents with     RECHECK     TG       Vitals:    02/21/19 1044   BP: 133/81   Pulse: 91   Weight: 116.6 kg (257 lb)   Height: 1.778 m (5' 10\")       Body mass index is 36.88 kg/m .      Mira Turner CMA    "

## 2019-03-21 ENCOUNTER — OFFICE VISIT (OUTPATIENT)
Dept: OTHER | Facility: OUTPATIENT CENTER | Age: 54
End: 2019-03-21
Payer: COMMERCIAL

## 2019-03-21 DIAGNOSIS — F64.0 GENDER DYSPHORIA IN ADOLESCENT AND ADULT: Primary | ICD-10-CM

## 2019-03-21 NOTE — PROGRESS NOTES
"[facility]  [address], [address]  [place] [address]  Outpatient Progress Note      Session Date: 3/21/19  Client Name: Fred Flores (\"Sunni;\" she/her pronouns)  YOB: 1965  MRN: [id number]  Provider: Aicha Velasquez, PhD, LP  Type of Session: Individual   Present in Session: Client only  Number of Minutes: 55   Treatment Plan Due: 2018    Health Maintenance Summary - Mental Health Treatment Plan       Status Date      Mental Health Tx Plan Q11 MOS Next Due 2019      Done 10/5/2018 See Scan     Done 2017           Current Symptoms/Status:   Sunni reports lifelong discomfort with her sex assigned at birth, and identifies as a woman. At intake, Sunni also noted concerns about sexual functioning (erection/orgasm) though these have subsided as gender has been further explored. She indicated that, since her wife  in , she increasingly desired to further explore gender concerns and has been supported in this by her current partner, Tawny. Sunni has taken steps to come out as transgender to her daughters and peer group, and has been met with support. She reports a desire to further explore options for social and medical transition.     Progress Toward Treatment Goals:   Continues social and medical transition. Exploring options/plans for vaginoplasty in the future and has connected with the [facility] Trans Care Coordinator.       Intervention & Response:   Interpersonal, client-centered, and CBT techniques utilized to address client's current status.     Sunni discussed her reactions to the last therapy session and her feelings of identifying with and attraction to her therapists. Explored her attraction to \"smart women\" and her struggle with identifying with and also wanting to have acceptance from cis women, friends and therapists she has had. Stated she struggles to identify as a therapist and this brings up fears about her stableness of her " identity as trans. Explored her feelings about cis women, her socialization as a man, and what it means to be and feel like a trans woman. Was tearful as she discussed feeling she knows she is trans but finds it hard to integrate her female self and feels jealous/wants acceptance from cis women.     Discussed writing her WPATH letters of referral and process and timing. Agreed to write the letter in her next session, seeing Dr. Guillermo next week.     Assignment:   At least one blog entry between visits  Have coffee with theatre friend before next session  Have two things to discuss in the afternoon  Complete self compassion website quiz      Diagnosis:        302.5 (F64.0) - Gender dysphoria in adults      Interactive Complexity:  None.       Plan / Need for Future Services:    Return for individual therapy in 2-3 weeks.           Aicha Velasquez, PhD, LP

## 2019-03-26 ENCOUNTER — MEDICAL CORRESPONDENCE (OUTPATIENT)
Dept: HEALTH INFORMATION MANAGEMENT | Facility: CLINIC | Age: 54
End: 2019-03-26

## 2019-03-26 ENCOUNTER — OFFICE VISIT (OUTPATIENT)
Dept: OTHER | Facility: OUTPATIENT CENTER | Age: 54
End: 2019-03-26
Payer: COMMERCIAL

## 2019-03-26 DIAGNOSIS — F64.0 GENDER DYSPHORIA IN ADULT: Primary | ICD-10-CM

## 2019-03-26 NOTE — LETTER
2019      Re:  Sunni Flores  : 1965  Secondary referral letter for gender affirmation surgery (vaginoplasty)    To whom it may concern:    I am writing this letter in support of Ms. Flores s intention to pursue gender affirmation surgery (vaginoplasty). I am a gender specialist with a PsyD. in Marital and Family Therapy and a licensed marriage and family therapist. I am employed at the [employer] [facility] ([employer]), and have worked within the Adult Gender Services program for the past three years.     Ms. Flores has received services through the [facility] s Transgender [facility] Program since 2017. Ms. Flores is a 54 year old,  transgender woman who initially sought services to explore her gender identity and make decisions about medical interventions to address her gender dysphoria. Ms. Flores completed an initial diagnostic evaluation with Dr. Brooklynn Zhang in 2017, as well as 22 individual psychotherapy appointments. Ms. Flores then began seeing Dr. Sara Velasquez and has continued to see her for ongoing individual therapy bi-weekly. Her most recent appointment for individual psychotherapy occurred on 2019.     Ms. Flores has a diagnosis of gender dysphoria. She also has met criteria for generalized anxiety disorder, and major depressive disorder, recurrent, in remission. Ms. Flores has a established history of managing her mental health symptoms with medication and psychotherapy. Ms. Flores does not have a history of substance use/abuse. She has exhibited long-standing gender and anatomic dysphoria related to her birth assigned sex. She is well-adjusted and has approached her decision making for surgery with intention and care. There appears to be no barriers to her ability to consent and undergo gender-affirming surgery at this time.    Additionally,  reported that she started hormone therapy in 2018 and will have been on  feminizing hormones for at least one year at the time of surgery. She also reported that she has undergone significant and permanent changes such as legal name and gender marker change (in October 2018) and socially transitioned to live in her affirmed gender in June 2018. Gender affirmation surgery will decrease her gender dysphoria and psychological distress and improve her overall psychological functioning.  In some cases, denial of access to this surgery can lead to increased isolation, decreased work performance, and decreased sexual and interpersonal functioning. Surgery is expected to increase comfort with self, and improve interpersonal, sexual, and vocational functioning.    Ms. Flores appears to meet the [employer] Association for Transgender Health (WPATH) Standards of Care as well as internal prerequisites for gender affirmation surgery. Surgery is considered a medically necessary treatment by the WPATH Standards of Care for transgender persons. On behalf of our staff, I support her decision to pursue medically necessary surgery at this time.     Ms. Flores has educated herself about the surgery and is aware of its risks and benefits. During individual sessions, she plans to discuss in detail her plans for support to assist with post-operation needs, and indicates a strong support system at this time. Given her concerns and the availability of this type of surgery, I support her choice of surgeon.    The staff s support is based on the psychological indication for surgery and did not include a physical examination to assess medical contra-indications for the surgical procedure. This letter of support is valid for one year from the date of approval.      If you have any questions, or are in need of additional information, please do not hesitate to contact me.      Sincerely,        Kristen Guillermo PsyD, RADHA

## 2019-03-26 NOTE — PROGRESS NOTES
"[facility]  [facility]  [address], [address]  [place] [address]  Outpatient Progress Note      Session Date: 3/26/19  Client Name: Fred Flores (\"Sunni;\" she/her pronouns)  YOB: 1965  MRN: [id number]  Provider: Kristen Guillermo PsyD, LMFT  Type of Session: Individual   Present in Session: Client only  Number of Minutes: 53  Treatment Plan Due: 2018    Health Maintenance Summary - Mental Health Treatment Plan       Status Date      Mental Health Tx Plan Q11 MOS Next Due 2019      Done 10/5/2018 See Scan     Done 2017           Current Symptoms/Status:   Sunni reports lifelong discomfort with her sex assigned at birth, and identifies as a woman. At intake, Sunni also noted concerns about sexual functioning (erection/orgasm) though these have subsided as gender has been further explored. She indicated that, since her wife  in , she increasingly desired to further explore gender concerns and has been supported in this by her current partner, Tawny. Sunni has taken steps to come out as transgender to her daughters and peer group, and has been met with support. She reports a desire to further explore options for social and medical transition.     Progress Toward Treatment Goals:   Continues social and medical transition. Exploring options/plans for vaginoplasty in the future and has connected with the [facility] Trans Care Coordinator.       Intervention & Response:   Interpersonal, client-centered, and CBT techniques utilized to address client's current status.   Therapist focused on joining and creating an alliance with the client. Therapist discussed process of secondary letter writing. Made space for client to share narrative around gender identity and exploration. Explored surgical process and post-operative plans.    Therapist and client constructed secondary letter of support together.     Assignment:   At least one blog entry between visits  Have " coffee with theatre friend before next session  Have two things to discuss in the afternoon  Complete self compassion website quiz      Diagnosis:        302.5 (F64.0) - Gender dysphoria in adults      Interactive Complexity:  None.       Plan / Need for Future Services:    Return for individual therapy in 2-3 weeks.         Kristen Guillermo PsyD, LMFT

## 2019-03-28 ENCOUNTER — PATIENT OUTREACH (OUTPATIENT)
Dept: PLASTIC SURGERY | Facility: CLINIC | Age: 54
End: 2019-03-28

## 2019-03-28 DIAGNOSIS — F64.0 GENDER DYSPHORIA IN ADULT: Primary | ICD-10-CM

## 2019-03-28 NOTE — PROGRESS NOTES
[facility]:  Care Coordination Note     SITUATION   Patient is a 54 year old who is receiving support for:  Clinic Care Coordination - Initial ([facility] patient requesting Vaginoplasty)  .    BACKGROUND     New patient requesting vaginoplasty with Dr. Dawson.     ASSESSMENT     Surgery              [facility] Assessment  Comprehensive Gender Care ([facility]) Enrollment: Enrolled(Hormones since 7/17/2018 with DR. Rodríguez. )  Patient has a therapist: Yes  Name of therapist: Aicha Velasquez at [facility] sexual health   Letter of support #1: Received  Letter #1 Date: 09/20/18  Letter of support #2: Received  Letter #2 Date: 03/26/19  Surgery being considered: Yes  Vaginoplasty: Yes          PLAN          Nursing Interventions:   [facility] program and services discussed with patient. [facility] referral placed. [facility] assessment completed. Process for accessing surgery discussed including: WPATH standards of care, Letters of support, [place] insurance process, surgery scheduling, and approximate timeline. Pt questions answered. Registration completed & reviewed; scheduling process discussed & completed, as necessary.     More than 50% of the time was used to educate patient on medical & surgical process.     Pt requesting hair removal example to provide to the  completing electrolysis.     Follow-up plan:  Writer to send/mail pt hair removal example. Pt to attend consultation appt.        Devin Hawkins

## 2019-04-03 ENCOUNTER — OFFICE VISIT (OUTPATIENT)
Dept: OTHER | Facility: OUTPATIENT CENTER | Age: 54
End: 2019-04-03
Payer: COMMERCIAL

## 2019-04-03 DIAGNOSIS — F64.0 GENDER DYSPHORIA IN ADULT: Primary | ICD-10-CM

## 2019-04-03 NOTE — PROGRESS NOTES
"[facility]  [facility]  [address], [address]  [place] [address]  Outpatient Progress Note      Session Date: 19  Client Name: Fred Flores (\"Sunni;\" she/her pronouns)  YOB: 1965  MRN: [id number]  Provider: Aicha Velasquez, PhD, LP  Type of Session: Individual   Present in Session: Client only  Number of Minutes: 55   Treatment Plan Due: 2018    Health Maintenance Summary - Mental Health Treatment Plan       Status Date      Mental Health Tx Plan Q11 MOS Next Due 2019      Done 10/5/2018 See Scan     Done 2017           Current Symptoms/Status:   Sunni reports lifelong discomfort with her sex assigned at birth, and identifies as a woman. At intake, Sunni also noted concerns about sexual functioning (erection/orgasm) though these have subsided as gender has been further explored. She indicated that, since her wife  in , she increasingly desired to further explore gender concerns and has been supported in this by her current partner, Tawny. Sunni has taken steps to come out as transgender to her daughters and peer group, and has been met with support. She reports a desire to further explore options for social and medical transition.     Progress Toward Treatment Goals:   Continues social and medical transition. Exploring options/plans for vaginoplasty in the future and has connected with the [facility] Trans Care Coordinator.       Intervention & Response:   Interpersonal, client-centered, and CBT techniques utilized to address client's current status.     Sunni discussed her feeling angry at this therapist after her last session about not feeling cared for while she was upset. Explored the interaction and connected it to themes from previous session where she questioned her identity and identified seeking validation from cisgender women about her transgender female identity. Explored how her partner helped her reintegrate her identity and how the " questionig process allowed her to affirm her identity. Explored how she came to question herself and how she affirms her identity.      Ask about Sunni's facebook friend in next session.     Discussed writing her WPATH letters of referral and process and timing. Scheduled for her consult in June.     Assignment:   At least one blog entry between visits  Have coffee with theatre friend before next session  Have two things to discuss in the afternoon  Complete self compassion website quiz      Diagnosis:        302.5 (F64.0) - Gender dysphoria in adults      Interactive Complexity:  None.       Plan / Need for Future Services:    Return for individual therapy in 2-3 weeks.           Aicha Velasquez, PhD, LP

## 2019-04-22 ENCOUNTER — OFFICE VISIT (OUTPATIENT)
Dept: OTHER | Facility: OUTPATIENT CENTER | Age: 54
End: 2019-04-22
Payer: COMMERCIAL

## 2019-04-22 DIAGNOSIS — F43.22 ADJUSTMENT DISORDER WITH ANXIOUS MOOD: ICD-10-CM

## 2019-04-22 DIAGNOSIS — F64.0 GENDER DYSPHORIA IN ADULT: Primary | ICD-10-CM

## 2019-04-22 NOTE — PROGRESS NOTES
"[facility]  [facility]  [address], [address]  [place] [address]  Outpatient Progress Note      Session Date: 19  Client Name: Fred Flores (\"Sunni;\" she/her pronouns)  YOB: 1965  MRN: [id number]  Provider: Aicha Velasquez, PhD, LP  Type of Session: Individual   Present in Session: Client only  Number of Minutes: 55   Treatment Plan Due: 2019    Health Maintenance Summary - Mental Health Treatment Plan       Status Date      Mental Health Tx Plan Q11 MOS Next Due 2019      Done 10/5/2018 See Scan     Done 2017           Current Symptoms/Status:   Sunni reports lifelong discomfort with her sex assigned at birth, and identifies as a woman. At intake, Sunni also noted concerns about sexual functioning (erection/orgasm) though these have subsided as gender has been further explored. She indicated that, since her wife  in , she increasingly desired to further explore gender concerns and has been supported in this by her current partner, Tawny. Sunni has taken steps to come out as transgender to her daughters and peer group, and has been met with support. She reports a desire to further explore options for social and medical transition.     Progress Toward Treatment Goals:   Continues social and medical transition. Exploring options/plans for vaginoplasty in the future and has connected with the [facility] Trans Care Coordinator.       Intervention & Response:   Interpersonal, client-centered, and CBT techniques utilized to address client's current status.     Completed new treatment plan, see file.    Sunni discussed her goals for therapy, wanting to increase her self understanding. Discussed her own anxiety, and her fears of moving from a pessimistic place to a place where she is more optimistic - fear of disappointment. Discussed hopefulness vs hopelessness. Discussed her feeling about surgery, how she might feel after surgery, how she might feel " disappointed if she has to wait for surgery. Talked throurgh her expectations.     Stated she is feeling happier overall and much better than the last two years. Stated she feels she has become who she was meant to be.      Ask about Sunni's facebook friend in next session.     Discussed writing her WPATH letters of referral and process and timing. Scheduled for her consult in June.     Assignment:   At least one blog entry between visits  Have coffee with theatre friend before next session  Have two things to discuss in the afternoon  Complete self compassion website quiz      Diagnosis:        302.5 (F64.0) - Gender dysphoria in adults      Interactive Complexity:  None.       Plan / Need for Future Services:    Return for individual therapy in 2-3 weeks.           Aicha Velasquez, PhD, LP

## 2019-05-06 ENCOUNTER — OFFICE VISIT (OUTPATIENT)
Dept: OTHER | Facility: OUTPATIENT CENTER | Age: 54
End: 2019-05-06
Payer: COMMERCIAL

## 2019-05-06 VITALS
BODY MASS INDEX: 37.59 KG/M2 | SYSTOLIC BLOOD PRESSURE: 135 MMHG | DIASTOLIC BLOOD PRESSURE: 90 MMHG | WEIGHT: 262 LBS | HEART RATE: 86 BPM

## 2019-05-06 DIAGNOSIS — F64.0 GENDER DYSPHORIA IN ADOLESCENT AND ADULT: ICD-10-CM

## 2019-05-06 DIAGNOSIS — F64.0 GENDER DYSPHORIA IN ADULT: Primary | ICD-10-CM

## 2019-05-06 DIAGNOSIS — F43.22 ADJUSTMENT DISORDER WITH ANXIOUS MOOD: Primary | ICD-10-CM

## 2019-05-06 DIAGNOSIS — F64.0 GENDER DYSPHORIA IN ADULT: ICD-10-CM

## 2019-05-06 RX ORDER — ESTRADIOL 0.1 MG/D
2 FILM, EXTENDED RELEASE TRANSDERMAL
Qty: 16 PATCH | Refills: 3 | Status: SHIPPED | OUTPATIENT
Start: 2019-05-06 | End: 2019-08-19

## 2019-05-06 RX ORDER — SPIRONOLACTONE 100 MG/1
200 TABLET, FILM COATED ORAL DAILY
Qty: 60 TABLET | Refills: 3 | Status: SHIPPED | OUTPATIENT
Start: 2019-05-06 | End: 2019-08-19

## 2019-05-06 ASSESSMENT — ENCOUNTER SYMPTOMS
WEAKNESS: 0
FREQUENCY: 0
VOMITING: 0
HEADACHES: 0
LIGHT-HEADEDNESS: 0
PALPITATIONS: 0
FEVER: 0
NUMBNESS: 0
DYSPHORIC MOOD: 0
POLYDIPSIA: 0
UNEXPECTED WEIGHT CHANGE: 0
SHORTNESS OF BREATH: 0
ABDOMINAL PAIN: 0
CHILLS: 0
NERVOUS/ANXIOUS: 0
CHEST TIGHTNESS: 0

## 2019-05-06 NOTE — PROGRESS NOTES
KRISHNA Moeller is a 54 year old individual that uses pronouns She/Her/Hers/Herself that presents today for follow up of:  feminizing hormone therapy.   Alone or accompanied by: accompanied today by self  Gender identity: trans woman  Started Hormone  therapy  7/2018  Continues on    Vivelle dot 0.1 x 2 mg patch 2x/wk                          Spironlactone 200* mg daily      Any special concerns today?    No new concerns or questions  Has appt. With Dr. Dawson in June  Exhausted quicker than in past    On hormones?  YES +++ Shot day of the week? Not applicable-taking pills/patch/gel      Due for labs?  Yes      +++ Refills of meds needed?  Yes  Gender related body changes since last visit:   Breast growth  Genitals smaller      Breakthrough bleeding? Does Not Apply  Activity: gym 2x/wk, cardio x 1 hour  New health concerns since last visit:  none  ---    No past surgical history on file.    Patient Active Problem List   Diagnosis     Gender dysphoria in adult     Elevated blood pressure reading without diagnosis of hypertension       Current Outpatient Medications   Medication Sig Dispense Refill     buPROPion (WELLBUTRIN XL) 150 MG 24 hr tablet        estradiol (VIVELLE-DOT) 0.1 MG/24HR bi-weekly patch Place 2 patches onto the skin twice a week 16 patch 3     ibuprofen (ADVIL/MOTRIN) 400 MG tablet Take 2 tablets by mouth       spironolactone (ALDACTONE) 100 MG tablet Take 2 tablets (200 mg) by mouth daily 60 tablet 3     traZODone (DESYREL) 50 MG tablet TAKE TWO TABLETS BY MOUTH DAILY AT BEDTIME        venlafaxine (EFFEXOR-XR) 75 MG 24 hr capsule Take 3 capsules by mouth          History   Smoking Status     Never Smoker   Smokeless Tobacco     Never Used          Allergies   Allergen Reactions     Allopurinol Other (See Comments)     Patient is positive for HLA-B 58:01. Increased risk of allopurinol-induced severe cutaneous reactions.      Clopidogrel Other (See Comments)     Patient is a [id number] metabolizer.  Clopidogrel would be predicted to lack efficacy       There are no preventive care reminders to display for this patient.      Problem, Medication and Allergy Lists were reviewed and are current..         Review of Systems:   Review of Systems   Constitutional: Negative for chills, fever and unexpected weight change.   Eyes: Negative for visual disturbance.   Respiratory: Negative for chest tightness and shortness of breath.    Cardiovascular: Negative for chest pain, palpitations and leg swelling.   Gastrointestinal: Negative for abdominal pain and vomiting.   Endocrine: Negative for polydipsia and polyuria.   Genitourinary: Negative for frequency.   Neurological: Negative for weakness, light-headedness, numbness and headaches.   Psychiatric/Behavioral: Negative for dysphoric mood and suicidal ideas. The patient is not nervous/anxious.             Physical Exam:   Blood pressure 135/90, pulse 86, weight 118.8 kg (262 lb).    Body mass index is 37.59 kg/m .    BMI= There is no height or weight on file to calculate BMI.   Wt Readings from Last 10 Encounters:   02/21/19 116.6 kg (257 lb)   11/15/18 115.8 kg (255 lb 6.4 oz)   08/07/18 112.5 kg (248 lb)   07/17/18 113.3 kg (249 lb 12.8 oz)   05/29/18 115.2 kg (254 lb)     Appearance: Female appearance and dress    GENERAL:: healthy, alert and no distress  RESP: lungs clear to auscultation - no rales, no rhonchi, no wheezes  CV: regular rates and rhythm, normal S1 S2, no S3 or S4 and no murmur, no click or rub -      Affect: Appropriate/mood-congruent           Labs:   Results from last visit:  Orders Only on 01/10/2019   Component Date Value Ref Range Status     Estradiol 01/10/2019 118* 6 - 50 pg/mL Final     Testosterone Total 01/10/2019 7* 240 - 950 ng/dL Final    Comment: This test was developed and its performance characteristics determined by the   [facility],  [facility] Laboratory. It has   not been cleared or approved by the  FDA. The laboratory is regulated under   CLIA as qualified to perform high-complexity testing. This test is used for   clinical purposes. It should not be regarded as investigational or for   research.       Sex Hormone Binding Globulin 01/10/2019 15  11 - 80 nmol/L Final     Free Testosterone Calculated 01/10/2019 0.18* 4.7 - 24.4 ng/dL Final     Sodium 01/10/2019 136  133 - 144 mmol/L Final     Potassium 01/10/2019 5.0  3.4 - 5.3 mmol/L Final     Chloride 01/10/2019 103  94 - 109 mmol/L Final     Carbon Dioxide 01/10/2019 26  20 - 32 mmol/L Final     Anion Gap 01/10/2019 7  3 - 14 mmol/L Final     Glucose 01/10/2019 86  70 - 99 mg/dL Final     Urea Nitrogen 01/10/2019 30  7 - 30 mg/dL Final     Creatinine 01/10/2019 1.35* 0.66 - 1.25 mg/dL Final     GFR Estimate 01/10/2019 60* >60 mL/min/[1.73_m2] Final    Comment: Non  GFR Calc  Starting 12/18/2018, serum creatinine based estimated GFR (eGFR) will be   calculated using the Chronic Kidney Disease Epidemiology Collaboration   (CKD-EPI) equation.       GFR Estimate If Black 01/10/2019 69  >60 mL/min/[1.73_m2] Final    Comment:  GFR Calc  Starting 12/18/2018, serum creatinine based estimated GFR (eGFR) will be   calculated using the Chronic Kidney Disease Epidemiology Collaboration   (CKD-EPI) equation.       Calcium 01/10/2019 8.9  8.5 - 10.1 mg/dL Final     Bilirubin Total 01/10/2019 0.4  0.2 - 1.3 mg/dL Final     Albumin 01/10/2019 4.2  3.4 - 5.0 g/dL Final     Protein Total 01/10/2019 7.8  6.8 - 8.8 g/dL Final     Alkaline Phosphatase 01/10/2019 76  40 - 150 U/L Final     ALT 01/10/2019 33  0 - 70 U/L Final     AST 01/10/2019 13  0 - 45 U/L Final     Cholesterol 01/10/2019 167  <200 mg/dL Final     Triglycerides 01/10/2019 87  <150 mg/dL Final     HDL Cholesterol 01/10/2019 46  >39 mg/dL Final     LDL Cholesterol Calculated 01/10/2019 104* <100 mg/dL Final    Comment: Above desirable:  100-129 mg/dl  Borderline High:  130-159  mg/dL  High:             160-189 mg/dL  Very high:       >189 mg/dl       Non HDL Cholesterol 01/10/2019 122  <130 mg/dL Final       Assessment and Plan   1. Gender dysphoria  Continues feminizing changes on current dose hormones, K+ at upper end of normal  Labs: repeat BMP  2. Weight gain--steady since hormone start  Counseled on practical approach to portion control; counseled on portion size  Enjoy physical act of eating. Focus on: Small portions, small bites, excellent quality food.     Follow up:  Follow up in 4 months  Results by mychart  Questions were elicited and answered.     Hi Rodríguez MD

## 2019-05-06 NOTE — PROGRESS NOTES
"[facility]  [facility]  [address], [address]  [place] [address]  Outpatient Progress Note      Session Date: 19  Client Name: Fred Flores (\"Sunni;\" she/her pronouns)  YOB: 1965  MRN: [id number]  Provider: Aicha Velasquez, PhD, LP  Type of Session: Individual   Present in Session: Client only  Number of Minutes: 55   Treatment Plan Due: 2019    Health Maintenance Summary - Mental Health Treatment Plan       Status Date      Mental Health Tx Plan Q11 MOS Next Due 3/22/2020      Done 2019      Done 10/5/2018 See Scan     Done 2017           Current Symptoms/Status:   Sunni reports lifelong discomfort with her sex assigned at birth, and identifies as a woman. At intake, Sunni also noted concerns about sexual functioning (erection/orgasm) though these have subsided as gender has been further explored. She indicated that, since her wife  in , she increasingly desired to further explore gender concerns and has been supported in this by her current partner, Tawny. Sunni has taken steps to come out as transgender to her daughters and peer group, and has been met with support. She reports a desire to further explore options for social and medical transition.     Progress Toward Treatment Goals:   Continues social and medical transition. Exploring options/plans for vaginoplasty in the future and has connected with the [facility] Trans Care Coordinator.       Intervention & Response:   Interpersonal, client-centered, and CBT techniques utilized to address client's current status.     Sunni discussed her reactions to her appointment with Dr. Rodríguez and her feelings of concern about her weight. Stated she wants to lose weight and has not made plans to try to lose weight.Stated she has been struggling with feelings of anxiety and vague sense   Of distress.      Discussed internal critical voice and developing compassion. Agreed to read compassion " book.        Scheduled for her consult in June.     Assignment:   At least one blog entry between visits  Have coffee with theatre friend before next session  Have two things to discuss in the afternoon  Complete self compassion website quiz      Diagnosis:        302.5 (F64.0) - Gender dysphoria in adults      Interactive Complexity:  None.       Plan / Need for Future Services:    Return for individual therapy in 2-3 weeks.           Aicha Velasquez, PhD, LP

## 2019-05-14 RX ORDER — LIDOCAINE/PRILOCAINE 2.5 %-2.5%
CREAM (GRAM) TOPICAL PRN
Refills: 2 | COMMUNITY
Start: 2019-03-11

## 2019-05-14 RX ORDER — BUPROPION HYDROCHLORIDE 150 MG/1
TABLET ORAL
Refills: 3 | COMMUNITY
Start: 2019-01-18 | End: 2019-08-19

## 2019-05-20 ENCOUNTER — OFFICE VISIT (OUTPATIENT)
Dept: OTHER | Facility: OUTPATIENT CENTER | Age: 54
End: 2019-05-20
Payer: COMMERCIAL

## 2019-05-20 DIAGNOSIS — F43.22 ADJUSTMENT DISORDER WITH ANXIOUS MOOD: ICD-10-CM

## 2019-05-20 DIAGNOSIS — F64.0 GENDER DYSPHORIA IN ADULT: Primary | ICD-10-CM

## 2019-06-06 ENCOUNTER — PRE VISIT (OUTPATIENT)
Dept: PLASTIC SURGERY | Facility: CLINIC | Age: 54
End: 2019-06-06

## 2019-06-06 NOTE — TELEPHONE ENCOUNTER
FUTURE VISIT INFORMATION      FUTURE VISIT INFORMATION:    Date: 6/18/19    Time: 3:00pm    Location: [facility]  REFERRAL INFORMATION:    Referring provider:  Self    Referring providers clinic:  N/A    Reason for visit/diagnosis  Vaginoplasty    RECORDS REQUESTED FROM:       No Records to collect

## 2019-06-07 ENCOUNTER — OFFICE VISIT (OUTPATIENT)
Dept: OTHER | Facility: OUTPATIENT CENTER | Age: 54
End: 2019-06-07
Payer: COMMERCIAL

## 2019-06-07 DIAGNOSIS — F64.0 GENDER DYSPHORIA IN ADULT: Primary | ICD-10-CM

## 2019-06-07 DIAGNOSIS — F43.22 ADJUSTMENT DISORDER WITH ANXIOUS MOOD: ICD-10-CM

## 2019-06-07 NOTE — PROGRESS NOTES
"[facility]  [facility]  [address], [address]  [place] [address]  Outpatient Progress Note      Session Date: 19  Client Name: Fred Flores (\"Sunni;\" she/her pronouns)  YOB: 1965  MRN: [id number]  Provider: Aicha Velasquez, PhD, LP  Type of Session: Individual   Present in Session: Client only  Number of Minutes: 55   Treatment Plan Due: 2019    Health Maintenance Summary - Mental Health Treatment Plan       Status Date      MENTAL HEALTH TX PLAN Next Due 3/22/2020      Done 2019      Done 10/5/2018 See Scan     Done 2017           Current Symptoms/Status:   Sunni reports lifelong discomfort with her sex assigned at birth, and identifies as a woman. At intake, Sunni also noted concerns about sexual functioning (erection/orgasm) though these have subsided as gender has been further explored. She indicated that, since her wife  in , she increasingly desired to further explore gender concerns and has been supported in this by her current partner, Tawny. Sunni has taken steps to come out as transgender to her daughters and peer group, and has been met with support. She reports a desire to further explore options for social and medical transition.     Progress Toward Treatment Goals:   Continues social and medical transition. Exploring options/plans for vaginoplasty in the future and has connected with the [facility] Trans Care Coordinator.       Intervention & Response:   Interpersonal, client-centered, and CBT techniques utilized to address client's current status.     Sunni discussed her part of herself that does not believe she is a woman. Stated she has an internal voice that is very critical of her and tells her she is not a real woman. Described her inner voice as a boulder inside of her chest that feels stuck in the middle of the road. Had her work with the imagery and try to cordon off the voice and to soften the boulder, move the boulder off " the road.     DEscribed feeling unsafe in going towards a brewpub and her relationship to herown fear.     Scheduled for her consult in June.     Assignment:   At least one blog entry between visits  Have coffee with theatre friend before next session  Have two things to discuss in the afternoon  Complete self compassion website quiz      Diagnosis:        302.5 (F64.0) - Gender dysphoria in adults      Interactive Complexity:  None.       Plan / Need for Future Services:    Return for individual therapy in 2-3 weeks.           Aicha Velasquez, PhD, LP

## 2019-06-16 NOTE — PROGRESS NOTES
"Program [facility] Human [facility]  [facility]  [address], [address]  [place] [address]  Outpatient Progress Note      Session Date: 19  Client Name: Fred Flores (\"Sunni;\" she/her pronouns)  YOB: 1965  MRN: [id number]  Provider: Aicha Velasquez, PhD, LP  Type of Session: Individual   Present in Session: Client only  Number of Minutes: 55   Treatment Plan Due: 2019    Health Maintenance Summary - Mental Health Treatment Plan       Status Date      MENTAL HEALTH TX PLAN Next Due 3/22/2020      Done 2019      Done 10/5/2018 See Scan     Done 2017           Current Symptoms/Status:   Sunni reports lifelong discomfort with her sex assigned at birth, and identifies as a woman. At intake, Sunni also noted concerns about sexual functioning (erection/orgasm) though these have subsided as gender has been further explored. She indicated that, since her wife  in , she increasingly desired to further explore gender concerns and has been supported in this by her current partner, Tawny. Sunni has taken steps to come out as transgender to her daughters and peer group, and has been met with support. She reports a desire to further explore options for social and medical transition.     Progress Toward Treatment Goals:   Continues social and medical transition. Exploring options/plans for vaginoplasty in the future and has connected with the [facility] Trans Care Coordinator.       Intervention & Response:   Interpersonal, client-centered, and CBT techniques utilized to address client's current status.     Sunni discussed a continued sense of questioning herself in her gender identity and struggling with negative self talk. Explored her feelings of not feeling she is woman enough and that she is not progressing in her transition quickly enough. Discussed her internal pressure on herself and coping strategies to decrease her negative self talk. Discussed using self-compassion. "     Discussed internal critical voice and developing compassion. Agreed to read compassion book.        Scheduled for her consult in June.     Assignment:   At least one blog entry between visits  Have coffee with theatre friend before next session  Have two things to discuss in the afternoon  Complete self compassion website quiz      Diagnosis:        302.5 (F64.0) - Gender dysphoria in adults      Interactive Complexity:  None.       Plan / Need for Future Services:    Return for individual therapy in 2-3 weeks.           Aicha Velasquez, PhD, LP

## 2019-06-17 ENCOUNTER — OFFICE VISIT (OUTPATIENT)
Dept: OTHER | Facility: OUTPATIENT CENTER | Age: 54
End: 2019-06-17
Payer: COMMERCIAL

## 2019-06-17 DIAGNOSIS — F64.0 GENDER DYSPHORIA IN ADULT: ICD-10-CM

## 2019-06-17 DIAGNOSIS — F43.22 ADJUSTMENT DISORDER WITH ANXIOUS MOOD: Primary | ICD-10-CM

## 2019-06-18 ENCOUNTER — PATIENT OUTREACH (OUTPATIENT)
Dept: PLASTIC SURGERY | Facility: CLINIC | Age: 54
End: 2019-06-18

## 2019-06-18 NOTE — PROGRESS NOTES
"[facility]:  Care Coordination Note     SITUATION   Patient (Sunni, She/Her) is a 54 year old who is receiving support for:  Clinic Care Coordination - Initial (Vaginoplasty consultation)  .    BACKGROUND     Pt attended consultation alone for vaginoplasty with Dr. Dawson.     Pt was tearful, anxious and concerned about her weight. Pt was fearful her weight will exclude her from being able to access surgery. Pt reports exercising regularly and eating \"mostly\" healthy but does eat out frequently. Pt is willing to work toward weight loss and worked with dietician in past but is worried she will never get to a BMI of 30.     ASSESSMENT     Surgery              [facility]  [facility] ([facility] Enrollment: Enrolled  Patient has a therapist: Yes  Name of therapist: Aicha Velasquez at [facility]   Letter of support #1: Received  Letter #1 Date: 09/20/18  Letter of support #2: Received  Letter #2 Date: 03/26/19  Surgery being considered: Yes  Vaginoplasty: Yes  Hair removal completed: (already working on hair removal in [place])          PLAN          Nursing Interventions:   [facility] program and services discussed with patient. Educational surgical packet provided and reviewed with patient. Process for accessing surgery discussed, including: WPATH standards of care, letters of support, treatment plan action steps, PA insurance process, surgery scheduling, and approximate timeline.     Pt has letter of support in chart, which is adequate for PA. Photography consent signed by patient. Surgeon s photography outcomes reviewed with patient. Pt questions answered within scope of practice.     Follow-up plan:  Dr. Dawson had to leave clinic for emergency surgery.     Pt was rescheduled to see Dr. Dawson on 6/26/19.    Writer working with Dr. Venegas's nurse to get scheduled for consult on 6/26/19, LUX.       Devin Hawkins  "

## 2019-06-19 ENCOUNTER — PRE VISIT (OUTPATIENT)
Dept: UROLOGY | Facility: CLINIC | Age: 54
End: 2019-06-19

## 2019-06-19 NOTE — PROGRESS NOTES
"[facility]  [facility]  [address], [address]  [place] [address]  Outpatient Progress Note      Session Date: 19  Client Name: Fred Flores (\"Sunni;\" she/her pronouns)  YOB: 1965  MRN: [id number]  Provider: Aicha Velasquez, PhD,   Type of Session: Individual   Present in Session: Client only  Number of Minutes: 55   Treatment Plan Due: 2019    Health Maintenance Summary - Mental Health Treatment Plan       Status Date      MENTAL HEALTH TX PLAN Next Due 3/22/2020      Done 2019      Done 10/5/2018 See Scan     Done 2017           Current Symptoms/Status:   Sunni reports lifelong discomfort with her sex assigned at birth, and identifies as a woman. At intake, Sunni also noted concerns about sexual functioning (erection/orgasm) though these have subsided as gender has been further explored. She indicated that, since her wife  in , she increasingly desired to further explore gender concerns and has been supported in this by her current partner, Tawny. Sunni has taken steps to come out as transgender to her daughters and peer group, and has been met with support. She reports a desire to further explore options for social and medical transition.     Progress Toward Treatment Goals:   Continues social and medical transition. Exploring options/plans for vaginoplasty in the future and has connected with the [facility] Trans Care Coordinator. Working on integration of self, negative self-talk and self-compassion      Intervention & Response:   Interpersonal, client-centered, and CBT techniques utilized to address client's current status.     Sunni reported that she had benefited greatly from the visual imagery intervention from the previous session.  She stated she had been able to conceptualize the aspects of her navigate negative self talk as separate for self which had been difficult to do before.  She stated the visual imagery of her negative self " talk being a boulder within her chest has allowed her to work with the negative self talk and has decreased her distress. Sunni stated she is scheduled to see Dr. Dawson this week and reported she has some anxiety about the appointment.  Explored her anxiety and discussed her feelings about pursuing vaginoplasty at this time.  Discussed how the surgery feels important for her age and gender affirmation process and her anxiety that she will not be able to access the surgery after waiting so long.    Assignment:   At least one blog entry between visits  Have coffee with theatre friend before next session  Have two things to discuss in the afternoon  Complete self compassion website quiz      Diagnosis:        302.5 (F64.0) - Gender dysphoria in adults      Interactive Complexity:  None.       Plan / Need for Future Services:    Return for individual therapy in 2-3 weeks.           Aicha Velasquez, PhD, LP

## 2019-06-19 NOTE — TELEPHONE ENCOUNTER
MEDICAL RECORDS REQUEST   [facility]  Urology Clinic  [address] [place] [address]  PHONE: [phone]  Fax: [phone]        FUTURE VISIT INFORMATION                                                   ESTEPHANIA Malik: 1965 scheduled for future visit at [facility]    APPOINTMENT INFORMATION:    Date: 19 12:45PM    Provider:  Meet Venegas MD     Reason for Visit/Diagnosis: GENDER CARE     REFERRAL INFORMATION:    Referring provider:  Tank Dawson    Specialty: MD    Referring providers clinic:  [facility]     Clinic contact number:  [phone]    RECORDS REQUESTED FOR VISIT                                                     NOTES  STATUS/DETAILS   OFFICE NOTE from referring provider  yes   OFFICE NOTE from other specialist  no   DISCHARGE SUMMARY from hospital  no   DISCHARGE REPORT from the ER  no   OPERATIVE REPORT  no   MEDICATION LIST  yes     PRE-VISIT CHECKLIST      Record collection complete Yes - All recs in Epic    Appointment appropriately scheduled           (right time/right provider) Yes   MyChart activation Yes   Questionnaire complete If no, please explain: In process      Completed by: Donya Ashley

## 2019-06-25 ENCOUNTER — PRE VISIT (OUTPATIENT)
Dept: UROLOGY | Facility: CLINIC | Age: 54
End: 2019-06-25

## 2019-06-25 NOTE — TELEPHONE ENCOUNTER
Chief Complaint : New-Referred by Dr. Dawson    Hx/Sx: Gender Care    Records/Orders: Available     Pt Contacted: N/A    At Rooming: Normal

## 2019-06-26 ENCOUNTER — OFFICE VISIT (OUTPATIENT)
Dept: UROLOGY | Facility: CLINIC | Age: 54
End: 2019-06-26
Payer: COMMERCIAL

## 2019-06-26 ENCOUNTER — OFFICE VISIT (OUTPATIENT)
Dept: PLASTIC SURGERY | Facility: CLINIC | Age: 54
End: 2019-06-26
Attending: PLASTIC SURGERY
Payer: COMMERCIAL

## 2019-06-26 VITALS
DIASTOLIC BLOOD PRESSURE: 82 MMHG | SYSTOLIC BLOOD PRESSURE: 128 MMHG | BODY MASS INDEX: 36.36 KG/M2 | HEIGHT: 70 IN | HEART RATE: 98 BPM | WEIGHT: 254 LBS

## 2019-06-26 VITALS
DIASTOLIC BLOOD PRESSURE: 82 MMHG | HEIGHT: 70 IN | OXYGEN SATURATION: 99 % | HEART RATE: 98 BPM | BODY MASS INDEX: 36.38 KG/M2 | SYSTOLIC BLOOD PRESSURE: 128 MMHG | WEIGHT: 254.1 LBS

## 2019-06-26 DIAGNOSIS — F64.9 GENDER DYSPHORIA: Primary | ICD-10-CM

## 2019-06-26 DIAGNOSIS — F64.0 GENDER DYSPHORIA IN ADULT: Primary | ICD-10-CM

## 2019-06-26 ASSESSMENT — PAIN SCALES - GENERAL: PAINLEVEL: NO PAIN (0)

## 2019-06-26 ASSESSMENT — MIFFLIN-ST. JEOR
SCORE: 2003.39
SCORE: 2003.84

## 2019-06-26 ASSESSMENT — ENCOUNTER SYMPTOMS
INSOMNIA: 1
PANIC: 0
NERVOUS/ANXIOUS: 1
DEPRESSION: 0
DECREASED CONCENTRATION: 0

## 2019-06-26 NOTE — PROGRESS NOTES
REFERRING PROVIDER: Sara Velasquez    REASON FOR CONSULTATION: Gender dysphoria, requesting vaginoplasty.    HPI: Patient is a 54-year-old trans-woman who prefers she her pronouns, referred to me by Sara Velasquez for possible vaginoplasty.  Patient has been seriously considering undergoing surgery for the past 1.5 years.  By undergoing vaginoplasty, patient would like to better align her physical body with her chosen gender identity.  In fact, patient talks her penis on a daily basis and sits to urinate on a daily basis and has been doing this for the past 2 years.  She transitioned a year ago.  Her chosen name is Sunni.  She has been on hormone therapy for 11 months now.  She has not undergone any transition related surgeries.  She has already started hair removal process along her genitalia.  She denies any urinary issues.  Denies any prostate issues.  Denies any perianal issues.  Denies any sexually transmitted infections.  She is currently unable to achieve orgasm at this time.  Her last orgasm was 2 years ago.  She does not have a specific more stimulatory portion of her genitalia.  She prefers to have sex with women.    MEDS:   Prior to Admission medications    Medication Sig Start Date End Date Taking? Authorizing Provider           estradiol (VIVELLE-DOT) 0.1 MG/24HR bi-weekly patch Place 2 patches onto the skin twice a week 5/6/19  Yes Hi Rodríguez MD   ibuprofen (ADVIL/MOTRIN) 400 MG tablet Take 2 tablets by mouth 6/28/17  Yes Reported, Patient   lidocaine-prilocaine (EMLA) 2.5-2.5 % external cream  3/11/19  Yes Reported, Patient   methylfolate (DEPLIN) 15 MG TABS tablet Take 15 mg by mouth   Yes Reported, Patient   spironolactone (ALDACTONE) 100 MG tablet Take 2 tablets (200 mg) by mouth daily 5/6/19  Yes Hi Rodríguez MD   TRAZODONE HCL PO TAKE TWO TABLETS BY MOUTH DAILY AT BEDTIME  3/20/18  Yes Reported, Patient   venlafaxine (EFFEXOR-XR) 75 MG 24 hr capsule Take 3 capsules by mouth   "2/8/17  Yes Reported, Patient       ALLERGIES:   Allergies   Allergen Reactions     Allopurinol Other (See Comments)     Patient is positive for HLA-B 58:01. Increased risk of allopurinol-induced severe cutaneous reactions.      Clopidogrel Other (See Comments)     Patient is a AMT4V67 metabolizer. Clopidogrel would be predicted to lack efficacy       PMH: Depression and anxiety.    PSH: Hernia repair in suprapubic region at 18 months old.    SH:   Social History     Tobacco Use     Smoking status: Never Smoker     Smokeless tobacco: Never Used   Substance Use Topics     Alcohol use: Yes     Comment: 2-3 drinks weekly   Is a retired .    FH: Colon cancer.    ROS: Denies chest pain, shortness of breath, diabetes, MI, CVA, DVT, PE, and bleeding disorders.    PHYSICAL EXAMINATION: /82 (BP Location: Left arm, Patient Position: Sitting, Cuff Size: Adult Large)   Pulse 98   Ht 1.778 m (5' 10\")   Wt 115.3 kg (254 lb 1.6 oz)   SpO2 99%   BMI 36.46 kg/m    General: No acute distress.  Appears feminine.  Has an obese body habitus.  Genital examination was performed in the presence of a chaperone.  This revealed a circumcised penis with a shaft length of 4.5 cm.  Patient has a horizontal linear scar along the suprapubic region.  Her glans is viable.  Genitalia is hirsute.  Testicles are small but palpable on either side.  There is no inguinal hernia.    ASSESSMENT: Gender dysphoria, requesting vaginoplasty.    PLAN: Patient has provided us with 2 letters of support.  I recommended continuing hair removal to decrease the chance of intravaginal hair growth.  I will recommend patient see Dr. Venegas in urology in preparation for vaginoplasty.  During this hair removal process, I am recommending patient lose weight to obtain a BMI of 30 or less prior to undergoing surgery to decrease the chance of wound healing difficulties, wound infection, asymmetry, contour deformity, vaginal prolapse, and need for " revision surgery.  With these items completed, in accordance with [facility] Standards Of Care guidelines, patient is a potential candidate for penile inversion vaginoplasty with supplemental scrotal skin graft as an inpatient at the [facility] with a postoperative admission to maintain a vaginal stent for 5 to 7 days.  The operation is performed in conjunction with Dr. Meet Venegas given the need for surgical expertise from 2 separate subspecialties.  I explained the vaginoplasty procedure in detail today, which include neovaginal cavity dissection, grafting of neovaginal cavity, vaginal colpopexy, clitoroplasty, urethroplasty, perineal flap, orchiectomy, penectomy, scrotectomy, and bilateral labiaplasty.  Patient and I discussed the risks involved to include bleeding, infection, injury to surrounding structures, fluid collection, wound dehiscence with prolonged dressing changes, asymmetry, contour deformity, DVT, PE, rectal injury, rectovaginal fistula, possible need for ostomy, clitoral necrosis, sensory loss, voiding issues, urinary stream abnormalities, flap loss, vaginal prolapse, vaginal shrinkage, vaginal stenosis, need for lifelong dilation, granulation tissue, chronic pain, intravaginal hair growth, incompletely feminized external vulva, and need for revision surgery.  I also explained to the patient that given her horizontal scar in the suprapubic region, inverting her penile tissues will be the more difficult, leading to possibly increased tension at the surgical incisions contributing to wound dehiscence and wound creation.  Patient accepts these risks and wishes to work towards obtaining surgery.  I also explained to her that with this surgical technique, we are relying on anterior blood supply to heal the surgical site, preventing me from performing anterior labiaplasty.  And therefore she may require a second stage feminizing labiaplasty to fully feminize the external vulva.  She  understood this.  Patient will be instructed to discontinue hormone therapy 4 weeks prior to surgery and resume it 2 weeks postoperatively to control its risk of DVT.  Patient will perform a bowel prep 1 to 2 days prior to surgery.  I will see the patient back once the hair removal process has been finalized and she has undergone significant weight loss.    Total time spent with patient was 60 min of which greater than 50% was in counseling.

## 2019-06-26 NOTE — LETTER
6/26/2019       RE: Sunni Flores  [address] [facility] [address]     Dear Colleague,    Thank you for referring your patient, Sunni Flores, to the [facility] UROLOGY AND INST FOR PROSTATE AND UROLOGIC CANCERS at [facility]. Please see a copy of my visit note below.    [facility]    Name: Sunni Flores    MRN: [id number]   YOB: 1965                 Chief Complaint:   Gender Dysphoria          History of Present Illness:   Sunni Flores is a 54 year old transgender female seen in consultation for gender dysphoria    Patient has been living as a female for 1.5 years. Came out friends and family in Spring 2018.  Preferred pronouns are: she/hers/her  The patient has been on exogenous hormones 11 months.  In terms of an intimate relationship, the patient is in a relationship with a female. Was  for 21 years. Passed away from lymphoma complications. Met new partner Tawny (cis gender female). Has been open with partner and Tawny is supportive.  In terms of fertility, the patient has 2 girls - 19 and 22 years old.  Has started hair removal process - on face or about a year.  Had 2 sessions on genitalia.  The patient has obtained letters of support x 2    Long-term surgical goals for the patient include: vaginoplasty    The patient is here today expressing interest in vaginoplasty         Past Medical History:   Anxiety  Depression          Past Surgical History:   Hernia - abdominal @ 18 months old  Had a vasectomy         Social History:     Social History     Tobacco Use     Smoking status: Never Smoker     Smokeless tobacco: Never Used   Substance Use Topics     Alcohol use: Yes     Comment: 2-3 drinks weekly            Family History:   None relevant         Allergies:     Allergies   Allergen Reactions     Allopurinol Other (See Comments)     Patient is positive for HLA-B 58:01. Increased risk of allopurinol-induced severe  "cutaneous reactions.      Clopidogrel Other (See Comments)     Patient is a [id number] metabolizer. Clopidogrel would be predicted to lack efficacy            Medications:     Current Outpatient Medications   Medication Sig     estradiol (VIVELLE-DOT) 0.1 MG/24HR bi-weekly patch Place 2 patches onto the skin twice a week     ibuprofen (ADVIL/MOTRIN) 400 MG tablet Take 2 tablets by mouth     lidocaine-prilocaine (EMLA) 2.5-2.5 % external cream      methylfolate (DEPLIN) 15 MG TABS tablet Take 15 mg by mouth     spironolactone (ALDACTONE) 100 MG tablet Take 2 tablets (200 mg) by mouth daily     TRAZODONE HCL PO TAKE TWO TABLETS BY MOUTH DAILY AT BEDTIME      venlafaxine (EFFEXOR-XR) 75 MG 24 hr capsule Take 3 capsules by mouth      buPROPion (WELLBUTRIN XL) 150 MG 24 hr tablet      No current facility-administered medications for this visit.              Review of Systems:    ROS: 14 point ROS neg other than the symptoms noted above in the HPI.          Physical Exam:   /82   Pulse 98   Ht 1.778 m (5' 10\")   Wt 115.2 kg (254 lb)   BMI 36.45 kg/m     General: age-appropriate in NAD  HEENT: Head AT/NC, EOMI, CN Grossly intact  Resp: no respiratory distress, lung sounds clear.  CV: heart rate regular, S1, S2.  Lymph: No cervical, supraclavicular or axillary lymphadenopathy  Back: bony spine is non-tender, flanks are nontender  Abdomen: mild obese, soft, non-distended, non-tender. No organomegaly  : testicles normal without atrophy or masses and penis normal without urethral discharge  LE: no edema.   Neuro: grossly intact  Motor: excellent strength throughout  Skin: clear of rashes or ecchymoses.         Outside records:    I spent 10 minutes reviewing outside records.         Assessment and Plan:   54 year old transgender female with gender dysphoria    The criteria for genital surgery are specific to the type of surgery being requested.  Criteria for orchiectomy in MtF patients (WPATH version 7)    1. " Persistent, well documented gender dysphoria;  2. Capacity to make a fully informed decision and to consent for treatment;  3. Age of consent (>18 years old)  4. If significant medical or mental health concerns are present, they must be well controlled.  5. 12 continuous months of hormone therapy as appropriate to the patient s gender goals (unless  the patient has a medical contraindication or is otherwise unable or unwilling to take  Hormones).  6. Two letters of support    The aim of hormone therapy prior to gonadectomy is primarily to introduce a period of reversible  estrogen or testosterone suppression, before the patient undergoes irreversible surgical intervention.    I reviewed the steps of orchiectomy. I reviewed the surgical procedure. I reviewed the risks and benefits including bleeding, infection and irreversible nature of the procedure.     She has a persistent, well documented gender dysphoria. She has capacity to make a fully informed decision and to consent for treatment. Her mental health issues are well controlled. She has been on continuous hormones for almost a year. We received letters of support.     The patient will meet all of these criteria in a month. We discussed that gender affirmation surgery should be considered permanent. We discussed risks/complications of rectal injury, rectovaginal fistula, bleeding, fluid collection, infection, injury to surrounding structures, flap loss, sensory loss, wound dehiscence, vaginal prolapse, vaginal shrinkage/stenosis, need for lifelong dilation, urinary stream abnormalities, DVT/PE and need for revision surgery.     We discussed the option for zero depth. She is considering zero depth vaginoplasty     We also discussed the need to stop hormones debo-procedurally, which Dr. Dawson will finalize.    Will continue hair removal.  Needs some weight loss with goal BMI ~30  Will continue to consider minimal depth vs. full depth vaginoplasty.  Dr. Dawson will see  back once more weight loss has been done.    Meet Venegas MD   Reconstructive Urology  [facility]

## 2019-06-26 NOTE — NURSING NOTE
"New-Vaginoplasty Consult         Chief Complaint   Patient presents with     New Patient     Vagianoplasty Consult        Blood pressure 128/82, pulse 98, height 1.778 m (5' 10\"), weight 115.2 kg (254 lb). Body mass index is 36.45 kg/m .    Patient Active Problem List   Diagnosis     Gender dysphoria in adult     Elevated blood pressure reading without diagnosis of hypertension     Migraine headache     Obstructive sleep apnea syndrome     Generalized anxiety disorder     Moderate episode of recurrent major depressive disorder (H)       Allergies   Allergen Reactions     Allopurinol Other (See Comments)     Patient is positive for HLA-B 58:01. Increased risk of allopurinol-induced severe cutaneous reactions.      Clopidogrel Other (See Comments)     Patient is a [id number] metabolizer. Clopidogrel would be predicted to lack efficacy       Current Outpatient Medications   Medication Sig Dispense Refill     estradiol (VIVELLE-DOT) 0.1 MG/24HR bi-weekly patch Place 2 patches onto the skin twice a week 16 patch 3     ibuprofen (ADVIL/MOTRIN) 400 MG tablet Take 2 tablets by mouth       lidocaine-prilocaine (EMLA) 2.5-2.5 % external cream   2     methylfolate (DEPLIN) 15 MG TABS tablet Take 15 mg by mouth       spironolactone (ALDACTONE) 100 MG tablet Take 2 tablets (200 mg) by mouth daily 60 tablet 3     TRAZODONE HCL PO TAKE TWO TABLETS BY MOUTH DAILY AT BEDTIME        venlafaxine (EFFEXOR-XR) 75 MG 24 hr capsule Take 3 capsules by mouth        buPROPion (WELLBUTRIN XL) 150 MG 24 hr tablet   3       Social History     Tobacco Use     Smoking status: Never Smoker     Smokeless tobacco: Never Used   Substance Use Topics     Alcohol use: Yes     Comment: 2-3 drinks weekly     Drug use: No       Geovanny San, EMT  6/26/2019  1:27 PM      "

## 2019-06-26 NOTE — LETTER
6/26/2019       RE: Sunni Flores  [address] [facility] [address]     Dear Colleague,    Thank you for referring your patient, Sunni Flores, to the [facility] PLASTIC AND RECONSTRUCTIVE SURGERY at [facility]. Please see a copy of my visit note below.    REFERRING PROVIDER: Sara Velasquez    REASON FOR CONSULTATION: Gender dysphoria, requesting vaginoplasty.    HPI: Patient is a 54-year-old trans-woman who prefers she her pronouns, referred to me by Sara Velasquez for possible vaginoplasty.  Patient has been seriously considering undergoing surgery for the past 1.5 years.  By undergoing vaginoplasty, patient would like to better align her physical body with her chosen gender identity.  In fact, patient talks her penis on a daily basis and sits to urinate on a daily basis and has been doing this for the past 2 years.  She transitioned a year ago.  Her chosen name is Sunni.  She has been on hormone therapy for 11 months now.  She has not undergone any transition related surgeries.  She has already started hair removal process along her genitalia.  She denies any urinary issues.  Denies any prostate issues.  Denies any perianal issues.  Denies any sexually transmitted infections.  She is currently unable to achieve orgasm at this time.  Her last orgasm was 2 years ago.  She does not have a specific more stimulatory portion of her genitalia.  She prefers to have sex with women.    MEDS:   Prior to Admission medications    Medication Sig Start Date End Date Taking? Authorizing Provider           estradiol (VIVELLE-DOT) 0.1 MG/24HR bi-weekly patch Place 2 patches onto the skin twice a week 5/6/19  Yes Hi Rodríguez MD   ibuprofen (ADVIL/MOTRIN) 400 MG tablet Take 2 tablets by mouth 6/28/17  Yes Reported, Patient   lidocaine-prilocaine (EMLA) 2.5-2.5 % external cream  3/11/19  Yes Reported, Patient   methylfolate (DEPLIN) 15 MG TABS tablet Take 15 mg by mouth   Yes Reported,  "Patient   spironolactone (ALDACTONE) 100 MG tablet Take 2 tablets (200 mg) by mouth daily 5/6/19  Yes Hi Rodríguez MD   TRAZODONE HCL PO TAKE TWO TABLETS BY MOUTH DAILY AT BEDTIME  3/20/18  Yes Reported, Patient   venlafaxine (EFFEXOR-XR) 75 MG 24 hr capsule Take 3 capsules by mouth  2/8/17  Yes Reported, Patient       ALLERGIES:   Allergies   Allergen Reactions     Allopurinol Other (See Comments)     Patient is positive for HLA-B 58:01. Increased risk of allopurinol-induced severe cutaneous reactions.      Clopidogrel Other (See Comments)     Patient is a [id number] metabolizer. Clopidogrel would be predicted to lack efficacy       PMH: Depression and anxiety.    PSH: Hernia repair in suprapubic region at 18 months old.    SH:   Social History     Tobacco Use     Smoking status: Never Smoker     Smokeless tobacco: Never Used   Substance Use Topics     Alcohol use: Yes     Comment: 2-3 drinks weekly   Is a retired .    FH: Colon cancer.    ROS: Denies chest pain, shortness of breath, diabetes, MI, CVA, DVT, PE, and bleeding disorders.    PHYSICAL EXAMINATION: /82 (BP Location: Left arm, Patient Position: Sitting, Cuff Size: Adult Large)   Pulse 98   Ht 1.778 m (5' 10\")   Wt 115.3 kg (254 lb 1.6 oz)   SpO2 99%   BMI 36.46 kg/m     General: No acute distress.  Appears feminine.  Has an obese body habitus.  Genital examination was performed in the presence of a chaperone.  This revealed a circumcised penis with a shaft length of 4.5 cm.  Patient has a horizontal linear scar along the suprapubic region.  Her glans is viable.  Genitalia is hirsute.  Testicles are small but palpable on either side.  There is no inguinal hernia.    ASSESSMENT: Gender dysphoria, requesting vaginoplasty.    PLAN: Patient has provided us with 2 letters of support.  I recommended continuing hair removal to decrease the chance of intravaginal hair growth.  I will recommend patient see Dr. Venegas in urology in " preparation for vaginoplasty.  During this hair removal process, I am recommending patient lose weight to obtain a BMI of 30 or less prior to undergoing surgery to decrease the chance of wound healing difficulties, wound infection, asymmetry, contour deformity, vaginal prolapse, and need for revision surgery.  With these items completed, in accordance with [facility] Standards Of Care guidelines, patient is a potential candidate for penile inversion vaginoplasty with supplemental scrotal skin graft as an inpatient at the [facility] with a postoperative admission to maintain a vaginal stent for 5 to 7 days.  The operation is performed in conjunction with Dr. Meet Venegas given the need for surgical expertise from 2 separate subspecialties.  I explained the vaginoplasty procedure in detail today, which include neovaginal cavity dissection, grafting of neovaginal cavity, vaginal colpopexy, clitoroplasty, urethroplasty, perineal flap, orchiectomy, penectomy, scrotectomy, and bilateral labiaplasty.  Patient and I discussed the risks involved to include bleeding, infection, injury to surrounding structures, fluid collection, wound dehiscence with prolonged dressing changes, asymmetry, contour deformity, DVT, PE, rectal injury, rectovaginal fistula, possible need for ostomy, clitoral necrosis, sensory loss, voiding issues, urinary stream abnormalities, flap loss, vaginal prolapse, vaginal shrinkage, vaginal stenosis, need for lifelong dilation, granulation tissue, chronic pain, intravaginal hair growth, incompletely feminized external vulva, and need for revision surgery.  I also explained to the patient that given her horizontal scar in the suprapubic region, inverting her penile tissues will be the more difficult, leading to possibly increased tension at the surgical incisions contributing to wound dehiscence and wound creation.  Patient accepts these risks and wishes to work towards obtaining surgery.  I  also explained to her that with this surgical technique, we are relying on anterior blood supply to heal the surgical site, preventing me from performing anterior labiaplasty.  And therefore she may require a second stage feminizing labiaplasty to fully feminize the external vulva.  She understood this.  Patient will be instructed to discontinue hormone therapy 4 weeks prior to surgery and resume it 2 weeks postoperatively to control its risk of DVT.  Patient will perform a bowel prep 1 to 2 days prior to surgery.  I will see the patient back once the hair removal process has been finalized and she has undergone significant weight loss.    Total time spent with patient was 60 min of which greater than 50% was in counseling.    Again, thank you for allowing me to participate in the care of your patient.      Sincerely,    Tank Dawson MD

## 2019-06-26 NOTE — PROGRESS NOTES
[facility] Consult H&P    Name: Sunni Flores    MRN: [id number]   YOB: 1965                 Chief Complaint:   Gender Dysphoria          History of Present Illness:   Sunni Flores is a 54 year old transgender female seen in consultation for gender dysphoria    Patient has been living as a female for 1.5 years. Came out friends and family in Spring 2018.  Preferred pronouns are: she/hers/her  The patient has been on exogenous hormones 11 months.  In terms of an intimate relationship, the patient is in a relationship with a female. Was  for 21 years. Passed away from lymphoma complications. Met new partner Tawny (cis gender female). Has been open with partner and Tawny is supportive.  In terms of fertility, the patient has 2 girls - 19 and 22 years old.  Has started hair removal process - on face or about a year.  Had 2 sessions on genitalia.  The patient has obtained letters of support x 2    Long-term surgical goals for the patient include: vaginoplasty    The patient is here today expressing interest in vaginoplasty         Past Medical History:   Anxiety  Depression          Past Surgical History:   Hernia - abdominal @ 18 months old  Had a vasectomy         Social History:     Social History     Tobacco Use     Smoking status: Never Smoker     Smokeless tobacco: Never Used   Substance Use Topics     Alcohol use: Yes     Comment: 2-3 drinks weekly            Family History:   None relevant         Allergies:     Allergies   Allergen Reactions     Allopurinol Other (See Comments)     Patient is positive for HLA-B 58:01. Increased risk of allopurinol-induced severe cutaneous reactions.      Clopidogrel Other (See Comments)     Patient is a [id number] metabolizer. Clopidogrel would be predicted to lack efficacy            Medications:     Current Outpatient Medications   Medication Sig     estradiol (VIVELLE-DOT) 0.1 MG/24HR bi-weekly patch Place 2 patches onto the skin  "twice a week     ibuprofen (ADVIL/MOTRIN) 400 MG tablet Take 2 tablets by mouth     lidocaine-prilocaine (EMLA) 2.5-2.5 % external cream      methylfolate (DEPLIN) 15 MG TABS tablet Take 15 mg by mouth     spironolactone (ALDACTONE) 100 MG tablet Take 2 tablets (200 mg) by mouth daily     TRAZODONE HCL PO TAKE TWO TABLETS BY MOUTH DAILY AT BEDTIME      venlafaxine (EFFEXOR-XR) 75 MG 24 hr capsule Take 3 capsules by mouth      buPROPion (WELLBUTRIN XL) 150 MG 24 hr tablet      No current facility-administered medications for this visit.              Review of Systems:    ROS: 14 point ROS neg other than the symptoms noted above in the HPI.          Physical Exam:   /82   Pulse 98   Ht 1.778 m (5' 10\")   Wt 115.2 kg (254 lb)   BMI 36.45 kg/m    General: age-appropriate in NAD  HEENT: Head AT/NC, EOMI, CN Grossly intact  Resp: no respiratory distress, lung sounds clear.  CV: heart rate regular, S1, S2.  Lymph: No cervical, supraclavicular or axillary lymphadenopathy  Back: bony spine is non-tender, flanks are nontender  Abdomen: mild obese, soft, non-distended, non-tender. No organomegaly  : testicles normal without atrophy or masses and penis normal without urethral discharge  LE: no edema.   Neuro: grossly intact  Motor: excellent strength throughout  Skin: clear of rashes or ecchymoses.         Outside records:    I spent 10 minutes reviewing outside records.         Assessment and Plan:   54 year old transgender female with gender dysphoria    The criteria for genital surgery are specific to the type of surgery being requested.  Criteria for orchiectomy in MtF patients (WPATH version 7)    1. Persistent, well documented gender dysphoria;  2. Capacity to make a fully informed decision and to consent for treatment;  3. Age of consent (>18 years old)  4. If significant medical or mental health concerns are present, they must be well controlled.  5. 12 continuous months of hormone therapy as appropriate to " the patient s gender goals (unless  the patient has a medical contraindication or is otherwise unable or unwilling to take  Hormones).  6. Two letters of support    The aim of hormone therapy prior to gonadectomy is primarily to introduce a period of reversible  estrogen or testosterone suppression, before the patient undergoes irreversible surgical intervention.    I reviewed the steps of orchiectomy. I reviewed the surgical procedure. I reviewed the risks and benefits including bleeding, infection and irreversible nature of the procedure.     She has a persistent, well documented gender dysphoria. She has capacity to make a fully informed decision and to consent for treatment. Her mental health issues are well controlled. She has been on continuous hormones for almost a year. We received letters of support.     The patient will meet all of these criteria in a month. We discussed that gender affirmation surgery should be considered permanent. We discussed risks/complications of rectal injury, rectovaginal fistula, bleeding, fluid collection, infection, injury to surrounding structures, flap loss, sensory loss, wound dehiscence, vaginal prolapse, vaginal shrinkage/stenosis, need for lifelong dilation, urinary stream abnormalities, DVT/PE and need for revision surgery.     We discussed the option for zero depth. She is considering zero depth vaginoplasty     We also discussed the need to stop hormones debo-procedurally, which Dr. Dawson will finalize.    Will continue hair removal.  Needs some weight loss with goal BMI ~30  Will continue to consider minimal depth vs. full depth vaginoplasty.  Dr. Dawson will see back once more weight loss has been done.    Meet Venegas MD   Reconstructive Urology  [facility]

## 2019-07-01 ENCOUNTER — OFFICE VISIT (OUTPATIENT)
Dept: OTHER | Facility: OUTPATIENT CENTER | Age: 54
End: 2019-07-01
Payer: COMMERCIAL

## 2019-07-01 DIAGNOSIS — F64.0 GENDER DYSPHORIA IN ADULT: Primary | ICD-10-CM

## 2019-07-01 DIAGNOSIS — F43.22 ADJUSTMENT DISORDER WITH ANXIOUS MOOD: ICD-10-CM

## 2019-07-02 ENCOUNTER — PATIENT OUTREACH (OUTPATIENT)
Dept: PLASTIC SURGERY | Facility: CLINIC | Age: 54
End: 2019-07-02

## 2019-07-02 DIAGNOSIS — F64.0 GENDER DYSPHORIA IN ADULT: Primary | ICD-10-CM

## 2019-07-02 NOTE — PROGRESS NOTES
Called pt to discuss MyChart message. Pt clarified with therapists help that she would like to move forward with vaginoplasty. Pt is working to loose weight and hair removal and will call back to schedule F/U appt when closer to goals.     Pt scheduled for breast augmentation consultation.     Devin BRICENO

## 2019-07-18 ENCOUNTER — OFFICE VISIT (OUTPATIENT)
Dept: OTHER | Facility: OUTPATIENT CENTER | Age: 54
End: 2019-07-18
Payer: COMMERCIAL

## 2019-07-18 DIAGNOSIS — F43.22 ADJUSTMENT DISORDER WITH ANXIOUS MOOD: ICD-10-CM

## 2019-07-18 DIAGNOSIS — F64.0 GENDER DYSPHORIA IN ADULT: Primary | ICD-10-CM

## 2019-07-22 NOTE — PROGRESS NOTES
"[facility]  [facility]  [address], [address]  [place] [address]  Outpatient Progress Note      Session Date: 19  Client Name: Fred Flores (\"Sunni;\" she/her pronouns)  YOB: 1965  MRN: [id number]  Provider: Aicha Velasquez, PhD,   Type of Session: Individual   Present in Session: Client only  Number of Minutes: 55   Treatment Plan Due: 2019    Health Maintenance Summary - Mental Health Treatment Plan       Status Date      MENTAL HEALTH TX PLAN Next Due 3/22/2020      Done 2019      Done 10/5/2018 See Scan     Done 2017           Current Symptoms/Status:   Sunni reports lifelong discomfort with her sex assigned at birth, and identifies as a woman. At intake, Sunni also noted concerns about sexual functioning (erection/orgasm) though these have subsided as gender has been further explored. She indicated that, since her wife  in , she increasingly desired to further explore gender concerns and has been supported in this by her current partner, Tawny. Sunni has taken steps to come out as transgender to her daughters and peer group, and has been met with support. She reports a desire to further explore options for social and medical transition.     Progress Toward Treatment Goals:   Continues social and medical transition. Exploring options/plans for vaginoplasty in the future and has connected with the [facility] Trans Care Coordinator. Working on integration of self, negative self-talk and self-compassion      Intervention & Response:   Interpersonal, client-centered, and CBT techniques utilized to address client's current status.     Sunni reported she has had anxiety since meeting with Dr. Dawson, that she has to lose weight and that she has had feelings about the surgery and her identity. Explored her feelings about pursuing surgery and seeing it as a necessary part of her transition and needed to affirm her gender identity. Explored her feelings " about needing to lose weight, and plans to how she would accomplish this. Stated she feels it is far out to plan for the surgery and she feels impatient.     Assignment:   Prepare for surgery  Explore identity feelings  Complete self compassion website quiz      Diagnosis:        302.5 (F64.0) - Gender dysphoria in adults      Interactive Complexity:  None.       Plan / Need for Future Services:    Return for individual therapy in 2-3 weeks.           Aicha Velasquez, PhD, LP

## 2019-07-31 NOTE — PROGRESS NOTES
"[facility]  [facility]  [address], [address]  [place] [address]  Outpatient Progress Note      Session Date: 19  Client Name: Fred Flores (\"Sunni;\" she/her pronouns)  YOB: 1965  MRN: [id number]  Provider: Aicha Velasquez, PhD, LP  Type of Session: Individual   Present in Session: Client only  Number of Minutes: 55   Treatment Plan Due: 2019    Health Maintenance Summary - Mental Health Treatment Plan       Status Date      MENTAL HEALTH TX PLAN Next Due 3/22/2020      Done 2019      Done 10/5/2018 See Scan     Done 2017           Current Symptoms/Status:   Sunni reports lifelong discomfort with her sex assigned at birth, and identifies as a woman. At intake, Sunni also noted concerns about sexual functioning (erection/orgasm) though these have subsided as gender has been further explored. She indicated that, since her wife  in , she increasingly desired to further explore gender concerns and has been supported in this by her current partner, Tawny. Sunni has taken steps to come out as transgender to her daughters and peer group, and has been met with support. She reports a desire to further explore options for social and medical transition.     Progress Toward Treatment Goals:   Continues social and medical transition. Exploring options/plans for vaginoplasty in the future and has connected with the [facility] Trans Care Coordinator. Working on integration of self, negative self-talk and self-compassion      Intervention & Response:   Interpersonal, client-centered, and CBT techniques utilized to address client's current status.     Sunni reported she continues to feel anxious about if she will be lenin to have surgery soon or not. STated she is trying to lose weight and starting her hair removal process. Stated she has questions about her sexual identity and we explored her identity as a lesbian. Stated she has always been attracted to women and " has not been attracted to men. Explored the connections for her of gender affirmation and sexual identity and behaviors.     Assignment:   Prepare for surgery  Explore identity feelings  Complete self compassion website quiz      Diagnosis:        302.5 (F64.0) - Gender dysphoria in adults      Interactive Complexity:  None.       Plan / Need for Future Services:    Return for individual therapy in 2-3 weeks.           Aicha Velasquez, PhD, LP

## 2019-08-05 ENCOUNTER — OFFICE VISIT (OUTPATIENT)
Dept: OTHER | Facility: OUTPATIENT CENTER | Age: 54
End: 2019-08-05
Payer: COMMERCIAL

## 2019-08-05 DIAGNOSIS — F64.0 GENDER DYSPHORIA IN ADULT: ICD-10-CM

## 2019-08-05 DIAGNOSIS — Z01.89 LABORATORY TEST: Primary | ICD-10-CM

## 2019-08-05 DIAGNOSIS — F43.22 ADJUSTMENT DISORDER WITH ANXIOUS MOOD: Primary | ICD-10-CM

## 2019-08-05 LAB
ANION GAP SERPL CALCULATED.3IONS-SCNC: 8 MMOL/L (ref 3–14)
BUN SERPL-MCNC: 22 MG/DL (ref 7–30)
CALCIUM SERPL-MCNC: 9.1 MG/DL (ref 8.5–10.1)
CHLORIDE SERPL-SCNC: 107 MMOL/L (ref 94–109)
CO2 SERPL-SCNC: 23 MMOL/L (ref 20–32)
CREAT SERPL-MCNC: 1.46 MG/DL (ref 0.66–1.25)
GFR SERPL CREATININE-BSD FRML MDRD: 54 ML/MIN/{1.73_M2}
GLUCOSE SERPL-MCNC: 85 MG/DL (ref 70–99)
POTASSIUM SERPL-SCNC: 4.7 MMOL/L (ref 3.4–5.3)
SODIUM SERPL-SCNC: 138 MMOL/L (ref 133–144)

## 2019-08-05 PROCEDURE — 80048 BASIC METABOLIC PNL TOTAL CA: CPT | Performed by: FAMILY MEDICINE

## 2019-08-05 NOTE — PROGRESS NOTES
Performed venipuncture for Dr. Rodríguez's patient.  Specimen get send to [facility].    Melonie Hernández CMA  August 5, 2019 9:02 AM

## 2019-08-05 NOTE — PROGRESS NOTES
"[facility]  [facility]  [address], [address]  [place] [address]  Outpatient Progress Note      Session Date: 19  Client Name: Fred Flores (\"Sunni;\" she/her pronouns)  YOB: 1965  MRN: [id number]  Provider: Aicha Velasquez, PhD, LP  Type of Session: Individual   Present in Session: Client only  Number of Minutes: 55   Treatment Plan Due: 2019    Health Maintenance Summary - Mental Health Treatment Plan       Status Date      MENTAL HEALTH TX PLAN Next Due 3/22/2020      Done 2019      Done 10/5/2018 See Scan     Done 2017           Current Symptoms/Status:   Sunni reports lifelong discomfort with her sex assigned at birth, and identifies as a woman. At intake, Sunni also noted concerns about sexual functioning (erection/orgasm) though these have subsided as gender has been further explored. She indicated that, since her wife  in , she increasingly desired to further explore gender concerns and has been supported in this by her current partner, Tawny. Sunni has taken steps to come out as transgender to her daughters and peer group, and has been met with support. She reports a desire to further explore options for social and medical transition.     Progress Toward Treatment Goals:   Continues social and medical transition. Exploring options/plans for vaginoplasty in the future and has connected with the [facility] Trans Care Coordinator. Working on integration of self, negative self-talk and self-compassion      Intervention & Response:   Interpersonal, client-centered, and CBT techniques utilized to address client's current status.     Sunni reported she is having family issues. Discussed her family, and that her father and her mother met because of an affair, and she and her sister have different fathers. Stated her father was emotionally distant. Stated her sister has always struggled and has abused drugs, and joined a [employer] and got " sober. Stated her son continues to struggle as well. Stated her sister continues to struggle and has not had consistent employment. Stated there is a history of financial manipulation. Discussed her codependent tendencies and learning how to set loving boundaries in order to separate herself for the dysfunction. Discussed how her anxiety is soy  Bubbling cauldron of anxiety in her gut, explored what she does with the anxiety and how she approaches it.     Assignment:   Prepare for surgery  Explore identity feelings  Complete self compassion website quiz      Diagnosis:        302.5 (F64.0) - Gender dysphoria in adults      Interactive Complexity:  None.       Plan / Need for Future Services:    Return for individual therapy in 2-3 weeks.           Aicha Velasquez, PhD, LP

## 2019-08-08 NOTE — RESULT ENCOUNTER NOTE
Dear Sunni,   Here are your recent results which are within the expected range. Please continue with your current plan of care.       Hi Rodríguez MD

## 2019-08-19 ENCOUNTER — OFFICE VISIT (OUTPATIENT)
Dept: OTHER | Facility: OUTPATIENT CENTER | Age: 54
End: 2019-08-19
Payer: COMMERCIAL

## 2019-08-19 VITALS
HEART RATE: 92 BPM | SYSTOLIC BLOOD PRESSURE: 124 MMHG | BODY MASS INDEX: 35.24 KG/M2 | WEIGHT: 246.2 LBS | DIASTOLIC BLOOD PRESSURE: 82 MMHG | HEIGHT: 70 IN

## 2019-08-19 DIAGNOSIS — F64.0 GENDER DYSPHORIA IN ADULT: ICD-10-CM

## 2019-08-19 DIAGNOSIS — F43.22 ADJUSTMENT DISORDER WITH ANXIOUS MOOD: Primary | ICD-10-CM

## 2019-08-19 DIAGNOSIS — F64.0 GENDER DYSPHORIA IN ADOLESCENT AND ADULT: ICD-10-CM

## 2019-08-19 RX ORDER — SPIRONOLACTONE 100 MG/1
200 TABLET, FILM COATED ORAL DAILY
Qty: 60 TABLET | Refills: 5 | Status: SHIPPED | OUTPATIENT
Start: 2019-08-19 | End: 2019-12-16

## 2019-08-19 RX ORDER — ESTRADIOL 0.1 MG/D
2 FILM, EXTENDED RELEASE TRANSDERMAL
Qty: 16 PATCH | Refills: 5 | Status: SHIPPED | OUTPATIENT
Start: 2019-08-19 | End: 2019-12-16

## 2019-08-19 ASSESSMENT — ENCOUNTER SYMPTOMS
DYSPHORIC MOOD: 0
WEAKNESS: 0
LIGHT-HEADEDNESS: 1
SHORTNESS OF BREATH: 0
POLYDIPSIA: 0
NUMBNESS: 0
VOMITING: 0
ABDOMINAL PAIN: 0
FREQUENCY: 0
CHEST TIGHTNESS: 0
UNEXPECTED WEIGHT CHANGE: 0
HEADACHES: 0
CHILLS: 0
NERVOUS/ANXIOUS: 0
FEVER: 0
PALPITATIONS: 0

## 2019-08-19 ASSESSMENT — MIFFLIN-ST. JEOR: SCORE: 1968.01

## 2019-08-19 NOTE — PROGRESS NOTES
KRISHNA Moeller is a 54 year old individual that uses pronouns She/Her/Hers/Herself that presents today for follow up of:  feminizing hormone therapy.   Alone or accompanied by: accompanied today by self  Gender identity: trans woman  Started Hormone  therapy  7/2018  Continues on .Vivelle dot 0.1 x 2 mg patch 2x/wk                          Spironlactone 200* mg daily    Any special concerns today?    1. Bowel changes: BM every 2-3 days (former daily), more dense stools, harder to pass.   2. Losing weight intentionally for bottom surgery--stopped soda, paying attention to calories    On hormones?  YES +++ Shot day of the week? Not applicable-taking pills/patch/gel      Due for labs?  Yes      +++ Refills of meds needed?  Yes  Gender related body changes since last visit:   Continuing same types of changes, breast growth appears to have reached plateau    Breakthrough bleeding? Does Not Apply  Activity: gym 3x/wk, treadmill/bike 60 min, weights  New health concerns since last visit:  none  ---    No past surgical history on file.    Patient Active Problem List   Diagnosis     Gender dysphoria in adult     Elevated blood pressure reading without diagnosis of hypertension     Migraine headache     Obstructive sleep apnea syndrome     Generalized anxiety disorder     Moderate episode of recurrent major depressive disorder (H)       Current Outpatient Medications   Medication Sig Dispense Refill     estradiol (VIVELLE-DOT) 0.1 MG/24HR bi-weekly patch Place 2 patches onto the skin twice a week 16 patch 3     ibuprofen (ADVIL/MOTRIN) 400 MG tablet Take 2 tablets by mouth       lidocaine-prilocaine (EMLA) 2.5-2.5 % external cream   2     methylfolate (DEPLIN) 15 MG TABS tablet Take 15 mg by mouth       spironolactone (ALDACTONE) 100 MG tablet Take 2 tablets (200 mg) by mouth daily 60 tablet 3     TRAZODONE HCL PO TAKE TWO TABLETS BY MOUTH DAILY AT BEDTIME        venlafaxine (EFFEXOR-XR) 75 MG 24 hr capsule Take 3  "capsules by mouth          History   Smoking Status     Never Smoker   Smokeless Tobacco     Never Used          Allergies   Allergen Reactions     Allopurinol Other (See Comments)     Patient is positive for HLA-B 58:01. Increased risk of allopurinol-induced severe cutaneous reactions.      Clopidogrel Other (See Comments)     Patient is a [id number] metabolizer. Clopidogrel would be predicted to lack efficacy       There are no preventive care reminders to display for this patient.      Problem, Medication and Allergy Lists were reviewed and are current..         Review of Systems:   Review of Systems   Constitutional: Negative for chills, fever and unexpected weight change.   Eyes: Negative for visual disturbance.   Respiratory: Negative for chest tightness and shortness of breath.    Cardiovascular: Negative for chest pain, palpitations and leg swelling.   Gastrointestinal: Negative for abdominal pain and vomiting.   Endocrine: Negative for polydipsia and polyuria.   Genitourinary: Negative for frequency.   Neurological: Positive for light-headedness. Negative for weakness, numbness and headaches.   Psychiatric/Behavioral: Negative for dysphoric mood and suicidal ideas. The patient is not nervous/anxious.             Physical Exam:     Vitals:    08/19/19 1417   BP: 124/82   Pulse: 92   Weight: 111.7 kg (246 lb 3.2 oz)   Height: 1.778 m (5' 10\")     BMI= Body mass index is 35.33 kg/m .   Wt Readings from Last 10 Encounters:   08/19/19 111.7 kg (246 lb 3.2 oz)   06/26/19 115.2 kg (254 lb)   06/26/19 115.3 kg (254 lb 1.6 oz)   06/18/19 118 kg (260 lb 3.2 oz)   05/06/19 118.8 kg (262 lb)   02/21/19 116.6 kg (257 lb)   11/15/18 115.8 kg (255 lb 6.4 oz)   08/07/18 112.5 kg (248 lb)   07/17/18 113.3 kg (249 lb 12.8 oz)   05/29/18 115.2 kg (254 lb)     Appearance: Female appearance and dress    GENERAL:: healthy, alert and no distress  RESP: lungs clear to auscultation - no rales, no rhonchi, no wheezes  CV: regular rates " and rhythm, normal S1 S2, no S3 or S4 and no murmur, no click or rub -    Affect: Appropriate/mood-congruent           Labs:   Results from last visit:  Orders Only on 08/05/2019   Component Date Value Ref Range Status     Sodium 08/05/2019 138  133 - 144 mmol/L Final     Potassium 08/05/2019 4.7  3.4 - 5.3 mmol/L Final     Chloride 08/05/2019 107  94 - 109 mmol/L Final     Carbon Dioxide 08/05/2019 23  20 - 32 mmol/L Final     Anion Gap 08/05/2019 8  3 - 14 mmol/L Final     Glucose 08/05/2019 85  70 - 99 mg/dL Final     Urea Nitrogen 08/05/2019 22  7 - 30 mg/dL Final     Creatinine 08/05/2019 1.46* 0.66 - 1.25 mg/dL Final     GFR Estimate 08/05/2019 54* >60 mL/min/[1.73_m2] Final    Comment: Non  GFR Calc  Starting 12/18/2018, serum creatinine based estimated GFR (eGFR) will be   calculated using the Chronic Kidney Disease Epidemiology Collaboration   (CKD-EPI) equation.       GFR Estimate If Black 08/05/2019 62  >60 mL/min/[1.73_m2] Final    Comment:  GFR Calc  Starting 12/18/2018, serum creatinine based estimated GFR (eGFR) will be   calculated using the Chronic Kidney Disease Epidemiology Collaboration   (CKD-EPI) equation.       Calcium 08/05/2019 9.1  8.5 - 10.1 mg/dL Final       Assessment and Plan   1. Gender dysphoria  2. Constipation  Clinically appropriate response to current hormone dose  GFR is mildly decreased, although may be due to change in calculation based on age or gender rather than actual change in renal function  Discussed adding more fluid and fiber to diet, use of fiber supplements to treat constipation  Continue healthy weight loss    Follow up:  Follow up in 4-6 months  [facility] by [facility]  Questions were elicited and answered.     Hi Rodríguez MD

## 2019-08-19 NOTE — PROGRESS NOTES
[facility]  [facility]  [address], [address]  [place] [address]  Outpatient Progress Note      Session Date: 19  Client Name: Sunni Flores (she/her pronouns)  YOB: 1965  MRN: [id number]  Provider: Aicha Velasquez, PhD, LP  Type of Session: Individual   Present in Session: Client only  Number of Minutes: 55   Treatment Plan Due: 2019    Health Maintenance Summary - Mental Health Treatment Plan       Status Date      MENTAL HEALTH TX PLAN Next Due 3/22/2020      Done 2019      Done 10/5/2018 See Scan     Done 2017           Current Symptoms/Status:   Sunni reports lifelong discomfort with her sex assigned at birth, and identifies as a woman. At intake, Sunin also noted concerns about sexual functioning (erection/orgasm) though these have subsided as gender has been further explored. She indicated that, since her wife  in , she increasingly desired to further explore gender concerns and has been supported in this by her current partner, Tawny. Sunni has taken steps to come out as transgender to her daughters and peer group, and has been met with support. She reports a desire to further explore options for social and medical transition.     Progress Toward Treatment Goals:   Continues social and medical transition. Exploring options/plans for vaginoplasty in the future and has connected with the [facility] Trans Care Coordinator. Working on integration of self, negative self-talk and self-compassion      Intervention & Response:   Interpersonal, client-centered, and CBT techniques utilized to address client's current status.     Sunni reported she has been afraid to allow herself to be happy, that then she would feel disappointed if things were to go wrong, and she has to have her guard up to defend against disappointment. Stated she had a revelation about [employer] and her feelings about her gender. Reflected on how she had identified as a  lesa in 1993, and [facility] had asked her if she was winn or wanted to transition. Stated she has felt peace about imagining [employer] accepting her. Stated she ahd gone to see her  at her old [employer] and it was a positive experience, that the  had been accepting and supportive. Stated she had written a letter to her mother and her sister explaining her feelings about their relationship and she feels closure about expressing herself.     Assignment:   Prepare for surgery  Explore identity feelings  Resolve historical relationships, integration ofs elf      Diagnosis:        302.5 (F64.0) - Gender dysphoria in adults      Interactive Complexity:  None.       Plan / Need for Future Services:    Return for individual therapy in 2-3 weeks.           Aicha Velasquez, PhD, LP

## 2019-08-19 NOTE — NURSING NOTE
"Chief Complaint   Patient presents with     RECHECK     TG       Vitals:    08/19/19 1417   BP: 124/82   Pulse: 92   Weight: 111.7 kg (246 lb 3.2 oz)   Height: 1.778 m (5' 10\")       Body mass index is 35.33 kg/m .      Mira Turner CMA    "

## 2019-09-04 ENCOUNTER — OFFICE VISIT (OUTPATIENT)
Dept: OTHER | Facility: OUTPATIENT CENTER | Age: 54
End: 2019-09-04
Payer: COMMERCIAL

## 2019-09-04 DIAGNOSIS — F43.22 ADJUSTMENT DISORDER WITH ANXIOUS MOOD: ICD-10-CM

## 2019-09-04 DIAGNOSIS — F64.0 GENDER DYSPHORIA IN ADOLESCENT AND ADULT: Primary | ICD-10-CM

## 2019-09-04 NOTE — PROGRESS NOTES
[facility]  [facility]  [address], [address]  [place] [address]  Outpatient Progress Note      Session Date: 19  Client Name: Sunni Flores (she/her pronouns)  YOB: 1965  MRN: [id number]  Provider: Aicha Velasquez, PhD, LP  Type of Session: Individual   Present in Session: Client only  Number of Minutes: 55   Treatment Plan Due: 2019    Health Maintenance Summary - Mental Health Treatment Plan       Status Date      MENTAL HEALTH TX PLAN Next Due 3/22/2020      Done 2019      Done 10/5/2018 See Scan     Done 2017           Current Symptoms/Status:   Sunni reports lifelong discomfort with her sex assigned at birth, and identifies as a woman. At intake, Sunni also noted concerns about sexual functioning (erection/orgasm) though these have subsided as gender has been further explored. She indicated that, since her wife  in , she increasingly desired to further explore gender concerns and has been supported in this by her current partner, Tawny. Sunni has taken steps to come out as transgender to her daughters and peer group, and has been met with support. She reports a desire to further explore options for social and medical transition.     Progress Toward Treatment Goals:   Continues social and medical transition. Exploring options/plans for vaginoplasty in the future and has connected with the [facility] Trans Care Coordinator. Working on integration of self, negative self-talk and self-compassion      Intervention & Response:   Interpersonal, client-centered, and CBT techniques utilized to address client's current status.     Sunni reported her anniversary with her wife who passed away, it would have been their 25 year anniversary. Stated she feels grief and sadness and misses her wife. Stated she has been wondering about her feminne expressions and how she repressed herself as a younger person. Stated she feels ashamed about her previous  homophobia.     Stated she was accepted to [employer], a womens [employer]. Stated she had her first practice and it went very well.       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       Plan / Need for Future Services:    Return for individual therapy in 2-3 weeks.           Aicha Velasquez, PhD, LP

## 2019-10-02 ENCOUNTER — OFFICE VISIT (OUTPATIENT)
Dept: OTHER | Facility: OUTPATIENT CENTER | Age: 54
End: 2019-10-02
Payer: COMMERCIAL

## 2019-10-02 DIAGNOSIS — F43.22 ADJUSTMENT DISORDER WITH ANXIOUS MOOD: Primary | ICD-10-CM

## 2019-10-02 DIAGNOSIS — F64.0 GENDER DYSPHORIA IN ADOLESCENT AND ADULT: ICD-10-CM

## 2019-10-02 NOTE — PROGRESS NOTES
Program in Human Sexuality  [facility]  [address], [address]  [place] [address]  Outpatient Progress Note      Session Date: 10/02/19  Client Name: Sunni Flores (she/her pronouns)  YOB: 1965  MRN: [id number]  Provider: Aicha Velasquez, PhD, LP  Type of Session: Individual   Present in Session: Client only  Number of Minutes: 55   Treatment Plan Due: 2019    Health Maintenance Summary - Mental Health Treatment Plan       Status Date      MENTAL HEALTH TX PLAN Next Due 3/22/2020      Done 2019      Done 10/5/2018 See Scan     Done 2017           Current Symptoms/Status:   Sunni reports lifelong discomfort with her sex assigned at birth, and identifies as a woman. At intake, Sunni also noted concerns about sexual functioning (erection/orgasm) though these have subsided as gender has been further explored. She indicated that, since her wife  in , she increasingly desired to further explore gender concerns and has been supported in this by her current partner, Tawny. Sunni has taken steps to come out as transgender to her daughters and peer group, and has been met with support. She reports a desire to further explore options for social and medical transition.     Progress Toward Treatment Goals:   Continues social and medical transition. Exploring options/plans for vaginoplasty in the future and has connected with the [facility] Trans Care Coordinator. Working on integration of self, negative self-talk and self-compassion      Intervention & Response:   Interpersonal, client-centered, and CBT techniques utilized to address client's current status.     Sunni reported she is about to go on a trip to [place]. Stated she has been working though grief and is [place] to [place]. Discussed her feelings of grief and unresolved feelings about her wife's sudden death.     Stated she has lost 30 pounds and gained muscle mass. Stated she is worried about her dieting  and verging on restrictive disordered eating. Stated she is angry about the BMI requirement and her dysphoria has increased. Stated her appointment with Dr. Dawson is in December.     Stated she was accepted to [employer], a womens [employer].       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       Plan / Need for Future Services:    Return for individual therapy in 2-3 weeks.           Aicha Velasquez, PhD, LP

## 2019-10-22 ENCOUNTER — OFFICE VISIT (OUTPATIENT)
Dept: OTHER | Facility: OUTPATIENT CENTER | Age: 54
End: 2019-10-22
Payer: COMMERCIAL

## 2019-10-22 DIAGNOSIS — F43.22 ADJUSTMENT DISORDER WITH ANXIOUS MOOD: Primary | ICD-10-CM

## 2019-10-22 DIAGNOSIS — F64.0 GENDER DYSPHORIA IN ADOLESCENT AND ADULT: ICD-10-CM

## 2019-10-22 NOTE — PROGRESS NOTES
Program in Human Sexuality  [facility]  [address], [address]  [place] [address]  Outpatient Progress Note      Session Date: 10/22/19  Client Name: Sunni lFores (she/her pronouns)  YOB: 1965  MRN: [id number]  Provider: Aicha Velasquez, PhD, LP  Type of Session: Individual   Present in Session: Client only  Number of Minutes: 50  Treatment Plan Due: 2019    Health Maintenance Summary - Mental Health Treatment Plan       Status Date      MENTAL HEALTH TX PLAN Next Due 3/22/2020      Done 2019      Done 10/5/2018 See Scan     Done 2017           Current Symptoms/Status:   Sunni reports lifelong discomfort with her sex assigned at birth, and identifies as a woman. At intake, Sunni also noted concerns about sexual functioning (erection/orgasm) though these have subsided as gender has been further explored. She indicated that, since her wife  in , she increasingly desired to further explore gender concerns and has been supported in this by her current partner, Tawny. Sunni has taken steps to come out as transgender to her daughters and peer group, and has been met with support. She reports a desire to further explore options for social and medical transition.     Progress Toward Treatment Goals:   Continues social and medical transition. Exploring options/plans for vaginoplasty in the future and has connected with the [facility] Trans Care Coordinator. Working on integration of self, negative self-talk and self-compassion      Intervention & Response:   Interpersonal, client-centered, and CBT techniques utilized to address client's current status.     Sunni reported she had gone on a trip to spread her wifes ashes in [place]. Stated it had been a good trip and felt completed for her. STated she had felt connections to old friends and it ad been a validating trip. Stated she had felt good after she got back. Explored her feelings about sexual identity and beina lesbian  now. 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 STated she is working on connecting more with others and making friends as Sunni.     Stated her appointment with Dr. Dawson is in December.       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       Plan / Need for Future Services:    Return for individual therapy in 2-3 weeks.           Aicha Velasquez, PhD, LP

## 2019-11-04 ENCOUNTER — OFFICE VISIT (OUTPATIENT)
Dept: OTHER | Facility: OUTPATIENT CENTER | Age: 54
End: 2019-11-04
Payer: COMMERCIAL

## 2019-11-04 DIAGNOSIS — F43.22 ADJUSTMENT DISORDER WITH ANXIOUS MOOD: Primary | ICD-10-CM

## 2019-11-04 DIAGNOSIS — F64.0 GENDER DYSPHORIA IN ADULT: ICD-10-CM

## 2019-11-04 NOTE — PROGRESS NOTES
Program in Human Sexuality  [facility]  [address], [address]  [place] [address]  Outpatient Progress Note      Session Date: 19  Client Name: Sunni Flores (she/her pronouns)  YOB: 1965  MRN: [id number]  Provider: Brooklynn Chacko, PhD, [facility]  Type of Session: Individual   Present in Session: Client only  Number of Minutes: 55  Treatment Plan Due: 2019    Health Maintenance Summary - Mental Health Treatment Plan       Status Date      MENTAL HEALTH TX PLAN Next Due 3/22/2020      Done 2019      Done 10/5/2018 See Scan     Done 2017           Current Symptoms/Status:   Sunni reports lifelong discomfort with her sex assigned at birth, and identifies as a woman. At intake, Sunni also noted concerns about sexual functioning (erection/orgasm) though these have subsided as gender has been further explored. She indicated that, since her wife  in , she increasingly desired to further explore gender concerns and has been supported in this by her current partner, Tawny. Sunni has taken steps to come out as transgender to her daughters and peer group, and has been met with support. She reports a desire to further explore options for social and medical transition.     Progress Toward Treatment Goals:   Continues social and medical transition. Exploring options/plans for vaginoplasty in the future and has connected with the [facility] Trans Care Coordinator. Working on integration of self, negative self-talk and self-compassion. Some increased anxiety in past weeks.      Intervention & Response:   Interpersonal, client-centered, and CBT techniques utilized to address client's current status.     Sunni met with this writer as Dr. Velasquez had to cancel due to jury duty. She discussed her increased anxiety about upcoming December meeting with Dr. Dawson, in particular concerned that she will be told she has not lost enough weight. Discussed how she typically manages this type  of anxiety, in particular talking through and hearing another perspective. Some light challenges were made to her automatic thoughts about anxiety.    Processed thoughts and concerns about cancelled appointments. She self-validated her own frustration, but also acknowledged the realities of Dr. Velasquez's workload and challenges - such as jury duty. She shared her frustration that the clinic did not appear to have a method of talking to someone else unless it was an emergency (such as intense SI). She was complimented on her ability to advocate for self and ask to see someone. She discussed both the frustration of this, while also making certain she was not seen as a problem patient. She was assured that she was not. She also discussed the positive work she has done with Dr. Velasquez, noting that she does not want to switch providers, but look for ways to have two appointments scheduled with Dr. Velasquez and possibly have a back-up. She noted that she had a very difficult weekend following having trouble scheduling January and today's appointment being cancelled, noting that she is working through issues related to how she relates to therapists. She stated she had fleeting thoughts of self-harm but also stated she would not harm herself due to being the only parent in her daughters' lives.      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       Plan / Need for Future Services:    Return for individual therapy in 2-3 weeks.           Brooklynn Chacko, PhD, LMFT

## 2019-11-18 ENCOUNTER — OFFICE VISIT (OUTPATIENT)
Dept: OTHER | Facility: OUTPATIENT CENTER | Age: 54
End: 2019-11-18
Payer: COMMERCIAL

## 2019-11-18 DIAGNOSIS — F64.0 GENDER DYSPHORIA IN ADULT: ICD-10-CM

## 2019-11-18 DIAGNOSIS — F43.22 ADJUSTMENT DISORDER WITH ANXIOUS MOOD: Primary | ICD-10-CM

## 2019-11-18 NOTE — PROGRESS NOTES
Program in Human Sexuality  [facility]  [address], [address]  [place] [address]  Outpatient Progress Note      Session Date: 19  Client Name: Sunni Flores (she/her pronouns)  YOB: 1965  MRN: [id number]  Provider: Aicha Velasquez, PhD, LP  Type of Session: Individual   Present in Session: Client only  Number of Minutes: 55  Treatment Plan Due: 2019    Health Maintenance Summary - Mental Health Treatment Plan       Status Date      MENTAL HEALTH TX PLAN Next Due 3/22/2020      Done 2019      Done 10/5/2018 See Scan     Done 2017           Current Symptoms/Status:   Sunni reports lifelong discomfort with her sex assigned at birth, and identifies as a woman. At intake, Sunni also noted concerns about sexual functioning (erection/orgasm) though these have subsided as gender has been further explored. She indicated that, since her wife  in , she increasingly desired to further explore gender concerns and has been supported in this by her current partner, Tawny. Sunni has taken steps to come out as transgender to her daughters and peer group, and has been met with support. She reports a desire to further explore options for social and medical transition.     Progress Toward Treatment Goals:   Continues social and medical transition. Exploring options/plans for vaginoplasty in the future and has connected with the [facility] Trans Care Coordinator. Working on integration of self, negative self-talk and self-compassion      Intervention & Response:   Interpersonal, client-centered, and CBT techniques utilized to address client's current status.     Sunni stated she had a hard time when I had to cancel the last appointment, and that she had felt scarcity about being able to schedule appointments. Explored her feelings about her attachment to me and to the clinic. Stated having pre book appointment will help, and she had a good session with Dr. Chacko.     Discussed her  relationship with her daughter who is graduating from college. Stated she ahd gone to [employer] with her daughter and had a spiritual experience, and that the  had been explicit in supporting her transition and it had felt very healing.     Stated her appointment with Dr. Dawson is in December.       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       Plan / Need for Future Services:    Return for individual therapy in 2-3 weeks.           Aicha Velasquez, PhD, LP

## 2019-12-02 ENCOUNTER — OFFICE VISIT (OUTPATIENT)
Dept: OTHER | Facility: OUTPATIENT CENTER | Age: 54
End: 2019-12-02
Payer: COMMERCIAL

## 2019-12-02 DIAGNOSIS — F43.22 ADJUSTMENT DISORDER WITH ANXIOUS MOOD: Primary | ICD-10-CM

## 2019-12-02 DIAGNOSIS — F64.0 GENDER DYSPHORIA IN ADULT: ICD-10-CM

## 2019-12-02 NOTE — PROGRESS NOTES
Program in Human Sexuality  [facility]  [address], [address]  [place] [address]  Outpatient Progress Note      Session Date: 19  Client Name: Sunni Flores (she/her pronouns)  YOB: 1965  MRN: [id number]  Provider: Aicha Velasquez, PhD, LP  Type of Session: Individual   Present in Session: Client only  Number of Minutes: 55  Treatment Plan Due: 2019    Health Maintenance Summary - Mental Health Treatment Plan       Status Date      MENTAL HEALTH TX PLAN Next Due 3/22/2020      Done 2019      Done 10/5/2018 See Scan     Done 2017           Current Symptoms/Status:   Sunni reports lifelong discomfort with her sex assigned at birth, and identifies as a woman. At intake, Sunni also noted concerns about sexual functioning (erection/orgasm) though these have subsided as gender has been further explored. She indicated that, since her wife  in , she increasingly desired to further explore gender concerns and has been supported in this by her current partner, Tawny. Sunni has taken steps to come out as transgender to her daughters and peer group, and has been met with support. She reports a desire to further explore options for social and medical transition.     Progress Toward Treatment Goals:   Continues social and medical transition. Exploring options/plans for vaginoplasty in the future and has connected with the [facility] Trans Care Coordinator. Working on integration of self, negative self-talk and self-compassion      Intervention & Response:   Interpersonal, client-centered, and CBT techniques utilized to address client's current status.     Sunni stated she is curious about what happens urologically when she has surgery. Discussed her concerns about surgery and urinal functioning.     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 Asked if it was cowardly of her to leavce when they came instead of dealing  with them. Stated she felt tired and did not want to deal with being an example and also risking rejection or stigma frmo the [employer] women.     Stated both of her daughters were home visiting. Talked about feeling that the wrong parent survived, and feeling bad for her transition impact on her daughters.     Stated her appointment with Dr. Dawson is in December and she is nervous about it, if she has lost enough weight.       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       Plan / Need for Future Services:    Return for individual therapy in 2-3 weeks.           Aicha Velasquez, PhD, LP

## 2019-12-16 ENCOUNTER — OFFICE VISIT (OUTPATIENT)
Dept: OTHER | Facility: OUTPATIENT CENTER | Age: 54
End: 2019-12-16
Payer: COMMERCIAL

## 2019-12-16 VITALS
DIASTOLIC BLOOD PRESSURE: 84 MMHG | WEIGHT: 236.2 LBS | SYSTOLIC BLOOD PRESSURE: 126 MMHG | BODY MASS INDEX: 33.89 KG/M2 | HEART RATE: 87 BPM

## 2019-12-16 DIAGNOSIS — F43.22 ADJUSTMENT DISORDER WITH ANXIOUS MOOD: Primary | ICD-10-CM

## 2019-12-16 DIAGNOSIS — F64.0 GENDER DYSPHORIA IN ADULT: Primary | ICD-10-CM

## 2019-12-16 DIAGNOSIS — F64.0 GENDER DYSPHORIA IN ADOLESCENT AND ADULT: ICD-10-CM

## 2019-12-16 DIAGNOSIS — F64.0 GENDER DYSPHORIA IN ADULT: ICD-10-CM

## 2019-12-16 RX ORDER — ESTRADIOL 0.1 MG/D
2 FILM, EXTENDED RELEASE TRANSDERMAL
Qty: 16 PATCH | Refills: 5 | Status: SHIPPED | OUTPATIENT
Start: 2019-12-16 | End: 2020-06-16

## 2019-12-16 RX ORDER — SPIRONOLACTONE 100 MG/1
200 TABLET, FILM COATED ORAL DAILY
Qty: 60 TABLET | Refills: 5 | Status: SHIPPED | OUTPATIENT
Start: 2019-12-16 | End: 2020-06-16

## 2019-12-16 ASSESSMENT — ENCOUNTER SYMPTOMS
WEAKNESS: 0
FEVER: 0
PALPITATIONS: 0
HEADACHES: 0
FREQUENCY: 0
VOMITING: 0
ABDOMINAL PAIN: 0
CHILLS: 0
DYSPHORIC MOOD: 0
POLYDIPSIA: 0
LIGHT-HEADEDNESS: 1
CHEST TIGHTNESS: 0
NERVOUS/ANXIOUS: 0
UNEXPECTED WEIGHT CHANGE: 0
NUMBNESS: 0
SHORTNESS OF BREATH: 0

## 2019-12-16 NOTE — PROGRESS NOTES
Program in Human Sexuality  [facility]  [address], [address]  [place] [address]  Outpatient Progress Note      Session Date: 19  Client Name: Sunni Flores (she/her pronouns)  YOB: 1965  MRN: [id number]  Provider: Aicha Velasquez, PhD, LP  Type of Session: Individual   Present in Session: Client and Devin Hawkins for 20 min  Number of Minutes: 55  Treatment Plan Due: 2019    Health Maintenance Summary - Mental Health Treatment Plan       Status Date      MENTAL HEALTH TX PLAN Next Due 3/22/2020      Done 2019      Done 10/5/2018 See Scan     Done 2017           Current Symptoms/Status:   Sunni reports lifelong discomfort with her sex assigned at birth, and identifies as a woman. At intake, Sunni also noted concerns about sexual functioning (erection/orgasm) though these have subsided as gender has been further explored. She indicated that, since her wife  in , she increasingly desired to further explore gender concerns and has been supported in this by her current partner, Tawny. Sunni has taken steps to come out as transgender to her daughters and peer group, and has been met with support. She reports a desire to further explore options for social and medical transition.     Progress Toward Treatment Goals:   Continues social and medical transition. Exploring options/plans for vaginoplasty in the future and has connected with the [facility] Trans Care Coordinator. Working on integration of self, negative self-talk and self-compassion, angry and upset about surgery consultation      Intervention & Response:   Interpersonal, client-centered, and CBT techniques utilized to address client's current status.     Sunni stated she is upset, anxious, and on guard in anticipation of her appointment with Dr. Dawson tomorrow. Stated she was told she needed to lose weight prior to surgery. Stated she feels hse has done everything she could possibly do to lose weight and she feels  anxious and angry that she might be told it was not enough. Stated she feels that the surgeons will not recognize the emotional toll not having surgery and that if there are more barriers put up, she will feel worse. Brought Devin Hawkins into the appointment to discuss the process of the appointment and how to prepare for feedback about her weight loss. Discussed her anxiety and how to manage - may bring Devin into consult to help advocate for her having a concrete plan for her surgeries.     Stated she has been thinking about how she relates to her daughters and she wants to shift how she relates to be more feminine. Stated she does not want to be their mother, but does not want to default to acting as their father. Stated she is trying to be more emotional in her responses.     Discussed feeling more emotional and not understanding why, wondering if it is connected to her estrogen. Talked about journalling and blogging and social expression.     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       Plan / Need for Future Services:    Return for individual therapy in 2-3 weeks.           Aicha Velasquez, PhD, LP

## 2019-12-16 NOTE — PROGRESS NOTES
KRISHNA Moeller is a 54 year old individual that uses pronouns She/Her/Hers/Herself that presents today for follow up of:  feminizing hormone therapy.   Alone or accompanied by: accompanied today by self  Gender identity: female  Started Hormone  therapy  2018  Continues on Vivelle dot 0.1 x 2 mg patch 2x/wk                          Spironlactone 200* mg daily    Any special concerns today?    Breasts seem tender again  Meets with Dr. Dawson tomorrow regarding bottom surgery, needs to meet weight goal    On hormones?  YES +++ Shot day of the week? Not applicable-taking pills/patch/gel      Due for labs?  Yes      +++ Refills of meds needed?  Yes  Gender related body changes since last visit:   As above    Breakthrough bleeding? Does Not Apply  Activity: gym 5x/wk, aerobic 5-6x/wk  New health concerns since last visit:  None  ---    No past surgical history on file.    Patient Active Problem List   Diagnosis     Gender dysphoria in adult     Elevated blood pressure reading without diagnosis of hypertension     Migraine headache     Obstructive sleep apnea syndrome     Generalized anxiety disorder     Moderate episode of recurrent major depressive disorder (H)       Current Outpatient Medications   Medication Sig Dispense Refill     estradiol (VIVELLE-DOT) 0.1 MG/24HR bi-weekly patch Place 2 patches onto the skin twice a week 16 patch 5     ibuprofen (ADVIL/MOTRIN) 400 MG tablet Take 2 tablets by mouth       lidocaine-prilocaine (EMLA) 2.5-2.5 % external cream   2     methylfolate (DEPLIN) 15 MG TABS tablet Take 15 mg by mouth       spironolactone (ALDACTONE) 100 MG tablet Take 2 tablets (200 mg) by mouth daily 60 tablet 5     TRAZODONE HCL PO TAKE TWO TABLETS BY MOUTH DAILY AT BEDTIME        venlafaxine (EFFEXOR-XR) 75 MG 24 hr capsule Take 3 capsules by mouth          History   Smoking Status     Never Smoker   Smokeless Tobacco     Never Used          Allergies   Allergen Reactions     Allopurinol Other (See  Comments)     Patient is positive for HLA-B 58:01. Increased risk of allopurinol-induced severe cutaneous reactions.      Clopidogrel Other (See Comments)     Patient is a [id number] metabolizer. Clopidogrel would be predicted to lack efficacy       There are no preventive care reminders to display for this patient.      Problem, Medication and Allergy Lists were reviewed and are current..         Review of Systems:   Review of Systems   Constitutional: Negative for chills, fever and unexpected weight change.   Eyes: Negative for visual disturbance.   Respiratory: Negative for chest tightness and shortness of breath.    Cardiovascular: Negative for chest pain, palpitations and leg swelling.   Gastrointestinal: Negative for abdominal pain and vomiting.   Endocrine: Negative for polydipsia and polyuria.   Genitourinary: Negative for frequency.   Neurological: Positive for light-headedness. Negative for weakness, numbness and headaches.        With sit to stand, usually not severe   Psychiatric/Behavioral: Negative for dysphoric mood and suicidal ideas. The patient is not nervous/anxious.             Physical Exam:   Blood pressure 126/84, pulse 87, weight 107.1 kg (236 lb 3.2 oz).    Body mass index is 33.89 kg/m .    BMI= There is no height or weight on file to calculate BMI.   Wt Readings from Last 10 Encounters:   08/19/19 111.7 kg (246 lb 3.2 oz)   06/26/19 115.2 kg (254 lb)   06/26/19 115.3 kg (254 lb 1.6 oz)   06/18/19 118 kg (260 lb 3.2 oz)   05/06/19 118.8 kg (262 lb)   02/21/19 116.6 kg (257 lb)   11/15/18 115.8 kg (255 lb 6.4 oz)   08/07/18 112.5 kg (248 lb)   07/17/18 113.3 kg (249 lb 12.8 oz)   05/29/18 115.2 kg (254 lb)     Appearance: Female appearance and dress    GENERAL:: healthy, alert and no distress  RESP: lungs clear to auscultation - no rales, no rhonchi, no wheezes  CV: regular rates and rhythm, normal S1 S2, no S3 or S4 and no murmur, no click or rub -  Pt. declines breast measurements    Affect:  Anxious/Nervous           Labs:   Results from last visit:  Orders Only on 08/05/2019   Component Date Value Ref Range Status     Sodium 08/05/2019 138  133 - 144 mmol/L Final     Potassium 08/05/2019 4.7  3.4 - 5.3 mmol/L Final     Chloride 08/05/2019 107  94 - 109 mmol/L Final     Carbon Dioxide 08/05/2019 23  20 - 32 mmol/L Final     Anion Gap 08/05/2019 8  3 - 14 mmol/L Final     Glucose 08/05/2019 85  70 - 99 mg/dL Final     Urea Nitrogen 08/05/2019 22  7 - 30 mg/dL Final     Creatinine 08/05/2019 1.46* 0.66 - 1.25 mg/dL Final     GFR Estimate 08/05/2019 54* >60 mL/min/[1.73_m2] Final    Comment: Non  GFR Calc  Starting 12/18/2018, serum creatinine based estimated GFR (eGFR) will be   calculated using the Chronic Kidney Disease Epidemiology Collaboration   (CKD-EPI) equation.       GFR Estimate If Black 08/05/2019 62  >60 mL/min/[1.73_m2] Final    Comment:  GFR Calc  Starting 12/18/2018, serum creatinine based estimated GFR (eGFR) will be   calculated using the Chronic Kidney Disease Epidemiology Collaboration   (CKD-EPI) equation.       Calcium 08/05/2019 9.1  8.5 - 10.1 mg/dL Final       Assessment and Plan   1. Gender dysphoria  Appropriate clinical response by report to current hormone dose  Labs: estradiol, testosterone, lipids  Counseled patient on approaches to weight loss      Follow up:  Follow up in 4 months  Results by mychart  Questions were elicited and answered.     Hi Rodríguez MD

## 2019-12-17 ENCOUNTER — OFFICE VISIT (OUTPATIENT)
Dept: PLASTIC SURGERY | Facility: CLINIC | Age: 54
End: 2019-12-17
Payer: COMMERCIAL

## 2019-12-17 VITALS
HEIGHT: 70 IN | BODY MASS INDEX: 33.07 KG/M2 | WEIGHT: 231 LBS | OXYGEN SATURATION: 100 % | SYSTOLIC BLOOD PRESSURE: 125 MMHG | DIASTOLIC BLOOD PRESSURE: 79 MMHG | HEART RATE: 104 BPM

## 2019-12-17 DIAGNOSIS — F64.0 GENDER DYSPHORIA IN ADULT: Primary | ICD-10-CM

## 2019-12-17 ASSESSMENT — PATIENT HEALTH QUESTIONNAIRE - PHQ9
SUM OF ALL RESPONSES TO PHQ QUESTIONS 1-9: 7
SUM OF ALL RESPONSES TO PHQ QUESTIONS 1-9: 7
10. IF YOU CHECKED OFF ANY PROBLEMS, HOW DIFFICULT HAVE THESE PROBLEMS MADE IT FOR YOU TO DO YOUR WORK, TAKE CARE OF THINGS AT HOME, OR GET ALONG WITH OTHER PEOPLE: SOMEWHAT DIFFICULT

## 2019-12-17 ASSESSMENT — MIFFLIN-ST. JEOR: SCORE: 1733.06

## 2019-12-17 ASSESSMENT — PAIN SCALES - GENERAL: PAINLEVEL: NO PAIN (0)

## 2019-12-17 NOTE — LETTER
"12/17/2019       RE: Sunni Flores  [address] [facility] [address]     Dear Colleague,    Thank you for referring your patient, Sunni Flores, to the [facility] PLASTIC AND RECONSTRUCTIVE SURGERY at [facility]. Please see a copy of my visit note below.    Patient returns for a follow-up visit regarding vaginoplasty for gender dysphoria.    INTERVAL HISTORY: Patient has lost over 20 pounds since we last saw her.  She continues to be interested in undergoing vaginoplasty.  She underwent hair removal and is nearing completion.    PHYSICAL EXAMINATION:  /79 (BP Location: Left arm, Patient Position: Chair, Cuff Size: Adult Regular)   Pulse 104   Ht 1.778 m (5' 10\")   Wt 104.8 kg (231 lb)   SpO2 100%   BMI 33.15 kg/m     General: No acute distress.  Appears feminine.  Genital examination was performed in the presence of a chaperone.  This revealed a circumcised penis.  Shaft length measures 5 cm.  There are 2 testicles palpable within the scrotum with no inguinal hernia.  There are signs of having undergone hair removal.    ASSESSMENT: Gender dysphoria, requesting vaginoplasty.    PLAN: We thoroughly discussed penile inversion vaginoplasty today.  I explained this inpatient procedure in detail again today.  Patient will require a postoperative admission for 5 to 7 days while we manage a vaginal stent.  She will need to discontinue her hormone therapy 2 weeks prior to surgery and for another 2 weeks after surgery.  She will need to undergo bowel prep 1 to 2 days prior to surgery.  I explained the risks with surgery to include bleeding, infection, injury to surrounding structures, fluid collection, wound dehiscence with prolonged dressing changes, asymmetry, contour deformity, DVT, PE, rectal injury, rectovaginal fistula, possible need for ostomy, clitoral necrosis, sensory loss, voiding issues, urinary stream abnormalities, flap loss, vaginal prolapse, vaginal shrinkage, vaginal " stenosis, need for lifelong dilation, granulation tissue, chronic pain, intravaginal hair growth, incompletely feminized external vulva, and need for revision surgery.  Patient accepts these risks and wishes to proceed.    Total time spent with patient was 30 min of which greater than 50% was in counseling.    Again, thank you for allowing me to participate in the care of your patient.      Sincerely,    Tank Dawson MD

## 2019-12-17 NOTE — NURSING NOTE
"Chief Complaint   Patient presents with     RECHECK     recheck s/p LHR        Vitals:    12/17/19 1738   BP: 125/79   BP Location: Left arm   Patient Position: Chair   Cuff Size: Adult Regular   Pulse: 104   SpO2: 100%   Weight: 104.8 kg (231 lb)   Height: 1.778 m (5' 10\")       Body mass index is 33.15 kg/m .    EDWIN Jimenez    "

## 2019-12-18 NOTE — PROGRESS NOTES
"Patient returns for a follow-up visit regarding vaginoplasty for gender dysphoria.    INTERVAL HISTORY: Patient has lost over 20 pounds since we last saw her.  She continues to be interested in undergoing vaginoplasty.  She underwent hair removal and is nearing completion.    PHYSICAL EXAMINATION:  /79 (BP Location: Left arm, Patient Position: Chair, Cuff Size: Adult Regular)   Pulse 104   Ht 1.778 m (5' 10\")   Wt 104.8 kg (231 lb)   SpO2 100%   BMI 33.15 kg/m    General: No acute distress.  Appears feminine.  Genital examination was performed in the presence of a chaperone.  This revealed a circumcised penis.  Shaft length measures 5 cm.  There are 2 testicles palpable within the scrotum with no inguinal hernia.  There are signs of having undergone hair removal.    ASSESSMENT: Gender dysphoria, requesting vaginoplasty.    PLAN: We thoroughly discussed penile inversion vaginoplasty today.  I explained this inpatient procedure in detail again today.  Patient will require a postoperative admission for 5 to 7 days while we manage a vaginal stent.  She will need to discontinue her hormone therapy 2 weeks prior to surgery and for another 2 weeks after surgery.  She will need to undergo bowel prep 1 to 2 days prior to surgery.  I explained the risks with surgery to include bleeding, infection, injury to surrounding structures, fluid collection, wound dehiscence with prolonged dressing changes, asymmetry, contour deformity, DVT, PE, rectal injury, rectovaginal fistula, possible need for ostomy, clitoral necrosis, sensory loss, voiding issues, urinary stream abnormalities, flap loss, vaginal prolapse, vaginal shrinkage, vaginal stenosis, need for lifelong dilation, granulation tissue, chronic pain, intravaginal hair growth, incompletely feminized external vulva, and need for revision surgery.  Patient accepts these risks and wishes to proceed.    Total time spent with patient was 30 min of which greater than 50% " was in counseling.    Answers for HPI/ROS submitted by the patient on 12/17/2019   If you checked off any problems, how difficult have these problems made it for you to do your work, take care of things at home, or get along with other people?: Somewhat difficult  PHQ9 TOTAL SCORE: 7

## 2020-01-02 PROBLEM — R03.0 ELEVATED BLOOD PRESSURE READING WITHOUT DIAGNOSIS OF HYPERTENSION: Status: RESOLVED | Noted: 2018-08-15 | Resolved: 2020-01-02

## 2020-01-07 ENCOUNTER — TELEPHONE (OUTPATIENT)
Dept: PLASTIC SURGERY | Facility: CLINIC | Age: 55
End: 2020-01-07

## 2020-01-14 ENCOUNTER — TELEPHONE (OUTPATIENT)
Dept: PLASTIC SURGERY | Facility: CLINIC | Age: 55
End: 2020-01-14

## 2020-01-16 ENCOUNTER — TELEPHONE (OUTPATIENT)
Dept: PLASTIC SURGERY | Facility: CLINIC | Age: 55
End: 2020-01-16

## 2020-01-16 NOTE — TELEPHONE ENCOUNTER
faxed in out of network request for PA, as services need to be done at [address], not at [address] [place] [address]

## 2020-01-20 ENCOUNTER — OFFICE VISIT (OUTPATIENT)
Dept: OTHER | Facility: OUTPATIENT CENTER | Age: 55
End: 2020-01-20
Payer: COMMERCIAL

## 2020-01-20 DIAGNOSIS — F43.22 ADJUSTMENT DISORDER WITH ANXIOUS MOOD: ICD-10-CM

## 2020-01-20 DIAGNOSIS — F64.0 GENDER DYSPHORIA IN ADULT: Primary | ICD-10-CM

## 2020-01-20 NOTE — PROGRESS NOTES
Program in [facility]  [facility]  [address], [address]  [place] [address]  Outpatient Progress Note      Session Date: 20  Client Name: Sunni Flores (she/her pronouns)  YOB: 1965  MRN: [id number]  Provider: Aicha Velasquez, PhD, LP  Type of Session: Individual   Present in Session: Client only  Number of Minutes: 55  Treatment Plan Due: 2019    Health Maintenance Summary - Mental Health Treatment Plan       Status Date      MENTAL HEALTH TX PLAN Next Due 3/22/2020      Done 2019      Done 10/5/2018 See Scan     Done 2017           Current Symptoms/Status:   Sunni reports lifelong discomfort with her sex assigned at birth, and identifies as a woman. At intake, Sunni also noted concerns about sexual functioning (erection/orgasm) though these have subsided as gender has been further explored. She indicated that, since her wife  in , she increasingly desired to further explore gender concerns and has been supported in this by her current partner, Tawny. Sunni has taken steps to come out as transgender to her daughters and peer group, and has been met with support. She reports a desire to further explore options for social and medical transition.     Progress Toward Treatment Goals:   Continues social and medical transition. Exploring options/plans for vaginoplasty in the future and has connected with the [facility] Trans Care Coordinator. Working on integration of self, negative self-talk and self-compassion, excited about upcoming surgery and also apprehensive about insurance coverage      Intervention & Response:   Interpersonal, client-centered, and CBT techniques utilized to address client's current status.     Sunni stated she had her consult and she had been approved for surgery. STated she is anxious about insurance coverage and apprehensive about what the coverage might be and that it might cost too much. Shared she had a lot of anxiety about calling  the insurance company but was able to make herself call and get the info she needed. Stated she feels impatient for the process to move forward and wants to have her date and payments figured out and done.     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       Plan / Need for Future Services:    Return for individual therapy in 2-3 weeks.           Aicha Velasquez, PhD, LP, CST

## 2020-01-28 ENCOUNTER — TELEPHONE (OUTPATIENT)
Dept: PLASTIC SURGERY | Facility: CLINIC | Age: 55
End: 2020-01-28

## 2020-01-28 NOTE — TELEPHONE ENCOUNTER
FUTURE VISIT INFORMATION      FUTURE VISIT INFORMATION:    Date: 2/18/20    Time: 12:20pm    [place] [facility] [place]  REFERRAL INFORMATION:    Referring provider:  Self    Referring providers clinic:  N/A    Reason for visit/diagnosis  Top consult    RECORDS REQUESTED FROM:       NO recs to collect

## 2020-02-03 DIAGNOSIS — F64.0 GENDER DYSPHORIA IN ADULT: Primary | ICD-10-CM

## 2020-02-03 RX ORDER — CEFOTETAN DISODIUM 2 G/20ML
2 INJECTION, POWDER, FOR SOLUTION INTRAMUSCULAR; INTRAVENOUS
Status: CANCELLED | OUTPATIENT
Start: 2020-02-03

## 2020-02-06 ENCOUNTER — OFFICE VISIT (OUTPATIENT)
Dept: OTHER | Facility: OUTPATIENT CENTER | Age: 55
End: 2020-02-06
Payer: COMMERCIAL

## 2020-02-06 DIAGNOSIS — F43.22 ADJUSTMENT DISORDER WITH ANXIOUS MOOD: ICD-10-CM

## 2020-02-06 DIAGNOSIS — F64.0 GENDER DYSPHORIA IN ADULT: Primary | ICD-10-CM

## 2020-02-06 NOTE — PROGRESS NOTES
Program in Human Sexuality  [facility]  [address], [address]  [place] [address]  Outpatient Progress Note      Session Date: 20  Client Name: Sunni Flores (she/her pronouns)  YOB: 1965  MRN: [id number]  Provider: Aicha Velasquez, PhD, LP  Type of Session: Individual   Present in Session: Client only  Number of Minutes: 55  Treatment Plan Due: 2019    Health Maintenance Summary - Mental Health Treatment Plan       Status Date      MENTAL HEALTH TX PLAN Next Due 3/22/2020      Done 2019      Done 10/5/2018 See Scan     Done 2017           Current Symptoms/Status:   Sunni reports lifelong discomfort with her sex assigned at birth, and identifies as a woman. At intake, Sunni also noted concerns about sexual functioning (erection/orgasm) though these have subsided as gender has been further explored. She indicated that, since her wife  in , she increasingly desired to further explore gender concerns and has been supported in this by her current partner, Tawny. Sunni has taken steps to come out as transgender to her daughters and peer group, and has been met with support. She reports a desire to further explore options for social and medical transition.     Progress Toward Treatment Goals:   Continues social and medical transition. Exploring options/plans for vaginoplasty in the future and has connected with the [facility] Trans Care Coordinator. Working on integration of self, negative self-talk and self-compassion, excited about upcoming surgery and also apprehensive about timing and scheduling    Intervention & Response:   Interpersonal, client-centered, and CBT techniques utilized to address client's current status.     Sunni stated she has been approved for her PA and is waiting to schedule her surgery. Stated she also has scheduled a consult for breast augmentation.     Stated she ahs been anxious and frustrated in waiting for the scheduling. Stated she has  been more depressed and anxious lately. Wondered if she chooses her depression. Challenged her cognitive schemas on depression. Stated she feels confused why she is still depressed and anxious even though she is moving towards surgery, and she feels confused about this. STated she feels her anxiety has changed since she started on estrogen.     Talked about her inner critic and emotional roots of her critical negative voice. Engaged in empty chair technique. Assigned ehr to complete self-compassion exercises.     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       Plan / Need for Future Services:    Return for individual therapy in 2-3 weeks.           Aicha Velasquez, PhD, LP, CST

## 2020-02-10 ENCOUNTER — HOSPITAL ENCOUNTER (INPATIENT)
Facility: CLINIC | Age: 55
Setting detail: SURGERY ADMIT
End: 2020-02-10
Attending: PLASTIC SURGERY | Admitting: PLASTIC SURGERY
Payer: COMMERCIAL

## 2020-02-10 ENCOUNTER — TELEPHONE (OUTPATIENT)
Dept: PLASTIC SURGERY | Facility: CLINIC | Age: 55
End: 2020-02-10

## 2020-02-10 NOTE — TELEPHONE ENCOUNTER
ORDER RECEIVED VIA: CASE MESSAGE    Spoke with patient to schedule surgery with Dr Meet Venegas and Dr Tank Dawson     Surgery was scheduled on 7/16 at [facility]    Patient will have pre-surgery visit with PAC       -Patient advised H&P is needed or surgery cancellation may occur    Post-Op care appointment scheduled?  YES on 7/28    Patient is aware a / is needed day of surgery.     Surgery packet was sent via [employer], patient has my direct contact information for any further questions.     Ashlee Todd  Surgical [facility]-Op Coordinator  [phone]

## 2020-02-10 NOTE — TELEPHONE ENCOUNTER
Left message to schedule procedure with Dr Meet Venegas and Dr Tank Dawson    Details left with my direct contact number for scheduling.     Ashlee Todd  [facility] Surgical Coordinator  [phone]

## 2020-02-17 ENCOUNTER — TELEPHONE (OUTPATIENT)
Dept: PLASTIC SURGERY | Facility: CLINIC | Age: 55
End: 2020-02-17

## 2020-02-17 NOTE — TELEPHONE ENCOUNTER
Contacted patient per her request for a sooner date.   Advised will send mychart with new pre/post appointments to accommodate change to 6/4

## 2020-02-18 ENCOUNTER — PRE VISIT (OUTPATIENT)
Dept: PLASTIC SURGERY | Facility: CLINIC | Age: 55
End: 2020-02-18

## 2020-02-18 ENCOUNTER — OFFICE VISIT (OUTPATIENT)
Dept: PLASTIC SURGERY | Facility: CLINIC | Age: 55
End: 2020-02-18
Payer: COMMERCIAL

## 2020-02-18 VITALS
HEIGHT: 69 IN | OXYGEN SATURATION: 94 % | BODY MASS INDEX: 36.02 KG/M2 | HEART RATE: 97 BPM | SYSTOLIC BLOOD PRESSURE: 125 MMHG | WEIGHT: 243.2 LBS | DIASTOLIC BLOOD PRESSURE: 77 MMHG

## 2020-02-18 DIAGNOSIS — F64.0 GENDER DYSPHORIA IN ADULT: Primary | ICD-10-CM

## 2020-02-18 RX ORDER — DIAZEPAM 10 MG
TABLET ORAL PRN
COMMUNITY
Start: 2020-01-14

## 2020-02-18 ASSESSMENT — MIFFLIN-ST. JEOR: SCORE: 1769.02

## 2020-02-18 ASSESSMENT — PAIN SCALES - GENERAL: PAINLEVEL: NO PAIN (0)

## 2020-02-18 NOTE — NURSING NOTE
"Chief Complaint   Patient presents with     Consult     New patient consultation for breast augmentation.        Vitals:    02/18/20 1201   BP: 125/77   BP Location: Left arm   Patient Position: Sitting   Cuff Size: Adult Large   Pulse: 97   SpO2: 94%   Weight: 110.3 kg (243 lb 3.2 oz)   Height: 1.755 m (5' 9.09\")       Body mass index is 35.82 kg/m .    Romeo Ashley LPN                        "

## 2020-02-18 NOTE — LETTER
"2/18/2020       RE: Sunni Flores  [address] [facility] [address]     Dear Colleague,    Thank you for referring your patient, Sunni Flores, to the [facility] PLASTIC AND RECONSTRUCTIVE SURGERY at [facility]. Please see a copy of my visit note below.    Patient returns for possible breast augmentation for gender dysphoria.    INTERVAL HISTORY: Patient returns to see me to discuss possible breast augmentation.  She has been seriously considering the surgery for the past 3 years.  She transitioned 2 years ago.  Has been on hormone therapy for the past 1.5 years including estradiol and spironolactone.  She saw some breast growth with this but it plateaued after a year of hormone therapy.  The breast growth was insufficient to bring a feminine form to her torso.  Her estradiol dose has been stable after the first 2 months of initiating hormone therapy.  She is currently in a to a small B cup.  She does wear padding on both sides to become a 40 2C bra size.  She would like to be proportionate with the rest of her torso size.  In essence, by undergoing breast augmentation, she would like to better align her physical body with her chosen gender identity.    PHYSICAL EXAMINATION:  /77 (BP Location: Left arm, Patient Position: Sitting, Cuff Size: Adult Large)   Pulse 97   Ht 1.755 m (5' 9.09\")   Wt 110.3 kg (243 lb 3.2 oz)   SpO2 94%   BMI 35.82 kg/m     General: No acute distress.  Appears feminine.  Chest examination was performed the presence of a chaperone.  Patient currently has approximately 200 cc of tissue on either side.  Patient has bilateral grade 1 ptosis.  Skin pinch is 2 cm or greater in all 4 quadrants bilaterally.  Pectoralis muscles intact bilaterally.  Patient has a Y chest on either side.  Notch to nipple distance is 22 cm bilaterally.  Areole or diameter is 3 cm bilaterally.  Nipple to fold distance is 6 cm on the right and 6.5 cm on the left.  Base diameter " is 17 cm on the right and 18 cm on the left.    IMAGIN2019 mammogram showed BI-RADS category 1.    ASSESSMENT: Gender dysphoria, requesting breast augmentation.    PLAN: Patient has not provided us with a letter of support yet.  I explained the need for a letter of support from a mental health professional.  Patient will provide us with this.  With this obtained, patient is a potential candidate for bilateral subglandular silicone implant breast augmentation through an inframammary fold as an outpatient.  I suspect an augmentation in the range of 450 to 550 cc will be appropriate for the patient.  I explained this operation in detail today.  I explained the risks to include bleeding, infection, injury to surrounding structures, fluid collection, nipple loss, nipple sensory loss, change in nipple size, capsular contracture, implant malposition, implant exposure, implant rupture or leakage, need for MRI, rippling, animation defect, cancer development, wound healing difficulties, contour deformity, dog ears, asymmetry, and need for revision surgery.  Patient accepts these risks and wishes to proceed with surgery.  We will undergo prior authorization once letter of support is received and reviewed to be adequate.    Total time spent with patient was 15 min of which greater than 50% was in counseling.    Again, thank you for allowing me to participate in the care of your patient.      Sincerely,    Tank Dawson MD

## 2020-02-18 NOTE — PROGRESS NOTES
"Patient returns for possible breast augmentation for gender dysphoria.    INTERVAL HISTORY: Patient returns to see me to discuss possible breast augmentation.  She has been seriously considering the surgery for the past 3 years.  She transitioned 2 years ago.  Has been on hormone therapy for the past 1.5 years including estradiol and spironolactone.  She saw some breast growth with this but it plateaued after a year of hormone therapy.  The breast growth was insufficient to bring a feminine form to her torso.  Her estradiol dose has been stable after the first 2 months of initiating hormone therapy.  She is currently in a to a small B cup.  She does wear padding on both sides to become a 40 2C bra size.  She would like to be proportionate with the rest of her torso size.  In essence, by undergoing breast augmentation, she would like to better align her physical body with her chosen gender identity.    PHYSICAL EXAMINATION:  /77 (BP Location: Left arm, Patient Position: Sitting, Cuff Size: Adult Large)   Pulse 97   Ht 1.755 m (5' 9.09\")   Wt 110.3 kg (243 lb 3.2 oz)   SpO2 94%   BMI 35.82 kg/m    General: No acute distress.  Appears feminine.  Chest examination was performed the presence of a chaperone.  Patient currently has approximately 200 cc of tissue on either side.  Patient has bilateral grade 1 ptosis.  Skin pinch is 2 cm or greater in all 4 quadrants bilaterally.  Pectoralis muscles intact bilaterally.  Patient has a Y chest on either side.  Notch to nipple distance is 22 cm bilaterally.  Areole or diameter is 3 cm bilaterally.  Nipple to fold distance is 6 cm on the right and 6.5 cm on the left.  Base diameter is 17 cm on the right and 18 cm on the left.    IMAGIN2019 mammogram showed BI-RADS category 1.    ASSESSMENT: Gender dysphoria, requesting breast augmentation.    PLAN: Patient has not provided us with a letter of support yet.  I explained the need for a letter of support from a " mental health professional.  Patient will provide us with this.  With this obtained, patient is a potential candidate for bilateral subglandular silicone implant breast augmentation through an inframammary fold as an outpatient.  I suspect an augmentation in the range of 450 to 550 cc will be appropriate for the patient.  I explained this operation in detail today.  I explained the risks to include bleeding, infection, injury to surrounding structures, fluid collection, nipple loss, nipple sensory loss, change in nipple size, capsular contracture, implant malposition, implant exposure, implant rupture or leakage, need for MRI, rippling, animation defect, cancer development, wound healing difficulties, contour deformity, dog ears, asymmetry, and need for revision surgery.  Patient accepts these risks and wishes to proceed with surgery.  We will undergo prior authorization once letter of support is received and reviewed to be adequate.    Total time spent with patient was 15 min of which greater than 50% was in counseling.

## 2020-02-18 NOTE — TELEPHONE ENCOUNTER
FUTURE VISIT INFORMATION      SURGERY INFORMATION:    Date: 6/4/20    Location: [facility] OR    Surgeon:  Tank Dawson MD Pariser, Joseph, MD    Anesthesia Type:  general    Procedure: Vaginoplasty    Consult: ov 12/17/19 robbie, 6/26/19- Theo    RECORDS REQUESTED FROM:       Primary Care Provider: Paul Swift M.D.  - [place]    Pertinent Medical History: JERRELL

## 2020-02-20 ENCOUNTER — OFFICE VISIT (OUTPATIENT)
Dept: OTHER | Facility: OUTPATIENT CENTER | Age: 55
End: 2020-02-20

## 2020-02-20 DIAGNOSIS — F64.0 GENDER DYSPHORIA IN ADULT: Primary | ICD-10-CM

## 2020-02-20 DIAGNOSIS — F43.22 ADJUSTMENT DISORDER WITH ANXIOUS MOOD: ICD-10-CM

## 2020-02-20 NOTE — PROGRESS NOTES
[facility]  [address], [address]  [place] [address]  Outpatient Progress Note      Session Date: 20  Client Name: Sunni Flores (she/her pronouns)  YOB: 1965  MRN: [id number]  Provider: Aicha Velasquez, PhD, LP  Type of Session: Individual   Present in Session: Client only  Number of Minutes: 55  Treatment Plan Due: 2019    Health Maintenance Summary - Mental Health Treatment Plan       Status Date      MENTAL HEALTH TX PLAN Next Due 3/22/2020      Done 2019      Done 10/5/2018 See Scan     Done 2017           Current Symptoms/Status:   Sunni reports lifelong discomfort with her sex assigned at birth, and identifies as a woman. At intake, Sunni also noted concerns about sexual functioning (erection/orgasm) though these have subsided as gender has been further explored. She indicated that, since her wife  in , she increasingly desired to further explore gender concerns and has been supported in this by her current partner, Tawny. Sunni has taken steps to come out as transgender to her daughters and peer group, and has been met with support. She reports a desire to further explore options for social and medical transition.     Progress Toward Treatment Goals:   Continues social and medical transition, planning for surgery. Working on integration of self, negative self-talk and self-compassion, excited about upcoming surgery and also apprehensive about timing and scheduling    Intervention & Response:   Interpersonal, client-centered, and CBT techniques utilized to address client's current status.     Sunni stated she had schedule her surgery and is scheduled for 2020. Stated she feels sobered by the reality of having surgery upcoming for her. Stated she feels excited and she now does not have to jump through any other hoops to get what she has really wanted - surgery.     Stated she is struggling with her mother, and  the feeling that her mother is not happy for her. Talked about how she thinks her mother has not internalized her as a woman and does not realize how happy eusebia is now that she has transitioned. Worked through naming her ambivalence, and identifying her needs - wanting her mother to understand how happy she has been since she transitioned.    Stated she completed the first chapter of the mindful self compassion workbook.     Talked about her inner critic and emotional roots of her critical negative voice. Engaged in empty chair technique. Assigned ehr to complete self-compassion exercises.     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       Plan / Need for Future Services:    Return for individual therapy in 2-3 weeks.           Aicha Velasquez, PhD, LP, CST

## 2020-03-11 ENCOUNTER — HEALTH MAINTENANCE LETTER (OUTPATIENT)
Age: 55
End: 2020-03-11

## 2020-03-19 ENCOUNTER — VIRTUAL VISIT (OUTPATIENT)
Dept: OTHER | Facility: OUTPATIENT CENTER | Age: 55
End: 2020-03-19

## 2020-03-19 DIAGNOSIS — F64.0 GENDER DYSPHORIA IN ADULT: Primary | ICD-10-CM

## 2020-03-19 DIAGNOSIS — F43.22 ADJUSTMENT DISORDER WITH ANXIOUS MOOD: ICD-10-CM

## 2020-03-19 NOTE — PROGRESS NOTES
"Sunni Flores is a 55 year old adult who is being evaluated via a billable telephone visit.      The patient has been notified of following:     \"This telephone visit will be conducted via a call between you and your physician/provider. We have found that certain health care needs can be provided without the need for a physical exam.  This service lets us provide the care you need with a short phone conversation.  If a prescription is necessary we can send it directly to your pharmacy.  If lab work is needed we can place an order for that and you can then stop by our lab to have the test done at a later time.    If during the course of the call the physician/provider feels a telephone visit is not appropriate, you will not be charged for this service.\"     Sunni Flores complains of  Gender dysphoria and adjustment disorder with anxiety    I have reviewed and updated the patient's Past Medical History, Social History, Family History and Medication List.    ALLERGIES  Allopurinol and Clopidogrel    Additional provider notes: Checked in with client about anxiety about COVID-19. Discussed her feeling about not being understood in her gender by her mother and her family, feeling alienated. Explored strategies for managing feelings of disappointment in this with her family.     Assessment/Plan:  Return for appt in two weeks    Phone call duration: 30 minutes        Sara Velasquez, PhD, LP    Coordinator, Transgender Health Services  [facility]  Department of Family Medicine and Community Health  [employer]      "

## 2020-04-02 ENCOUNTER — VIRTUAL VISIT (OUTPATIENT)
Dept: OTHER | Facility: OUTPATIENT CENTER | Age: 55
End: 2020-04-02

## 2020-04-02 DIAGNOSIS — F64.0 GENDER DYSPHORIA IN ADULT: Primary | ICD-10-CM

## 2020-04-02 DIAGNOSIS — F43.22 ADJUSTMENT DISORDER WITH ANXIOUS MOOD: ICD-10-CM

## 2020-04-16 ENCOUNTER — VIRTUAL VISIT (OUTPATIENT)
Dept: OTHER | Facility: OUTPATIENT CENTER | Age: 55
End: 2020-04-16

## 2020-04-16 DIAGNOSIS — F64.0 GENDER DYSPHORIA IN ADULT: Primary | ICD-10-CM

## 2020-04-16 DIAGNOSIS — F43.22 ADJUSTMENT DISORDER WITH ANXIOUS MOOD: ICD-10-CM

## 2020-04-16 NOTE — PROGRESS NOTES
".Sunni Flores is a 55 year old adult who is being evaluated via a billable telephone visit.  Attempted video visit and encountered technology failure.     The patient has been notified of following:     \"This telephone visit will be conducted via a call between you and your physician/provider. We have found that certain health care needs can be provided without the need for an in-person physical exam.  This service lets us provide the care you need with a v?ly to your pharmacy.  If lab work is needed we can place an order for that and you can then stop by our lab to have the test done at a later time.    If during the course of the call the physician/provider feels a video visit is not appropriate, you will not be charged for this service.\"     Patient has given verbal consent for Video visit? Yes    Patient would like the video invitation sent by: Send to e-mail at: [email or web address]    Video Start Time: 3:05 PM    Sunni Flores complains of  No chief complaint on file.      I have reviewed and updated the patient's Past Medical History, Social History, Family History and Medication List.    ALLERGIES  Allopurinol and Clopidogrel    Additional provider notes:     [facility]  [address], [address]  [place] [address]  Outpatient Progress Note      Session Date: 4/16/20  Client Name: Sunni Flores (she/her pronouns)  YOB: 1965  MRN: [id number]  Provider: Aicha Velasquez, PhD,   Type of Session: Individual   Present in Session: Client only  Number of Minutes: 53  Treatment Plan Due: 9/18/2019    Health Maintenance Summary - Mental Health Treatment Plan       Status Date      MENTAL HEALTH TX PLAN Overdue 3/22/2020      Done 4/22/2019      Done 10/5/2018 See Scan     Done 9/18/2017           Current Symptoms/Status:   Sunni reports lifelong discomfort with her sex assigned at birth, and identifies as a woman. At intake, Sunni also noted concerns about " sexual functioning (erection/orgasm) though these have subsided as gender has been further explored. She indicated that, since her wife  in 2016, she increasingly desired to further explore gender concerns and has been supported in this by her current partner, Tawny. Sunni has taken steps to come out as transgender to her daughters and peer group, and has been met with support. She reports a desire to further explore options for social and medical transition.     Progress Toward Treatment Goals:   Continues social and medical transition, planning for surgery. Working on integration of self, negative self-talk and self-compassion, excited about upcoming surgery and also apprehensive about timing and scheduling    Intervention & Response:   Interpersonal, client-centered, and CBT techniques utilized to address client's current status.     Sunni stated her mother had surgery last week for a spot on her lung. Stated her mother had come home from surgery. Stated her mother had expressed missing her and being able to address Sunni's gender affirmatively and be consistent with her pronouns.     Talked about anxiety about potential rescheduling to her surgery. Surgery scheduled for [facility]. Discussed the impact of COVID 19 on scheduling.    Talked about her grief about her wife's passing and missing her more lately.     Continues to work on self-compassion. Talked about coping with Covid and slowing down.     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       Plan / Need for Future Services:    Return for individual therapy in 2-3 weeks.           Aicha Velasquez, PhD, LP, CST      Video-Visit Details    Type of service:  Video Visit    Video End Time (time video stopped): 3:58pm      Sara Velasquez, PhD,  LEBRON    Coordinator, Transgender Health Services  [facility]  Department of Family Medicine and Community Health  [employer] [facility] [employer] [facility] [employer]    Originating Location (pt. Location): Home    Distant Location (provider location):  [facility]     Mode of Communication:  Video Conference via [employer]

## 2020-04-30 ENCOUNTER — VIRTUAL VISIT (OUTPATIENT)
Dept: OTHER | Facility: OUTPATIENT CENTER | Age: 55
End: 2020-04-30

## 2020-04-30 DIAGNOSIS — F64.0 GENDER DYSPHORIA IN ADULT: Primary | ICD-10-CM

## 2020-04-30 ASSESSMENT — ENCOUNTER SYMPTOMS
NUMBNESS: 0
DYSPHORIC MOOD: 0
PALPITATIONS: 0
FEVER: 0
UNEXPECTED WEIGHT CHANGE: 0
CHILLS: 0
VOMITING: 0
SHORTNESS OF BREATH: 0
WEAKNESS: 0
LIGHT-HEADEDNESS: 0
POLYDIPSIA: 0
NERVOUS/ANXIOUS: 0
ABDOMINAL PAIN: 0
FREQUENCY: 0
HEADACHES: 0
CHEST TIGHTNESS: 0

## 2020-04-30 NOTE — PROGRESS NOTES
"The patient has been notified of following:     \"This video visit will be conducted via a call between you and your physician/provider. We have found that certain health care needs can be provided without the need for an in-person physical exam.  This service lets us provide the care you need with a video conversation.  If a prescription is necessary we can send it directly to your pharmacy.  If lab work is needed we can place an order for that and you can then stop by our lab to have the test done at a later time.    If during the course of the call the physician/provider feels a video visit is not appropriate, you will not be charged for this service.\"     Patient has given verbal consent for Video visit? Yes    Patient would like the video invitation sent by: Send to e-mail at: [email or web address]  Audio not working, did audio via telephone  Video Start Time: 4:28 PM              KRISHNA Moeller is a 55 year old individual that uses pronouns She/Her/Hers/Herself that presents today for follow up of:  feminizing hormone therapy.   Alone or accompanied by: accompanied today by self  Gender identity: female  Started Hormone  therapy  2018  Continues on Vivelle dot 0.1 x 2 mg patch 2x/wk and Spironlactone 200* mg daily   Any special concerns today?    Had been be scheduled for bottom June 4th, but with pandemic, unclear when surgery will happen.    On hormones?  YES +++ Shot day of the week? Not applicable-taking pills/patch/gel      Due for labs?  Yes      +++ Refills of meds needed?  Yes  Gender related body changes since last visit:   none    Breakthrough bleeding? Does Not Apply    New health concerns since last visit:  ---None    No past surgical history on file.    Patient Active Problem List   Diagnosis     Gender dysphoria in adult     Obstructive sleep apnea syndrome     Generalized anxiety disorder     Moderate episode of recurrent major depressive disorder (H)       Current Outpatient Medications   Medication " Sig Dispense Refill     diazepam (VALIUM) 10 MG tablet        estradiol (VIVELLE-DOT) 0.1 MG/24HR bi-weekly patch Place 2 patches onto the skin twice a week 16 patch 5     ibuprofen (ADVIL/MOTRIN) 400 MG tablet Take 2 tablets by mouth       lidocaine-prilocaine (EMLA) 2.5-2.5 % external cream   2     methylfolate (DEPLIN) 15 MG TABS tablet Take 15 mg by mouth       spironolactone (ALDACTONE) 100 MG tablet Take 2 tablets (200 mg) by mouth daily 60 tablet 5     TRAZODONE HCL PO TAKE TWO TABLETS BY MOUTH DAILY AT BEDTIME        venlafaxine (EFFEXOR-XR) 75 MG 24 hr capsule Take 3 capsules by mouth          History   Smoking Status     Never Smoker   Smokeless Tobacco     Never Used          Allergies   Allergen Reactions     Allopurinol Other (See Comments)     Patient is positive for HLA-B 58:01. Increased risk of allopurinol-induced severe cutaneous reactions.      Clopidogrel Other (See Comments)     Patient is a [id number] metabolizer. Clopidogrel would be predicted to lack efficacy       Health Maintenance Due   Topic Date Due     MENTAL HEALTH TX PLAN  03/22/2020         Problem, Medication and Allergy Lists were reviewed and are current..         Review of Systems:   Review of Systems   Constitutional: Negative for chills, fever and unexpected weight change.   Eyes: Negative for visual disturbance.   Respiratory: Negative for chest tightness and shortness of breath.    Cardiovascular: Negative for chest pain, palpitations and leg swelling.   Gastrointestinal: Negative for abdominal pain and vomiting.   Endocrine: Negative for polydipsia and polyuria.   Genitourinary: Negative for frequency.   Neurological: Negative for weakness, light-headedness, numbness and headaches.   Psychiatric/Behavioral: Negative for dysphoric mood and suicidal ideas. The patient is not nervous/anxious.               Labs:   Results from last visit:  Orders Only on 08/05/2019   Component Date Value Ref Range Status     Sodium 08/05/2019 138   133 - 144 mmol/L Final     Potassium 08/05/2019 4.7  3.4 - 5.3 mmol/L Final     Chloride 08/05/2019 107  94 - 109 mmol/L Final     Carbon Dioxide 08/05/2019 23  20 - 32 mmol/L Final     Anion Gap 08/05/2019 8  3 - 14 mmol/L Final     Glucose 08/05/2019 85  70 - 99 mg/dL Final     Urea Nitrogen 08/05/2019 22  7 - 30 mg/dL Final     Creatinine 08/05/2019 1.46* 0.66 - 1.25 mg/dL Final     GFR Estimate 08/05/2019 54* >60 mL/min/[1.73_m2] Final    Comment: Non  GFR Calc  Starting 12/18/2018, serum creatinine based estimated GFR (eGFR) will be   calculated using the Chronic Kidney Disease Epidemiology Collaboration   (CKD-EPI) equation.       GFR Estimate If Black 08/05/2019 62  >60 mL/min/[1.73_m2] Final    Comment:  GFR Calc  Starting 12/18/2018, serum creatinine based estimated GFR (eGFR) will be   calculated using the Chronic Kidney Disease Epidemiology Collaboration   (CKD-EPI) equation.       Calcium 08/05/2019 9.1  8.5 - 10.1 mg/dL Final         EXAM:  Constitutional: healthy, alert and no distress   Psychiatric: mentation appears normal and affect normal/bright     Assessment and Plan   1. Gender dysphoria  Stable on current dose hormone therapy  Counseled patient regarding periop and post op hormone dosing, in preparation for surgery, although unknown when that may occur  Patient to stop estrogen at least 2 weeks prior to surgeyr, grabiel per Dr. Dawson   Restart 1 patch when home and mobile on regular basis  discontinue spironolactone after surgery  Brief supportive counseling regarding delay in bottom surgery, can call care coordinator at [facility] for update on when resuming surgeries   BMP if no surery by 9/2020  Follow-up 12 weeks post surgery, sooner if surgery significantly delayed        Video-Visit Details    Type of service:  Video Visit    Video End Time (time video stopped): 4: 50    Originating Location (pt. Location): Home    Distant Location ([facility]):  [facility]     Mode of Communication:  Video Conference via [employer]      Hi Rodríguez MD        [facility]  Questions were elicited and answered.     Hi Rodríguez MD

## 2020-05-01 ENCOUNTER — PATIENT OUTREACH (OUTPATIENT)
Dept: PLASTIC SURGERY | Facility: CLINIC | Age: 55
End: 2020-05-01

## 2020-05-01 NOTE — PROGRESS NOTES
Called pt instead of replying to [employer] [facility] message. Informed pt that her June surgery will be cancelled/postponed. Explained to pt we are unsure when we can reschedule at this time. Pt was very understanding. Pt is willing to be scheduled any available time.     Devin Hawkins, [facility]  Transgender Care Coordinator

## 2020-05-04 ENCOUNTER — TELEPHONE (OUTPATIENT)
Dept: PLASTIC SURGERY | Facility: CLINIC | Age: 55
End: 2020-05-04

## 2020-05-04 ENCOUNTER — VIRTUAL VISIT (OUTPATIENT)
Dept: OTHER | Facility: OUTPATIENT CENTER | Age: 55
End: 2020-05-04

## 2020-05-04 DIAGNOSIS — F43.22 ADJUSTMENT DISORDER WITH ANXIOUS MOOD: ICD-10-CM

## 2020-05-04 DIAGNOSIS — F64.0 GENDER DYSPHORIA IN ADULT: Primary | ICD-10-CM

## 2020-05-04 NOTE — TELEPHONE ENCOUNTER
Left voicemail confirming that we are postponing/cancelling surgery with Dr. Dawson and Dr. Venegas on 6/4 due to the COVID-19 concerns. We are also cancelling any pre-op/post-op appointment scheduled.     I stated that we will call when we are able to reschedule as we are not able to do so at this time.    Noted that I am not a nurse and cannot answer any medical questions regarding this and to contact the RN with medical questions.    Provided my direct number.

## 2020-05-04 NOTE — PROGRESS NOTES
".Sunni Flores is a 55 year old adult who is being evaluated via a billable telephone visit.  Attempted video visit and encountered technology failure.     The patient has been notified of following:     \"This telephone visit will be conducted via a call between you and your physician/provider. We have found that certain health care needs can be provided without the need for an in-person physical exam.  This service lets us provide the care you need with a v?ly to your pharmacy.  If lab work is needed we can place an order for that and you can then stop by our lab to have the test done at a later time.    If during the course of the call the physician/provider feels a video visit is not appropriate, you will not be charged for this service.\"     Patient has given verbal consent for Video visit? Yes    Patient would like the video invitation sent by: Send to e-mail at: [email or web address]    Video Start Time: 5:05 PM    Sunni Flores complains of  No chief complaint on file.      I have reviewed and updated the patient's Past Medical History, Social History, Family History and Medication List.    ALLERGIES  Allopurinol and Clopidogrel    Additional provider notes:     [facility]  [address], [address]  [place] [address]  Outpatient Progress Note      Session Date: 5/04/20  Client Name: Sunni Flores (she/her pronouns)  YOB: 1965  MRN: [id number]  Provider: Aicha Velasquez, PhD,   Type of Session: Individual   Present in Session: Client only  Number of Minutes: 53  Treatment Plan Due: 9/18/2019    Health Maintenance Summary - Mental Health Treatment Plan       Status Date      MENTAL HEALTH TX PLAN Overdue 3/22/2020      Done 4/22/2019      Done 10/5/2018 See Scan     Done 9/18/2017           Current Symptoms/Status:   Sunni reports lifelong discomfort with her sex assigned at birth, and identifies as a woman. At intake, Sunni also noted concerns about " sexual functioning (erection/orgasm) though these have subsided as gender has been further explored. She indicated that, since her wife  in 2016, she increasingly desired to further explore gender concerns and has been supported in this by her current partner, Tawny. Sunni has taken steps to come out as transgender to her daughters and peer group, and has been met with support. She reports a desire to further explore options for social and medical transition.     Progress Toward Treatment Goals:   Continues social and medical transition, planning for surgery. Working on integration of self, negative self-talk and self-compassion, excited about upcoming surgery and also apprehensive about timing and scheduling    Intervention & Response:   Interpersonal, client-centered, and CBT techniques utilized to address client's current status.     Sunni stated she had been struggling with feeling depressed, feeling down, negative. Stated she had been able to accept her surgery being cancelled, and being in the in between of not knowing was stressful for her.     Discussed the impact of COVID 19 on scheduling. Talked about feeling stuck in her transition.    Talked about her grief about her wife's passing and missing her more lately.     Continues to work on self-compassion. Talked about coping with Covid and slowing down.     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       Plan / Need for Future Services:    Return for individual therapy in 2-3 weeks.           Aicha Velasquez, PhD, LP, CST      Video-Visit Details    Type of service:  Video Visit    Video End Time (time video stopped): 6:01pm      Sara Velasquez, PhD, LP    Coordinator, Transgender Health Services  [facility]  Department of Family Medicine and Community Health  [employer] [facility] [employer]    Originating Location (pt. Location): Home    Distant Location (provider location):  [facility]     Mode of Communication:  Video Conference via [email or web address]

## 2020-05-05 DIAGNOSIS — Z11.59 ENCOUNTER FOR SCREENING FOR OTHER VIRAL DISEASES: Primary | ICD-10-CM

## 2020-05-11 ENCOUNTER — VIRTUAL VISIT (OUTPATIENT)
Dept: OTHER | Facility: OUTPATIENT CENTER | Age: 55
End: 2020-05-11

## 2020-05-11 DIAGNOSIS — F43.22 ADJUSTMENT DISORDER WITH ANXIOUS MOOD: ICD-10-CM

## 2020-05-11 DIAGNOSIS — F64.0 GENDER DYSPHORIA IN ADULT: Primary | ICD-10-CM

## 2020-05-19 ENCOUNTER — PRE VISIT (OUTPATIENT)
Dept: SURGERY | Facility: CLINIC | Age: 55
End: 2020-05-19

## 2020-05-21 ENCOUNTER — VIRTUAL VISIT (OUTPATIENT)
Dept: OTHER | Facility: OUTPATIENT CENTER | Age: 55
End: 2020-05-21

## 2020-05-21 DIAGNOSIS — F43.22 ADJUSTMENT DISORDER WITH ANXIOUS MOOD: ICD-10-CM

## 2020-05-21 DIAGNOSIS — F64.0 GENDER DYSPHORIA IN ADULT: Primary | ICD-10-CM

## 2020-05-21 NOTE — PROGRESS NOTES
".Sunni Flores is a 55 year old adult who is being evaluated via a billable telephone visit.  Attempted video visit and encountered technology failure.     The patient has been notified of following:     \"This telephone visit will be conducted via a call between you and your physician/provider. We have found that certain health care needs can be provided without the need for an in-person physical exam.  This service lets us provide the care you need with a v?ly to your pharmacy.  If lab work is needed we can place an order for that and you can then stop by our lab to have the test done at a later time.    If during the course of the call the physician/provider feels a video visit is not appropriate, you will not be charged for this service.\"     Patient has given verbal consent for Video visit? Yes    Patient would like the video invitation sent by: Send to e-mail at: [email or web address]    Video Start Time: 3:10 PM    Sunni Flores complains of  No chief complaint on file.      I have reviewed and updated the patient's Past Medical History, Social History, Family History and Medication List.    ALLERGIES  Allopurinol and Clopidogrel    Additional provider notes:     [facility]  [address], [address]  [place] [address]  Outpatient Progress Note      Session Date: 5/21/20  Client Name: Sunni Flores (she/her pronouns)  YOB: 1965  MRN: [id number]  Provider: Aicha Velasquez, PhD,   Type of Session: Individual   Present in Session: Client only  Number of Minutes: 31  Treatment Plan Due: 9/18/2019    Health Maintenance Summary - Mental Health Treatment Plan       Status Date      MENTAL HEALTH TX PLAN Overdue 3/22/2020      Done 4/22/2019      Done 10/5/2018 See Scan     Done 9/18/2017           Current Symptoms/Status:   Sunni reports lifelong discomfort with her sex assigned at birth, and identifies as a woman. At intake, Sunni also noted concerns about " sexual functioning (erection/orgasm) though these have subsided as gender has been further explored. She indicated that, since her wife  in 2016, she increasingly desired to further explore gender concerns and has been supported in this by her current partner, Tawny. Sunni has taken steps to come out as transgender to her daughters and peer group, and has been met with support. She reports a desire to further explore options for social and medical transition.     Progress Toward Treatment Goals:   Continues social and medical transition, planning for surgery. Working on integration of self, negative self-talk and self-compassion, excited about upcoming surgery and also apprehensive about timing and scheduling    Intervention & Response:   Interpersonal, client-centered, and CBT techniques utilized to address client's current status.     Sunni stated she is doing ok, wants to meet in person, tired of virtual visits. Stated she has been realzing how she avoided her feelings in the past, and her transition has allowed her to express more of her emotions.     Stated she likes the term confirmation vs tranasition. Stated she continues on the [facility] advisory board.     Had to end session early due to dog running away. Will reschedule.     Continues to work on self-compassion. Talked about coping with Covid and slowing down.     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       Plan / Need for Future Services:    Return for individual therapy in 2-3 weeks.           Aicha Velasquez, PhD, LP, CST      Video-Visit Details    Type of service:  Video Visit    Video End Time (time video stopped): 3:41pm      Sara Velasquez, PhD, LP    Coordinator, Transgender Health Services  Program in [facility]  Sexuality, [facility]  Department of Family Medicine and Community Health  [employer] [facility] [employer]    Originating Location (pt. Location): Home    Distant Location (provider location):  [place] [facility]     Mode of Communication:  Video Conference via [email or web address]

## 2020-05-28 ENCOUNTER — VIRTUAL VISIT (OUTPATIENT)
Dept: OTHER | Facility: OUTPATIENT CENTER | Age: 55
End: 2020-05-28

## 2020-05-28 DIAGNOSIS — F64.0 GENDER DYSPHORIA IN ADULT: Primary | ICD-10-CM

## 2020-05-28 DIAGNOSIS — F43.22 ADJUSTMENT DISORDER WITH ANXIOUS MOOD: ICD-10-CM

## 2020-06-05 ENCOUNTER — VIRTUAL VISIT (OUTPATIENT)
Dept: OTHER | Facility: OUTPATIENT CENTER | Age: 55
End: 2020-06-05

## 2020-06-05 DIAGNOSIS — F64.0 GENDER DYSPHORIA IN ADULT: Primary | ICD-10-CM

## 2020-06-05 DIAGNOSIS — F43.22 ADJUSTMENT DISORDER WITH ANXIOUS MOOD: ICD-10-CM

## 2020-06-05 NOTE — PROGRESS NOTES
".Sunni Flores is a 55 year old adult who is being evaluated via a billable telephone visit.  Attempted video visit and encountered technology failure.     The patient has been notified of following:     \"This telephone visit will be conducted via a call between you and your physician/provider. We have found that certain health care needs can be provided without the need for an in-person physical exam.  This service lets us provide the care you need with a v?ly to your pharmacy.  If lab work is needed we can place an order for that and you can then stop by our lab to have the test done at a later time.    If during the course of the call the physician/provider feels a video visit is not appropriate, you will not be charged for this service.\"     Patient has given verbal consent for Video visit? Yes    Patient would like the video invitation sent by: Send to e-mail at: [email or web address]    Video Start Time: 3:10 PM    Sunni Flores complains of  No chief complaint on file.      I have reviewed and updated the patient's Past Medical History, Social History, Family History and Medication List.    ALLERGIES  Allopurinol and Clopidogrel    Additional provider notes:     [facility]  [address], [address]  [place] [address]  Outpatient Progress Note      Session Date: 6/05/20  Client Name: Sunni Flores (she/her pronouns)  YOB: 1965  MRN: [id number]  Provider: Aicha Velasquez, PhD,   Type of Session: Individual   Present in Session: Client only  Number of Minutes: 31  Treatment Plan Due: 9/18/2019    Health Maintenance Summary - Mental Health Treatment Plan       Status Date      MENTAL HEALTH TX PLAN Overdue 3/22/2020      Done 4/22/2019      Done 10/5/2018 See Scan     Done 9/18/2017           Current Symptoms/Status:   Sunni reports lifelong discomfort with her sex assigned at birth, and identifies as a woman. At intake, Sunni also noted concerns about " sexual functioning (erection/orgasm) though these have subsided as gender has been further explored. She indicated that, since her wife  in 2016, she increasingly desired to further explore gender concerns and has been supported in this by her current partner, Tawny. Sunni has taken steps to come out as transgender to her daughters and peer group, and has been met with support. She reports a desire to further explore options for social and medical transition.     Progress Toward Treatment Goals:   Continues social and medical transition, planning for surgery. Working on integration of self, negative self-talk and self-compassion, excited about upcoming surgery and also apprehensive about timing and scheduling    Intervention & Response:   Interpersonal, client-centered, and CBT techniques utilized to address client's current status.     Sunni stated she is sad she was not able to have surgery yesterday. Stated she cognitively understands but emotionally feels sad.    Stated she wanted to talk about masturbation. Stated her sexual desire level has always been low and HRT made it lower.Explored her feelings about masturbation, she is having more sexual dreams and waking with morning erections. Assigned her to explore masturbation in more gender affirming ways, prostate stimulation, vibrator stimulation.     Stated she likes the term confirmation vs tranasition. Stated she continues on the [facility] advisory board.     Had to end session early due to dog running away. Will reschedule.     Continues to work on self-compassion. Talked about coping with Covid and slowing down.     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       Plan / Need for Future Services:    Return for individual therapy in 2-3 weeks.            Aicha Velasquez, PhD, LP, CST      Video-Visit Details    Type of service:  Video Visit    Video End Time (time video stopped): 4:00pm      Sara Velasquez, PhD, LP    Coordinator, Transgender Health Services  Program in [facility] Sexuality, [facility]  Department of Family Medicine and Community Health  [employer] [facility] [employer]    Originating Location (pt. Location): Home    Distant Location (provider location):  [place] [facility]     Mode of Communication:  Video Conference via [email or web address]

## 2020-06-16 DIAGNOSIS — F64.0 GENDER DYSPHORIA IN ADOLESCENT AND ADULT: ICD-10-CM

## 2020-06-16 RX ORDER — ESTRADIOL 0.1 MG/D
2 FILM, EXTENDED RELEASE TRANSDERMAL
Qty: 16 PATCH | Refills: 5 | Status: SHIPPED | OUTPATIENT
Start: 2020-06-18 | End: 2021-02-01

## 2020-06-16 RX ORDER — SPIRONOLACTONE 100 MG/1
200 TABLET, FILM COATED ORAL DAILY
Qty: 60 TABLET | Refills: 5 | Status: SHIPPED | OUTPATIENT
Start: 2020-06-16 | End: 2021-01-15

## 2020-06-16 NOTE — TELEPHONE ENCOUNTER
Requested Medication: Spironolactone 100mg  Dose: 200mg  Quantity: 60  Refills: 5    Take 2 (100mg) tablets daily    _______    Requested Medication: Estradiol 0.1mg  Dose: 0.2mg  Quantity: 16  Refills: 5    Apply 2 patches twice weekly    Last seen at [facility]: 4/30 -   Next Appointment with Provider:  Visit date not found    Follow up 12 weeks post surgery      Mira Turner CMA

## 2020-06-18 ENCOUNTER — VIRTUAL VISIT (OUTPATIENT)
Dept: OTHER | Facility: OUTPATIENT CENTER | Age: 55
End: 2020-06-18

## 2020-06-18 DIAGNOSIS — F43.22 ADJUSTMENT DISORDER WITH ANXIOUS MOOD: ICD-10-CM

## 2020-06-18 DIAGNOSIS — F64.0 GENDER DYSPHORIA IN ADOLESCENT AND ADULT: Primary | ICD-10-CM
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
## 2020-06-28 NOTE — PROGRESS NOTES
".Sunni Flores is a 55 year old adult who is being evaluated via a billable telephone visit.  Attempted video visit and encountered technology failure.     The patient has been notified of following:     \"This telephone visit will be conducted via a call between you and your physician/provider. We have found that certain health care needs can be provided without the need for an in-person physical exam.  This service lets us provide the care you need with a v?ly to your pharmacy.  If lab work is needed we can place an order for that and you can then stop by our lab to have the test done at a later time.    If during the course of the call the physician/provider feels a video visit is not appropriate, you will not be charged for this service.\"     Patient has given verbal consent for Video visit? Yes    Patient would like the video invitation sent by: Send to e-mail at: [email or web address]    Video Start Time: 3:10 PM    Sunni Flores complains of  No chief complaint on file.      I have reviewed and updated the patient's Past Medical History, Social History, Family History and Medication List.    ALLERGIES  Allopurinol and Clopidogrel    Additional provider notes:     [facility] in Human Sexuality  [facility]  [address], [address]  [place] [address]  Outpatient Progress Note      Session Date: 6/18/20  Client Name: Sunni Flores (she/her pronouns)  YOB: 1965  MRN: [id number]  Provider: Aicha Velasquez, PhD,   Type of Session: Individual   Present in Session: Client only  Number of Minutes: 50  Treatment Plan Due: 9/18/2019    Health Maintenance Summary - Mental Health Treatment Plan       Status Date      MENTAL HEALTH TX PLAN Overdue 3/22/2020      Done 4/22/2019      Done 10/5/2018 See Scan     Done 9/18/2017           Current Symptoms/Status:   Sunni reports lifelong discomfort with her sex assigned at birth, and identifies as a woman. At intake, Sunni also noted concerns about " sexual functioning (erection/orgasm) though these have subsided as gender has been further explored. She indicated that, since her wife  in 2016, she increasingly desired to further explore gender concerns and has been supported in this by her current partner, Tawny. Sunni has taken steps to come out as transgender to her daughters and peer group, and has been met with support. She reports a desire to further explore options for social and medical transition.     Progress Toward Treatment Goals:   Continues social and medical transition, planning for surgery. Working on integration of self, negative self-talk and self-compassion, excited about upcoming surgery and also apprehensive about timing and scheduling    Intervention & Response:   Interpersonal, client-centered, and CBT techniques utilized to address client's current status.     Sunni stated she is sad she was not able to have surgery yesterday. Stated she cognitively understands but emotionally feels sad.    Stated she wanted to talk about masturbation. Stated her sexual desire level has always been low and HRT made it lower.Explored her feelings about masturbation, she is having more sexual dreams and waking with morning erections. Assigned her to explore masturbation in more gender affirming ways, prostate stimulation, vibrator stimulation.     Stated she likes the term confirmation vs tranasition. Stated she continues on the [facility] advisory board.     Had to end session early due to dog running away. Will reschedule.     Continues to work on self-compassion. Talked about coping with Covid and slowing down.     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       Plan / Need for Future Services:    Return for individual therapy in 2-3 weeks.            Aicha Velasquez, PhD, LP, CST      Video-Visit Details    Type of service:  Video Visit    Video End Time (time video stopped): 4:00pm      Sara Velasquez, PhD, LP    Coordinator, Transgender Health Services  Program in Human Sexuality, [facility]  Department of Family Medicine and Community Health  [facility]       No

## 2020-07-02 ENCOUNTER — VIRTUAL VISIT (OUTPATIENT)
Dept: OTHER | Facility: OUTPATIENT CENTER | Age: 55
End: 2020-07-02

## 2020-07-02 DIAGNOSIS — F43.22 ADJUSTMENT DISORDER WITH ANXIOUS MOOD: ICD-10-CM

## 2020-07-02 DIAGNOSIS — F64.0 GENDER DYSPHORIA IN ADOLESCENT AND ADULT: Primary | ICD-10-CM

## 2020-07-02 NOTE — PROGRESS NOTES
".Sunni Flores is a 55 year old adult who is being evaluated via a billable telephone visit.  Attempted video visit and encountered technology failure.     The patient has been notified of following:     \"This telephone visit will be conducted via a call between you and your physician/provider. We have found that certain health care needs can be provided without the need for an in-person physical exam.  This service lets us provide the care you need with a v?ly to your pharmacy.  If lab work is needed we can place an order for that and you can then stop by our lab to have the test done at a later time.    If during the course of the call the physician/provider feels a video visit is not appropriate, you will not be charged for this service.\"     Patient has given verbal consent for Video visit? Yes    Patient would like the video invitation sent by: Send to e-mail at: [email or web address]    Video Start Time: 3:06 PM    Sunni Flores complains of  No chief complaint on file.      I have reviewed and updated the patient's Past Medical History, Social History, Family History and Medication List.    ALLERGIES  Allopurinol and Clopidogrel    Additional provider notes:     Program in Human Sexuality  [facility]  [address], [address]  [place] [address]  Outpatient Progress Note      Session Date: 7/02/20  Client Name: Sunni Flores (she/her pronouns)  YOB: 1965  MRN: [id number]  Provider: Aicha Velasquez, PhD,   Type of Session: Individual   Present in Session: Client only  Number of Minutes: 54  Treatment Plan Due: 9/18/2019    Health Maintenance Summary - Mental Health Treatment Plan       Status Date      MENTAL HEALTH TX PLAN Overdue 3/22/2020      Done 4/22/2019      Done 10/5/2018 See Scan     Done 9/18/2017           Current Symptoms/Status:   Sunni reports lifelong discomfort with her sex assigned at birth, and identifies as a woman. At intake, Sunni also noted concerns about " sexual functioning (erection/orgasm) though these have subsided as gender has been further explored. She indicated that, since her wife  in 2016, she increasingly desired to further explore gender concerns and has been supported in this by her current partner, Tawny. Sunni has taken steps to come out as transgender to her daughters and peer group, and has been met with support. She reports a desire to further explore options for social and medical transition.     Progress Toward Treatment Goals:   Continues social and medical transition, planning for surgery. Working on integration of self, negative self-talk and self-compassion, excited about upcoming surgery and also apprehensive about timing and scheduling    Intervention & Response:   Interpersonal, client-centered, and CBT techniques utilized to address client's current status.     Sunni stated she is sad and tired. Stated she had sent an email to the [facility] coordinators, and Devin had called her to talk to her about her complaints. Stated she did not feel reassured after the conversation, that she had not received communication from [facility]. Stated she is not going to resign from the [facility] advisory board. Stated she did not get answers that she would like. Stated the combo of gender dysphoria and COVID has been very painful for her. Stated she cant stop thinking about how she could be post surgery right now. Stated she feels profound sadness.     Stated exercising has been hard to do because she does not have a date for surgery. Stated it is hard for her to go to her partner with her feelings.Stated she misses people and feels lonely.    Stated she wanted to talk about masturbation. Stated her sexual desire level has always been low and HRT made it lower.Explored her feelings about masturbation, she is having more sexual dreams and waking with morning erections. Assigned her to explore masturbation in more gender affirming ways, prostate stimulation, vibrator  stimulation.     Stated she likes the term confirmation vs tranasition. Stated she continues on the [facility] advisory board.     Had to end session early due to dog running away. Will reschedule.     Continues to work on self-compassion. Talked about coping with Covid and slowing down.     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       Plan / Need for Future Services:    Return for individual therapy in 2-3 weeks.           Aicha Velasquez, PhD, LP, CST      Video-Visit Details    Type of service:  Video Visit    Video End Time (time video stopped): 4:00pm      Sara Velasquez, PhD, LP    Coordinator, Transgender Health Services  Program in Human Sexuality, [facility]  Department of Family Medicine and Community Health  [employer] [facility] [employer]

## 2020-07-16 ENCOUNTER — VIRTUAL VISIT (OUTPATIENT)
Dept: OTHER | Facility: OUTPATIENT CENTER | Age: 55
End: 2020-07-16

## 2020-07-16 DIAGNOSIS — F43.22 ADJUSTMENT DISORDER WITH ANXIOUS MOOD: ICD-10-CM

## 2020-07-16 DIAGNOSIS — F64.0 GENDER DYSPHORIA IN ADOLESCENT AND ADULT: Primary | ICD-10-CM

## 2020-07-16 NOTE — PROGRESS NOTES
".Sunni Flores is a 55 year old adult who is being evaluated via a billable telephone visit.  Attempted video visit and encountered technology failure.     The patient has been notified of following:     \"This telephone visit will be conducted via a call between you and your physician/provider. We have found that certain health care needs can be provided without the need for an in-person physical exam.  This service lets us provide the care you need with a v?ly to your pharmacy.  If lab work is needed we can place an order for that and you can then stop by our lab to have the test done at a later time.    If during the course of the call the physician/provider feels a video visit is not appropriate, you will not be charged for this service.\"     Patient has given verbal consent for Video visit? Yes    Patient would like the video invitation sent by: Send to e-mail at: [email or web address]    Video Start Time: 3:06 PM    Sunni Flores complains of  No chief complaint on file.      I have reviewed and updated the patient's Past Medical History, Social History, Family History and Medication List.    ALLERGIES  Allopurinol and Clopidogrel    Additional provider notes:     [facility]  [address], [address]  [place] [address]  Outpatient Progress Note      Session Date: 7/16/20  Client Name: Sunni Flores (she/her pronouns)  YOB: 1965  MRN: [id number]  Provider: Aicha Velasquez, PhD,   Type of Session: Individual   Present in Session: Client only  Number of Minutes: 54  Treatment Plan Due: 9/18/2019    Health Maintenance Summary - Mental Health Treatment Plan       Status Date      MENTAL HEALTH TX PLAN Overdue 3/22/2020      Done 4/22/2019      Done 10/5/2018 See Scan     Done 9/18/2017           Current Symptoms/Status:   Sunni reports lifelong discomfort with her sex assigned at birth, and identifies as a woman. At intake, Sunni also noted concerns about " sexual functioning (erection/orgasm) though these have subsided as gender has been further explored. She indicated that, since her wife  in 2016, she increasingly desired to further explore gender concerns and has been supported in this by her current partner, Tawny. Sunni has taken steps to come out as transgender to her daughters and peer group, and has been met with support. She reports a desire to further explore options for social and medical transition.     Progress Toward Treatment Goals:   Continues social and medical transition, planning for surgery. Working on integration of self, negative self-talk and self-compassion, excited about upcoming surgery and also apprehensive about timing and scheduling    Intervention & Response:   Interpersonal, client-centered, and CBT techniques utilized to address client's current status.     Sunni stated she was uncertain of what to talk about today. Stated she overthinks everything and it is just the way she is, that she has been that way since childhood. Stated her overthinking has prevented her from engaging in activties and she is afraid of a lot of things. Stated she was a curious and mischievous toddler. Explored the way this serves her and does not serve her.     Talked about her acceptance of coronavirus. Discussed the ways we are adapting and changing our realities to address the virus.     Stated she has decided to stay on the PFACS and she has resumed her electroylsis. She feels better since talking to Devin. Stated she has tried to lose weight but she cannot do more than she has been doing.    Stated exercising has been hard to do because she does not have a date for surgery. Stated it is hard for her to go to her partner with her feelings.Stated she misses people and feels lonely.    Explored arousal, desire, history of trauma with pressure to perform sexually. Recommended muffing, self pleasuring, practicing being present.     Continues to work on  self-compassion. Talked about coping with Covid and slowing down.     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       Plan / Need for Future Services:    Return for individual therapy in 2-3 weeks.           Aicha Velasquez, PhD, LP, CST      Video-Visit Details    Type of service:  Video Visit    Video End Time (time video stopped): 4:00pm      Sara Velasquez, PhD, LP    Coordinator, Transgender Health Services  [facility]  Department of Family Medicine and Community Health  [facility]

## 2020-07-23 ENCOUNTER — MYC MEDICAL ADVICE (OUTPATIENT)
Dept: OTHER | Facility: OUTPATIENT CENTER | Age: 55
End: 2020-07-23

## 2020-07-30 ENCOUNTER — VIRTUAL VISIT (OUTPATIENT)
Dept: OTHER | Facility: OUTPATIENT CENTER | Age: 55
End: 2020-07-30

## 2020-07-30 DIAGNOSIS — F64.0 GENDER DYSPHORIA IN ADOLESCENT AND ADULT: Primary | ICD-10-CM

## 2020-07-30 DIAGNOSIS — F43.22 ADJUSTMENT DISORDER WITH ANXIOUS MOOD: ICD-10-CM

## 2020-07-30 NOTE — PROGRESS NOTES
".Sunni Flores is a 55 year old adult who is being evaluated via a billable telephone visit.  Attempted video visit and encountered technology failure.     The patient has been notified of following:     \"This telephone visit will be conducted via a call between you and your physician/provider. We have found that certain health care needs can be provided without the need for an in-person physical exam.  This service lets us provide the care you need with a v?ly to your pharmacy.  If lab work is needed we can place an order for that and you can then stop by our lab to have the test done at a later time.    If during the course of the call the physician/provider feels a video visit is not appropriate, you will not be charged for this service.\"     Patient has given verbal consent for Video visit? Yes    Patient would like the video invitation sent by: Send to e-mail at: [email or web address]    Video Start Time: 3:00 PM    Sunni Flores complains of  No chief complaint on file.      I have reviewed and updated the patient's Past Medical History, Social History, Family History and Medication List.    ALLERGIES  Allopurinol and Clopidogrel    Additional provider notes:     [facility]  [address], [address]  [place] [address]  Outpatient Progress Note      Session Date: 7/30/20  Client Name: Sunni Flores (she/her pronouns)  YOB: 1965  MRN: [id number]  Provider: Aicha Velasquez, PhD,   Type of Session: Individual   Present in Session: Client only  Number of Minutes: 50  Treatment Plan Due: 9/18/2019    Health Maintenance Summary - Mental Health Treatment Plan       Status Date      MENTAL HEALTH TX PLAN Overdue 3/22/2020      Done 4/22/2019      Done 10/5/2018 See Scan     Done 9/18/2017           Current Symptoms/Status:   Sunni reports lifelong discomfort with her sex assigned at birth, and identifies as a woman. At intake, Sunni also noted concerns about " sexual functioning (erection/orgasm) though these have subsided as gender has been further explored. She indicated that, since her wife  in 2016, she increasingly desired to further explore gender concerns and has been supported in this by her current partner, Tawny. Sunni has taken steps to come out as transgender to her daughters and peer group, and has been met with support. She reports a desire to further explore options for social and medical transition.     Progress Toward Treatment Goals:   Continues social and medical transition, planning for surgery. Working on integration of self, negative self-talk and self-compassion, excited about upcoming surgery and also apprehensive about timing and scheduling    Intervention & Response:   Interpersonal, client-centered, and CBT techniques utilized to address client's current status.     Sunni stated she has been distressed about her mental health. Stated she has been more distractable, feeling more fuzzy, trailing off when talking, feeling lightheadedness, feeling vague. Stated she has been on effexor for a long time, and initially she had felt better, but she is worried she is getting more depressed. Stated she feels stuck in a rut and needs to change her routine. Stated she is frustrated by her current political and covid reality, struggling to be in it, feels tired.    Talked about her acceptance of coronavirus. Discussed the ways we are adapting and changing our realities to address the virus. Struggling to cope with being with herself and her patterns. Feeling fatigued a lot.     Stated she has decided to stay on the PFACS and she has resumed her electroylsis. She feels better since talking to Devin. Stated she has tried to lose weight but she cannot do more than she has been doing.    Stated exercising has been hard to do because she does not have a date for surgery. Stated it is hard for her to go to her partner with her feelings.Stated she misses people and  feels lonely.    Explored arousal, desire, history of trauma with pressure to perform sexually. Recommended muffing, self pleasuring, practicing being present.     Continues to work on self-compassion. Talked about coping with Covid and slowing down.     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       Plan / Need for Future Services:    Return for individual therapy in 2-3 weeks.           Aicha Velasquez, PhD, LP, CST      Video-Visit Details    Type of service:  Video Visit    Video End Time (time video stopped): 3:50pm      Sara Velasquez, PhD, LP    Coordinator, Transgender Health Services  [facility]  Department of Family Medicine and Community Health  [employer] [facility] [employer]

## 2020-08-04 ENCOUNTER — VIRTUAL VISIT (OUTPATIENT)
Dept: OTHER | Facility: OUTPATIENT CENTER | Age: 55
End: 2020-08-04

## 2020-08-04 VITALS — BODY MASS INDEX: 35.2 KG/M2 | SYSTOLIC BLOOD PRESSURE: 122 MMHG | WEIGHT: 239 LBS | DIASTOLIC BLOOD PRESSURE: 80 MMHG

## 2020-08-04 DIAGNOSIS — F64.0 GENDER DYSPHORIA IN ADOLESCENT AND ADULT: Primary | ICD-10-CM

## 2020-08-04 ASSESSMENT — ENCOUNTER SYMPTOMS
UNEXPECTED WEIGHT CHANGE: 0
FEVER: 0
CHEST TIGHTNESS: 0
CHILLS: 0
POLYDIPSIA: 0
ABDOMINAL PAIN: 0
FREQUENCY: 0
VOMITING: 0
NERVOUS/ANXIOUS: 0
WEAKNESS: 0
HEADACHES: 0
PALPITATIONS: 0
NUMBNESS: 0
DYSPHORIC MOOD: 1
LIGHT-HEADEDNESS: 1
SHORTNESS OF BREATH: 0

## 2020-08-04 NOTE — PROGRESS NOTES
"The patient has been notified of following:     \"This video visit will be conducted via a call between you and your physician/provider. We have found that certain health care needs can be provided without the need for an in-person physical exam.  This service lets us provide the care you need with a video conversation.  If a prescription is necessary we can send it directly to your pharmacy.  If lab work is needed we can place an order for that and you can then stop by our lab to have the test done at a later time.    If during the course of the call the physician/provider feels a video visit is not appropriate, you will not be charged for this service.\"     Patient has given verbal consent for Video visit? Yes    Patient would like the video invitation sent by: Send to e-mail at: [email or web address]    Video Start Time: 4:08 PM              KRISHNA Moeller is a 55 year old individual that uses pronouns She/Her/Hers/Herself that presents today for follow up of:  feminizing hormone therapy.   Alone or accompanied by: accompanied today by self  Gender identity: female  Started Hormone  therapy  2018  Continues on Vivelle dot 0.1 x 2 mg patch 2x/wk and Spironlactone 200* mg daily   Any special concerns today?    Has had increased depression since last visit, with Covid and delay of surgery; bottom surgery now likely late Oct or early November  Met with PCP and raised Effexor XR dose to 300 mg daily at least until surgery time, and then can consider Pristiq if needed  Had good meeting with Aicha, which was quite helpful  Feels spironolactone side effects of lightheadedness daily, especialy with sit to stand, working with  2x/wk and this gets in the way.   Feels should be less short of breath than with regular exercise than she she is. However, feels appropriately short of breath with  and working out on ellipitical at gym.    On hormones?  YES +++ Shot day of the week? Not applicable-taking " pills/patch/gel      Due for labs?  Yes      +++ Refills of meds needed?  Yes  Gender related body changes since last visit:   Stable, no significant change    Breakthrough bleeding? Yes    New health concerns since last visit:  ---none    No past surgical history on file.    Patient Active Problem List   Diagnosis     Gender dysphoria in adult     Obstructive sleep apnea syndrome     Generalized anxiety disorder     Moderate episode of recurrent major depressive disorder (H)       Current Outpatient Medications   Medication Sig Dispense Refill     diazepam (VALIUM) 10 MG tablet        estradiol (VIVELLE-DOT) 0.1 MG/24HR bi-weekly patch Place 2 patches onto the skin twice a week 16 patch 5     lidocaine-prilocaine (EMLA) 2.5-2.5 % external cream   2     methylfolate (DEPLIN) 15 MG TABS tablet Take 15 mg by mouth       spironolactone (ALDACTONE) 100 MG tablet Take 2 tablets (200 mg) by mouth daily 60 tablet 5     TRAZODONE HCL PO TAKE TWO TABLETS BY MOUTH DAILY AT BEDTIME        venlafaxine (EFFEXOR-XR) 75 MG 24 hr capsule Take 300 capsules by mouth daily        ibuprofen (ADVIL/MOTRIN) 400 MG tablet Take 2 tablets by mouth         History   Smoking Status     Never Smoker   Smokeless Tobacco     Never Used          Allergies   Allergen Reactions     Allopurinol Other (See Comments)     Patient is positive for HLA-B 58:01. Increased risk of allopurinol-induced severe cutaneous reactions.      Clopidogrel Other (See Comments)     Patient is a [id number] metabolizer. Clopidogrel would be predicted to lack efficacy       Health Maintenance Due   Topic Date Due     MENTAL HEALTH TX PLAN  03/22/2020         Problem, Medication and Allergy Lists were reviewed and are current..         Review of Systems:   Review of Systems   Constitutional: Negative for chills, fever and unexpected weight change.   Eyes: Negative for visual disturbance.   Respiratory: Negative for chest tightness and shortness of breath.    Cardiovascular:  Negative for chest pain, palpitations and leg swelling.   Gastrointestinal: Negative for abdominal pain and vomiting.   Endocrine: Negative for polydipsia and polyuria.   Genitourinary: Negative for frequency.   Neurological: Positive for light-headedness. Negative for weakness, numbness and headaches.   Psychiatric/Behavioral: Positive for dysphoric mood. Negative for suicidal ideas. The patient is not nervous/anxious.               Labs:   Results from last visit:  Orders Only on 08/05/2019   Component Date Value Ref Range Status     Sodium 08/05/2019 138  133 - 144 mmol/L Final     Potassium 08/05/2019 4.7  3.4 - 5.3 mmol/L Final     Chloride 08/05/2019 107  94 - 109 mmol/L Final     Carbon Dioxide 08/05/2019 23  20 - 32 mmol/L Final     Anion Gap 08/05/2019 8  3 - 14 mmol/L Final     Glucose 08/05/2019 85  70 - 99 mg/dL Final     Urea Nitrogen 08/05/2019 22  7 - 30 mg/dL Final     Creatinine 08/05/2019 1.46* 0.66 - 1.25 mg/dL Final     GFR Estimate 08/05/2019 54* >60 mL/min/[1.73_m2] Final    Comment: Non  GFR Calc  Starting 12/18/2018, serum creatinine based estimated GFR (eGFR) will be   calculated using the Chronic Kidney Disease Epidemiology Collaboration   (CKD-EPI) equation.       GFR Estimate If Black 08/05/2019 62  >60 mL/min/[1.73_m2] Final    Comment:  GFR Calc  Starting 12/18/2018, serum creatinine based estimated GFR (eGFR) will be   calculated using the Chronic Kidney Disease Epidemiology Collaboration   (CKD-EPI) equation.       Calcium 08/05/2019 9.1  8.5 - 10.1 mg/dL Final     Needs labs    EXAM:  Constitutional: healthy, alert and no distress   Respiratory: negative  Psychiatric: mentation appears normal, anxious and midly depressed   [place] PCP visit 7/31/2020 vitals copied forward:  Blood Pressure 122/84 07/31/2020 10:18 AM CDT     Pulse 80 07/31/2020 10:18 AM CDT     Temperature - -     Respiratory Rate - -     Oxygen Saturation - -     Inhaled Oxygen  Concentration - -     Weight 109 kg (239 lb 12.8 oz) 07/31/2020 10:18 AM CDT     Height - -     Body Mass Index 35.12         Assessment and Plan   1 . Gender dysphoria  Clinically appropriate response to current hormone dose, however lightheadedness enough to be troublesome  Discussed treatment options in light of upcoming bottom surgery  Reduce spironolactone to 100 mg daily, continue same estradiol dose  Labs: BMP, estradiol, testosterone, Vitamin D    Follow-up 4 months or prior to any surgery      Video-Visit Details    Type of service:  Video Visit    Video End Time (time video stopped): 424    Originating Location (pt. Location): Home    Distant Location (provider location):  [facility]     Mode of Communication:  Video Conference via video platform: [email or web address]    Hi Rodríguez MD        Results by [facility]  Questions were elicited and answered.     Hi Rodríguez MD

## 2020-08-06 ENCOUNTER — VIRTUAL VISIT (OUTPATIENT)
Dept: OTHER | Facility: OUTPATIENT CENTER | Age: 55
End: 2020-08-06

## 2020-08-06 DIAGNOSIS — F64.0 GENDER DYSPHORIA IN ADOLESCENT AND ADULT: Primary | ICD-10-CM

## 2020-08-06 DIAGNOSIS — F43.22 ADJUSTMENT DISORDER WITH ANXIOUS MOOD: ICD-10-CM

## 2020-08-06 NOTE — PROGRESS NOTES
".Sunni Flores is a 55 year old adult who is being evaluated via a billable telephone visit.  Attempted video visit and encountered technology failure.     The patient has been notified of following:     \"This telephone visit will be conducted via a call between you and your physician/provider. We have found that certain health care needs can be provided without the need for an in-person physical exam.  This service lets us provide the care you need with a v?ly to your pharmacy.  If lab work is needed we can place an order for that and you can then stop by our lab to have the test done at a later time.    If during the course of the call the physician/provider feels a video visit is not appropriate, you will not be charged for this service.\"     Patient has given verbal consent for Video visit? Yes    Patient would like the video invitation sent by: Send to e-mail at: [email or web address]    Video Start Time: 3:35 PM    Sunni Flores complains of  No chief complaint on file.      I have reviewed and updated the patient's Past Medical History, Social History, Family History and Medication List.    ALLERGIES  Allopurinol and Clopidogrel    Additional provider notes:     [facility]  [facility]  [address], [address]  [place] [address]  Outpatient Progress Note      Session Date: 8/06/20  Client Name: Sunni Flores (she/her pronouns)  YOB: 1965  MRN: [id number]  Provider: Aicha Velasquez, PhD,   Type of Session: Individual   Present in Session: Client only  Number of Minutes: 25min  Treatment Plan Due: 9/18/2019    Health Maintenance Summary - Mental Health Treatment Plan       Status Date      MENTAL HEALTH TX PLAN Overdue 3/22/2020      Done 4/22/2019      Done 10/5/2018 See Scan     Done 9/18/2017           Current Symptoms/Status:   Sunni reports lifelong discomfort with her sex assigned at birth, and identifies as a woman. At intake, Sunni also noted concerns about " sexual functioning (erection/orgasm) though these have subsided as gender has been further explored. She indicated that, since her wife  in 2016, she increasingly desired to further explore gender concerns and has been supported in this by her current partner, Tawny. Sunni has taken steps to come out as transgender to her daughters and peer group, and has been met with support. She reports a desire to further explore options for social and medical transition.     Progress Toward Treatment Goals:   Continues social and medical transition, planning for surgery. Working on integration of self, negative self-talk and self-compassion, excited about upcoming surgery and also apprehensive about timing and scheduling    Intervention & Response:   Interpersonal, client-centered, and CBT techniques utilized to address client's current status.     Sunni stated she has been distressed about having video visits and wants to be in person. Talked about how important for her to be able to come to the clinic and be dressed and present as female. Talked about how important it was for her dysphoria and her identity development.     Stated she has been feeling better since her therapy appointment last week. Stated she had adjusted her meds and is feeling better about that too.    Talked about her acceptance of coronavirus. Discussed the ways we are adapting and changing our realities to address the virus. Struggling to cope with being with herself and her patterns. Feeling fatigued a lot.     Stated she has decided to stay on the PFACS and she has resumed her electroylsis. She feels better since talking to Devin. Stated she has tried to lose weight but she cannot do more than she has been doing.    Stated exercising has been hard to do because she does not have a date for surgery. Stated it is hard for her to go to her partner with her feelings.Stated she misses people and feels lonely.    Explored arousal, desire, history of trauma  with pressure to perform sexually. Recommended muffing, self pleasuring, practicing being present.     Continues to work on self-compassion. Talked about coping with Covid and slowing down.     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       Plan / Need for Future Services:    Return for individual therapy in 2-3 weeks.           Aicha Velasquez, PhD, LP, CST      Video-Visit Details    Type of service:  Video Visit    Video End Time (time video stopped): 4:00pm      Sara Velasquez, PhD, LP    Coordinator, Transgender Health Services  [facility]  Department of Family Medicine and Community Health  [employer] [facility] [employer]

## 2020-08-07 ENCOUNTER — MYC MEDICAL ADVICE (OUTPATIENT)
Dept: OTHER | Facility: OUTPATIENT CENTER | Age: 55
End: 2020-08-07

## 2020-08-11 LAB
25 OH VIT D TOTAL: 48 NG/ML
25 OH VIT D3: 48 NG/ML
ANION GAP SERPL CALCULATED.3IONS-SCNC: 9 MMOL/L
ANION GAP SERPL CALCULATED.3IONS-SCNC: 9 MMOL/L
BUN SERPL-MCNC: 27 MG/DL (ref 6–21)
BUN SERPL-MCNC: 27 MG/DL (ref 6–21)
CALCIUM SERPL-MCNC: 9.1 MG/DL
CALCIUM SERPL-MCNC: 9.1 MG/DL
CHLORIDE SERPLBLD-SCNC: 105 MMOL/L
CHLORIDE SERPLBLD-SCNC: 105 MMOL/L
CO2 SERPL-SCNC: 22 MMOL/L
CREAT SERPL-MCNC: 1.45 MG/DL (ref 0.59–1.04)
CREAT SERPL-MCNC: 1.45 MG/DL (ref 0.59–1.04)
ESTRADIOL SERPL-MCNC: 65 PG/ML
GFR SERPL CREATININE-BSD FRML MDRD: 41 ML/MIN/1.73M2
GFR SERPL CREATININE-BSD FRML MDRD: ABNORMAL ML/MIN/{1.73_M2}
GLUCOSE SERPL-MCNC: ABNORMAL MG/DL (ref 70–99)
GLUCOSE SERPL-MCNC: ABNORMAL MG/DL (ref 70–99)
HCO3 SERPL-SCNC: 22 MMOL/L
POTASSIUM SERPL-SCNC: 4.9 MMOL/L
POTASSIUM SERPL-SCNC: 4.9 MMOL/L
SODIUM SERPL-SCNC: 136 MMOL/L
SODIUM SERPL-SCNC: 136 MMOL/L
TESTOST SERPL-MCNC: 9.6 NG/DL
VITAMIN D2 SERPL-MCNC: <4 NG/ML

## 2020-08-21 NOTE — TELEPHONE ENCOUNTER
FUTURE VISIT INFORMATION      SURGERY INFORMATION:    H&P - Dr. Dawson - KEVIN 10/29    RECORDS REQUESTED FROM:       Primary Care Provider: Paul Swift M.D.  - [place]    Pertinent Medical History: anita

## 2020-08-28 NOTE — RESULT ENCOUNTER NOTE
"Dear Sunni,     Here are your recent results which are within the expected range. Your  BUN and creatinine tests (kidney function) are stable--normal levels change with \"male\" vs \"female\".  We will monitor as you get older. Please continue with your current plan of care and let us know if you have any questions or concerns.    Regards,  Hi Rodríguez MD"

## 2020-09-03 ENCOUNTER — VIRTUAL VISIT (OUTPATIENT)
Dept: OTHER | Facility: OUTPATIENT CENTER | Age: 55
End: 2020-09-03

## 2020-09-03 DIAGNOSIS — F64.0 GENDER DYSPHORIA IN ADOLESCENT AND ADULT: Primary | ICD-10-CM

## 2020-09-03 DIAGNOSIS — F43.22 ADJUSTMENT DISORDER WITH ANXIOUS MOOD: ICD-10-CM

## 2020-09-03 NOTE — PROGRESS NOTES
Video start time: 3:09pm  Video end time: 4pm    Telemedicine Visit: The patient's condition can be safely assessed and treated via synchronous audio and visual telemedicine encounter.      Reason for Telemedicine Visit: COVID 19    Originating Site (Patient Location): Patient's home    Distant Site (Provider Location): Provider Remote Setting    Consent:  The patient/guardian has verbally consented to: the potential risks and benefits of telemedicine (video visit) versus in person care; bill my insurance or make self-payment for services provided; and responsibility for payment of non-covered services.     Mode of Communication:  Video Conference via [employer]    As the provider I attest to compliance with applicable laws and regulations related to telemedicine.          [facility]  [facility]  [address], [address]  [place] [address]  Outpatient Progress Note      Session Date: 20  Client Name: Sunni Flores (she/her pronouns)  YOB: 1965  MRN: [id number]  Provider: Aicha Velasquez, PhD,   Type of Session: Individual   Present in Session: Client only  Number of Minutes: 51min  Treatment Plan Due: 2019    Health Maintenance Summary - Mental Health Treatment Plan       Status Date      MENTAL HEALTH TX PLAN Overdue 3/22/2020      Done 2019      Done 10/5/2018 See Scan     Done 2017           Current Symptoms/Status:   Sunni reports lifelong discomfort with her sex assigned at birth, and identifies as a woman. At intake, Sunni also noted concerns about sexual functioning (erection/orgasm) though these have subsided as gender has been further explored. She indicated that, since her wife  in , she increasingly desired to further explore gender concerns and has been supported in this by her current partner, Tawny. Sunni has taken steps to come out as transgender to her daughters and peer group, and has been met with support. She reports a  desire to further explore options for social and medical transition.     Progress Toward Treatment Goals:   Continues social and medical transition, planning for surgery. Working on integration of self, negative self-talk and self-compassion, excited about upcoming surgery and also apprehensive about timing and scheduling    Intervention & Response:   Interpersonal, client-centered, and CBT techniques utilized to address client's current status.     Sunni stated she has been distressed about having video visits and wants to be in person. Talked about how important for her to be able to come to the clinic and be dressed and present as female. Talked about how important it was for her dysphoria and her identity development.     Stated she had found a book her wife had in her office about being in a sexless marriage. Stated she feels self critical of herself in her inability to confront her gender and sexuality in the past. Stated she was intimidated by sexual performance and so did not initiate sex. Stated she felt she was selfish by not confronting her problems.     Stated she has decided to stay on the PFACS and she has resumed her electroylsis. She feels better since talking to Devin. Stated she has tried to lose weight but she cannot do more than she has been doing.    Explored arousal, desire, history of trauma with pressure to perform sexually. Recommended muffing, self pleasuring, practicing being present.     Continues to work on self-compassion. Talked about coping with Covid and slowing down.     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       Plan / Need for Future Services:    Return for individual therapy in 2-3 weeks.           Aicha Velasqeuz, PhD, LP, CST      Video-Visit Details    Type of service:   Video Visit    Video End Time (time video stopped): 4:00pm      Sara Velasquez, PhD, LP    Coordinator, Transgender Health Services  [facility], [facility]  Department of Family Medicine and Community Health  [facility]

## 2020-09-18 ENCOUNTER — DOCUMENTATION ONLY (OUTPATIENT)
Dept: PLASTIC SURGERY | Facility: CLINIC | Age: 55
End: 2020-09-18

## 2020-09-18 NOTE — PROGRESS NOTES
Sent message to patient regarding surgery being officially scheduled with the OR. Patient and writer have been discussing via [employer]. Patient is aware surgery has been scheduled and is aware of all appointments as surgery packet has been provided.

## 2020-10-01 ENCOUNTER — VIRTUAL VISIT (OUTPATIENT)
Dept: PSYCHOLOGY | Facility: CLINIC | Age: 55
End: 2020-10-01
Payer: COMMERCIAL

## 2020-10-01 DIAGNOSIS — F64.0 GENDER DYSPHORIA IN ADULT: Primary | ICD-10-CM

## 2020-10-01 DIAGNOSIS — F41.1 GENERALIZED ANXIETY DISORDER: ICD-10-CM

## 2020-10-01 PROCEDURE — 90837 PSYTX W PT 60 MINUTES: CPT | Mod: GT | Performed by: PSYCHOLOGIST

## 2020-10-01 PROCEDURE — 99207 PR NO CHARGE LOS: CPT | Performed by: PSYCHOLOGIST

## 2020-10-20 ENCOUNTER — PATIENT OUTREACH (OUTPATIENT)
Dept: PLASTIC SURGERY | Facility: CLINIC | Age: 55
End: 2020-10-20

## 2020-10-20 NOTE — PATIENT INSTRUCTIONS
Left message for pt regarding upcoming surgery. Provided direct contact information and requested call back to discuss. Marguerite JOSHI RNCC  Spoke with pt and explained that Dr. Dawson would like to see her for an earlier pre-op on 10/23/20 at 10am. Pt states understanding and denies any additional questions or concerns. Marguerite JOSHI RNCC

## 2020-10-23 ENCOUNTER — OFFICE VISIT (OUTPATIENT)
Dept: PLASTIC SURGERY | Facility: CLINIC | Age: 55
End: 2020-10-23
Payer: COMMERCIAL

## 2020-10-23 VITALS
HEIGHT: 69 IN | DIASTOLIC BLOOD PRESSURE: 81 MMHG | HEART RATE: 85 BPM | BODY MASS INDEX: 35.84 KG/M2 | SYSTOLIC BLOOD PRESSURE: 135 MMHG | OXYGEN SATURATION: 100 % | TEMPERATURE: 98.8 F | WEIGHT: 242 LBS

## 2020-10-23 DIAGNOSIS — F64.0 GENDER DYSPHORIA IN ADULT: Primary | ICD-10-CM

## 2020-10-23 PROBLEM — N60.19 FIBROCYSTIC BREAST CHANGES: Status: ACTIVE | Noted: 2019-12-21

## 2020-10-23 PROCEDURE — 99213 OFFICE O/P EST LOW 20 MIN: CPT | Performed by: PLASTIC SURGERY

## 2020-10-23 ASSESSMENT — MIFFLIN-ST. JEOR: SCORE: 1762.08

## 2020-10-23 ASSESSMENT — PAIN SCALES - GENERAL: PAINLEVEL: NO PAIN (0)

## 2020-10-23 NOTE — LETTER
"10/23/2020       RE: Sunni Flores  [address] [facility] [address]     Dear Colleague,    Thank you for referring your patient, Sunni Flores, to the [facility] PLASTIC AND RECONSTRUCTIVE SURGERY CLINIC [place] at [facility]. Please see a copy of my visit note below.    Patient returns for a preoperative visit prior to undergoing vaginal plasty for gender dysphoria.    INTERVAL HISTORY: Patient has been trying her best to lose weight prior to surgery.  She continues to be interested in vaginal plasty.    PHYSICAL EXAMINATION:  /81 (BP Location: Left arm, Patient Position: Chair, Cuff Size: Adult Regular)   Pulse 85   Temp 98.8  F (37.1  C) (Oral)   Ht 1.753 m (5' 9\")   Wt 109.8 kg (242 lb)   SpO2 100%   BMI 35.74 kg/m    Genital examination was performed in the presence of a chaperone.  This revealed an obese body habitus to the mons area.  Penis is circumcised.  Shaft length measures 4 cm in length.  2 testicles were palpable within a small size scrotum.  Skin pinch is 3 cm or greater in all areas.    ASSESSMENT: Gender dysphoria, requesting vaginoplasty.  Obesity with BMI of 35.    PLAN: We had a jean claude and thorough discussion regarding penile inversion vaginoplasty today.  I explained to the patient that weight loss is a necessary requirement prior to vaginoplasty to decrease the risk of vaginal prolapse, contour deformity, wound healing difficulties, infection, vaginal shrinkage, vaginal stenosis, and the need for a revision surgery.  Unfortunately, patient's BMI increased from 33 to 35 since I last saw her.  I explained to the patient that either she loses weight prior to rescheduling a standard penile inversion vaginoplasty versus continues with the currently scheduled date for next week with minimal depth vaginoplasty given that minimal depth vaginoplasty avoids the many risks associated with penile inversion vaginoplasty.  Patient at this time cannot " make this decision.  She will need some time to think about this.    She was severely agitated and visibly upset during our discussion.  I will request that she speak to her therapist regarding this discussion.  For today, patient will discuss these issues with our patient care coordinator.  I will have our nursing team reach out to the patient next week on Monday to discuss with her what her decision is.    Total time spent with patient was 15 min of which greater than 50% was in counseling.      Again, thank you for allowing me to participate in the care of your patient.      Sincerely,    Tank Dawson MD

## 2020-10-23 NOTE — LETTER
"10/23/2020       RE: Sunni Flores  [address] [facility] [address]     Dear Colleague,    Thank you for referring your patient, Sunni Flores, to the [facility] PLASTIC AND RECONSTRUCTIVE SURGERY CLINIC [place] at [facility]. Please see a copy of my visit note below.    Patient returns for a preoperative visit prior to undergoing vaginal plasty for gender dysphoria.    INTERVAL HISTORY: Patient has been trying her best to lose weight prior to surgery.  She continues to be interested in vaginal plasty.    PHYSICAL EXAMINATION:  /81 (BP Location: Left arm, Patient Position: Chair, Cuff Size: Adult Regular)   Pulse 85   Temp 98.8  F (37.1  C) (Oral)   Ht 1.753 m (5' 9\")   Wt 109.8 kg (242 lb)   SpO2 100%   BMI 35.74 kg/m    Genital examination was performed in the presence of a chaperone.  This revealed an obese body habitus to the mons area.  Penis is circumcised.  Shaft length measures 4 cm in length.  2 testicles were palpable within a small size scrotum.  Skin pinch is 3 cm or greater in all areas.    ASSESSMENT: Gender dysphoria, requesting vaginoplasty.  Obesity with BMI of 35.    PLAN: We had a jean claude and thorough discussion regarding penile inversion vaginoplasty today.  I explained to the patient that weight loss is a necessary requirement prior to vaginoplasty to decrease the risk of vaginal prolapse, contour deformity, wound healing difficulties, infection, vaginal shrinkage, vaginal stenosis, and the need for a revision surgery.  Unfortunately, patient's BMI increased from 33 to 35 since I last saw her.  I explained to the patient that either she loses weight prior to rescheduling a standard penile inversion vaginoplasty versus continues with the currently scheduled date for next week with minimal depth vaginoplasty given that minimal depth vaginoplasty avoids the many risks associated with penile inversion vaginoplasty.  Patient at this time cannot " make this decision.  She will need some time to think about this.        She was severely agitated and visibly upset during our discussion.  I will request that she speak to her therapist regarding this discussion.  For today, patient will discuss these issues with our patient care coordinator.  I will have our nursing team reach out to the patient next week on Monday to discuss with her what her decision is.    Total time spent with patient was 15 min of which greater than 50% was in counseling.      Again, thank you for allowing me to participate in the care of your patient.  Sincerely,    Tank Dawson MD

## 2020-10-23 NOTE — NURSING NOTE
"Chief Complaint   Patient presents with     RECHECK     preop DOS 10/29       Vitals:    10/23/20 0937   BP: 135/81   BP Location: Left arm   Patient Position: Chair   Cuff Size: Adult Regular   Pulse: 85   Temp: 98.8  F (37.1  C)   TempSrc: Oral   SpO2: 100%   Weight: 109.8 kg (242 lb)   Height: 1.753 m (5' 9\")       Body mass index is 35.74 kg/m .    Krunal Barker, EMT    "

## 2020-10-23 NOTE — PROGRESS NOTES
"Patient returns for a preoperative visit prior to undergoing vaginal plasty for gender dysphoria.    INTERVAL HISTORY: Patient has been trying her best to lose weight prior to surgery.  She continues to be interested in vaginal plasty.    PHYSICAL EXAMINATION:  /81 (BP Location: Left arm, Patient Position: Chair, Cuff Size: Adult Regular)   Pulse 85   Temp 98.8  F (37.1  C) (Oral)   Ht 1.753 m (5' 9\")   Wt 109.8 kg (242 lb)   SpO2 100%   BMI 35.74 kg/m    Genital examination was performed in the presence of a chaperone.  This revealed an obese body habitus to the mons area.  Penis is circumcised.  Shaft length measures 4 cm in length.  2 testicles were palpable within a small size scrotum.  Skin pinch is 3 cm or greater in all areas.    ASSESSMENT: Gender dysphoria, requesting vaginoplasty.  Obesity with BMI of 35.    PLAN: We had a jean claude and thorough discussion regarding penile inversion vaginoplasty today.  I explained to the patient that weight loss is a necessary requirement prior to vaginoplasty to decrease the risk of vaginal prolapse, contour deformity, wound healing difficulties, infection, vaginal shrinkage, vaginal stenosis, and the need for a revision surgery.  Unfortunately, patient's BMI increased from 33 to 35 since I last saw her.  I explained to the patient that either she loses weight prior to rescheduling a standard penile inversion vaginoplasty versus continues with the currently scheduled date for next week with minimal depth vaginoplasty given that minimal depth vaginoplasty avoids the many risks associated with penile inversion vaginoplasty.  Patient at this time cannot make this decision.  She will need some time to think about this.    She was severely agitated and visibly upset during our discussion.  I will request that she speak to her therapist regarding this discussion.  For today, patient will discuss these issues with our patient care coordinator.  I will have our nursing " team reach out to the patient next week on Monday to discuss with her what her decision is.    Total time spent with patient was 15 min of which greater than 50% was in counseling.

## 2020-10-27 ENCOUNTER — PRE VISIT (OUTPATIENT)
Dept: SURGERY | Facility: CLINIC | Age: 55
End: 2020-10-27

## 2020-10-27 ENCOUNTER — VIRTUAL VISIT (OUTPATIENT)
Dept: FAMILY MEDICINE | Facility: CLINIC | Age: 55
End: 2020-10-27
Payer: COMMERCIAL

## 2020-10-27 DIAGNOSIS — F64.0 GENDER DYSPHORIA IN ADULT: Primary | ICD-10-CM

## 2020-10-27 PROCEDURE — 99213 OFFICE O/P EST LOW 20 MIN: CPT | Mod: 95 | Performed by: FAMILY MEDICINE

## 2020-10-27 ASSESSMENT — ENCOUNTER SYMPTOMS
NERVOUS/ANXIOUS: 0
FREQUENCY: 0
LIGHT-HEADEDNESS: 0
CHILLS: 0
WEAKNESS: 0
SHORTNESS OF BREATH: 0
UNEXPECTED WEIGHT CHANGE: 0
FEVER: 0
PALPITATIONS: 0
POLYDIPSIA: 0
ABDOMINAL PAIN: 0
VOMITING: 0
CHEST TIGHTNESS: 0
HEADACHES: 0
DYSPHORIC MOOD: 1
NUMBNESS: 0

## 2020-10-27 NOTE — NURSING NOTE
Chart reviewed with patient and updated    Recent surgery canceled due to weight /     Wants to restart HRT      Mira Turner,CMA

## 2020-10-27 NOTE — PROGRESS NOTES
"The patient has been notified of following:     \"This video visit will be conducted via a call between you and your physician/provider. We have found that certain health care needs can be provided without the need for an in-person physical exam.  This service lets us provide the care you need with a video conversation.  If a prescription is necessary we can send it directly to your pharmacy.  If lab work is needed we can place an order for that and you can then stop by our lab to have the test done at a later time.    If during the course of the call the physician/provider feels a video visit is not appropriate, you will not be charged for this service.\"     Patient has given verbal consent for Video visit? Yes    Patient would like the video invitation sent by: Send to e-mail at: [email or web address]    Video Start Time: 300              HPI     Sunni is a 55 year old individual that uses pronouns She/Her/Hers/Herself that presents today for follow up of:  feminizing hormone therapy.   Alone or accompanied by: accompanied today by self  Gender identity: female  Started Hormone  therapy  2018  Continues on Vivelle dot 0.1 x 2 mg patch 2x/wk and Spironlactone 200* mg daily   Any special concerns today?    Was supposed to have vaginoplasty last week, but Dr. Dawson felt weight too high, now asking for BMI 30 (would be weight around 200 lb), but had been 231 lb when okay'd back 12/2019.  Had stopped hormone medications x 2 weeks in preparation for surgery, and has restarted them on 10/24.   Had hot flashes when off hormones  Waking up with morning erections since had stopped and then restarted hormones  On hormones?  YES +++ Shot day of the week? Not applicable-taking pills/patch/gel      Due for labs?  Yes      +++ Refills of meds needed?  Yes  Gender related body changes since last visit: stable    Breakthrough bleeding? Does Not Apply    New health concerns since last visit:  ---none--depressed with new developments re: " surgery    No past surgical history on file.    Patient Active Problem List   Diagnosis     Gender dysphoria in adult     Obstructive sleep apnea syndrome     Generalized anxiety disorder     Moderate episode of recurrent major depressive disorder (H)     Fibrocystic breast changes       Current Outpatient Medications   Medication Sig Dispense Refill     diazepam (VALIUM) 10 MG tablet        estradiol (VIVELLE-DOT) 0.1 MG/24HR bi-weekly patch Place 2 patches onto the skin twice a week 16 patch 5     ibuprofen (ADVIL/MOTRIN) 400 MG tablet Take 2 tablets by mouth       lidocaine-prilocaine (EMLA) 2.5-2.5 % external cream   2     methylfolate (DEPLIN) 15 MG TABS tablet Take 15 mg by mouth       spironolactone (ALDACTONE) 100 MG tablet Take 2 tablets (200 mg) by mouth daily 60 tablet 5     TRAZODONE HCL PO TAKE TWO TABLETS BY MOUTH DAILY AT BEDTIME        venlafaxine (EFFEXOR-XR) 75 MG 24 hr capsule Take 300 capsules by mouth daily          History   Smoking Status     Never Smoker   Smokeless Tobacco     Never Used          Allergies   Allergen Reactions     Allopurinol Other (See Comments)     Patient is positive for HLA-B 58:01. Increased risk of allopurinol-induced severe cutaneous reactions.      Clopidogrel Other (See Comments)     Patient is a [id number] metabolizer. Clopidogrel would be predicted to lack efficacy       Health Maintenance Due   Topic Date Due     MENTAL HEALTH TX PLAN  03/22/2020         Problem, Medication and Allergy Lists were reviewed and are current..         Review of Systems:   Review of Systems   Constitutional: Negative for chills, fever and unexpected weight change.   Eyes: Negative for visual disturbance.   Respiratory: Negative for chest tightness and shortness of breath.    Cardiovascular: Negative for chest pain, palpitations and leg swelling.   Gastrointestinal: Negative for abdominal pain and vomiting.   Endocrine: Negative for polydipsia and polyuria.   Genitourinary: Negative for  frequency.   Neurological: Negative for weakness, light-headedness, numbness and headaches.   Psychiatric/Behavioral: Positive for dysphoric mood. Negative for suicidal ideas. The patient is not nervous/anxious.               Labs:   Results from last visit:  Virtual Visit on 08/04/2020   Component Date Value Ref Range Status     Sodium 08/11/2020 136  mmol/L Final     Potassium 08/11/2020 4.9  mmol/L Final     Chloride 08/11/2020 105  mmol/L Final     Carbon Dioxide 08/11/2020 22  mmol/L Final     Anion Gap 08/11/2020 9  mmol/L Final     Glucose 08/11/2020    Final    Canceled     Urea Nitrogen 08/11/2020 27* 6 - 21 mg/dL Final     Creatinine 08/11/2020 1.45* 0.59 - 1.04 mg/dL Final     Calcium 08/11/2020 9.1  mg/dL Final     Sodium 08/11/2020 136  mmol/L Final     Potassium 08/11/2020 4.9  mmol/L Final     Chloride 08/11/2020 105  mmol/L Final     Bicarbonate Blood 08/11/2020 22   Final     Anion Gap 08/11/2020 9  mmol/L Final     Urea Nitrogen 08/11/2020 27* 6 - 21 mg/dL Final     Creatinine 08/11/2020 1.45* 0.59 - 1.04 mg/dL Final     Calcium 08/11/2020 9.1  mg/dL Final     GFR Estimate 08/11/2020 41  >=60 ml/min/1.73m2 Final     Estradiol 08/11/2020 65   Final     Testosterone Total 08/11/2020 9.6  ng/dL Final     25 OH Vit D2 08/11/2020 <4.0  ng/ml Final     25 OH Vit D3 08/11/2020 48  ng/ml Final     25 OH Vit D total 08/11/2020 48  ng/ml Final         EXAM:  Constitutional: healthy, alert and no distress   Respiratory: negative  Psychiatric: mentation appears normal, tearful when discussing change in surgery plans    Assessment and Plan   1. Gender dysphoria   Continue on current dose of hormones; counseled that erections will reduce/resolve as hormone levels stabilize  Recommend that she may wish to discuss recent experience with Care Coordinator Devin Hawkins, and have him review case with Dr. Dawson  Recommend consider other interventions she may wish to pursue (orchiectomy, etc) while working on weight  loss.    Follow-up in 4-6 months and as needed    Video-Visit Details    Type of service:  Video Visit    Video End Time (time video stopped): 3: 24    Originating Location (pt. Location): Home    Distant Location (provider location):  [facility] SEXUAL HEALTH     Mode of Communication:  Video Conference via video platform: Doxy.laurie Rodríguez MD        Results by [facility]  Questions were elicited and answered.     Hi Rodríguez MD

## 2020-10-30 DIAGNOSIS — F64.0 GENDER DYSPHORIA IN ADULT: Primary | ICD-10-CM

## 2020-11-05 ENCOUNTER — VIRTUAL VISIT (OUTPATIENT)
Dept: PSYCHOLOGY | Facility: CLINIC | Age: 55
End: 2020-11-05
Payer: COMMERCIAL

## 2020-11-05 DIAGNOSIS — F43.22 ADJUSTMENT DISORDER WITH ANXIOUS MOOD: ICD-10-CM

## 2020-11-05 DIAGNOSIS — F64.0 GENDER DYSPHORIA IN ADULT: Primary | ICD-10-CM

## 2020-11-05 PROCEDURE — 90834 PSYTX W PT 45 MINUTES: CPT | Mod: 95 | Performed by: PSYCHOLOGIST

## 2020-11-05 PROCEDURE — 99207 PR NO CHARGE LOS: CPT | Performed by: PSYCHOLOGIST

## 2020-11-05 NOTE — PROGRESS NOTES
Video start time: 3:09pm  Video end time: 3:59pm    Telemedicine Visit: The patient's condition can be safely assessed and treated via synchronous audio and visual telemedicine encounter.      Reason for Telemedicine Visit: COVID 19    Originating Site (Patient Location): Patient's home    Distant Site (Provider Location): Provider Remote Setting    Consent:  The patient/guardian has verbally consented to: the potential risks and benefits of telemedicine (video visit) versus in person care; bill my insurance or make self-payment for services provided; and responsibility for payment of non-covered services.     Mode of Communication:  Video Conference via [employer]    As the provider I attest to compliance with applicable laws and regulations related to telemedicine.          Program in Human Sexuality  [facility]  [address], [address]  [place] [address]  Outpatient Progress Note      Session Date: 20  Client Name: Sunni Flores (she/her pronouns)  YOB: 1965  MRN: [id number]  Provider: Aicha Velasquez, PhD, LP  Type of Session: Individual   Present in Session: Clent only  Number of Minutes: 50min  Treatment Plan Due: 2019    Health Maintenance Summary - Mental Health Treatment Plan       Status Date      MENTAL HEALTH TX PLAN Overdue 3/22/2020      Done 2019      Done 10/5/2018 See Scan     Done 2017           Current Symptoms/Status:   Sunni reports lifelong discomfort with her sex assigned at birth, and identifies as a woman. At intake, Sunni also noted concerns about sexual functioning (erection/orgasm) though these have subsided as gender has been further explored. She indicated that, since her wife  in , she increasingly desired to further explore gender concerns and has been supported in this by her current partner, Tawny. Sunni has taken steps to come out as transgender to her daughters and peer group, and has been met with support. She reports  a desire to further explore options for social and medical transition.     Progress Toward Treatment Goals:   Continues social and medical transition, planning for surgery. Working on integration of self, negative self-talk and self-compassion, excited about upcoming surgery and also apprehensive about timing and scheduling    Intervention & Response:   Interpersonal, client-centered, and CBT techniques utilized to address client's current status.     Sunni stated she has been anxious about the election and waiting for the results. STated she is cautiously hopeful.     Stated she has lost weight and started a weight loss program. Stated she is down to 220lbs. Stated she realized she had never tried to imagine herself weighing less and losing weight. Stated she is working with [facility] on a weight loss program. Stated she is on a new program and it is going well. Stated her short term goal is to lose weight for surgery and her long term goal is to lose weight for her health for the rest of her life. Stated she feels she has been able to accept herself better since having to readjust to a new surgery date.     Explored arousal, desire, history of trauma with pressure to perform sexually. Recommended muffing, self pleasuring, practicing being present.     Continues to work on self-compassion. Talked about coping with Covid and slowing down.     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       Plan / Need for Future Services:    Return for individual therapy in 2-3 weeks.           Aicha Velasquez, PhD, LP, CST      Video-Visit Details    Type of service:  Video Visit    Video End Time (time video stopped): 3:59pm      Sara Velasquez, PhD, LP    Coordinator, Transgender Health  Services  Program in Human Sexuality, [facility]  Department of Family Medicine and Community Health  [facility]

## 2020-11-18 ENCOUNTER — THERAPY VISIT (OUTPATIENT)
Dept: PHYSICAL THERAPY | Facility: CLINIC | Age: 55
End: 2020-11-18
Payer: COMMERCIAL

## 2020-11-18 DIAGNOSIS — F64.0 GENDER DYSPHORIA IN ADULT: ICD-10-CM

## 2020-11-18 DIAGNOSIS — M99.05 SOMATIC DYSFUNCTION OF PELVIS REGION: ICD-10-CM

## 2020-11-18 PROCEDURE — 97112 NEUROMUSCULAR REEDUCATION: CPT | Mod: GP | Performed by: PHYSICAL THERAPIST

## 2020-11-18 PROCEDURE — 97161 PT EVAL LOW COMPLEX 20 MIN: CPT | Mod: GP | Performed by: PHYSICAL THERAPIST

## 2020-11-18 PROCEDURE — 97110 THERAPEUTIC EXERCISES: CPT | Mod: GP | Performed by: PHYSICAL THERAPIST

## 2020-11-18 NOTE — PROGRESS NOTES
[facility] Initial Evaluation  Subjective:  The history is provided by the patient. No  was used.   Therapist Generated HPI Evaluation  Problem details: Wong presents before vaginoplasty.  Surgery was scheduled but had to lose weight and then COVID got in the way.  Was scheduled for the end of October. Has been working out at the gym.  Wasn't paying attention to weight and gained weight so surgery was called off. Now on a strict diet and reports feeling exhausted due to calorie restriction.  MD placed a referral for pre surgical PT on 10/28/2020.  Has a follow up appointment in January with MD.  Patient reports MD wants BMI of less than 30.    Patient is a retired .  Lives in [place] with partner and has two college aged daughters..  .         Type of problem:  Pelvic dysfunction.          Site of Pain: none currently.                                       Objective:  System                                 Pelvic Dysfunction Evaluation:    Bladder/Pelvic Problems:  Pre-surgical pelvic muscle program          Diagnostic Tests:  none                        Flexibility:    Tightness present at:Adductors    Abdominal Wall:  normal        Pelvic Clock Exam:  normal            SI Provocation:  NA        Reflex Testing:  normal    External Assessment:    Skin Condition:  Normal          Muscle Contraction/Perineal Mobility:  Elevation and urogential triangle descent  Internal Assessment:  NA              SEMG Biofeedback:    Equipment:  Mr20 with computer    Suraface electrode placement--Perianal:  X  Baseline EMG PM:  Initally at 5.0uv and decreased to 2.0uv by the end of the treatment        EMG interpretation to fatigue:  3-5 seconds  Position:  Sidelying          Hip Evaluation  HIP AROM:  AROM:   Left Hip:     Normal    Right Hip:                                       General     ROS    Assessment/Plan:    Patient is a 55 year old adult with pelvic complaints.     Patient has the following significant findings with corresponding treatment plan.                Diagnosis 1:  Pre surgical instruction  Decreased ROM/flexibility - therapeutic exercise and home program  Decreased strength - therapeutic exercise, therapeutic activities and home program  Impaired muscle performance - biofeedback, neuro re-education and home program    Therapy Evaluation Codes:   1) History comprised of:   Personal factors that impact the plan of care:      None.    Comorbidity factors that impact the plan of care are:      None.     Medications impacting care: None.  2) Examination of Body Systems comprised of:   Body structures and functions that impact the plan of care:      Pelvis.   Activity limitations that impact the plan of care are:      pre surgical for voiding and dilation.  3) Clinical presentation characteristics are:   Stable/Uncomplicated.  4) Decision-Making    Low complexity using standardized patient assessment instrument and/or measureable assessment of functional outcome.  Cumulative Therapy Evaluation is: Low complexity.    Previous and current functional limitations:  (See Goal Flow Sheet for this information)    Short term and Long term goals: (See Goal Flow Sheet for this information)     Communication ability:  Patient appears to be able to clearly communicate and understand verbal and written communication and follow directions correctly.  Treatment Explanation - The following has been discussed with the patient:   RX ordered/plan of care  Anticipated outcomes  Possible risks and side effects  This patient would benefit from PT intervention to resume normal activities.   Rehab potential is excellent.    Frequency:  One visit  Duration:  One visit  Discharge Plan:  Independent in home treatment program.  Reach maximal therapeutic benefit.    Please refer to the daily flowsheet for treatment today, total treatment time and time spent performing 1:1 timed codes.

## 2020-11-19 ENCOUNTER — VIRTUAL VISIT (OUTPATIENT)
Dept: PSYCHOLOGY | Facility: CLINIC | Age: 55
End: 2020-11-19
Payer: COMMERCIAL

## 2020-11-19 DIAGNOSIS — F64.0 GENDER DYSPHORIA IN ADULT: Primary | ICD-10-CM

## 2020-11-19 DIAGNOSIS — F33.1 MODERATE EPISODE OF RECURRENT MAJOR DEPRESSIVE DISORDER (H): ICD-10-CM

## 2020-11-19 PROCEDURE — 90837 PSYTX W PT 60 MINUTES: CPT | Mod: 95 | Performed by: PSYCHOLOGIST

## 2020-11-19 PROCEDURE — 99207 PR NO CHARGE LOS: CPT | Performed by: PSYCHOLOGIST

## 2020-11-19 NOTE — PROGRESS NOTES
Video start time: 3:04pm  Video end time: 4:00pm    Telemedicine Visit: The patient's condition can be safely assessed and treated via synchronous audio and visual telemedicine encounter.      Reason for Telemedicine Visit: COVID 19    Originating Site (Patient Location): Patient's home    Distant Site (Provider Location): Provider Remote Setting    Consent:  The patient/guardian has verbally consented to: the potential risks and benefits of telemedicine (video visit) versus in person care; bill my insurance or make self-payment for services provided; and responsibility for payment of non-covered services.     Mode of Communication:  Video Conference via QR Artist    As the provider I attest to compliance with applicable laws and regulations related to telemedicine.          [facility]  [facility]  [address], [address]  [place] [address]  Outpatient Progress Note      Session Date: 11/19/20  Client Name: Sunni Flores (she/her pronouns)  YOB: 1965  MRN: [id number]  Provider: Aicha Velasquez, PhD, LP  Type of Session: Individual   Present in Session: Clent only  Number of Minutes: 56min  Treatment Plan Due: 9/18/2019    Health Maintenance Summary - Mental Health Treatment Plan       Status Date      MENTAL HEALTH TX PLAN Overdue 3/22/2020      Done 4/22/2019      Done 10/5/2018 See Scan     Done 9/18/2017           Current Symptoms/Status:   Sunni reports a history of gender dysphoria and has continued social and medical gender transition. Reports a high level of anatomic dysphoria, focused on her genitals. Planning for bottom surgery and high levels of distress around delays in surgery. History of major depressive disorder, moderate, recurrent. Continued labile mood, rumination, passive suicidal ideation, low energy, negative thinking.     Progress Toward Treatment Goals:   Continues social and medical transition, planning for surgery. Working on integration of  self, negative self-talk and self-compassion, dieting and working out to reduce BMI prior to surgery, rumination, negative mood symptoms    Intervention & Response:   Interpersonal, client-centered, and CBT techniques utilized to address client's current status.     Stated she has lost weight and started a weight loss program. Stated she is frustrated with Covid and gyms being shut down. Stated she has been ruminating on her process and wishing she had done things differently so she could of had surgery a year ago. Stated she is mad at the BMI requirement and the surgeon making her wait longer to have surgery. Stated she feels resigned and upset and despondent about the current situation, struggling with depression.     Continues to work on self-compassion. Talked about coping with Covid and slowing down.     Assignment:   Prepare for surgery  Explore identity feelings  Resolve historical relationships, integration of self      Diagnosis:    302.5 (F64.0) - Gender dysphoria in adults  Moderate Episode of Recurrent Major Depressive Disorder      Interactive Complexity:  None.       Plan / Need for Future Services:    Return for individual therapy in 2-3 weeks.                 Sara Velasquez, PhD, LP, CST    [facility], [facility]  Department of Family Medicine and Community Health  [employer] [facility] [employer]

## 2020-11-19 NOTE — PROGRESS NOTES
[facility] Initial Evaluation  Subjective:    Patient Health History           General health as reported by patient is good.  Pertinent medical history includes: overweight, sleep disorder/apnea, depression and other (gender dysphoria, anxiety).   Red flags:  None as reported by patient.  Medical allergies: none.   Surgeries include:  Other. Other surgery history details: hernia ( 1967, 18 months).    Current medications:  Anti-depressants. Other medications details: spironolaetone, estrodial.    Current occupation is retired .   Primary job tasks include:  Computer work and prolonged sitting.                                    Objective:  System    Physical Exam    General     ROS    Assessment/Plan:

## 2020-12-03 ENCOUNTER — VIRTUAL VISIT (OUTPATIENT)
Dept: PSYCHOLOGY | Facility: CLINIC | Age: 55
End: 2020-12-03
Payer: COMMERCIAL

## 2020-12-03 DIAGNOSIS — F43.22 ADJUSTMENT DISORDER WITH ANXIOUS MOOD: ICD-10-CM

## 2020-12-03 DIAGNOSIS — F64.0 GENDER DYSPHORIA IN ADULT: Primary | ICD-10-CM

## 2020-12-03 PROCEDURE — 90837 PSYTX W PT 60 MINUTES: CPT | Mod: GT | Performed by: PSYCHOLOGIST

## 2020-12-03 NOTE — PROGRESS NOTES
Video start time: 3:04pm  Video end time: 4:00pm    Telemedicine Visit: The patient's condition can be safely assessed and treated via synchronous audio and visual telemedicine encounter.      Reason for Telemedicine Visit: COVID 19    Originating Site (Patient Location): Patient's home    Distant Site (Provider Location): Provider Remote Setting    Consent:  The patient/guardian has verbally consented to: the potential risks and benefits of telemedicine (video visit) versus in person care; bill my insurance or make self-payment for services provided; and responsibility for payment of non-covered services.     Mode of Communication:  Video Conference via SHERPAND[employer]ITY    As the provider I attest to compliance with applicable laws and regulations related to telemedicine.          [facility]  [facility]  [address], [address]  [place] [address]  Outpatient Progress Note      Session Date: 12/03/20  Client Name: Sunni Flores (she/her pronouns)  YOB: 1965  MRN: [id number]  Provider: Aicha Velasquez, PhD, LP  Type of Session: Individual   Present in Session: Clent only  Number of Minutes: 56min  Treatment Plan Due: 9/18/2019    Health Maintenance Summary - Mental Health Treatment Plan       Status Date      MENTAL HEALTH TX PLAN Overdue 3/22/2020      Done 4/22/2019      Done 10/5/2018 See Scan     Done 9/18/2017           Current Symptoms/Status:   Sunni reports a history of gender dysphoria and has continued social and medical gender transition. Reports a high level of anatomic dysphoria, focused on her genitals. Planning for bottom surgery and high levels of distress around delays in surgery. History of major depressive disorder, moderate, recurrent. Continued labile mood, rumination, passive suicidal ideation, low energy, negative thinking.     Progress Toward Treatment Goals:   Continues social and medical transition, planning for surgery. Working on integration of  self, negative self-talk and self-compassion, dieting and working out to reduce BMI prior to surgery, rumination, negative mood symptoms    Intervention & Response:   Interpersonal, client-centered, and CBT techniques utilized to address client's current status.     Sunni stated she has been feeling blah lately, that she is frustrated she cannot go to the gym to work out, she is low energy from her diet, and she is frustrated about the political situation. Stated she is feeling low energy and unmotivated. Stated she had felt ambivalent about Marques Hogue coming out as trans. Explored her feelings about her own gender experience and feeling averse to maleness.     Continues to work on self-compassion. Talked about coping with Covid and slowing down.     Assignment:   Prepare for surgery  Explore identity feelings  Resolve historical relationships, integration of self      Diagnosis:    302.5 (F64.0) - Gender dysphoria in adults  Moderate Episode of Recurrent Major Depressive Disorder      Interactive Complexity:  None.       Plan / Need for Future Services:    Return for individual therapy in 2-3 weeks.                 Sara Velasquez, PhD, LP, CST    [facility]  Department of Family Medicine and Community Health  [employer] [employer]

## 2020-12-17 ENCOUNTER — VIRTUAL VISIT (OUTPATIENT)
Dept: PSYCHOLOGY | Facility: CLINIC | Age: 55
End: 2020-12-17
Payer: COMMERCIAL

## 2020-12-17 DIAGNOSIS — F64.0 GENDER DYSPHORIA IN ADULT: ICD-10-CM

## 2020-12-17 DIAGNOSIS — F43.22 ADJUSTMENT DISORDER WITH ANXIOUS MOOD: Primary | ICD-10-CM

## 2020-12-17 PROCEDURE — 99207 PR NO CHARGE LOS: CPT | Performed by: PSYCHOLOGIST

## 2020-12-17 PROCEDURE — 90837 PSYTX W PT 60 MINUTES: CPT | Mod: 95 | Performed by: PSYCHOLOGIST

## 2020-12-17 NOTE — PROGRESS NOTES
Video start time: 3:07pm  Video end time: 4:00pm    Telemedicine Visit: The patient's condition can be safely assessed and treated via synchronous audio and visual telemedicine encounter.      Reason for Telemedicine Visit: COVID 19    Originating Site (Patient Location): Patient's home    Distant Site (Provider Location): Provider Remote Setting    Consent:  The patient/guardian has verbally consented to: the potential risks and benefits of telemedicine (video visit) versus in person care; bill my insurance or make self-payment for services provided; and responsibility for payment of non-covered services.     Mode of Communication:  Video Conference via Buyou    As the provider I attest to compliance with applicable laws and regulations related to telemedicine.          [facility]  [facility]  [address], [address]  [place] [address]  Outpatient Progress Note      Session Date: 12/17/20  Client Name: Sunni Flores (she/her pronouns)  YOB: 1965  MRN: [id number]  Provider: Aicha Velasquez, PhD, LP  Type of Session: Individual   Present in Session: Clent only  Number of Minutes: 53min  Treatment Plan Due: 9/18/2019    Health Maintenance Summary - Mental Health Treatment Plan       Status Date      MENTAL HEALTH TX PLAN Overdue 3/22/2020      Done 4/22/2019      Done 10/5/2018 See Scan     Done 9/18/2017           Current Symptoms/Status:   Sunni reports a history of gender dysphoria and has continued social and medical gender transition. Reports a high level of anatomic dysphoria, focused on her genitals. Planning for bottom surgery and high levels of distress around delays in surgery. History of major depressive disorder, moderate, recurrent. Continued labile mood, rumination, passive suicidal ideation, low energy, negative thinking.     Progress Toward Treatment Goals:   Continues social and medical transition, planning for surgery. Working on integration of  self, negative self-talk and self-compassion, dieting and working out to reduce BMI prior to surgery, rumination, negative mood symptoms    Intervention & Response:   Interpersonal, client-centered, and CBT techniques utilized to address client's current status.     Sunni stated she has been feeling depressed and hopeless about Covid. Stated she is depressed being on her diet. Stated she has been on a strict diet and has lost 40 lbs. Stated she is restricting even more than the diet requires. Stated she is starting the next step where she gets to start adding in new foods. Stated she has explored her relationship with food - analyzing her use of food to manage her mood. Talked about how affirming it has been to lose the weight and able to buy smaller clothes and have less body weight.     Continues to work on self-compassion. Talked about coping with Covid and slowing down.     Assignment:   Prepare for surgery  Explore identity feelings  Resolve historical relationships, integration of self      Diagnosis:    302.5 (F64.0) - Gender dysphoria in adults  Moderate Episode of Recurrent Major Depressive Disorder      Interactive Complexity:  None.       Plan / Need for Future Services:    Return for individual therapy in 2-3 weeks.                 Sara Velasquez, PhD, LP, CST    [facility]  Department of Family Medicine and Community Health  [employer] [facility] [employer]

## 2021-01-07 ENCOUNTER — VIRTUAL VISIT (OUTPATIENT)
Dept: PSYCHOLOGY | Facility: CLINIC | Age: 56
End: 2021-01-07
Payer: COMMERCIAL

## 2021-01-07 DIAGNOSIS — F33.1 MODERATE EPISODE OF RECURRENT MAJOR DEPRESSIVE DISORDER (H): Primary | ICD-10-CM

## 2021-01-07 DIAGNOSIS — F64.0 GENDER DYSPHORIA IN ADULT: ICD-10-CM

## 2021-01-07 PROCEDURE — 99207 PR NO CHARGE LOS: CPT | Performed by: PSYCHOLOGIST

## 2021-01-07 PROCEDURE — 90837 PSYTX W PT 60 MINUTES: CPT | Mod: GT | Performed by: PSYCHOLOGIST

## 2021-01-07 NOTE — PROGRESS NOTES
Video start time: 3:07pm  Video end time: 4:00pm    Telemedicine Visit: The patient's condition can be safely assessed and treated via synchronous audio and visual telemedicine encounter.      Reason for Telemedicine Visit: COVID 19    Originating Site (Patient Location): Patient's home    Distant Site (Provider Location): Provider Remote Setting    Consent:  The patient/guardian has verbally consented to: the potential risks and benefits of telemedicine (video visit) versus in person care; bill my insurance or make self-payment for services provided; and responsibility for payment of non-covered services.     Mode of Communication:  Video Conference via Metaplace    As the provider I attest to compliance with applicable laws and regulations related to telemedicine.          [facility]  [facility]  [address], [address]  [place] [address]  Outpatient Progress Note      Session Date: 1/07/21  Client Name: Sunni Flores (she/her pronouns)  YOB: 1965  MRN: [id number]  Provider: Aicha Velasquez, PhD, LP  Type of Session: Individual   Present in Session: Clent only  Number of Minutes: 53min  Treatment Plan Due: 9/18/2019    Health Maintenance Summary - Mental Health Treatment Plan       Status Date      MENTAL HEALTH TX PLAN Overdue 3/22/2020      Done 4/22/2019      Done 10/5/2018 See Scan     Done 9/18/2017           Current Symptoms/Status:   Sunni reports a history of gender dysphoria and has continued social and medical gender transition. Reports a high level of anatomic dysphoria, focused on her genitals. Planning for bottom surgery and high levels of distress around delays in surgery. History of major depressive disorder, moderate, recurrent. Continued labile mood, rumination, passive suicidal ideation, low energy, negative thinking.     Progress Toward Treatment Goals:   Continues social and medical transition, planning for surgery. Working on integration of  self, negative self-talk and self-compassion, dieting and working out to reduce BMI prior to surgery, rumination, negative mood symptoms    Intervention & Response:   Interpersonal, client-centered, and CBT techniques utilized to address client's current status.     Sunni stated she has been feeling depressed and hopeless about Covid and having more gender dysphoria. STated she has lost more weight and is now at 198lbs. Stated she is considering getting breast augmentation prior to vaginoplasty because she wants to be able to move forward since she can not do vaginoplasty. Stated she feels concerned about managing the perspective of the surgery team about her having one surgery prior to the other and being confused. Stated she is fearful that Dr. Dawson will tell her that she cannot have surgery. Stated she is afraid she is not resilient enough for disappointment. Stated she worries she will hurt herself if she is told no. Stated she has no desire or intent to harm herself.     Continues to work on self-compassion. Talked about coping with Covid and slowing down.     Assignment:   Prepare for surgery  Explore identity feelings  Resolve historical relationships, integration of self      Diagnosis:    302.5 (F64.0) - Gender dysphoria in adults  Moderate Episode of Recurrent Major Depressive Disorder      Interactive Complexity:  None.       Plan / Need for Future Services:    Return for individual therapy in 2-3 weeks.                 Sara Velasquez, PhD, LP, CST    [facility]  Department of Family Medicine and Community Health  [employer] [facility] [employer]

## 2021-01-09 ENCOUNTER — TRANSFERRED RECORDS (OUTPATIENT)
Dept: HEALTH INFORMATION MANAGEMENT | Facility: CLINIC | Age: 56
End: 2021-01-09

## 2021-01-09 ENCOUNTER — TELEPHONE (OUTPATIENT)
Dept: PSYCHOLOGY | Facility: CLINIC | Age: 56
End: 2021-01-09

## 2021-01-11 ENCOUNTER — VIRTUAL VISIT (OUTPATIENT)
Dept: FAMILY MEDICINE | Facility: CLINIC | Age: 56
End: 2021-01-11
Payer: COMMERCIAL

## 2021-01-11 DIAGNOSIS — F64.0 GENDER DYSPHORIA IN ADULT: Primary | ICD-10-CM

## 2021-01-11 DIAGNOSIS — R55 SYNCOPE, UNSPECIFIED SYNCOPE TYPE: ICD-10-CM

## 2021-01-11 DIAGNOSIS — E86.0 DEHYDRATION: ICD-10-CM

## 2021-01-11 PROCEDURE — 99214 OFFICE O/P EST MOD 30 MIN: CPT | Mod: 95 | Performed by: FAMILY MEDICINE

## 2021-01-11 ASSESSMENT — ENCOUNTER SYMPTOMS
CHILLS: 0
PALPITATIONS: 0
UNEXPECTED WEIGHT CHANGE: 0
FEVER: 0
SHORTNESS OF BREATH: 0
LIGHT-HEADEDNESS: 1

## 2021-01-11 NOTE — NURSING NOTE
Chart reviewed with patient and updated      Mira Turner,[facility]          1   Sunni Flores      Question about medications  Received: 2 days ago  Message Contents   Sunni Flores [facility]  Pool   Phone Number: [phone]             , I believe I have shared with you in the past some of the challenges I've had with grabiel. Well, over the past two months I have lost over 40 lbs. This morning, when I woke and stood up from bed, I lost consciousness, and collapsed to the floor. I went to the ER here in [place] where the checked me out, and gave me one bag of IV fluids. I am home now. I believe you and I should discuss whether my HRT doses need to be changed. Thank you. Sunni    Question about medications    Sunni Flores Jamie L, MD 2 days ago

## 2021-01-11 NOTE — PROGRESS NOTES
"The patient has been notified of following:       Patient has given verbal consent for Video visit? Yes    Patient would like the video invitation sent by: Send to e-mail at: [email or web address]    Video Start Time: 2:00 PM              KRISHNA Moeller is a 56 year old individual that uses pronouns She/Her/Hers/Herself that presents today for follow up of:  feminizing hormone therapy.   Alone or accompanied by: accompanied today by  Gender identity: female  Started Hormone  therapy  2018  Continues on Vivelle dot 0.2 x 2 mg patch 2x/wk and Spironlactone 200* mg daily   Any special concerns today?   Since seriously dieting 2 1/2 months, significant lightheadedness from sit to stand. But on 1/9/2021, stood up and and then syncopal episode, seen in ER via ambulance. ER note reviewed, and was noted to have significant orthostatic hypotension, EKG negative. BMP and CBC consistent with hemoconcentration and dehydration.    Has lost 40 lb in 2  1/2 months. On \"profile diet' by Serena     On hormones?  YES +++ Shot day of the week? Not applicable-taking pills/patch/gel      Due for labs?  Yes      +++ Refills of meds needed?  Yes  Gender related body changes since last visit: none    Breakthrough bleeding? Does Not Apply    New health concerns since last visit: see above  ---    No past surgical history on file.    Patient Active Problem List   Diagnosis     Gender dysphoria in adult     Obstructive sleep apnea syndrome     Generalized anxiety disorder     Moderate episode of recurrent major depressive disorder (H)     Fibrocystic breast changes       Current Outpatient Medications   Medication Sig Dispense Refill     diazepam (VALIUM) 10 MG tablet        estradiol (VIVELLE-DOT) 0.1 MG/24HR bi-weekly patch Place 2 patches onto the skin twice a week 16 patch 5     ibuprofen (ADVIL/MOTRIN) 400 MG tablet Take 2 tablets by mouth       lidocaine-prilocaine (EMLA) 2.5-2.5 % external cream   2     methylfolate (DEPLIN) 15 MG TABS " tablet Take 15 mg by mouth       spironolactone (ALDACTONE) 100 MG tablet Take 2 tablets (200 mg) by mouth daily 60 tablet 5     TRAZODONE HCL PO TAKE TWO TABLETS BY MOUTH DAILY AT BEDTIME        venlafaxine (EFFEXOR-XR) 75 MG 24 hr capsule Take 300 capsules by mouth daily          History   Smoking Status     Never Smoker   Smokeless Tobacco     Never Used          Allergies   Allergen Reactions     Allopurinol Other (See Comments)     Patient is positive for HLA-B 58:01. Increased risk of allopurinol-induced severe cutaneous reactions.      Clopidogrel Other (See Comments)     Patient is a [id number] metabolizer. Clopidogrel would be predicted to lack efficacy       Health Maintenance Due   Topic Date Due     MENTAL HEALTH TX PLAN  03/22/2020         Problem, Medication and Allergy Lists were reviewed and are current..         Review of Systems:   Review of Systems   Constitutional: Negative for chills, fever and unexpected weight change.   Respiratory: Negative for shortness of breath.    Cardiovascular: Negative for chest pain and palpitations.   Neurological: Positive for light-headedness.              Labs:   Results from last visit:  Virtual Visit on 08/04/2020   Component Date Value Ref Range Status     Sodium 08/11/2020 136  mmol/L Final     Potassium 08/11/2020 4.9  mmol/L Final     Chloride 08/11/2020 105  mmol/L Final     Carbon Dioxide 08/11/2020 22  mmol/L Final     Anion Gap 08/11/2020 9  mmol/L Final     Glucose 08/11/2020    Final    Canceled     Urea Nitrogen 08/11/2020 27* 6 - 21 mg/dL Final     Creatinine 08/11/2020 1.45* 0.59 - 1.04 mg/dL Final     Calcium 08/11/2020 9.1  mg/dL Final     Sodium 08/11/2020 136  mmol/L Final     Potassium 08/11/2020 4.9  mmol/L Final     Chloride 08/11/2020 105  mmol/L Final     Bicarbonate Blood 08/11/2020 22   Final     Anion Gap 08/11/2020 9  mmol/L Final     Urea Nitrogen 08/11/2020 27* 6 - 21 mg/dL Final     Creatinine 08/11/2020 1.45* 0.59 - 1.04 mg/dL Final  "    Calcium 08/11/2020 9.1  mg/dL Final     GFR Estimate 08/11/2020 41  >=60 ml/min/1.73m2 Final     Estradiol 08/11/2020 65   Final     Testosterone Total 08/11/2020 9.6  ng/dL Final     25 OH Vit D2 08/11/2020 <4.0  ng/ml Final     25 OH Vit D3 08/11/2020 48  ng/ml Final     25 OH Vit D total 08/11/2020 48  ng/ml Final     Reviewed 1/9/2021 labs from [place] ER visit  Copied CMP forward:  Potassium, P 3.6 - 5.2 mmol/L 4.8    Sodium, P 135 - 145 mmol/L 135    Chloride, P 98 - 107 mmol/L 100    Bicarbonate, P 22 - 29 mmol/L 22    Anion Gap, P 7 - 15  13    BUN, P 6 - 21 mg/dL 30High     Creatinine, P 0.59 - 1.04 mg/dL 1.47High     eGFR Black >=60 mL/min/BSA 46Low     Comment:    ----ADDITIONAL INFORMATION----   Estimated GFR calculated using the 2009 CKD_EPI creatinine   equation.   eGFR Non-Black >=60 mL/min/BSA 40Low                 EXAM:  Constitutional: healthy, alert and no distress   Respiratory: negative,   Psychiatric: mentation appears normal and affect normal/bright   Vitals carried forward from ED visits    Vital Sign Reading Time Taken Comments   Blood Pressure 112/74 01/09/2021 11:00 AM CST     Pulse 74 01/09/2021 11:00 AM CST     Temperature 36  C (96.8  F) 01/09/2021 8:26 AM CST     Respiratory Rate 10 01/09/2021 11:15 AM CST     Oxygen Saturation 96% 01/09/2021 11:15 AM CST     Inhaled Oxygen Concentration - -     Weight 95.7 kg (210 lb 15.7 oz) 01/09/2021 8:27 AM CST     Height 176 cm (5' 9.29\") 01/09/2021 8:27 AM CST     Body Mass Index 30.9         Assessment and Plan   1. Gender dysphoria  2. Dehydration  3. Syncopal episode  Reviewed labs with patient  Syncopal episode likely due to dehydration, orthostatic hypotension;   Will reduce  spironolactone to 100 mg daily  Should not impact feminization status at this point; reviewed need to push adequate fluids  To call if continues with symptoms    Follow-up 4 months  [facility]  Review of labs as above  MDM   Video-Visit Details    Type of service:  Video Visit    Video End Time (time video stopped):281    Originating Location (pt. Location): Home    Distant Location (provider location):  [facility]     Mode of Communication:  Video Conference via [email or web address]    Hi Rodríguez MD        [facility]  Questions were elicited and answered.     Hi Rodríguez MD

## 2021-01-15 DIAGNOSIS — F64.0 GENDER DYSPHORIA IN ADOLESCENT AND ADULT: ICD-10-CM

## 2021-01-15 RX ORDER — SPIRONOLACTONE 100 MG/1
200 TABLET, FILM COATED ORAL DAILY
Qty: 60 TABLET | Refills: 5 | Status: SHIPPED | OUTPATIENT
Start: 2021-01-15 | End: 2021-01-25

## 2021-01-15 NOTE — TELEPHONE ENCOUNTER
Requested Medication:  Spironolactone Oral Tablet 100MG  Dose:  Take 2 Tablets by mouth daily  Quantity:  60  Refills:      Last seen at [facility]:  1/11/21  Next Appointment with Provider:  Visit date not found

## 2021-01-19 ENCOUNTER — OFFICE VISIT (OUTPATIENT)
Dept: PLASTIC SURGERY | Facility: CLINIC | Age: 56
End: 2021-01-19
Payer: COMMERCIAL

## 2021-01-19 VITALS
HEIGHT: 69 IN | DIASTOLIC BLOOD PRESSURE: 76 MMHG | BODY MASS INDEX: 30.21 KG/M2 | SYSTOLIC BLOOD PRESSURE: 123 MMHG | HEART RATE: 97 BPM | OXYGEN SATURATION: 100 % | WEIGHT: 204 LBS

## 2021-01-19 DIAGNOSIS — F64.0 GENDER DYSPHORIA IN ADULT: Primary | ICD-10-CM

## 2021-01-19 PROCEDURE — 99212 OFFICE O/P EST SF 10 MIN: CPT | Performed by: PLASTIC SURGERY

## 2021-01-19 RX ORDER — CEFAZOLIN SODIUM 2 G/50ML
2 SOLUTION INTRAVENOUS
Status: CANCELLED | OUTPATIENT
Start: 2021-01-19

## 2021-01-19 RX ORDER — HEPARIN SODIUM 5000 [USP'U]/.5ML
5000 INJECTION, SOLUTION INTRAVENOUS; SUBCUTANEOUS
Status: CANCELLED | OUTPATIENT
Start: 2021-01-19

## 2021-01-19 RX ORDER — CEFAZOLIN SODIUM 1 G/50ML
1 INJECTION, SOLUTION INTRAVENOUS SEE ADMIN INSTRUCTIONS
Status: CANCELLED | OUTPATIENT
Start: 2021-01-19

## 2021-01-19 ASSESSMENT — MIFFLIN-ST. JEOR
SCORE: 1584.72
SCORE: 1569.78

## 2021-01-19 ASSESSMENT — PAIN SCALES - GENERAL: PAINLEVEL: NO PAIN (0)

## 2021-01-19 NOTE — LETTER
"1/19/2021       RE: Sunni Flores  [address] [facility] [address]     Dear Colleague,    Thank you for referring your patient, Sunni Flores, to the [facility] [place] [facility]. Please see a copy of my visit note below.    Patient returns for a follow-up visit to discuss vaginoplasty for gender dysphoria.    INTERVAL HISTORY: Patient has been trying her best to lose weight prior to surgery.  She has lost 40 pounds since we last met.  Her BMI is now 30.  She continues to be interested in vaginoplasty.    PHYSICAL EXAMINATION:  /76   Pulse 97   Ht 1.753 m (5' 9\")   Wt 92.5 kg (204 lb)   SpO2 100%   BMI 30.13 kg/m    Weight loss visible.  Shaft length measuring 4.5 cm.  Skin is much more elastic.  Mons is less protuberant.    ASSESSMENT: Gender dysphoria, candidate for penile inversion vaginoplasty with supplemental scrotal skin graft, possible robot-assisted.    PLAN: In accordance with [facility] Standards Of Care guidelines, patient is a potential candidate for penile inversion vaginoplasty with supplemental scrotal skin graft as an inpatient at the [facility] with a postoperative admission to maintain a vaginal stent for 5 to 7 days.  The operation is performed in conjunction with Dr. Meet Venegas given the need for surgical expertise from 2 separate subspecialties.  I explained the vaginoplasty procedure in detail today, which include neovaginal cavity dissection, grafting of neovaginal cavity, vaginal colpopexy, clitoroplasty, urethroplasty, perineal flap, orchiectomy, penectomy, scrotectomy, and bilateral labiaplasty.  I also explained the possibility of da Sandy robot-assisted vaginal cavity dissection.  Patient and I discussed the risks involved to include bleeding, infection, injury to surrounding structures, fluid collection, wound dehiscence with prolonged dressing changes, asymmetry, " contour deformity, DVT, PE, rectal injury, rectovaginal fistula, possible need for ostomy, clitoral necrosis, sensory loss, voiding issues, urinary stream abnormalities, flap loss, vaginal prolapse, vaginal shrinkage, vaginal stenosis, need for lifelong dilation, granulation tissue, chronic pain, intravaginal hair growth, incompletely feminized external vulva, and need for revision surgery.  Patient accepts these risks and wishes to work towards obtaining surgery.  I also explained to her that with this surgical technique, we are relying on anterior blood supply to heal the surgical site, preventing me from performing anterior labiaplasty.  And therefore she may require a second stage feminizing labiaplasty to fully feminize the external vulva.  She understood this.  Patient will be instructed to discontinue hormone therapy 2 weeks prior to surgery and resume it 2 weeks postoperatively to control its risk of DVT.  Patient will perform a bowel prep 1 to 2 days prior to surgery.    Total time spent with patient was 15 min of which greater than 50% was in counseling.      Again, thank you for allowing me to participate in the care of your patient.      Sincerely,    Tank Dawson MD

## 2021-01-19 NOTE — PROGRESS NOTES
"Patient returns for a follow-up visit to discuss vaginoplasty for gender dysphoria.    INTERVAL HISTORY: Patient has been trying her best to lose weight prior to surgery.  She has lost 40 pounds since we last met.  Her BMI is now 30.  She continues to be interested in vaginoplasty.    PHYSICAL EXAMINATION:  /76   Pulse 97   Ht 1.753 m (5' 9\")   Wt 92.5 kg (204 lb)   SpO2 100%   BMI 30.13 kg/m    Weight loss visible.  Shaft length measuring 4.5 cm.  Skin is much more elastic.  Mons is less protuberant.    ASSESSMENT: Gender dysphoria, candidate for penile inversion vaginoplasty with supplemental scrotal skin graft, possible robot-assisted.    PLAN: In accordance with [facility] Standards Of Care guidelines, patient is a potential candidate for penile inversion vaginoplasty with supplemental scrotal skin graft as an inpatient at the [facility] with a postoperative admission to maintain a vaginal stent for 5 to 7 days.  The operation is performed in conjunction with Dr. Meet Venegas given the need for surgical expertise from 2 separate subspecialties.  I explained the vaginoplasty procedure in detail today, which include neovaginal cavity dissection, grafting of neovaginal cavity, vaginal colpopexy, clitoroplasty, urethroplasty, perineal flap, orchiectomy, penectomy, scrotectomy, and bilateral labiaplasty.  I also explained the possibility of da Sandy robot-assisted vaginal cavity dissection.  Patient and I discussed the risks involved to include bleeding, infection, injury to surrounding structures, fluid collection, wound dehiscence with prolonged dressing changes, asymmetry, contour deformity, DVT, PE, rectal injury, rectovaginal fistula, possible need for ostomy, clitoral necrosis, sensory loss, voiding issues, urinary stream abnormalities, flap loss, vaginal prolapse, vaginal shrinkage, vaginal stenosis, need for lifelong dilation, granulation tissue, chronic pain, intravaginal hair " growth, incompletely feminized external vulva, and need for revision surgery.  Patient accepts these risks and wishes to work towards obtaining surgery.  I also explained to her that with this surgical technique, we are relying on anterior blood supply to heal the surgical site, preventing me from performing anterior labiaplasty.  And therefore she may require a second stage feminizing labiaplasty to fully feminize the external vulva.  She understood this.  Patient will be instructed to discontinue hormone therapy 2 weeks prior to surgery and resume it 2 weeks postoperatively to control its risk of DVT.  Patient will perform a bowel prep 1 to 2 days prior to surgery.    Total time spent with patient was 15 min of which greater than 50% was in counseling.

## 2021-01-19 NOTE — NURSING NOTE
"Chief Complaint   Patient presents with     RECHECK     Return vaginoplasty consult.        Vitals:    01/19/21 1614   BP: 123/76   Pulse: 97   SpO2: 100%   Weight: 90.3 kg (199 lb)   Height: 1.765 m (5' 9.49\")       Body mass index is 28.98 kg/m .    Romeo Ashley LPN      "
Normal

## 2021-01-20 ENCOUNTER — PREP FOR PROCEDURE (OUTPATIENT)
Dept: PLASTIC SURGERY | Facility: CLINIC | Age: 56
End: 2021-01-20

## 2021-01-20 PROBLEM — F64.0 GENDER DYSPHORIA IN ADULT: Status: ACTIVE | Noted: 2018-07-05

## 2021-01-25 DIAGNOSIS — F64.0 GENDER DYSPHORIA IN ADOLESCENT AND ADULT: ICD-10-CM

## 2021-01-25 RX ORDER — SPIRONOLACTONE 100 MG/1
100 TABLET, FILM COATED ORAL DAILY
Qty: 30 TABLET | Refills: 5 | Status: SHIPPED | OUTPATIENT
Start: 2021-01-25 | End: 2021-04-30

## 2021-01-25 NOTE — TELEPHONE ENCOUNTER
Dose change  Patient requesting updated Rx to be sent  Changed and ready to sign    Requested Medication: Spironolactone  Dose: 100mg  Quantity: 30  Refills: 5    Take 1 tablet daily    Last seen at [facility]: 1/11/2021 -   Next Appointment with Provider:  Visit date not found

## 2021-02-01 DIAGNOSIS — F64.0 GENDER DYSPHORIA IN ADOLESCENT AND ADULT: ICD-10-CM

## 2021-02-01 RX ORDER — ESTRADIOL 0.1 MG/D
2 FILM, EXTENDED RELEASE TRANSDERMAL
Qty: 16 PATCH | Refills: 5 | Status: ON HOLD | OUTPATIENT
Start: 2021-02-01 | End: 2021-04-14

## 2021-02-01 NOTE — TELEPHONE ENCOUNTER
Requested Medication: Estradiol 0.1mg  Dose: 0.2mg  Quantity: 16  Refills: 5    Apply 2 patches twice weekly    Last seen at [facility]: 1/11/2021 - 4 mo follow up  Next Appointment with Provider:  Visit date not found        Mira Turner CMA

## 2021-02-04 ENCOUNTER — VIRTUAL VISIT (OUTPATIENT)
Dept: PSYCHOLOGY | Facility: CLINIC | Age: 56
End: 2021-02-04
Payer: COMMERCIAL

## 2021-02-04 DIAGNOSIS — F43.22 ADJUSTMENT DISORDER WITH ANXIOUS MOOD: ICD-10-CM

## 2021-02-04 DIAGNOSIS — F64.0 GENDER DYSPHORIA IN ADOLESCENT AND ADULT: Primary | ICD-10-CM

## 2021-02-04 PROCEDURE — 99207 PR NO BILLABLE SERVICE THIS VISIT: CPT | Performed by: PSYCHOLOGIST

## 2021-02-04 PROCEDURE — 90837 PSYTX W PT 60 MINUTES: CPT | Mod: 95 | Performed by: PSYCHOLOGIST

## 2021-02-04 NOTE — PROGRESS NOTES
Video start time: 3:05pm  Video end time: 4:00pm    Telemedicine Visit: The patient's condition can be safely assessed and treated via synchronous audio and visual telemedicine encounter.      Reason for Telemedicine Visit: COVID 19    Originating Site (Patient Location): Patient's home    Distant Site (Provider Location): Provider Remote Setting    Consent:  The patient/guardian has verbally consented to: the potential risks and benefits of telemedicine (video visit) versus in person care; bill my insurance or make self-payment for services provided; and responsibility for payment of non-covered services.     Mode of Communication:  Video Conference via [employer]    As the provider I attest to compliance with applicable laws and regulations related to telemedicine.          [facility]  [facility]  [address], [address]  [place] [address]  Outpatient Progress Note      Session Date: 2/04/21  Client Name: Sunni Flores (she/her pronouns)  YOB: 1965  MRN: [id number]  Provider: Aicha Velasquez, PhD, LP  Type of Session: Individual   Present in Session: Clent only  Number of Minutes: 55min  Treatment Plan Due: 9/18/2019    Health Maintenance Summary - Mental Health Treatment Plan       Status Date      MENTAL HEALTH TX PLAN Overdue 3/22/2020      Done 4/22/2019      Done 10/5/2018 See Scan     Done 9/18/2017           Current Symptoms/Status:   Sunni reports a history of gender dysphoria and has continued social and medical gender transition. Reports a high level of anatomic dysphoria, focused on her genitals. Planning for bottom surgery and high levels of distress around delays in surgery. History of major depressive disorder, moderate, recurrent. Continued labile mood, rumination, passive suicidal ideation, low energy, negative thinking.     Progress Toward Treatment Goals:   Continues social and medical transition, planning for surgery. Working on integration of  self, negative self-talk and self-compassion, dieting and working out to reduce BMI prior to surgery, rumination, negative mood symptoms    Intervention & Response:   Interpersonal, client-centered, and CBT techniques utilized to address client's current status.     Sunni stated she had been relieved to have a positive interaction with Dr. Dawson about her weight loss and she was approved for surgery. Stated she had gone to pretty extensive lengths to lose the weight quickly. Stated she is able to start integrating more foods after restricting her intake severely. Stated she felt affirmed for all the hard work she has done and her own gender journey. Scheduled for April 8th.     Continues to work on self-compassion. Talked about coping with Covid and slowing down.     Assignment:   Prepare for surgery  Explore identity feelings  Resolve historical relationships, integration of self      Diagnosis:    302.5 (F64.0) - Gender dysphoria in adults  Moderate Episode of Recurrent Major Depressive Disorder      Interactive Complexity:  None.       Plan / Need for Future Services:    Return for individual therapy in 2-3 weeks.                 Sara Velasquez, PhD, LP, CST    [facility], [facility]  Department of Family Medicine and Community Health  [employer]

## 2021-02-18 ENCOUNTER — VIRTUAL VISIT (OUTPATIENT)
Dept: PSYCHOLOGY | Facility: CLINIC | Age: 56
End: 2021-02-18
Payer: COMMERCIAL

## 2021-02-18 DIAGNOSIS — F43.22 ADJUSTMENT DISORDER WITH ANXIOUS MOOD: Primary | ICD-10-CM

## 2021-02-18 DIAGNOSIS — F64.0 GENDER DYSPHORIA IN ADULT: ICD-10-CM

## 2021-02-18 PROCEDURE — 90837 PSYTX W PT 60 MINUTES: CPT | Mod: GT | Performed by: PSYCHOLOGIST

## 2021-02-18 NOTE — PROGRESS NOTES
Video start time: 3:05pm  Video end time: 4:00pm    Telemedicine Visit: The patient's condition can be safely assessed and treated via synchronous audio and visual telemedicine encounter.      Reason for Telemedicine Visit: COVID 19    Originating Site (Patient Location): Patient's home    Distant Site (Provider Location): Provider Remote Setting    Consent:  The patient/guardian has verbally consented to: the potential risks and benefits of telemedicine (video visit) versus in person care; bill my insurance or make self-payment for services provided; and responsibility for payment of non-covered services.     Mode of Communication:  Video Conference via [employer]    As the provider I attest to compliance with applicable laws and regulations related to telemedicine.          Program in Human Sexuality  [facility]  [address], [address]  [place] [address]  Outpatient Progress Note      Session Date: 2/18/21  Client Name: Sunni Flores (she/her pronouns)  YOB: 1965  MRN: [id number]  Provider: Aicha Velasquez, PhD, LP  Type of Session: Individual   Present in Session: Clent only  Number of Minutes: 52 min  Treatment Plan Due: 9/18/2019    Health Maintenance Summary - Mental Health Treatment Plan       Status Date      MENTAL HEALTH TX PLAN Overdue 3/22/2020      Done 4/22/2019      Done 10/5/2018 See Scan     Done 9/18/2017           Current Symptoms/Status:   Sunni reports a history of gender dysphoria and has continued social and medical gender transition. Reports a high level of anatomic dysphoria, focused on her genitals. Planning for bottom surgery and high levels of distress around delays in surgery. History of major depressive disorder, moderate, recurrent. Continued labile mood, rumination, passive suicidal ideation, low energy, negative thinking.     Progress Toward Treatment Goals:   Continues social and medical transition, planning for surgery. Working on integration of  self, negative self-talk and self-compassion, dieting and working out to reduce BMI prior to surgery, rumination, negative mood symptoms    Intervention & Response:   Interpersonal, client-centered, and CBT techniques utilized to address client's current status.     Sunni stated she is tentatively scheduled for April 8th and is anxious about if it will happen.    Stated she began therapy in 2016 and her therapy focused on her desire to transition and on grief from her wife's passing. Reviewed her gender therapy and her feeling that she is finished with part of her therapy journey. Stated she has two secrets that she has been reticent to share, about her sexuality. Explored her conflict about her sexuality and shame. Talked about her transference and desire to be friends with her providers. Talked about therapy plan.    Continues to work on self-compassion. Talked about coping with Covid and slowing down.     Assignment:   Prepare for surgery  Explore identity feelings  Resolve historical relationships, integration of self      Diagnosis:    302.5 (F64.0) - Gender dysphoria in adults  Moderate Episode of Recurrent Major Depressive Disorder      Interactive Complexity:  None.       Plan / Need for Future Services:    Return for individual therapy in 2-3 weeks.                 Sara Velasquez, PhD, LP, CST    Program [facility]uality, [facility]  Department of Family Medicine and Community Health  [employer]

## 2021-02-22 ENCOUNTER — DOCUMENTATION ONLY (OUTPATIENT)
Dept: PLASTIC SURGERY | Facility: CLINIC | Age: 56
End: 2021-02-22

## 2021-02-22 NOTE — PROGRESS NOTES
I contacted the patient via [employer] and spoke with the patient to confirm the scheduled dates and provide the following information:     Surgery is scheduled with Dr. Dawson and Dr. Venegas on 4/8 at [facility].  Scheduled per surgeon.    Pre-op consult with Dr. Dawson on 3/16.     H&P: to be completed by PAC on 4/5    COVID-19 test scheduled for 4/5    POST-OP: 4/30    They are aware to arrive at 5:30 AM.    The surgery packet was provided via [employer].

## 2021-02-23 NOTE — TELEPHONE ENCOUNTER
FUTURE VISIT INFORMATION      SURGERY INFORMATION:    Date: 21    Location: [facility] OR    Surgeon:  Dr. Venegas    Anesthesia Type:  General    Procedure: vaginoplasty    Consult: 21    RECORDS REQUESTED FROM:       Primary Care Provider: Dr. ruiz    Most recent EKG+ Tracin21 [place]    Action 21 [facility]J   Action Taken Requested EKG strips from [place]

## 2021-03-04 ENCOUNTER — VIRTUAL VISIT (OUTPATIENT)
Dept: PSYCHOLOGY | Facility: CLINIC | Age: 56
End: 2021-03-04
Payer: COMMERCIAL

## 2021-03-04 DIAGNOSIS — F43.22 ADJUSTMENT DISORDER WITH ANXIOUS MOOD: ICD-10-CM

## 2021-03-04 DIAGNOSIS — F64.0 GENDER DYSPHORIA IN ADULT: Primary | ICD-10-CM

## 2021-03-04 PROCEDURE — 99207 PR NO BILLABLE SERVICE THIS VISIT: CPT | Performed by: PSYCHOLOGIST

## 2021-03-04 PROCEDURE — 90837 PSYTX W PT 60 MINUTES: CPT | Mod: GT | Performed by: PSYCHOLOGIST

## 2021-03-04 NOTE — PROGRESS NOTES
Video start time: 3:05pm  Video end time: 4:00pm    Telemedicine Visit: The patient's condition can be safely assessed and treated via synchronous audio and visual telemedicine encounter.      Reason for Telemedicine Visit: COVID 19    Originating Site (Patient Location): Patient's home    Distant Site (Provider Location): Provider Remote Setting    Consent:  The patient/guardian has verbally consented to: the potential risks and benefits of telemedicine (video visit) versus in person care; bill my insurance or make self-payment for services provided; and responsibility for payment of non-covered services.     Mode of Communication:  Video Conference via Verold    As the provider I attest to compliance with applicable laws and regulations related to telemedicine.          [facility]  [facility]  [address], [address]  [place] [address]  Outpatient Progress Note      Session Date: 3/04/21  Client Name: Sunni Flores (she/her pronouns)  YOB: 1965  MRN: [id number]  Provider: Aicha Velasquez, PhD, LP  Type of Session: Individual   Present in Session: Clent only  Number of Minutes: 52 min  Treatment Plan Due: 9/18/2019    Health Maintenance Summary - Mental Health Treatment Plan       Status Date      MENTAL HEALTH TX PLAN Overdue 3/22/2020      Done 4/22/2019      Done 10/5/2018 See Scan     Done 9/18/2017           Current Symptoms/Status:   Sunni reports a history of gender dysphoria and has continued social and medical gender transition. Reports a high level of anatomic dysphoria, focused on her genitals. Planning for bottom surgery and high levels of distress around delays in surgery. History of major depressive disorder, moderate, recurrent. Continued labile mood, rumination, passive suicidal ideation, low energy, negative thinking.     Progress Toward Treatment Goals:   Continues social and medical transition, planning for surgery. Working on integration of  self, negative self-talk and self-compassion, rumination, negative mood symptoms    Intervention & Response:   Interpersonal, client-centered, and CBT techniques utilized to address client's current status.     Sunni stated she is tentatively scheduled for April 8th and is anxious about if it will happen.    Stated she is anxious about her upcoming surgery, edmar anxious about the disclosures she made in past session. Stated she has been reading about whiteness and wanting to heal racism. Stated her being trans informs her approach to whiteness. Explored how what she shared in session feels resolved for her and she does not want or need to explore it more. Shared about her transference feelings and desire to be friends outside of therapy. Explored the unmet needs of female friendship.    Continues to work on self-compassion. Talked about coping with Covid and slowing down. Discussed therapy goals and wanting to end therapy and going down to 1x per month.     Assignment:   Prepare for surgery  Explore identity feelings  Resolve historical relationships, integration of self      Diagnosis:    302.5 (F64.0) - Gender dysphoria in adults  Moderate Episode of Recurrent Major Depressive Disorder      Interactive Complexity:  None.       Plan / Need for Future Services:    Return for individual therapy in 2-3 weeks.                 Sara Velasquez, PhD, LP, CST    [facility], [facility]  Department of Family Medicine and Community Health  [employer]

## 2021-03-11 NOTE — TELEPHONE ENCOUNTER
Records received 03/11/21    Facility  [facility]   Outcome 1/9/2021 ECG tracings received, sent to [facility]

## 2021-03-16 ENCOUNTER — OFFICE VISIT (OUTPATIENT)
Dept: PLASTIC SURGERY | Facility: CLINIC | Age: 56
End: 2021-03-16
Payer: COMMERCIAL

## 2021-03-16 VITALS
HEIGHT: 69 IN | WEIGHT: 201 LBS | BODY MASS INDEX: 29.77 KG/M2 | DIASTOLIC BLOOD PRESSURE: 71 MMHG | HEART RATE: 53 BPM | OXYGEN SATURATION: 98 % | SYSTOLIC BLOOD PRESSURE: 113 MMHG

## 2021-03-16 DIAGNOSIS — F64.0 GENDER DYSPHORIA IN ADULT: Primary | ICD-10-CM

## 2021-03-16 PROCEDURE — 99213 OFFICE O/P EST LOW 20 MIN: CPT | Performed by: PLASTIC SURGERY

## 2021-03-16 ASSESSMENT — MIFFLIN-ST. JEOR: SCORE: 1571.11

## 2021-03-16 ASSESSMENT — PAIN SCALES - GENERAL: PAINLEVEL: NO PAIN (0)

## 2021-03-16 NOTE — NURSING NOTE
"Chief Complaint   Patient presents with     RECHECK     Pre-op consult for surgery on 4/8/21.       Vitals:    03/16/21 1235   BP: 113/71   Pulse: 53   SpO2: 98%   Height: 1.753 m (5' 9\")       Body mass index is 30.13 kg/m .    Romeo Ashley LPN      "

## 2021-03-16 NOTE — PROGRESS NOTES
"Patient returns for a preoperative visit prior to undergoing vaginoplasty for gender dysphoria.    INTERVAL HISTORY: Patient is scheduled for a preoperative H&P and preop Covid testing.  She feels ready to undergo vaginal plasty.    PHYSICAL EXAMINATION:  /71   Pulse 53   Ht 1.753 m (5' 9\")   Wt 91.2 kg (201 lb)   SpO2 98%   BMI 29.68 kg/m    Physical examination was deferred today.    ASSESSMENT: Gender dysphoria, candidate for robot-assisted penile inversion vaginoplasty with supplemental scrotal skin graft.    PLAN: We went over the operation today.  We discussed the risks include bleeding, infection, injury to surrounding structures, fluid collection, wound dehiscence with prolonged dressing changes, asymmetry, contour deformity, DVT, PE, rectal injury, rectovaginal fistula, possible need for ostomy, clitoral necrosis, sensory loss, voiding issues, urinary stream abnormalities, flap loss, vaginal prolapse, vaginal shrinkage, vaginal stenosis, need for lifelong dilation, granulation tissue, chronic pain, intravaginal hair growth, incompletely feminized external vulva, and need for revision surgery.  Patient accepts the risks and wishes to proceed.    We discussed perioperative hormone therapy discontinuation to decrease the risk of DVT formation.  We discussed preoperative bowel prep.    Preop H&P and preop Covid testing is scheduled for April 5, 2021.  Surgery is scheduled for Thursday, April 8, 2021.    20 minutes spent on the date of the encounter performing chart review, history and physical, documentation, review of tests, communication with other healthcare professionals, and placing orders as noted above.  "

## 2021-03-24 DIAGNOSIS — Z11.59 ENCOUNTER FOR SCREENING FOR OTHER VIRAL DISEASES: ICD-10-CM

## 2021-03-31 ENCOUNTER — DOCUMENTATION ONLY (OUTPATIENT)
Dept: PLASTIC SURGERY | Facility: CLINIC | Age: 56
End: 2021-03-31

## 2021-03-31 DIAGNOSIS — N39.0 URINARY TRACT INFECTION: Primary | ICD-10-CM

## 2021-03-31 NOTE — PROGRESS NOTES
PLASTICS SURGERY HUDDLE:    Patient was reviewed in preparation for their surgery the following was reviewed and has been completed:    Surgeon: Dr. Dawson    Surgery & Date: 4/8/21 Vaginoplasty     PAC/H&P: Yes, 4/5/21    Pre-op: Yes, 3/16/21    Labs: N/A    Packet: Yes    Imaging: N/A    Post-Op Appointments: Yes, 4/30/21    Covid Test:  Yes, 4/5/21    Wound Vac: N/A    Pt reminded of stopping HRT 2 weeks prior and bowel prep instructions.    Marguerite JOSHI RN, BSN

## 2021-04-05 ENCOUNTER — ANESTHESIA EVENT (OUTPATIENT)
Dept: SURGERY | Facility: CLINIC | Age: 56
DRG: 876 | End: 2021-04-05
Payer: COMMERCIAL

## 2021-04-05 ENCOUNTER — PRE VISIT (OUTPATIENT)
Dept: SURGERY | Facility: CLINIC | Age: 56
End: 2021-04-05

## 2021-04-05 ENCOUNTER — OFFICE VISIT (OUTPATIENT)
Dept: SURGERY | Facility: CLINIC | Age: 56
End: 2021-04-05
Payer: COMMERCIAL

## 2021-04-05 VITALS
HEART RATE: 80 BPM | RESPIRATION RATE: 12 BRPM | HEIGHT: 70 IN | DIASTOLIC BLOOD PRESSURE: 81 MMHG | BODY MASS INDEX: 29.06 KG/M2 | TEMPERATURE: 98.1 F | WEIGHT: 203 LBS | SYSTOLIC BLOOD PRESSURE: 114 MMHG | OXYGEN SATURATION: 99 %

## 2021-04-05 DIAGNOSIS — N39.0 URINARY TRACT INFECTION: ICD-10-CM

## 2021-04-05 DIAGNOSIS — Z01.818 PREOP EXAMINATION: ICD-10-CM

## 2021-04-05 DIAGNOSIS — Z01.818 PREOP EXAMINATION: Primary | ICD-10-CM

## 2021-04-05 DIAGNOSIS — F64.0 GENDER DYSPHORIA IN ADULT: ICD-10-CM

## 2021-04-05 DIAGNOSIS — Z11.59 ENCOUNTER FOR SCREENING FOR OTHER VIRAL DISEASES: ICD-10-CM

## 2021-04-05 LAB
ALBUMIN UR-MCNC: NEGATIVE MG/DL
ANION GAP SERPL CALCULATED.3IONS-SCNC: 5 MMOL/L (ref 3–14)
APPEARANCE UR: CLEAR
BILIRUB UR QL STRIP: NEGATIVE
BUN SERPL-MCNC: 29 MG/DL (ref 7–30)
CALCIUM SERPL-MCNC: 9.2 MG/DL (ref 8.5–10.1)
CHLORIDE SERPL-SCNC: 106 MMOL/L (ref 94–109)
CO2 SERPL-SCNC: 29 MMOL/L (ref 20–32)
COLOR UR AUTO: YELLOW
CREAT SERPL-MCNC: 1.38 MG/DL (ref 0.52–1.04)
ERYTHROCYTE [DISTWIDTH] IN BLOOD BY AUTOMATED COUNT: 12.3 % (ref 10–15)
GFR SERPL CREATININE-BSD FRML MDRD: 43 ML/MIN/{1.73_M2}
GLUCOSE SERPL-MCNC: 88 MG/DL (ref 70–99)
GLUCOSE UR STRIP-MCNC: NEGATIVE MG/DL
HCT VFR BLD AUTO: 44 % (ref 35–47)
HGB BLD-MCNC: 14.3 G/DL (ref 11.7–15.7)
HGB UR QL STRIP: NEGATIVE
HYALINE CASTS #/AREA URNS LPF: 1 /LPF (ref 0–2)
KETONES UR STRIP-MCNC: NEGATIVE MG/DL
LABORATORY COMMENT REPORT: NORMAL
LEUKOCYTE ESTERASE UR QL STRIP: NEGATIVE
MCH RBC QN AUTO: 29.5 PG (ref 26.5–33)
MCHC RBC AUTO-ENTMCNC: 32.5 G/DL (ref 31.5–36.5)
MCV RBC AUTO: 91 FL (ref 78–100)
MUCOUS THREADS #/AREA URNS LPF: PRESENT /LPF
NITRATE UR QL: NEGATIVE
PH UR STRIP: 5 PH (ref 5–7)
PLATELET # BLD AUTO: 232 10E9/L (ref 150–450)
POTASSIUM SERPL-SCNC: 4 MMOL/L (ref 3.4–5.3)
RBC # BLD AUTO: 4.84 10E12/L (ref 3.8–5.2)
RBC #/AREA URNS AUTO: 1 /HPF (ref 0–2)
SARS-COV-2 RNA RESP QL NAA+PROBE: NEGATIVE
SARS-COV-2 RNA RESP QL NAA+PROBE: NORMAL
SODIUM SERPL-SCNC: 140 MMOL/L (ref 133–144)
SOURCE: ABNORMAL
SP GR UR STRIP: 1.02 (ref 1–1.03)
SPECIMEN SOURCE: NORMAL
SPECIMEN SOURCE: NORMAL
SQUAMOUS #/AREA URNS AUTO: 2 /HPF (ref 0–1)
UROBILINOGEN UR STRIP-MCNC: 0 MG/DL (ref 0–2)
WBC # BLD AUTO: 6.1 10E9/L (ref 4–11)
WBC #/AREA URNS AUTO: 1 /HPF (ref 0–5)

## 2021-04-05 PROCEDURE — U0005 INFEC AGEN DETEC AMPLI PROBE: HCPCS | Mod: 90 | Performed by: PATHOLOGY

## 2021-04-05 PROCEDURE — 86900 BLOOD TYPING SEROLOGIC ABO: CPT | Performed by: PATHOLOGY

## 2021-04-05 PROCEDURE — 80048 BASIC METABOLIC PNL TOTAL CA: CPT | Performed by: PATHOLOGY

## 2021-04-05 PROCEDURE — 87086 URINE CULTURE/COLONY COUNT: CPT | Mod: 90 | Performed by: PATHOLOGY

## 2021-04-05 PROCEDURE — 86850 RBC ANTIBODY SCREEN: CPT | Performed by: PATHOLOGY

## 2021-04-05 PROCEDURE — 36415 COLL VENOUS BLD VENIPUNCTURE: CPT | Performed by: PATHOLOGY

## 2021-04-05 PROCEDURE — 81001 URINALYSIS AUTO W/SCOPE: CPT | Performed by: PATHOLOGY

## 2021-04-05 PROCEDURE — 99203 OFFICE O/P NEW LOW 30 MIN: CPT | Performed by: PHYSICIAN ASSISTANT

## 2021-04-05 PROCEDURE — 86901 BLOOD TYPING SEROLOGIC RH(D): CPT | Performed by: PATHOLOGY

## 2021-04-05 PROCEDURE — 85027 COMPLETE CBC AUTOMATED: CPT | Performed by: PATHOLOGY

## 2021-04-05 PROCEDURE — U0003 INFECTIOUS AGENT DETECTION BY NUCLEIC ACID (DNA OR RNA); SEVERE ACUTE RESPIRATORY SYNDROME CORONAVIRUS 2 (SARS-COV-2) (CORONAVIRUS DISEASE [COVID-19]), AMPLIFIED PROBE TECHNIQUE, MAKING USE OF HIGH THROUGHPUT TECHNOLOGIES AS DESCRIBED BY CMS-2020-01-R: HCPCS | Mod: 90 | Performed by: PATHOLOGY

## 2021-04-05 RX ORDER — ACETAMINOPHEN 325 MG/1
325-650 TABLET ORAL EVERY 6 HOURS PRN
COMMUNITY

## 2021-04-05 ASSESSMENT — MIFFLIN-ST. JEOR: SCORE: 1596.05

## 2021-04-05 ASSESSMENT — ENCOUNTER SYMPTOMS: SEIZURES: 0

## 2021-04-05 ASSESSMENT — LIFESTYLE VARIABLES: TOBACCO_USE: 0

## 2021-04-05 ASSESSMENT — PAIN SCALES - GENERAL: PAINLEVEL: NO PAIN (0)

## 2021-04-05 NOTE — H&P
Pre-Operative H & P     CC:  Preoperative exam to assess for increased cardiopulmonary risk while undergoing surgery and anesthesia.    Date of Encounter: 4/5/2021  Primary Care Physician:  Paul Swift  Reason for Visit: Gender dysphoria in adult       HPI     Sunni Flores is a 55 y/o transgender female who presents for pre-operative H&P in preparation for VAGINOPLASTY, ROBOTIC ASSISTED with Meet Venegas MD & Tank Dawson MD on 4/8/21 at [facility] for treatment of Gender dysphoria in adult. Patient is being evaluated for comorbid conditions of history of JERRELL, obesity, anxiety, depression.    Ms. Flores has a history of gender dysphoria. She is felt to be a candidate for penile inversion vaginoplasty with supplemental scrotal skin graft. She now presents for the above procedure.    History was obtained from patient & chart review.     Past Medical History  Past Medical History:   Diagnosis Date     Anxiety      Depression      Gender dysphoria      JERRELL (obstructive sleep apnea)        Past Surgical History  Past Surgical History:   Procedure Laterality Date     AS REPAIR SLIDING INGUINAL HERNIA  1967     COLONOSCOPY       DENTAL SURGERY      wisdom teeth     VASECTOMY         Hx of Blood transfusions/reactions: denies     Hx of abnormal bleeding or anti-platelet use: denies    Menstrual history: No LMP recorded. (Menstrual status: Reassignment).:      Steroid use in the last year: denies    Personal or FH with difficulty with Anesthesia:  denies    Prior to Admission Medications  Current Outpatient Medications   Medication Sig Dispense Refill     acetaminophen (TYLENOL) 325 MG tablet Take 325-650 mg by mouth every 6 hours as needed for mild pain       diazepam (VALIUM) 10 MG tablet as needed        Docusate Sodium (COLACE PO) Take by mouth 2 times daily       estradiol (VIVELLE-DOT) 0.1 MG/24HR bi-weekly patch Place 2 patches onto the skin twice a week (Patient  taking differently: Place 2 patches onto the skin twice a week ) 16 patch 5     ibuprofen (ADVIL/MOTRIN) 400 MG tablet Take 2 tablets by mouth as needed        lidocaine-prilocaine (EMLA) 2.5-2.5 % external cream as needed   2     methylfolate (DEPLIN) 15 MG TABS tablet Take 15 mg by mouth every morning        TRAZODONE HCL PO At Bedtime        venlafaxine (EFFEXOR-XR) 75 MG 24 hr capsule Take 300 capsules by mouth every morning        spironolactone (ALDACTONE) 100 MG tablet Take 1 tablet (100 mg) by mouth daily (Patient taking differently: Take 100 mg by mouth every morning ) 30 tablet 5       Allergies  Allergies   Allergen Reactions     Allopurinol Other (See Comments)     Patient is positive for HLA-B 58:01. Increased risk of allopurinol-induced severe cutaneous reactions.      Clopidogrel Other (See Comments)     Patient is a [id number] metabolizer. Clopidogrel would be predicted to lack efficacy       Social History  Social History     Socioeconomic History     Marital status:      Spouse name: Not on file     Number of children: Not on file     Years of education: Not on file     Highest education level: Not on file   Occupational History     Not on file   Social Needs     Financial resource strain: Not on file     Food insecurity     Worry: Not on file     Inability: Not on file     Transportation needs     Medical: Not on file     Non-medical: Not on file   Tobacco Use     Smoking status: Never Smoker     Smokeless tobacco: Never Used   Substance and Sexual Activity     Alcohol use: Yes     Comment: 2-3 drinks weekly     Drug use: No     Sexual activity: Yes     Partners: Female   Lifestyle     Physical activity     Days per week: Not on file     Minutes per session: Not on file     Stress: Not on file   Relationships     Social connections     Talks on phone: Not on file     Gets together: Not on file     Attends Temple service: Not on file     Active member of club or organization: Not on file      Attends meetings of clubs or organizations: Not on file     Relationship status: Not on file     Intimate partner violence     Fear of current or ex partner: Not on file     Emotionally abused: Not on file     Physically abused: Not on file     Forced sexual activity: Not on file   Other Topics Concern     Not on file   Social History Narrative     Not on file       Family History  Family History   Problem Relation Age of Onset     Hypertension Father      Myocardial Infarction Father 79     Anesthesia Reaction No family hx of      Deep Vein Thrombosis (DVT) No family hx of             ROS/MED HX  The complete review of systems is negative other than noted in the HPI or here.  Patient denies recent illness, fever and respiratory infection during past month.  Pt denies steroid use during past year.    ENT/Pulmonary: Comment: Hx JERRELL, resolved with wt loss     (-) tobacco use and asthma   Neurologic:  - neg neurologic ROS  (-) no seizures and no CVA   Cardiovascular: Comment: Hx syncope with blood draws    (+) -----Previous cardiac testing   Echo: Date: Results:    Stress Test: Date: Results:    ECG Reviewed: Date: 1/9/21 Results:  Sinus tachycardia  Otherwise normal ECG  No previous ECGs available  Ventricular rate 106 bpm    Cath: Date: Results:      METS/Exercise Tolerance: >4 METS Comment: Goes to gym 3-4x/week, walks dogs regulary 1-2 miles   Hematologic:  - neg hematologic  ROS  (-) history of blood clots and history of blood transfusion   Musculoskeletal:  - neg musculoskeletal ROS     GI/Hepatic:  - neg GI/hepatic ROS  (-) GERD and liver disease   Renal/Genitourinary: Comment: Taking estrogen    (+) renal disease, Pt does not require dialysis,     Endo:     (+) Obesity,  (-) Type II DM   Psychiatric/Substance Use: Comment: Gender dysphoria     (+) psychiatric history anxiety and depression     Infectious Disease:  - neg infectious disease ROS     Malignancy:  - neg malignancy ROS     Other:  - neg other ROS     "          Temp: 98.1  F (36.7  C) Temp src: Oral BP: 114/81 Pulse: 80   Resp: 12 SpO2: 99 %         203 lbs 0 oz  5' 10\"[PT REPORTED[   Body mass index is 29.13 kg/m .       Physical Exam  Constitutional: Awake, alert, cooperative, no apparent distress, and appears stated age.  Eyes: Pupils equal, round and reactive to light, extra ocular muscles intact, sclera clear, conjunctiva normal.  HENT: Normocephalic, oral pharynx with moist mucus membranes, good dentition. No goiter appreciated. No removable dental hardware.  Respiratory: Clear to auscultation bilaterally, no crackles or wheezing. No SOB when supine.  Cardiovascular: Regular rate and rhythm, normal S1 and S2, and no murmur noted.  Carotids +2, no bruits. No edema. Palpable pulses to radial, DP and PT arteries.   GI: Normal bowel sounds, soft, non-distended, non-tender, no masses palpated.    Lymph/Hematologic: No cervical lymphadenopathy and no supraclavicular lymphadenopathy.  Genitourinary:  deferred  Skin: Warm and dry.  No rashes.   Musculoskeletal: full ROM of neck. There is no redness, warmth, or swelling of the joints. Gross motor strength is normal.    Neurologic: Awake, alert, oriented to name, place and time. Cranial nerves II-XII are grossly intact. Gait is normal. Ambulates from chair to exam table, seats self, lies supine and sits back up w/o assistance.  Neuropsychiatric: Calm, cooperative. Normal affect. Pleasant. Answers questions appropriately, follows commands w/o difficulty.        PRIOR LABS/DIAGNOSTIC STUDIES:    All labs and imaging personally reviewed      EKG 1/9/21  Sinus tachycardia  Otherwise normal ECG  No previous ECGs available  Ventricular rate 106 bpm    BMP:   Lab Results   Component Value Date     08/11/2020     08/11/2020    POTASSIUM 4.9 08/11/2020    POTASSIUM 4.9 08/11/2020    CHLORIDE 105 08/11/2020    CHLORIDE 105 08/11/2020    CO2 22 08/11/2020    CO2 23 08/05/2019    BUN 27 (A) 08/11/2020    BUN 27 (A) " 08/11/2020    CR 1.45 (A) 08/11/2020    CR 1.45 (A) 08/11/2020    GLC  08/11/2020      Comment:      Canceled    GLC 85 08/05/2019     COAGS: No results found for: PTT, INR, FIBR  POC: No results found for: BGM, HCG, HCGS  HEPATIC:   Lab Results   Component Value Date    ALBUMIN 4.2 01/10/2019    PROTTOTAL 7.8 01/10/2019    ALT 33 01/10/2019    AST 13 01/10/2019    ALKPHOS 76 01/10/2019    BILITOTAL 0.4 01/10/2019     OTHER:   Lab Results   Component Value Date    SERA 9.1 08/11/2020    SERA 9.1 08/11/2020     Labs today: (personally reviewed)  BMP, CBC, UA, T&S, COVID    Sodium 133 - 144 mmol/L 140      Potassium 3.4 - 5.3 mmol/L 4.0     Chloride 94 - 109 mmol/L 106     Carbon Dioxide 20 - 32 mmol/L 29     Anion Gap 3 - 14 mmol/L 5     Glucose 70 - 99 mg/dL 88     Urea Nitrogen 7 - 30 mg/dL 29     Creatinine 0.52 - 1.04 mg/dL 1.38High      GFR Estimate >60 mL/min/ 43Low     Comment: Non  GFR Calc   Starting 12/18/2018, serum creatinine based estimated GFR (eGFR) will be   calculated using the Chronic Kidney Disease Epidemiology Collaboration   (CKD-EPI) equation.     GFR Estimate If Black >60 mL/min/ 50Low     Comment:  GFR Calc   Starting 12/18/2018, serum creatinine based estimated GFR (eGFR) will be   calculated using the Chronic Kidney Disease Epidemiology Collaboration   (CKD-EPI) equation.     Calcium 8.5 - 10.1 mg/dL 9.2        WBC 4.0 - 11.0 10e9/L 6.1      RBC Count 3.8 - 5.2 10e12/L 4.84     Hemoglobin 11.7 - 15.7 g/dL 14.3     Hematocrit 35.0 - 47.0 % 44.0     MCV 78 - 100 fl 91     MCH 26.5 - 33.0 pg 29.5     MCHC 31.5 - 36.5 g/dL 32.5     RDW 10.0 - 15.0 % 12.3     Platelet Count 150 - 450 10e9/L 232        Color Urine  Yellow      Appearance Urine  Clear     Glucose Urine NEG^Negative mg/dL Negative     Bilirubin Urine NEG^Negative Negative     Ketones Urine NEG^Negative mg/dL Negative     Specific Gravity Urine 1.003 - 1.035 1.023     Blood Urine NEG^Negative Negative      pH Urine 5.0 - 7.0 pH 5.0     Protein Albumin Urine NEG^Negative mg/dL Negative     Urobilinogen mg/dL 0.0 - 2.0 mg/dL 0.0     Nitrite Urine NEG^Negative Negative     Leukocyte Esterase Urine NEG^Negative Negative     Source  Midstream Urine     WBC Urine 0 - 5 /HPF 1     RBC Urine 0 - 2 /HPF 1     Squamous Epithelial /HPF Urine 0 - 1 /HPF 2High      Mucous Urine NEG^Negative /LPF PresentAbnormal      Hyaline Casts 0 - 2 /LPF 1          Outside records reviewed from: Care Everywhere ( EKG @ [facility])    ASSESSMENT and PLAN  Sunni Flores is a 56 year old adult scheduled to undergoVAGINOPLASTY, ROBOTIC ASSISTED with Meet Venegas MD & Tank Dawson MD on 4/8/21 at [facility] for treatment of Gender dysphoria in adult.      Pt has had prior anesthetic.     No history of anesthetic complications     She has the following specific operative considerations:   # VTE risk: 0.26%  # Risk of PONV score = 2.  If > 2, anti-emetic intervention recommended.  # Anesthesia considerations:  Refer to PAC assessment in anesthesia records      CARDIAC: METS >4,  Goes to gym 3-4x/week, walks dogs regulary 1-2 miles     # RCRI : High risk surgery.  0.9% risk of major adverse cardiac event.     #  History of syncope w/ higher doses of aldactone. Last dose 3/25/21.       PULMONARY:     # History of JERRELL, resolved with 65# wt loss    # Never smoked    # Denies asthma or inhaler use    GI:    # Denies GERD    /RENAL:     # Creatinine 1.38, GFR 43 today. Will require close monitoring of renal function during periop period.    ENDO: BMI 30    # Taking estrogen    # No DM    ORTHO:     # full ROM of neck    NEURO/PSYCH:      # Anxiety, depression      Patient is optimized and is acceptable candidate for the proposed procedure. No further diagnostic evaluation is needed.      Final plan per anesthesiologist on day of surgery.     Arrival time, NPO, shower and medication instructions provided by  nursing staff today.  Preparing For Your Surgery handout given.    Conchis Rivas PA-C  Preoperative Assessment Center  [facility]  Phone: [phone]  Fax: [phone]

## 2021-04-05 NOTE — ANESTHESIA PREPROCEDURE EVALUATION
Anesthesia Pre-Procedure Evaluation    Patient: Sunni Flores   MRN: [id number] : 1965        Preoperative Diagnosis: Gender dysphoria in adult [F64.0]   Procedure : Procedure(s):  VAGINOPLASTY, ROBOTIC ASSISTED     Past Medical History:   Diagnosis Date     Anxiety      Depression      Gender dysphoria      JERRELL (obstructive sleep apnea)       Past Surgical History:   Procedure Laterality Date     AS REPAIR SLIDING INGUINAL HERNIA       COLONOSCOPY       DENTAL SURGERY      wisdom teeth     VASECTOMY        Allergies   Allergen Reactions     Allopurinol Other (See Comments)     Patient is positive for HLA-B 58:01. Increased risk of allopurinol-induced severe cutaneous reactions.      Clopidogrel Other (See Comments)     Patient is a [id number] metabolizer. Clopidogrel would be predicted to lack efficacy      Social History     Tobacco Use     Smoking status: Never Smoker     Smokeless tobacco: Never Used   Substance Use Topics     Alcohol use: Yes     Comment: 2-3 drinks weekly      Wt Readings from Last 1 Encounters:   21 92.1 kg (203 lb)        Anesthesia Evaluation   Pt has had prior anesthetic.     No history of anesthetic complications       ROS/MED HX  ENT/Pulmonary: Comment: Hx JERRELL, resolved with wt loss     (-) tobacco use and asthma   Neurologic:  - neg neurologic ROS  (-) no seizures and no CVA   Cardiovascular: Comment: Hx syncope with blood draws    (+) -----Previous cardiac testing   Echo: Date: Results:    Stress Test: Date: Results:    ECG Reviewed: Date: 21 Results:  Sinus tachycardia  Otherwise normal ECG  No previous ECGs available  Ventricular rate 106 bpm    Cath: Date: Results:      METS/Exercise Tolerance: >4 METS Comment: Goes to gym 3-4x/week, walks dogs regulary 1-2 miles   Hematologic:  - neg hematologic  ROS  (-) history of blood clots and history of blood transfusion   Musculoskeletal:  - neg musculoskeletal ROS     GI/Hepatic:  - neg GI/hepatic ROS  (-) GERD and liver  disease   Renal/Genitourinary: Comment: Taking estrogen    (+) renal disease, Pt does not require dialysis,     Endo:     (+) Obesity,  (-) Type II DM   Psychiatric/Substance Use: Comment: Gender dysphoria     (+) psychiatric history anxiety and depression     Infectious Disease:  - neg infectious disease ROS     Malignancy:  - neg malignancy ROS     Other:  - neg other ROS          Physical Exam    Airway        Mallampati: II   TM distance: > 3 FB   Neck ROM: full   Mouth opening: > 3 cm    Respiratory Devices and Support         Dental           Cardiovascular   cardiovascular exam normal       Rhythm and rate: regular and normal     Pulmonary   pulmonary exam normal        breath sounds clear to auscultation           OUTSIDE LABS:  CBC: No results found for: WBC, HGB, HCT, PLT  BMP:   Lab Results   Component Value Date     08/11/2020     08/11/2020    POTASSIUM 4.9 08/11/2020    POTASSIUM 4.9 08/11/2020    CHLORIDE 105 08/11/2020    CHLORIDE 105 08/11/2020    CO2 22 08/11/2020    CO2 23 08/05/2019    BUN 27 (A) 08/11/2020    BUN 27 (A) 08/11/2020    CR 1.45 (A) 08/11/2020    CR 1.45 (A) 08/11/2020    GLC  08/11/2020      Comment:      Canceled    GLC 85 08/05/2019     COAGS: No results found for: PTT, INR, FIBR  POC: No results found for: BGM, HCG, HCGS  HEPATIC:   Lab Results   Component Value Date    ALBUMIN 4.2 01/10/2019    PROTTOTAL 7.8 01/10/2019    ALT 33 01/10/2019    AST 13 01/10/2019    ALKPHOS 76 01/10/2019    BILITOTAL 0.4 01/10/2019     OTHER:   Lab Results   Component Value Date    SERA 9.1 08/11/2020    SERA 9.1 08/11/2020       Anesthesia Plan    ASA Status:  2      Anesthesia Type: General.   Induction: Intravenous.   Maintenance: Balanced.   Techniques and Equipment:     - Lines/Monitors: 2nd IV, BIS     Consents    Anesthesia Plan(s) and associated risks, benefits, and realistic alternatives discussed. Questions answered and patient/representative(s) expressed understanding.     -  Discussed with:  Patient         Postoperative Care    Pain management: IV analgesics, Oral pain medications, Multi-modal analgesia.   PONV prophylaxis: Ondansetron (or other 5HT-3), Dexamethasone or Solumedrol     Comments:              PAC Discussion and Assessment    ASA Classification: 2  Case is suitable for: [place]  Anesthetic techniques and relevant risks discussed: GA  Invasive monitoring and risk discussed: No    Possibility and Risk of blood transfusion discussed: No            PAC Resident/NP Anesthesia Assessment: Sunni Flores is a 56 year old adult scheduled to undergoVAGINOPLASTY, ROBOTIC ASSISTED with Meet Venegas MD & Tank Dawson MD on 4/8/21 at [facility] for treatment of Gender dysphoria in adult.      Pt has had prior anesthetic.     No history of anesthetic complications     She has the following specific operative considerations:   # VTE risk: 0.26%  # Risk of PONV score = 2.  If > 2, anti-emetic intervention recommended.  # Anesthesia considerations:  Refer to PAC assessment in anesthesia records      CARDIAC: METS >4,  Goes to gym 3-4x/week, walks dogs regulary 1-2 miles     # RCRI : High risk surgery.  0.9% risk of major adverse cardiac event.     #  History of syncope w/ higher doses of aldactone. Last dose 3/25/21.       PULMONARY:     # History of JERRELL, resolved with 65# wt loss    # Never smoked    # Denies asthma or inhaler use    GI:    # Denies GERD    /RENAL:     # Creatinine 1.38, GFR 43 today. Will require close monitoring of renal function during periop period.    ENDO: BMI 30    # Taking estrogen    # No DM    ORTHO:     # full ROM of neck    NEURO/PSYCH:      # Anxiety, depression      Patient is optimized and is acceptable candidate for the proposed procedure. No further diagnostic evaluation is needed.      Final plan per anesthesiologist on day of surgery.     Reviewed and Signed by PAC Mid-Level Provider/Resident  Mid-Level  Provider/Resident: Conchis Rivas PA-C  Date: 4/5/21  Time: 1115                               Conchis Rivas PA-C

## 2021-04-05 NOTE — PATIENT INSTRUCTIONS
Preparing for Your Surgery      Name:  Sunni Flores   MRN:  [id number]   :  1965   Today's Date:  2021       Arriving for surgery:  Surgery date:  2021  Arrival time:  5:45 am    Restrictions due to COVID 19:  One consistent visitor per patient is allowed.  The visitor will be allowed in the pre-op area.  Visitors are asked to leave the building during the surgery.  No ill visitors.  All visitors must wear face mask.    [employer] parking is available for anyone with mobility limitations or disabilities.  ([facility]  24 hours/ 7 days a week; [facility]  7 am- 3:30 pm, Mon- Fri)    Please come to:     [facility] [address]  [address] [place]  [address]       -    Please proceed to [address] on the 3rd floor. [phone]?     - ?If you are in need of directions, wheelchair or escort please stop at the Information Desk in the lobby.     What can I eat or drink?  -  Follow diet restrictions as instructed by surgeon---bowel prep   -  You may have clear liquids until 2 hours before surgery. (Until 5:45 am arrival time)    Examples of clear liquids:  Water  Clear broth  Juices (apple, white grape, white cranberry  and cider) without pulp  Noncarbonated, powder based beverages  (lemonade and Roman-Aid)  Sodas (Sprite, 7-Up, ginger ale and seltzer)  Coffee or tea (without milk or cream)  Gatorade    -  No Alcohol for at least 24 hours before surgery     Which medicines can I take?    Hold Aspirin for 7 days before surgery.   Hold Multivitamins for 7 days before surgery.  Hold Supplements for 7 days before surgery.    Hold Ibuprofen (Advil, Motrin) for 1 day before surgery.    Hold Naproxen (Aleve) for 4 days before surgery.    -  DO NOT take these medications the day of surgery:  Methylfolate   Spironolactone       -  PLEASE TAKE these medications the day of surgery:  Venlafaxine   Tylenol if needed   Diazepam if needed       How do I prepare  myself?  - Please take 2 showers before surgery using Scrubcare or Hibiclens soap.    Use this soap only from the neck to your toes.     Leave the soap on your skin for one minute--then rinse thoroughly.      You may use your own shampoo and conditioner; no other hair products.   - Please remove all jewelry and body piercings.  - No lotions, deodorants or fragrance.  - No makeup or fingernail polish.   - Bring your ID and insurance card.  - If you use a CPAP Machine please bring it to hospital    - All patients are required to have a Covid-19 test within 4 days of surgery/procedure.      -Patients will be contacted by the [facility] scheduling team within 1 week of surgery to make an appointment.      - Patients may call the Scheduling team at [phone] if they have not been scheduled within 4 days of  surgery.        Questions or Concerns:    - For any questions regarding the day of surgery or your hospital stay, please contact the Pre Admission Nursing Office at [phone].       - If you have health changes between today and your surgery please call your surgeon.       For questions after surgery please call your surgeons office.

## 2021-04-06 LAB
ABO + RH BLD: NORMAL
ABO + RH BLD: NORMAL
BACTERIA SPEC CULT: NO GROWTH
BLD GP AB SCN SERPL QL: NORMAL
BLOOD BANK CMNT PATIENT-IMP: NORMAL
BLOOD BANK CMNT PATIENT-IMP: NORMAL
Lab: NORMAL
SPECIMEN EXP DATE BLD: NORMAL
SPECIMEN SOURCE: NORMAL

## 2021-04-08 ENCOUNTER — APPOINTMENT (OUTPATIENT)
Dept: GENERAL RADIOLOGY | Facility: CLINIC | Age: 56
DRG: 876 | End: 2021-04-08
Attending: UROLOGY
Payer: COMMERCIAL

## 2021-04-08 ENCOUNTER — ANESTHESIA (OUTPATIENT)
Dept: SURGERY | Facility: CLINIC | Age: 56
DRG: 876 | End: 2021-04-08
Payer: COMMERCIAL

## 2021-04-08 ENCOUNTER — HOSPITAL ENCOUNTER (INPATIENT)
Facility: CLINIC | Age: 56
LOS: 6 days | Discharge: HOME | DRG: 876 | End: 2021-04-14
Attending: UROLOGY | Admitting: UROLOGY
Payer: COMMERCIAL

## 2021-04-08 DIAGNOSIS — G89.18 POSTOPERATIVE PAIN: Primary | ICD-10-CM

## 2021-04-08 DIAGNOSIS — F64.0 GENDER DYSPHORIA IN ADOLESCENT AND ADULT: ICD-10-CM

## 2021-04-08 DIAGNOSIS — F64.0 GENDER DYSPHORIA IN ADULT: ICD-10-CM

## 2021-04-08 LAB — GLUCOSE BLDC GLUCOMTR-MCNC: 81 MG/DL (ref 70–99)

## 2021-04-08 PROCEDURE — 250N000011 HC RX IP 250 OP 636: Performed by: NURSE ANESTHETIST, CERTIFIED REGISTERED

## 2021-04-08 PROCEDURE — 272N000002 HC OR SUPPLY OTHER OPNP: Performed by: UROLOGY

## 2021-04-08 PROCEDURE — 250N000009 HC RX 250: Performed by: UROLOGY

## 2021-04-08 PROCEDURE — 88305 TISSUE EXAM BY PATHOLOGIST: CPT | Mod: 26 | Performed by: PATHOLOGY

## 2021-04-08 PROCEDURE — 250N000013 HC RX MED GY IP 250 OP 250 PS 637: Performed by: STUDENT IN AN ORGANIZED HEALTH CARE EDUCATION/TRAINING PROGRAM

## 2021-04-08 PROCEDURE — 74018 RADEX ABDOMEN 1 VIEW: CPT | Mod: 26 | Performed by: RADIOLOGY

## 2021-04-08 PROCEDURE — 999N000063 XR ABDOMEN PORT 1 VW

## 2021-04-08 PROCEDURE — 272N000001 HC OR GENERAL SUPPLY STERILE: Performed by: UROLOGY

## 2021-04-08 PROCEDURE — 0W4M070 CREATION OF VAGINA IN MALE PERINEUM WITH AUTOLOGOUS TISSUE SUBSTITUTE, OPEN APPROACH: ICD-10-PCS | Performed by: UROLOGY

## 2021-04-08 PROCEDURE — 250N000011 HC RX IP 250 OP 636: Performed by: PLASTIC SURGERY

## 2021-04-08 PROCEDURE — 250N000011 HC RX IP 250 OP 636: Performed by: UROLOGY

## 2021-04-08 PROCEDURE — 360N000080 HC SURGERY LEVEL 7, PER MIN: Performed by: UROLOGY

## 2021-04-08 PROCEDURE — 999N000141 HC STATISTIC PRE-PROCEDURE NURSING ASSESSMENT: Performed by: UROLOGY

## 2021-04-08 PROCEDURE — 88305 TISSUE EXAM BY PATHOLOGIST: CPT | Mod: TC | Performed by: UROLOGY

## 2021-04-08 PROCEDURE — 258N000003 HC RX IP 258 OP 636: Performed by: STUDENT IN AN ORGANIZED HEALTH CARE EDUCATION/TRAINING PROGRAM

## 2021-04-08 PROCEDURE — 250N000011 HC RX IP 250 OP 636: Performed by: STUDENT IN AN ORGANIZED HEALTH CARE EDUCATION/TRAINING PROGRAM

## 2021-04-08 PROCEDURE — 258N000003 HC RX IP 258 OP 636: Performed by: ANESTHESIOLOGY

## 2021-04-08 PROCEDURE — 8E0W4CZ ROBOTIC ASSISTED PROCEDURE OF TRUNK REGION, PERCUTANEOUS ENDOSCOPIC APPROACH: ICD-10-PCS | Performed by: UROLOGY

## 2021-04-08 PROCEDURE — 88304 TISSUE EXAM BY PATHOLOGIST: CPT | Mod: TC | Performed by: UROLOGY

## 2021-04-08 PROCEDURE — 250N000025 HC SEVOFLURANE, PER MIN: Performed by: UROLOGY

## 2021-04-08 PROCEDURE — 999N001017 HC STATISTIC GLUCOSE BY METER IP

## 2021-04-08 PROCEDURE — 710N000010 HC RECOVERY PHASE 1, LEVEL 2, PER MIN: Performed by: UROLOGY

## 2021-04-08 PROCEDURE — 88331 PATH CONSLTJ SURG 1 BLK 1SPC: CPT | Mod: TC | Performed by: UROLOGY

## 2021-04-08 PROCEDURE — 250N000011 HC RX IP 250 OP 636: Performed by: ANESTHESIOLOGY

## 2021-04-08 PROCEDURE — 250N000009 HC RX 250: Performed by: NURSE ANESTHETIST, CERTIFIED REGISTERED

## 2021-04-08 PROCEDURE — 0DBW4ZX EXCISION OF PERITONEUM, PERCUTANEOUS ENDOSCOPIC APPROACH, DIAGNOSTIC: ICD-10-PCS | Performed by: UROLOGY

## 2021-04-08 PROCEDURE — 88302 TISSUE EXAM BY PATHOLOGIST: CPT | Mod: 26 | Performed by: PATHOLOGY

## 2021-04-08 PROCEDURE — 370N000017 HC ANESTHESIA TECHNICAL FEE, PER MIN: Performed by: UROLOGY

## 2021-04-08 PROCEDURE — 120N000002 HC R&B MED SURG/OB UMMC

## 2021-04-08 RX ORDER — VENLAFAXINE HYDROCHLORIDE 150 MG/1
300 CAPSULE, EXTENDED RELEASE ORAL
Status: DISCONTINUED | OUTPATIENT
Start: 2021-04-09 | End: 2021-04-14 | Stop reason: HOSPADM

## 2021-04-08 RX ORDER — CEFAZOLIN SODIUM 2 G/100ML
2 INJECTION, SOLUTION INTRAVENOUS EVERY 8 HOURS
Status: DISCONTINUED | OUTPATIENT
Start: 2021-04-08 | End: 2021-04-13

## 2021-04-08 RX ORDER — PROCHLORPERAZINE MALEATE 5 MG
10 TABLET ORAL EVERY 6 HOURS PRN
Status: DISCONTINUED | OUTPATIENT
Start: 2021-04-08 | End: 2021-04-14 | Stop reason: HOSPADM

## 2021-04-08 RX ORDER — TRAZODONE HYDROCHLORIDE 100 MG/1
100 TABLET ORAL
Status: DISCONTINUED | OUTPATIENT
Start: 2021-04-08 | End: 2021-04-14 | Stop reason: HOSPADM

## 2021-04-08 RX ORDER — LIDOCAINE 40 MG/G
CREAM TOPICAL
Status: DISCONTINUED | OUTPATIENT
Start: 2021-04-08 | End: 2021-04-14 | Stop reason: HOSPADM

## 2021-04-08 RX ORDER — FENTANYL CITRATE 50 UG/ML
25-50 INJECTION, SOLUTION INTRAMUSCULAR; INTRAVENOUS EVERY 5 MIN PRN
Status: DISCONTINUED | OUTPATIENT
Start: 2021-04-08 | End: 2021-04-08 | Stop reason: HOSPADM

## 2021-04-08 RX ORDER — VENLAFAXINE HYDROCHLORIDE 150 MG/1
300 CAPSULE, EXTENDED RELEASE ORAL EVERY MORNING
COMMUNITY

## 2021-04-08 RX ORDER — EPINEPHRINE 1 MG/ML
INJECTION, SOLUTION, CONCENTRATE INTRAVENOUS PRN
Status: DISCONTINUED | OUTPATIENT
Start: 2021-04-08 | End: 2021-04-08 | Stop reason: HOSPADM

## 2021-04-08 RX ORDER — NALOXONE HYDROCHLORIDE 0.4 MG/ML
0.2 INJECTION, SOLUTION INTRAMUSCULAR; INTRAVENOUS; SUBCUTANEOUS
Status: DISCONTINUED | OUTPATIENT
Start: 2021-04-08 | End: 2021-04-08 | Stop reason: HOSPADM

## 2021-04-08 RX ORDER — OXYCODONE HYDROCHLORIDE 5 MG/1
5 TABLET ORAL EVERY 4 HOURS PRN
Status: DISCONTINUED | OUTPATIENT
Start: 2021-04-08 | End: 2021-04-14 | Stop reason: HOSPADM

## 2021-04-08 RX ORDER — NALOXONE HYDROCHLORIDE 0.4 MG/ML
0.4 INJECTION, SOLUTION INTRAMUSCULAR; INTRAVENOUS; SUBCUTANEOUS
Status: DISCONTINUED | OUTPATIENT
Start: 2021-04-08 | End: 2021-04-14 | Stop reason: HOSPADM

## 2021-04-08 RX ORDER — MEPERIDINE HYDROCHLORIDE 25 MG/ML
12.5 INJECTION INTRAMUSCULAR; INTRAVENOUS; SUBCUTANEOUS
Status: DISCONTINUED | OUTPATIENT
Start: 2021-04-08 | End: 2021-04-08 | Stop reason: HOSPADM

## 2021-04-08 RX ORDER — SODIUM CHLORIDE 9 MG/ML
INJECTION, SOLUTION INTRAVENOUS CONTINUOUS
Status: DISCONTINUED | OUTPATIENT
Start: 2021-04-08 | End: 2021-04-13

## 2021-04-08 RX ORDER — NALOXONE HYDROCHLORIDE 0.4 MG/ML
0.2 INJECTION, SOLUTION INTRAMUSCULAR; INTRAVENOUS; SUBCUTANEOUS
Status: DISCONTINUED | OUTPATIENT
Start: 2021-04-08 | End: 2021-04-14 | Stop reason: HOSPADM

## 2021-04-08 RX ORDER — SODIUM CHLORIDE, SODIUM LACTATE, POTASSIUM CHLORIDE, CALCIUM CHLORIDE 600; 310; 30; 20 MG/100ML; MG/100ML; MG/100ML; MG/100ML
INJECTION, SOLUTION INTRAVENOUS CONTINUOUS
Status: DISCONTINUED | OUTPATIENT
Start: 2021-04-08 | End: 2021-04-08 | Stop reason: HOSPADM

## 2021-04-08 RX ORDER — DOCUSATE SODIUM 100 MG/1
100 CAPSULE, LIQUID FILLED ORAL 2 TIMES DAILY
Status: DISCONTINUED | OUTPATIENT
Start: 2021-04-08 | End: 2021-04-08

## 2021-04-08 RX ORDER — FENTANYL CITRATE 50 UG/ML
INJECTION, SOLUTION INTRAMUSCULAR; INTRAVENOUS PRN
Status: DISCONTINUED | OUTPATIENT
Start: 2021-04-08 | End: 2021-04-08

## 2021-04-08 RX ORDER — CEFAZOLIN SODIUM 2 G/100ML
2 INJECTION, SOLUTION INTRAVENOUS
Status: COMPLETED | OUTPATIENT
Start: 2021-04-08 | End: 2021-04-08

## 2021-04-08 RX ORDER — HYDROMORPHONE HCL IN WATER/PF 6 MG/30 ML
0.2 PATIENT CONTROLLED ANALGESIA SYRINGE INTRAVENOUS
Status: DISCONTINUED | OUTPATIENT
Start: 2021-04-08 | End: 2021-04-14 | Stop reason: HOSPADM

## 2021-04-08 RX ORDER — DEXAMETHASONE SODIUM PHOSPHATE 4 MG/ML
INJECTION, SOLUTION INTRA-ARTICULAR; INTRALESIONAL; INTRAMUSCULAR; INTRAVENOUS; SOFT TISSUE PRN
Status: DISCONTINUED | OUTPATIENT
Start: 2021-04-08 | End: 2021-04-08

## 2021-04-08 RX ORDER — AMOXICILLIN 250 MG
1 CAPSULE ORAL 2 TIMES DAILY
Status: DISCONTINUED | OUTPATIENT
Start: 2021-04-08 | End: 2021-04-14 | Stop reason: HOSPADM

## 2021-04-08 RX ORDER — CEFAZOLIN SODIUM 1 G/3ML
1 INJECTION, POWDER, FOR SOLUTION INTRAMUSCULAR; INTRAVENOUS SEE ADMIN INSTRUCTIONS
Status: DISCONTINUED | OUTPATIENT
Start: 2021-04-08 | End: 2021-04-08 | Stop reason: HOSPADM

## 2021-04-08 RX ORDER — LIDOCAINE HYDROCHLORIDE 10 MG/ML
INJECTION, SOLUTION INFILTRATION; PERINEURAL PRN
Status: DISCONTINUED | OUTPATIENT
Start: 2021-04-08 | End: 2021-04-08 | Stop reason: HOSPADM

## 2021-04-08 RX ORDER — ONDANSETRON 4 MG/1
4 TABLET, ORALLY DISINTEGRATING ORAL EVERY 30 MIN PRN
Status: DISCONTINUED | OUTPATIENT
Start: 2021-04-08 | End: 2021-04-08 | Stop reason: HOSPADM

## 2021-04-08 RX ORDER — NALOXONE HYDROCHLORIDE 0.4 MG/ML
0.4 INJECTION, SOLUTION INTRAMUSCULAR; INTRAVENOUS; SUBCUTANEOUS
Status: DISCONTINUED | OUTPATIENT
Start: 2021-04-08 | End: 2021-04-08 | Stop reason: HOSPADM

## 2021-04-08 RX ORDER — ONDANSETRON 2 MG/ML
4 INJECTION INTRAMUSCULAR; INTRAVENOUS EVERY 6 HOURS PRN
Status: DISCONTINUED | OUTPATIENT
Start: 2021-04-08 | End: 2021-04-14 | Stop reason: HOSPADM

## 2021-04-08 RX ORDER — OXYCODONE HYDROCHLORIDE 10 MG/1
10 TABLET ORAL EVERY 4 HOURS PRN
Status: DISCONTINUED | OUTPATIENT
Start: 2021-04-08 | End: 2021-04-14 | Stop reason: HOSPADM

## 2021-04-08 RX ORDER — ALBUTEROL SULFATE 0.83 MG/ML
2.5 SOLUTION RESPIRATORY (INHALATION) EVERY 4 HOURS PRN
Status: DISCONTINUED | OUTPATIENT
Start: 2021-04-08 | End: 2021-04-08 | Stop reason: HOSPADM

## 2021-04-08 RX ORDER — HEPARIN SODIUM 5000 [USP'U]/.5ML
5000 INJECTION, SOLUTION INTRAVENOUS; SUBCUTANEOUS
Status: COMPLETED | OUTPATIENT
Start: 2021-04-08 | End: 2021-04-08

## 2021-04-08 RX ORDER — ONDANSETRON 4 MG/1
4 TABLET, ORALLY DISINTEGRATING ORAL EVERY 6 HOURS PRN
Status: DISCONTINUED | OUTPATIENT
Start: 2021-04-08 | End: 2021-04-14 | Stop reason: HOSPADM

## 2021-04-08 RX ORDER — POLYETHYLENE GLYCOL 3350 17 G/17G
17 POWDER, FOR SOLUTION ORAL DAILY
Status: DISCONTINUED | OUTPATIENT
Start: 2021-04-09 | End: 2021-04-14 | Stop reason: HOSPADM

## 2021-04-08 RX ORDER — HYDROMORPHONE HYDROCHLORIDE 1 MG/ML
.3-.5 INJECTION, SOLUTION INTRAMUSCULAR; INTRAVENOUS; SUBCUTANEOUS EVERY 10 MIN PRN
Status: DISCONTINUED | OUTPATIENT
Start: 2021-04-08 | End: 2021-04-08 | Stop reason: HOSPADM

## 2021-04-08 RX ORDER — FENTANYL CITRATE 50 UG/ML
25-50 INJECTION, SOLUTION INTRAMUSCULAR; INTRAVENOUS
Status: CANCELLED | OUTPATIENT
Start: 2021-04-08

## 2021-04-08 RX ORDER — ONDANSETRON 2 MG/ML
INJECTION INTRAMUSCULAR; INTRAVENOUS PRN
Status: DISCONTINUED | OUTPATIENT
Start: 2021-04-08 | End: 2021-04-08

## 2021-04-08 RX ORDER — LIDOCAINE 40 MG/G
CREAM TOPICAL
Status: DISCONTINUED | OUTPATIENT
Start: 2021-04-08 | End: 2021-04-08 | Stop reason: HOSPADM

## 2021-04-08 RX ORDER — PROPOFOL 10 MG/ML
INJECTION, EMULSION INTRAVENOUS PRN
Status: DISCONTINUED | OUTPATIENT
Start: 2021-04-08 | End: 2021-04-08

## 2021-04-08 RX ORDER — BISACODYL 10 MG
10 SUPPOSITORY, RECTAL RECTAL DAILY PRN
Status: DISCONTINUED | OUTPATIENT
Start: 2021-04-08 | End: 2021-04-14 | Stop reason: HOSPADM

## 2021-04-08 RX ORDER — HYDROMORPHONE HYDROCHLORIDE 1 MG/ML
0.4 INJECTION, SOLUTION INTRAMUSCULAR; INTRAVENOUS; SUBCUTANEOUS
Status: DISCONTINUED | OUTPATIENT
Start: 2021-04-08 | End: 2021-04-14 | Stop reason: HOSPADM

## 2021-04-08 RX ORDER — DOCUSATE SODIUM 100 MG/1
100 CAPSULE, LIQUID FILLED ORAL 2 TIMES DAILY
Status: DISCONTINUED | OUTPATIENT
Start: 2021-04-08 | End: 2021-04-14 | Stop reason: HOSPADM

## 2021-04-08 RX ORDER — METRONIDAZOLE 7.5 MG/G
GEL TOPICAL ONCE
Status: DISCONTINUED | OUTPATIENT
Start: 2021-04-08 | End: 2021-04-08 | Stop reason: HOSPADM

## 2021-04-08 RX ORDER — ONDANSETRON 2 MG/ML
4 INJECTION INTRAMUSCULAR; INTRAVENOUS EVERY 30 MIN PRN
Status: DISCONTINUED | OUTPATIENT
Start: 2021-04-08 | End: 2021-04-08 | Stop reason: HOSPADM

## 2021-04-08 RX ORDER — ACETAMINOPHEN 325 MG/1
650 TABLET ORAL EVERY 4 HOURS
Status: DISCONTINUED | OUTPATIENT
Start: 2021-04-11 | End: 2021-04-10

## 2021-04-08 RX ORDER — HYDRALAZINE HYDROCHLORIDE 20 MG/ML
2.5-5 INJECTION INTRAMUSCULAR; INTRAVENOUS
Status: DISCONTINUED | OUTPATIENT
Start: 2021-04-08 | End: 2021-04-08 | Stop reason: HOSPADM

## 2021-04-08 RX ORDER — LIDOCAINE HYDROCHLORIDE 20 MG/ML
INJECTION, SOLUTION INFILTRATION; PERINEURAL PRN
Status: DISCONTINUED | OUTPATIENT
Start: 2021-04-08 | End: 2021-04-08

## 2021-04-08 RX ADMIN — FENTANYL CITRATE 50 MCG: 50 INJECTION, SOLUTION INTRAMUSCULAR; INTRAVENOUS at 12:15

## 2021-04-08 RX ADMIN — CEFAZOLIN SODIUM 2 G: 2 INJECTION, SOLUTION INTRAVENOUS at 18:32

## 2021-04-08 RX ADMIN — ROCURONIUM BROMIDE 10 MG: 10 INJECTION INTRAVENOUS at 13:00

## 2021-04-08 RX ADMIN — DEXAMETHASONE SODIUM PHOSPHATE 8 MG: 4 INJECTION, SOLUTION INTRA-ARTICULAR; INTRALESIONAL; INTRAMUSCULAR; INTRAVENOUS; SOFT TISSUE at 08:21

## 2021-04-08 RX ADMIN — HYDROMORPHONE HYDROCHLORIDE 0.2 MG: 0.2 INJECTION, SOLUTION INTRAMUSCULAR; INTRAVENOUS; SUBCUTANEOUS at 22:01

## 2021-04-08 RX ADMIN — OXYCODONE HYDROCHLORIDE 10 MG: 10 TABLET ORAL at 19:41

## 2021-04-08 RX ADMIN — FENTANYL CITRATE 100 MCG: 50 INJECTION, SOLUTION INTRAMUSCULAR; INTRAVENOUS at 09:10

## 2021-04-08 RX ADMIN — Medication 1 G: at 12:30

## 2021-04-08 RX ADMIN — SODIUM CHLORIDE: 9 INJECTION, SOLUTION INTRAVENOUS at 15:21

## 2021-04-08 RX ADMIN — HYDROMORPHONE HYDROCHLORIDE 0.5 MG: 1 INJECTION, SOLUTION INTRAMUSCULAR; INTRAVENOUS; SUBCUTANEOUS at 15:11

## 2021-04-08 RX ADMIN — ROCURONIUM BROMIDE 20 MG: 10 INJECTION INTRAVENOUS at 10:07

## 2021-04-08 RX ADMIN — FENTANYL CITRATE 50 MCG: 50 INJECTION, SOLUTION INTRAMUSCULAR; INTRAVENOUS at 13:00

## 2021-04-08 RX ADMIN — PROPOFOL 130 MG: 10 INJECTION, EMULSION INTRAVENOUS at 08:20

## 2021-04-08 RX ADMIN — SODIUM CHLORIDE, POTASSIUM CHLORIDE, SODIUM LACTATE AND CALCIUM CHLORIDE: 600; 310; 30; 20 INJECTION, SOLUTION INTRAVENOUS at 08:31

## 2021-04-08 RX ADMIN — HYDROMORPHONE HYDROCHLORIDE 0.2 MG: 0.2 INJECTION, SOLUTION INTRAMUSCULAR; INTRAVENOUS; SUBCUTANEOUS at 18:31

## 2021-04-08 RX ADMIN — FENTANYL CITRATE 50 MCG: 50 INJECTION, SOLUTION INTRAMUSCULAR; INTRAVENOUS at 14:36

## 2021-04-08 RX ADMIN — ROCURONIUM BROMIDE 20 MG: 10 INJECTION INTRAVENOUS at 12:15

## 2021-04-08 RX ADMIN — OXYCODONE HYDROCHLORIDE 10 MG: 10 TABLET ORAL at 15:31

## 2021-04-08 RX ADMIN — Medication 1 G: at 10:30

## 2021-04-08 RX ADMIN — LIDOCAINE HYDROCHLORIDE 100 MG: 20 INJECTION, SOLUTION INFILTRATION; PERINEURAL at 08:17

## 2021-04-08 RX ADMIN — Medication 2 G: at 08:30

## 2021-04-08 RX ADMIN — SUGAMMADEX 200 MG: 100 INJECTION, SOLUTION INTRAVENOUS at 13:55

## 2021-04-08 RX ADMIN — HYDROMORPHONE HYDROCHLORIDE 0.5 MG: 1 INJECTION, SOLUTION INTRAMUSCULAR; INTRAVENOUS; SUBCUTANEOUS at 14:52

## 2021-04-08 RX ADMIN — ONDANSETRON 4 MG: 2 INJECTION INTRAMUSCULAR; INTRAVENOUS at 08:19

## 2021-04-08 RX ADMIN — FENTANYL CITRATE 50 MCG: 50 INJECTION, SOLUTION INTRAMUSCULAR; INTRAVENOUS at 11:11

## 2021-04-08 RX ADMIN — SODIUM CHLORIDE, POTASSIUM CHLORIDE, SODIUM LACTATE AND CALCIUM CHLORIDE: 600; 310; 30; 20 INJECTION, SOLUTION INTRAVENOUS at 08:11

## 2021-04-08 RX ADMIN — DOCUSATE SODIUM 50 MG AND SENNOSIDES 8.6 MG 1 TABLET: 8.6; 5 TABLET, FILM COATED ORAL at 20:18

## 2021-04-08 RX ADMIN — ONDANSETRON 4 MG: 2 INJECTION INTRAMUSCULAR; INTRAVENOUS at 13:08

## 2021-04-08 RX ADMIN — ROCURONIUM BROMIDE 70 MG: 10 INJECTION INTRAVENOUS at 08:21

## 2021-04-08 RX ADMIN — MIDAZOLAM 2 MG: 1 INJECTION INTRAMUSCULAR; INTRAVENOUS at 08:03

## 2021-04-08 RX ADMIN — DOCUSATE SODIUM 100 MG: 100 CAPSULE, LIQUID FILLED ORAL at 20:18

## 2021-04-08 RX ADMIN — HEPARIN SODIUM 5000 UNITS: 5000 INJECTION, SOLUTION INTRAVENOUS; SUBCUTANEOUS at 07:21

## 2021-04-08 RX ADMIN — METRONIDAZOLE 500 MG: 500 INJECTION, SOLUTION INTRAVENOUS at 08:11

## 2021-04-08 RX ADMIN — FENTANYL CITRATE 100 MCG: 50 INJECTION, SOLUTION INTRAMUSCULAR; INTRAVENOUS at 08:11

## 2021-04-08 RX ADMIN — ROCURONIUM BROMIDE 20 MG: 10 INJECTION INTRAVENOUS at 11:11

## 2021-04-08 ASSESSMENT — MIFFLIN-ST. JEOR: SCORE: 1583.25

## 2021-04-08 ASSESSMENT — ACTIVITIES OF DAILY LIVING (ADL)
ADLS_ACUITY_SCORE: 15
ADLS_ACUITY_SCORE: 20

## 2021-04-08 NOTE — OR NURSING
PATRICIA Santiago at bedside, ok with pt transferring to  when bed/nurse ready.     Report given to RN, patient is wearing glasses. Transport is escort. Post-procedure note in.

## 2021-04-08 NOTE — OP NOTE
DATE OF OPERATION: 2021     PREOPERATIVE DIAGNOSIS: Gender dysphoria.     POSTOPERATIVE DIAGNOSIS: Gender dysphoria.     PROCEDURES PERFORMED: Robotic assisted laparoscopic penile inversion vaginoplasty with supplemental scrotal skin graft to include the followin.  Robotic assisted laparoscopic construction of artifical vagina with graft.  2.  Bilateral simple orchiectomy.  3.  Scrotectomy.  4.  Total penectomy.  5.  Clitoroplasty.  6.  Urethroplasty.  7.  Augmentation of vaginal introitus with adjacent perineal tissue transfer, size 3 x 6 cm.  8.  Robotic assisted laparoscopic harvest of a wide anterior peritoneal flap and inset over the neovaginal scrotal skin graft in the manner of an extraperitoneal inset of an omental flap.  9.  Robotic assisted laparoscopic intraabdominal neovaginal colpopexy.  10.  Bilateral labiaplasty via adjacent tissue transfer of scrotal and mons skin flaps, size 12 x 5 cm on left and 12 x 5 cm on right.     SURGEON: Tank Dawson MD      CO SURGEON: Meet Venegas MD (Parts 1, 3, 4, 9, and 10 of the procedure were performed in conjunction with Dr. Venegas, whereas he performed parts 2, 6, and 8 by himself and I performed parts 5 and 7 by myself.  Co surgeon was needed due to the complexity of the case, the need for expertise spanning two separate specialties, and lack of appropriately skilled resident assistance.)     ASSISTANT: MARILEE Call (This assistant was required because there was inconsistent and inadequate resident assistance available to perform wound closure and apply sterile dressings.)     ANESTHESIA: General.     ESTIMATED BLOOD LOSS: 250 mL     FINDINGS: Viable clitoris at the end of the case.     SPECIMENS: Bilateral testicles and penectomy specimen.     COMPLICATIONS: None.     DRAINS: Three 15 Moldovan JACINDA drains, one in each labia majora and one in the abdomen.     IMPLANTS: None.     INDICATIONS: Patient is a 56-year-old trans-woman who prefers she  her pronouns.  She met criteria for vaginoplasty according the WPATH Standards of Care 7th edition guidelines.  We discussed the risks benefits and alternatives to robotic assisted penile inversion vaginoplasty with supplemental scrotal skin graft.  The risks discussed included but were not limited to rectal injury, rectovaginal fistula, bleeding, fluid collection, infection, injury to surrounding structures, flap loss, clitoris necrosis, sensory loss, wound dehiscence, vaginal prolapse, vaginal shrinkage/stenosis, need for lifelong dilation, urinary stream abnormalities, DVT/PE, and need for revision surgery.  Patient accepted these associated risks and wished to proceed with surgery.     DESCRIPTION OF PROCEDURE:  Informed consent was obtained and markings were performed in the preoperative holding area.  Preoperative prophylactic antibiotics and anticoagulation were given.  Patient was taken to the operating room with bilateral lower leg SCD's on.  General anesthesia was obtained.  Patient was then placed in a lithotomy position with yellowfin stirrups.  Arms were maintained on arm boards at 45 degrees.  All pressure points were well padded.  The lower abdomen and genitalia were then prepped and draped in a sterile fashion.  A timeout was performed.     A sterile fitch catheter was inserted.  A rectal condom was placed and sewn in using silk sutures.  A butterfly shape scrotal skin graft excision and a 3 x 6 cm posteriorly based perineal flap were marked on the scrotum posterior to the penoscrotal junction and adductor tendons.  A gull wing pattern clitoroplasty incision was also marked.  These areas were injected with 30 ml of 1% lidocaine with 1:100,000 epinephrine.  The scrotal skin graft was then excised with a scalpel and the perineal flap was raised off the bulbospongiosus muscle using bovie cautery.  The scrotal skin graft was taken to the back table and thinned aggressively.  It was then shaped into a  tubular form around the largest dilator, and the edges were sewn together using interrupted 3-0 Vicryl sutures.  The bulbospongiosus muscle was delineated completely on either side and the central tendon was released, allowing mobility of the bulbar urethra.  With a finger inside the rectal condom protecting the rectum at all times, a plane was made bluntly anterior to the rectum and to either side of the bulbar urethra.     Simultaneously, the urology team gained access into the abdomen with the da Sandy robot.  A horizontally oriented incision was made through the peritoneum anterior to the rectum, and robotic assisted dissection was performed through Denonvillier's fascia to visualize the lateral dissection performed perineally.  The peritoneal dissection and intra-abdominal dissection planes eventually met and docking time occurred at 10:18 AM.  Hemostasis of this cavity was achieved and the largest dilator could be inserted without difficulty.  The tip of the dilator was used to rosario the superior most aspect of the wide anterior peritoneal flap, and this was dissected for a size of 10 x 4 cm in preparation for insetting the neovaginal construct.     Bilateral orchiectomies were performed by the urology team and this will be dictated separately in their operative report.     Cautery dissection was performed to identify the corpus spongiosum.  Sharp dissection was performed superficial to this distally until met by the junction between squamous and mucosal penile skin junction.  A circumferential incision was made around the penile shaft at that level and the penile shaft skin was sharply dissected off the deeper structures.  The penile shaft skin was then retracted superiorly to expose the dorsal neurovascular bundle to the glans penis.  The lateral extent of these structures was delineated on both sides with cautery.     Then the neoclitoris was sharply excised in a gull wing pattern from the remaining glans  tissue.  The clitoris along with the mucosal penile shaft skin were then sharply dissected off the corpus cavernosal bodies below the level of the tunica albuginea to ensure no injury to the neurovascular bundle.  This dissection was performed to the level of the pubis.  The neoclitoris was then shaped and sutured with 3-0 Vicryl into a narrow tubular form with the mucosal penile shaft skin forming the clitoral junior.     The penectomy was then performed by dissecting the corpus cavernosal bodies to the level of the pubis.  Care was taken to not damage the neurovascular bundle of the clitoroplasty while completely removing all tissues that may become engorged.  For this reason, penectomy was performed in conjunction.  The cavernosal bodies were then ligated with 3-0 PDS suture, amputated, and sent off for pathology review.  Hemostasis was confirmed.  I then gauged the appropriate location for the neoclitoris, just above the stumps of the corpus cavernosal bodies.  The neoclitoris was sewn into the place there using a 3-0 PDS suture.  Its neurovascular bundle was then folded and draped over the pubis.  This was then sewn to the abdominal wall using 3-0 Vicryl sutures with care taken not to damage the neurovascular bundle by taking bites in a longitudinal fashion.     The urology team then performed a urethroplasty by debulking the bulbar urethra, shortening the urethra, and oversewing the meatal edges.  This portion will be dictated separately in their operative report.  The urethral edges were then sutured to the neoclitoris using 3-0 Vicryl sutures.  The previously left behind bulbospongiosus muscle was then fully resected and discarded.      The penile skin tube was then brought posteriorly and placed temporarily into the neovaginal cavity.  I then designed a midline opening to allow exposure of the neoclitoris and urethra.  I also designed a scrotectomy design to allow for future labiaplasty with anteriorly based  blood supply.  Dr. Venegas then performed excision of the excess scrotum to ensure perfusion of all layers of the scrotum while removing any excess bulk.     10 x 10 cm of the scrotal skin graft was then used to augment the penile inversion.  Care was taken to the sew the skin graft tube with multiple transposition flaps to augment the penile skin and skin graft junction suture line in order to decrease the risk of intravaginal ring stenosis.  The neovaginal cavity was then fully irrigated three times with betadine solution and hemostasis ensured.  The vaginal lining construct was then inverted and placed into the neovaginal cavity.  I applied the appropriate amount of tension to properly line up the perineal flap within the vaginal introitus.  The urology team then inset the wide anterior peritoneal flap over the neovaginal construct.  Colpopexy sutures were then placed into the vaginal lining construct tip and tied down to the peritoneal flap by Dr. Venegas while I held the construct in place.  I then arranged the perineal flap to meet the penile skin tube appropriately to further augment the introitus.  This was sewn into placed with 3-0 PDS deep dermal sutures and 3-0 Vicryl interrupted skin sutures.  Simultaneously, the urology team closed the peritoneum.  The robot was then removed from the abdomen and the ports were closed in layers after a 15 Burundian JACINDA drain was left within the abdomen.     15 Burundian channeled drains were placed underneath each labia majora.  These were sewn in place using 3-0 Nylon.  Bilateral labia majora were designed based on two separate anteriorly perfused scrotal and mons skin, measuring 12 x 5 cm per side.  Care was taken to leave enough tissue to recreate the appearance of labia minora as well.  The incisions were then sewn in layers using 3-0 Vicryl sutures.  Dr. Venegas performed this on the left side while I simultaneously performed it on the right side to shorten the operative  time.     The vaginal cavity was then irrigated.  A lubricated tissue expander vaginal stent was placed and held in position with horizontal perineal sutures.  The clitoris and urethra were confirmed to be pink, well perfused, and viable at the end of the case.  The clitoral and urethral wound edges were covered and packed with xeroform.  Dressing were held down by mesh panties.  Both JACINDA drains held suction at the end of the case.  Forte was draining clear urine.  All counts were correct at the end of the case.  Patient was then extubated, awakened, and transferred to the postoperative area in stable condition.  Patient will be sent to the inpatient floor for a one week admission during which the vaginal stent will be removed on postoperative day 5.     DISPOSITION: Inpatient floor.

## 2021-04-08 NOTE — ANESTHESIA POSTPROCEDURE EVALUATION
Patient: Sunni Flores    Procedure(s):  VAGINOPLASTY, ROBOTIC ASSISTED, excisional biopsy of peritoneum    Diagnosis:Gender dysphoria in adult [F64.0]  Diagnosis Additional Information: No value filed.    Anesthesia Type:  General    Note:  Disposition: Admission   Postop Pain Control: Uneventful            Sign Out: Well controlled pain   PONV: No   Neuro/Psych: Uneventful            Sign Out: Acceptable/Baseline neuro status   Airway/Respiratory: Uneventful            Sign Out: Acceptable/Baseline resp. status   CV/Hemodynamics: Uneventful            Sign Out: Acceptable CV status   Other NRE: NONE   DID A NON-ROUTINE EVENT OCCUR? No         Last vitals:  Vitals:    04/08/21 1415 04/08/21 1430 04/08/21 1436   BP: 108/65 110/84    Pulse: 98 97    Resp: 16 11    Temp: 36.7  C (98  F)     SpO2: 99% 99% 99%       Last vitals prior to Anesthesia Care Transfer:  CRNA VITALS  4/8/2021 1337 - 4/8/2021 1437      4/8/2021             SpO2:  99 %          Electronically Signed By: Yakov Wilson MD  April 8, 2021  2:58 PM

## 2021-04-08 NOTE — ANESTHESIA CARE TRANSFER NOTE
Patient: Sunni Flores    Procedure(s):  VAGINOPLASTY, ROBOTIC ASSISTED, excisional biopsy of peritoneum    Diagnosis: Gender dysphoria in adult [F64.0]  Diagnosis Additional Information: No value filed.    Anesthesia Type:   General     Note:    Oropharynx: oropharynx clear of all foreign objects and spontaneously breathing  Level of Consciousness: awake and drowsy  Oxygen Supplementation: nasal cannula  Level of Supplemental Oxygen (L/min / FiO2): 2  Independent Airway: airway patency satisfactory and stable  Dentition: dentition unchanged  Vital Signs Stable: post-procedure vital signs reviewed and stable  Report to RN Given: handoff report given  Patient transferred to: PACU  Comments: Pt to recovery.  Spontaneous respirations and exchanging well.  Pt making needs known.  Report given to RN.    Handoff Report: Identifed the Patient, Identified the Reponsible Provider, Reviewed the pertinent medical history, Discussed the surgical course, Reviewed Intra-OP anesthesia mangement and issues during anesthesia, Set expectations for post-procedure period and Allowed opportunity for questions and acknowledgement of understanding      Vitals: (Last set prior to Anesthesia Care Transfer)  CRNA VITALS  4/8/2021 1337 - 4/8/2021 1412      4/8/2021             Resp Rate (observed):  (!) 1        Electronically Signed By: STERLING Ayala CRNA  April 8, 2021  2:12 PM

## 2021-04-08 NOTE — ANESTHESIA PROCEDURE NOTES
Airway       Patient location during procedure: OR  Staff -        CRNA: Sierra Scott APRN CRNA       Performed By: CRNA  Consent for Airway        Urgency: elective  Indications and Patient Condition       Indications for airway management: debo-procedural       Induction type:intravenous       Mask difficulty assessment: 1 - vent by mask    Final Airway Details       Final airway type: endotracheal airway       Successful airway: ETT - single  Endotracheal Airway Details        ETT size (mm): 7.5       Cuffed: yes       Successful intubation technique: direct laryngoscopy       DL Blade Type: Akhtar 2       Grade View of Cords: 1       Adjucts: stylet       Position: Right       Measured from: gums/teeth       Secured at (cm): 21       Bite block used: None    Post intubation assessment        Placement verified by: capnometry, equal breath sounds and chest rise        Number of attempts at approach: 1       Number of other approaches attempted: 0       Secured with: silk tape       Ease of procedure: easy       Dentition: Intact and Unchanged    Medication(s) Administered   Medication Administration Time: 4/8/2021 8:14 AM

## 2021-04-08 NOTE — OP NOTE
OPERATIVE REPORT     PREOPERATIVE DIAGNOSIS:   1.Gender Dysphoria  POSTOPERATIVE DIAGNOSIS:   1. Gender Dysphoria  2. Peritoneal Polyps  DATE OF SURGERY: 2021     PROCEDURE:   Robotic Assisted Laparoscopic Penile inversion vaginoplasty with supplemental scrotal skin graft, which includes the followin. Total Penectomy  2. Urethroplasty (Male Anterior One Stage)  3. Robotic Assisted Laparoscopy, surgical, colpopexy (suspension of vaginal apex)  4. Robotic Assisted Laparoscopic Construction of Artificial Vagina With Graft  5. Scrotectomy  6. Bilateral labiaplasty via adjacent tissue transfer of scrotal and mons skin flaps, size 12 x 5 cm on left and 12 x 5 cm on right.   7. Clitoroplasty  8. Augmentation of vaginal introitus via adjacent tissue transfer of perineal flap, size 2 x 2 cm  9. Bilateral Simple Scrotal Orchiectomy  10. Creation of Peritoneal Flaps (in the manner of omental flaps) to provide blood supply for the neovaginal lining  11. Excision of Intra-abdominal Peritoneal Polyps.      SURGEON: Meet Venegas MD  COSURGEON: Tank Dawson MD (Cosurgeon needed due to lack of qualified, appropriate level resident assistance, complexity of the procedure spanning the expertise of two separate specialties.)  ASSISTANTS: Ernesto Rivera MD & Amanda Joshi MD  ANESTHESIA: General  EBL: 100 mL  DRAINS: 16 Fr Forte and 15 Fr vaginal drain x 2, Vaginal Stent. 19 Fr abdominal drain  SPECIMENS: Peritoneal Polyps      INDICATIONS:   Sunni Flores is a 56 year old female with gender dysphoria. She meets WPATH guidelines for gender affirming surgery. She elected for a penile inversion vaginoplasty using a robotic approach to facilitate canal dissection. She did not have prior orchiectomy. The risks, benefits and alternatives of the multiple treatment options were discussed with her and she elected to proceed. Specifically, this included, but was not limited to: wound infection, anesthesia risks, blood clots, urethral  stricture, inadequate vaginal depth, and rectal injury.      DESCRIPTION OF PROCEDURE: After informed consent was obtained and preoperative antibiotics were given, the patient was taken to the operating room, placed supine on the operating table. SCDs and preoperative subcutaneous heparin were utilized for VTE prophylaxis. General anesthesia was induced and she was intubated. The patient was placed in lithotomy. The genitalia and lower abdomen were prepped and draped in a sterile fashion. A timeout was performed.A 16 Fr urethral Forte catheter was placed.     We opted for an early excision approach to scrotectomy. A butterfly shaped incision was made over the scrotum to excise extra skin.  Dr. Dawson and his team then thinned the full thickness scrotal skin grafts on the back table. The scrotum was removed in entirety using electrocautery.     A 16F Forte catheter was inserted. A stab wound was made above the umbilicus and the abdomen was insufflated with a Veress needle. The remaining 4 ports for a total of 5 were placed in the standard fashion with the assistant port in the left paramedian location, 2 robotic ports on the right side, 1 robotic port on the left side and the camera in the supraumbilical region.     Small peritoneal polyps - maybe 3 or 4 in number were encountered. We excised them and sent them for frozen section. They were thought to be benign. The rest of the abdomen was examined and no further lesions were noted.     Peritoneal incision was made in the posterior cul de sac. Vasa and seminal vesicles were encountered, and we dissected beneath these on Dennonvillier's fascia. We traversed as distal as possible with the prostate up and the rectum down. This was generally an avascular plane.     Dr. Dawson then created a posterior perineal flap placed directly over the perineal body. Midline dissection was used to identify the corpus spongiosum and the bulbospongiosus muscle. Muscle was left in place during  this portion. We then performed simultaneous perineal dissection. We traveled inferior to bulbospongiosus muscle. We transected central tendon. Dissection then continued to develop a space between the urethra and prostate anteriorly and the rectum posteriorly from the perineum. This would ultimately become a space for the neovagina. This was facilitated by utilization of a lubricated rectal condom drape.      This creation of the space for the artificial vagina required two surgeons from two different disciplines given the complexity of the case. We felt it safest to have both attending surgeons performing this portion of the case given the high risk for rectal and urethral injury. We eventually met to form the vaginal canal.     We then made a circumcising skin incision with care to leave a mucosal collar beneath the glans. This was dissected down to Lewis's fascia. The penis was degloved, which left a circumferential penile skin tube.      We then turned our attention to orchiectomy. The right testis was isolated by dissecting it free of its dartos attachments and this dissection was carried proximally along the spermatic cord to the external inguinal ring using electrocautery to free any cremasteric fibers. Once we reached the inguinal ring we placed a large clamp on the proximal spermatic cord. We divided the cord in half between two Kaila clamps more distal and sharply removed the testicle distal to the clamps. A 2-0 silk was used to suture ligate both stumps of the spermatic cord underneath our Kaila clamps. Lastly, we placed one more 2-0 silk suture ligature around the entire cord just proximal to our large clamp. The clamps was released and there was no bleeding. We performed the same steps on the left side. Specimens were passed off the field and sent to pathology.     Dr. Dawson then turned his attention to the glans in order to perform a clitoroplasty. A W-shaped incision was marked along the glans to create  the clitoris. Bovie cautery was used to delineate the lateral borders of the neurovascular bundle along the penile shaft. A combination of blunt and sharp dissection with tenotomy scissors was used to dissect the penile glans and its neurovascular bundle. Dissection was performed along the under surface of the tunica albuginea - freeing the corporal spongy tissue. This ensured that the neurovascular bundle was left unharmed. Dr. Dawson shaped and sutured the neoclitoris in a narrow tubular stricture and reshaping the mucosal collar to form a clitoral junior.      During the clitoroplasty, the anterior urethra was simultaneously freed from the corporal bodies. Circumferential mobilization was performed to the distal bulbar urethra. This was done using sharp dissection. This  the erectile tissue from the urethra completely.       We then dissected the cavernosal bodies to the level of the pubic symphysis and  then amputated the corporal bodies bilaterally at this level and passed the specimen off to pathology. The stumps were closed with running 2-0 PDS suture.      The neoclitoris was sewn into its appropriate location using 4-0 Vicryl suture by Dr. Dawson.            I then performed further dissection between the urethra and the bulbospongiosus muscles. The muscle was removed with electrocautery and passed off the field.             Dr. Dawson then turned his attention to creating a midline opening superior to the penile skin tube. This midline opening would later become the opening for the clitoris and urethra.      The wound was then irrigated with hemostasis obtained. The penile skin tube was then sewn to the skin graft over the vaginal dilator by Dr. Dawson. Lateral incisions were made on the skin tube to limit circular anastomosis and thus, contracture.      Dr. Dawson then arranged the 2x2 cm perineal flap to meet posteriorly with the inverted penile skin tube. This was performed to augment the introitus and create a  posterior fourchette.     The penile inversion with graft were then inverted into the vaginal cavity and visualized with the robot.   We further dissected the anterior peritoneal edge along its lateral margins to create a peritoneal flap to provide additional blood supply to the skin graft. This dissection was roughly 10cm x 4 cm.   Using multiple 3-0 V Loc, I sutured the skin graft to the peritoneal edge across the midline. Additional sutures were placed laterally which acted to suspend the skin graft internally to prevent prolapse. This completed the colpopexy.      The anterior peritoneal flap was advanced to provide additional blood supply to a 4cm portion of the skin graft at the apex. This was tacked in a few locations using PDS in a U stitch fashion through the anterior peritoneal flap then through the skin graft to provide fixation. This completed the placement of the peritoneal flap.      I then turned my attention to completing the urethroplasty. The urethra was transected at the level of the distal bulbar urethra. The urethra was spatulated for about 3cm ventrally, allowing the dorsal aspect to be sewn to the neoclitoral and clitoral junior skin dorsally using 3-0 Vicryl interrupted suture - thus creating a mucosal, lubricated inner vulvar vestibule.  The urethral spatulation ended at the mid bulbar urethra. The edges of the urethrotomy was oversewn to decrease bleeding and dimas the mucosa.        The ventral urethra was then sutured to the posterior aspect of the superiorly placed opening to complete the urethroplasty using 4-0 Vicryl suture. There was excellent urethral mucosal eversion with pink mucosa evident.     A drain was placed underneath each labia majora for two drains total. Drain sutures were placed. Dr. Dawsno and I designed labia majora using anteriorly perfused scrotal and mons skin. These labial flaps were 12x5cm per side. These were fashioned to create a feminine appearing labia. These were  sutured into place using 3-0 Vicryl and Monocryl sutures. Dr. Dawson performed the right side, and I performed the left side simultaneously to reduce operative time.     Robot was undocked, and ports closed using absorbable suture. A drain was placed deep in the pelvis and exited the right most lateral port.     Vaginal stent was placed and filled with air. All counts were correct at the end of the case.  The clitoris was pink and viable at the end of the case.  The Forte was intact and draining clear urine.  A pressure dressing was applied and sutured in place using Prolene sutures.  The patient was then extubated, awakened, and transferred to the postop area in stable condition.

## 2021-04-09 ENCOUNTER — APPOINTMENT (OUTPATIENT)
Dept: OCCUPATIONAL THERAPY | Facility: CLINIC | Age: 56
DRG: 876 | End: 2021-04-09
Attending: STUDENT IN AN ORGANIZED HEALTH CARE EDUCATION/TRAINING PROGRAM
Payer: COMMERCIAL

## 2021-04-09 LAB
ANION GAP SERPL CALCULATED.3IONS-SCNC: 5 MMOL/L (ref 3–14)
BUN SERPL-MCNC: 14 MG/DL (ref 7–30)
CALCIUM SERPL-MCNC: 8.2 MG/DL (ref 8.5–10.1)
CHLORIDE SERPL-SCNC: 108 MMOL/L (ref 94–109)
CO2 SERPL-SCNC: 26 MMOL/L (ref 20–32)
CREAT SERPL-MCNC: 1.35 MG/DL (ref 0.52–1.04)
ERYTHROCYTE [DISTWIDTH] IN BLOOD BY AUTOMATED COUNT: 12.4 % (ref 10–15)
GFR SERPL CREATININE-BSD FRML MDRD: 44 ML/MIN/{1.73_M2}
GLUCOSE SERPL-MCNC: 124 MG/DL (ref 70–99)
HCT VFR BLD AUTO: 34.5 % (ref 35–47)
HGB BLD-MCNC: 11.5 G/DL (ref 11.7–15.7)
MCH RBC QN AUTO: 29.6 PG (ref 26.5–33)
MCHC RBC AUTO-ENTMCNC: 33.3 G/DL (ref 31.5–36.5)
MCV RBC AUTO: 89 FL (ref 78–100)
PLATELET # BLD AUTO: 253 10E9/L (ref 150–450)
POTASSIUM SERPL-SCNC: 4.3 MMOL/L (ref 3.4–5.3)
RBC # BLD AUTO: 3.88 10E12/L (ref 3.8–5.2)
SODIUM SERPL-SCNC: 139 MMOL/L (ref 133–144)
WBC # BLD AUTO: 9.5 10E9/L (ref 4–11)

## 2021-04-09 PROCEDURE — 85027 COMPLETE CBC AUTOMATED: CPT | Performed by: STUDENT IN AN ORGANIZED HEALTH CARE EDUCATION/TRAINING PROGRAM

## 2021-04-09 PROCEDURE — 97530 THERAPEUTIC ACTIVITIES: CPT | Mod: GO | Performed by: OCCUPATIONAL THERAPIST

## 2021-04-09 PROCEDURE — 120N000002 HC R&B MED SURG/OB UMMC

## 2021-04-09 PROCEDURE — 36415 COLL VENOUS BLD VENIPUNCTURE: CPT | Performed by: STUDENT IN AN ORGANIZED HEALTH CARE EDUCATION/TRAINING PROGRAM

## 2021-04-09 PROCEDURE — 97165 OT EVAL LOW COMPLEX 30 MIN: CPT | Mod: GO | Performed by: OCCUPATIONAL THERAPIST

## 2021-04-09 PROCEDURE — 97535 SELF CARE MNGMENT TRAINING: CPT | Mod: GO | Performed by: OCCUPATIONAL THERAPIST

## 2021-04-09 PROCEDURE — 250N000011 HC RX IP 250 OP 636: Performed by: STUDENT IN AN ORGANIZED HEALTH CARE EDUCATION/TRAINING PROGRAM

## 2021-04-09 PROCEDURE — 250N000013 HC RX MED GY IP 250 OP 250 PS 637: Performed by: STUDENT IN AN ORGANIZED HEALTH CARE EDUCATION/TRAINING PROGRAM

## 2021-04-09 PROCEDURE — 258N000003 HC RX IP 258 OP 636: Performed by: STUDENT IN AN ORGANIZED HEALTH CARE EDUCATION/TRAINING PROGRAM

## 2021-04-09 PROCEDURE — 80048 BASIC METABOLIC PNL TOTAL CA: CPT | Performed by: STUDENT IN AN ORGANIZED HEALTH CARE EDUCATION/TRAINING PROGRAM

## 2021-04-09 RX ORDER — HYDROXYZINE HYDROCHLORIDE 25 MG/1
50 TABLET, FILM COATED ORAL EVERY 6 HOURS PRN
Status: DISCONTINUED | OUTPATIENT
Start: 2021-04-09 | End: 2021-04-14 | Stop reason: HOSPADM

## 2021-04-09 RX ORDER — HYDROXYZINE HYDROCHLORIDE 25 MG/1
25 TABLET, FILM COATED ORAL EVERY 6 HOURS PRN
Status: DISCONTINUED | OUTPATIENT
Start: 2021-04-09 | End: 2021-04-14 | Stop reason: HOSPADM

## 2021-04-09 RX ADMIN — CEFAZOLIN SODIUM 2 G: 2 INJECTION, SOLUTION INTRAVENOUS at 09:08

## 2021-04-09 RX ADMIN — OXYCODONE HYDROCHLORIDE 10 MG: 10 TABLET ORAL at 10:06

## 2021-04-09 RX ADMIN — HYDROMORPHONE HYDROCHLORIDE 0.4 MG: 1 INJECTION, SOLUTION INTRAMUSCULAR; INTRAVENOUS; SUBCUTANEOUS at 23:11

## 2021-04-09 RX ADMIN — DOCUSATE SODIUM 100 MG: 100 CAPSULE, LIQUID FILLED ORAL at 08:23

## 2021-04-09 RX ADMIN — DOCUSATE SODIUM 100 MG: 100 CAPSULE, LIQUID FILLED ORAL at 20:26

## 2021-04-09 RX ADMIN — VENLAFAXINE HYDROCHLORIDE 300 MG: 150 CAPSULE, EXTENDED RELEASE ORAL at 08:24

## 2021-04-09 RX ADMIN — HYDROMORPHONE HYDROCHLORIDE 0.4 MG: 1 INJECTION, SOLUTION INTRAMUSCULAR; INTRAVENOUS; SUBCUTANEOUS at 08:33

## 2021-04-09 RX ADMIN — CEFAZOLIN SODIUM 2 G: 2 INJECTION, SOLUTION INTRAVENOUS at 00:30

## 2021-04-09 RX ADMIN — DOCUSATE SODIUM 50 MG AND SENNOSIDES 8.6 MG 1 TABLET: 8.6; 5 TABLET, FILM COATED ORAL at 20:26

## 2021-04-09 RX ADMIN — SODIUM CHLORIDE: 9 INJECTION, SOLUTION INTRAVENOUS at 04:52

## 2021-04-09 RX ADMIN — CEFAZOLIN SODIUM 2 G: 2 INJECTION, SOLUTION INTRAVENOUS at 16:46

## 2021-04-09 RX ADMIN — OXYCODONE HYDROCHLORIDE 10 MG: 10 TABLET ORAL at 15:30

## 2021-04-09 RX ADMIN — OXYCODONE HYDROCHLORIDE 10 MG: 10 TABLET ORAL at 04:52

## 2021-04-09 RX ADMIN — HYDROMORPHONE HYDROCHLORIDE 0.4 MG: 1 INJECTION, SOLUTION INTRAMUSCULAR; INTRAVENOUS; SUBCUTANEOUS at 14:06

## 2021-04-09 RX ADMIN — POLYETHYLENE GLYCOL 3350 17 G: 17 POWDER, FOR SOLUTION ORAL at 08:23

## 2021-04-09 RX ADMIN — HYDROMORPHONE HYDROCHLORIDE 0.2 MG: 0.2 INJECTION, SOLUTION INTRAMUSCULAR; INTRAVENOUS; SUBCUTANEOUS at 03:30

## 2021-04-09 RX ADMIN — DOCUSATE SODIUM 50 MG AND SENNOSIDES 8.6 MG 1 TABLET: 8.6; 5 TABLET, FILM COATED ORAL at 08:24

## 2021-04-09 RX ADMIN — OXYCODONE HYDROCHLORIDE 10 MG: 10 TABLET ORAL at 20:26

## 2021-04-09 RX ADMIN — HYDROXYZINE HYDROCHLORIDE 25 MG: 25 TABLET, FILM COATED ORAL at 18:34

## 2021-04-09 RX ADMIN — OXYCODONE HYDROCHLORIDE 10 MG: 10 TABLET ORAL at 00:28

## 2021-04-09 RX ADMIN — HYDROMORPHONE HYDROCHLORIDE 0.4 MG: 1 INJECTION, SOLUTION INTRAMUSCULAR; INTRAVENOUS; SUBCUTANEOUS at 11:37

## 2021-04-09 ASSESSMENT — ACTIVITIES OF DAILY LIVING (ADL)
ADLS_ACUITY_SCORE: 19
PREVIOUS_RESPONSIBILITIES: MEAL PREP;HOUSEKEEPING;LAUNDRY;SHOPPING;YARDWORK;MEDICATION MANAGEMENT;FINANCES;DRIVING
ADLS_ACUITY_SCORE: 20
ADLS_ACUITY_SCORE: 19

## 2021-04-09 NOTE — PROGRESS NOTES
Admitted/transferred from: PACU  2 RN full   skin assessment completed by Noreen Juarez, RN and Erin RODRIGUES RN.  Skin assessment finding: skin intact, no problems   Interventions/actions: Education on pressure ulcer prevention reviewed with pt.      Will continue to monitor.

## 2021-04-09 NOTE — PLAN OF CARE
"BP (!) 143/81 (BP Location: Right arm)   Pulse 95   Temp 95.3  F (35.2  C) (Oral)   Resp 22   Ht 1.778 m (5' 10\")   Wt 90.8 kg (200 lb 2.8 oz)   SpO2 93%   BMI 28.72 kg/m    Assumed care from [id number]. Hypertensive but not within notifying parameters, all other VSS on RA. A&Ox4, mood liable throughout the afternoon, given PRN atarax x1 for anxiety. Pain managed with scheduled tylenol, PRN IV dilaudid & PO oxycodone. Denies nausea, on regular diet, tolerating sips of clears. Left PIV infusing IVMF, right SL. Abdominal lap sites with erythema, otherwise CDI & ANIYAH. Vaginal bolster intact, ABD over bolster changed Q4 hours with good amount of bloody drainage. Pubic area and labia have blanchable erythema and moderate edema, but improving this shift. JACINDA x3 with scant output. Forte positional with AUO. Denies gas, awaiting ROBF. Up with A1-2 with therapies this AM, dizzy upon first dangle & pt seemingly nervous about any further activity. Continue POC.   "

## 2021-04-09 NOTE — PROGRESS NOTES
"Urology  Progress Note    DIONNA BRAVO  Pain is well controlled    Exam  /79 (BP Location: Right arm)   Pulse 87   Temp 98.4  F (36.9  C) (Oral)   Resp 19   Ht 1.778 m (5' 10\")   Wt 90.8 kg (200 lb 2.8 oz)   SpO2 96%   BMI 28.72 kg/m    No acute distress  Unlabored breathing  Abdomen soft, nontender, nondistended.   Incisions c/d/i covered with dermabond  Bolster and stent sutured in place  Abdominal and groin JPs serosanguinous  Fitch with clear/yellow urine in tubing    /425  L groin JACINDA 20/NR  R groin JACINDA 10/NR  L abdominal JACINDA 10/NR    Labs    AM labs pending    Assessment/Plan  56 year old y/o adult with PMHx JERRELL, depression, and anxiety now POD#1 s/p robotic assisted vaginoplastly for gender affirming surgery.     Neuro: scheduled tylenol, PRN oxy and dilaudid for pain control. Continue PTA venlafaxine and trazodone  CV: no acute concerns  Pulm: incentive spirometry while awake  FEN/GI: clear liquid diet, MIVF @ 75/hr  Endo: no acute concerns  : continue fitch catheter, vaginal stent/bolster until Tuesday  Heme/ID: ancef 1 g q8h while bolster in place  Activity: ambulate with assist and small steps 4x/d  PPx: SCDs    Seen and examined with the chief resident. Will discuss with Dr. Theo Joshi MD  PGY-3 Urology  Pager [phone]       Contacting the Urology Team     Please use the following job codes to reach the Urology Team. Note that you must use an in house phone and that job codes cannot receive text pages.     On weekdays, dial [phone] (or star-star-star [phone] on the new [employer] telephones) then [phone] to reach the Adult Urology resident or PA on call    On weekdays, dial [phone] (or star-star-star [phone] on the new [employer] telephones) then [phone] to reach the Pediatric Urology resident    On weeknights and weekends, dial [phone] (or star-star-star [phone] on the new [employer] telephones) then [phone] to reach the Urology resident on call (for both Adult and Pediatrics)              "

## 2021-04-09 NOTE — PLAN OF CARE
POD# 0 s/p Robotic assisted vaginoplasty. Pt arrived from PACU at 1610. Alert,  oriented x 4, pain well controlled with IV Dilaudid and Oxycodone. Intermittent nausea, declined antiemetic. Tolerating clear diet. Pt had an episode of dizziness and feeling faint, vitas were stable, no shortness or breath or chest pain, MD notified and pt assessed, order to monitor for changes. See MD note for details. Pt on strict bedrest, no sitting. JACINDA x 3 patent, fitch with good urine output. Encouraged to use the IS, PCDs in place, continuous capnography

## 2021-04-09 NOTE — PROGRESS NOTES
04/09/21 1500   Quick Adds   Type of Visit Initial Occupational Therapy Evaluation   Living Environment   People in home other relative(s)   Current Living Arrangements house   Home Accessibility stairs to enter home;stairs within home   Number of Stairs, Main Entrance 5   Stair Railings, Main Entrance railings on both sides of stairs   Number of Stairs, Within Home, Primary 10   Stair Railings, Within Home, Primary railings on both sides of stairs   Transportation Anticipated car, drives self   Self-Care   Usual Activity Tolerance good   Current Activity Tolerance fair   Regular Exercise No   Disability/Function   Hearing Difficulty or Deaf no   Wear Glasses or Blind yes   Vision Management glasses   Concentrating, Remembering or Making Decisions Difficulty no   Difficulty Communicating no   Difficulty Eating/Swallowing no   Walking or Climbing Stairs Difficulty no   Dressing/Bathing Difficulty no   Toileting issues no   Doing Errands Independently Difficulty (such as shopping) no   Fall history within last six months no   Change in Functional Status Since Onset of Current Illness/Injury yes   General Information   Referring Physician Pal Ortega   Patient/Family Therapy Goal Statement (OT) return to PLOF   Additional Occupational Profile Info/Pertinent History of Current Problem 57 yo POD #1 s/p robotic assisted laparoscopic penile inversion vaginoplasty. Convalescing appropriately.   Existing Precautions/Restrictions other (see comments)  (vaginoplasty. no sitting)   General Observations and Info pt motivated   Cognitive Status Examination   Orientation Status orientation to person, place and time   Affect/Mental Status (Cognitive) WFL   Memory Deficit minimal deficit   Cognitive Status Comments will continue dto monitor   Visual Perception   Visual Impairment/Limitations WFL   Sensory   Sensory Quick Adds No deficits were identified   Range of Motion Comprehensive   General Range of Motion no range of motion  deficits identified   Comment, General Range of Motion B UE/LE WFL   Strength Comprehensive (MMT)   General Manual Muscle Testing (MMT) Assessment other (see comments)   Comment, General Manual Muscle Testing (MMT) Assessment overall deconditioning, at risk of further decompensation.    Coordination   Coordination Comments no concerns.    Bed Mobility   Bed Mobility supine-sit;sit-supine   Supine-Sit Castro (Bed Mobility) moderate assist (50% patient effort)   Sit-Supine Castro (Bed Mobility) moderate assist (50% patient effort)   Transfers   Transfers sit-stand transfer   Sit-Stand Transfer   Sit-Stand Castro (Transfers) moderate assist (50% patient effort)   Balance   Balance Assessment standing balance: dynamic   Standing Balance: Dynamic good balance   Activities of Daily Living   BADL Assessment/Intervention lower body dressing   Lower Body Dressing Assessment/Training   Castro Level (Lower Body Dressing) maximum assist (25% patient effort)   Instrumental Activities of Daily Living (IADL)   Previous Responsibilities meal prep;housekeeping;laundry;shopping;yardwork;medication management;finances;driving   Clinical Impression   Criteria for Skilled Therapeutic Interventions Met (OT) yes   OT Diagnosis decreased ADL I   Assessment of Occupational Performance 5 or more Performance Deficits   Identified Performance Deficits dressing, bathing, toileting, home making, leisure.    Planned Therapy Interventions (OT) ADL retraining;IADL retraining;bed mobility training;cognition;ROM;strengthening;transfer training;home program guidelines;progressive activity/exercise;risk factor education   Clinical Decision Making Complexity (OT) low complexity   Therapy Frequency (OT) 5x/week   Predicted Duration of Therapy 1 week   Risk & Benefits of therapy have been explained evaluation/treatment results reviewed;care plan/treatment goals reviewed;risks/benefits reviewed;current/potential barriers  reviewed;participants voiced agreement with care plan;participants included;patient;spouse/significant other   Comment-Clinical Impression pt presents to OT wiht post surgical rpecautions and decreased activity tolerance leading to decreased ADL I. Pt to benefit from skilled OT intervention to address the above problm list.    OT Discharge Planning    OT Discharge Recommendation (DC Rec) Home with assist;Transitional Care Facility   OT Rationale for DC Rec pending progress.    OT Brief overview of current status  pt supine <> EOB mod assist today, pt with syncopal episode in initial standing, VSS in supine.    Total Evaluation Time (Minutes)   Total Evaluation Time (Minutes) 5

## 2021-04-09 NOTE — PROGRESS NOTES
"Plastic Surgery Progress Note    S: No acute events overnight.     O:  /82 (BP Location: Right arm)   Pulse 83   Temp 98.7  F (37.1  C) (Oral)   Resp 18   Ht 1.778 m (5' 10\")   Wt 90.8 kg (200 lb 2.8 oz)   SpO2 96%   BMI 28.72 kg/m    Gen: NAD, AOx3  Chest: NLB on RA, RRR  Abdomen: soft, ND, incisions c/d/i. Drain s/s.  Groin: Symmetrical swelling without concern for hematoma. Drains s/s. Incisions intact. Dressing bolster in place. Forte in place.    JACINDA:  Left groin: 20cc  Right groin: 10cc  Abdomen: 75cc    A/P:  55 yo POD #1 s/p robotic assisted laparoscopic penile inversion vaginoplasty. Convalescing appropriately.  - Recommend ABD and mesh panties changes PRN  - Plan to remove prolene sutures, kerlix bolster, and neovaginal stent on Tues morning 4/13  - May ambulate with small steps  - Primary cares per Urology team    Pal Ortega MD   Surgery PGY-5  Please Page On-Call on [employer]    "

## 2021-04-09 NOTE — PLAN OF CARE
PT 7C: Holding - PT orders acknowledged and appreciated. Pt mobilizing relatively well for POD 1, but with syncopal episode with OT today. Will hold evaluation as pt may only require 1 discipline to follow during admission. Will re-assess following next OT session and initiate/defer as indicated.

## 2021-04-09 NOTE — PLAN OF CARE
POD 1 s/p vaginoplasty.  AVSS, alert/oriented. On bedrest overnight with activity restrictions.  Abdominal and perineal pain managed with oxycodone and IV dilaudid.  JACINDA x3 with small amt bloody drainage.  Forte with adequate UOP.  Tolerating clear liquids, denies nausea.  Vaginal bolster intact, ABD over bolster changed 1x with moderate amount of bloody drainage.  Pubic area and labia have blanchable erythema and mod edema.

## 2021-04-10 ENCOUNTER — APPOINTMENT (OUTPATIENT)
Dept: OCCUPATIONAL THERAPY | Facility: CLINIC | Age: 56
DRG: 876 | End: 2021-04-10
Attending: UROLOGY
Payer: COMMERCIAL

## 2021-04-10 ENCOUNTER — APPOINTMENT (OUTPATIENT)
Dept: GENERAL RADIOLOGY | Facility: CLINIC | Age: 56
DRG: 876 | End: 2021-04-10
Attending: UROLOGY
Payer: COMMERCIAL

## 2021-04-10 LAB
ANION GAP SERPL CALCULATED.3IONS-SCNC: 3 MMOL/L (ref 3–14)
BUN SERPL-MCNC: 10 MG/DL (ref 7–30)
CALCIUM SERPL-MCNC: 8.3 MG/DL (ref 8.5–10.1)
CHLORIDE SERPL-SCNC: 107 MMOL/L (ref 94–109)
CO2 SERPL-SCNC: 29 MMOL/L (ref 20–32)
CREAT SERPL-MCNC: 1.29 MG/DL (ref 0.52–1.04)
ERYTHROCYTE [DISTWIDTH] IN BLOOD BY AUTOMATED COUNT: 12.6 % (ref 10–15)
GFR SERPL CREATININE-BSD FRML MDRD: 47 ML/MIN/{1.73_M2}
GLUCOSE BLDC GLUCOMTR-MCNC: 140 MG/DL (ref 70–99)
GLUCOSE SERPL-MCNC: 101 MG/DL (ref 70–99)
HCT VFR BLD AUTO: 33.5 % (ref 35–47)
HGB BLD-MCNC: 11 G/DL (ref 11.7–15.7)
INTERPRETATION ECG - MUSE: NORMAL
MCH RBC QN AUTO: 30.4 PG (ref 26.5–33)
MCHC RBC AUTO-ENTMCNC: 32.8 G/DL (ref 31.5–36.5)
MCV RBC AUTO: 93 FL (ref 78–100)
PLATELET # BLD AUTO: 215 10E9/L (ref 150–450)
POTASSIUM SERPL-SCNC: 3.9 MMOL/L (ref 3.4–5.3)
RBC # BLD AUTO: 3.62 10E12/L (ref 3.8–5.2)
SODIUM SERPL-SCNC: 140 MMOL/L (ref 133–144)
WBC # BLD AUTO: 10.6 10E9/L (ref 4–11)

## 2021-04-10 PROCEDURE — 250N000013 HC RX MED GY IP 250 OP 250 PS 637: Performed by: STUDENT IN AN ORGANIZED HEALTH CARE EDUCATION/TRAINING PROGRAM

## 2021-04-10 PROCEDURE — 999N001017 HC STATISTIC GLUCOSE BY METER IP

## 2021-04-10 PROCEDURE — 74018 RADEX ABDOMEN 1 VIEW: CPT

## 2021-04-10 PROCEDURE — 85027 COMPLETE CBC AUTOMATED: CPT | Performed by: STUDENT IN AN ORGANIZED HEALTH CARE EDUCATION/TRAINING PROGRAM

## 2021-04-10 PROCEDURE — 250N000011 HC RX IP 250 OP 636: Performed by: STUDENT IN AN ORGANIZED HEALTH CARE EDUCATION/TRAINING PROGRAM

## 2021-04-10 PROCEDURE — 74018 RADEX ABDOMEN 1 VIEW: CPT | Mod: 26 | Performed by: RADIOLOGY

## 2021-04-10 PROCEDURE — 120N000002 HC R&B MED SURG/OB UMMC

## 2021-04-10 PROCEDURE — 93005 ELECTROCARDIOGRAM TRACING: CPT

## 2021-04-10 PROCEDURE — 93010 ELECTROCARDIOGRAM REPORT: CPT | Performed by: INTERNAL MEDICINE

## 2021-04-10 PROCEDURE — 97530 THERAPEUTIC ACTIVITIES: CPT | Mod: GO

## 2021-04-10 PROCEDURE — 80048 BASIC METABOLIC PNL TOTAL CA: CPT | Performed by: STUDENT IN AN ORGANIZED HEALTH CARE EDUCATION/TRAINING PROGRAM

## 2021-04-10 PROCEDURE — 36415 COLL VENOUS BLD VENIPUNCTURE: CPT | Performed by: STUDENT IN AN ORGANIZED HEALTH CARE EDUCATION/TRAINING PROGRAM

## 2021-04-10 PROCEDURE — 258N000003 HC RX IP 258 OP 636: Performed by: STUDENT IN AN ORGANIZED HEALTH CARE EDUCATION/TRAINING PROGRAM

## 2021-04-10 RX ORDER — OXYCODONE HYDROCHLORIDE 5 MG/1
5 TABLET ORAL EVERY 4 HOURS PRN
Status: DISCONTINUED | OUTPATIENT
Start: 2021-04-10 | End: 2021-04-10

## 2021-04-10 RX ORDER — ACETAMINOPHEN 325 MG/1
650 TABLET ORAL EVERY 4 HOURS
Status: DISCONTINUED | OUTPATIENT
Start: 2021-04-10 | End: 2021-04-14 | Stop reason: HOSPADM

## 2021-04-10 RX ORDER — DIAZEPAM 5 MG
5 TABLET ORAL DAILY PRN
Status: DISCONTINUED | OUTPATIENT
Start: 2021-04-10 | End: 2021-04-14 | Stop reason: HOSPADM

## 2021-04-10 RX ORDER — SIMETHICONE 80 MG
80 TABLET,CHEWABLE ORAL EVERY 6 HOURS PRN
Status: DISCONTINUED | OUTPATIENT
Start: 2021-04-10 | End: 2021-04-14 | Stop reason: HOSPADM

## 2021-04-10 RX ADMIN — SIMETHICONE 80 MG: 80 TABLET, CHEWABLE ORAL at 15:15

## 2021-04-10 RX ADMIN — DOCUSATE SODIUM 100 MG: 100 CAPSULE, LIQUID FILLED ORAL at 20:48

## 2021-04-10 RX ADMIN — CEFAZOLIN SODIUM 2 G: 2 INJECTION, SOLUTION INTRAVENOUS at 16:46

## 2021-04-10 RX ADMIN — OXYCODONE HYDROCHLORIDE 10 MG: 10 TABLET ORAL at 18:17

## 2021-04-10 RX ADMIN — ACETAMINOPHEN 650 MG: 325 TABLET, FILM COATED ORAL at 05:26

## 2021-04-10 RX ADMIN — DOCUSATE SODIUM 100 MG: 100 CAPSULE, LIQUID FILLED ORAL at 08:31

## 2021-04-10 RX ADMIN — OXYCODONE HYDROCHLORIDE 10 MG: 10 TABLET ORAL at 00:40

## 2021-04-10 RX ADMIN — DOCUSATE SODIUM 50 MG AND SENNOSIDES 8.6 MG 1 TABLET: 8.6; 5 TABLET, FILM COATED ORAL at 08:31

## 2021-04-10 RX ADMIN — HYDROMORPHONE HYDROCHLORIDE 0.4 MG: 1 INJECTION, SOLUTION INTRAMUSCULAR; INTRAVENOUS; SUBCUTANEOUS at 18:02

## 2021-04-10 RX ADMIN — ACETAMINOPHEN 650 MG: 325 TABLET, FILM COATED ORAL at 08:31

## 2021-04-10 RX ADMIN — CEFAZOLIN SODIUM 2 G: 2 INJECTION, SOLUTION INTRAVENOUS at 08:31

## 2021-04-10 RX ADMIN — POLYETHYLENE GLYCOL 3350 17 G: 17 POWDER, FOR SOLUTION ORAL at 08:31

## 2021-04-10 RX ADMIN — ACETAMINOPHEN 650 MG: 325 TABLET, FILM COATED ORAL at 16:46

## 2021-04-10 RX ADMIN — HYDROMORPHONE HYDROCHLORIDE 0.4 MG: 1 INJECTION, SOLUTION INTRAMUSCULAR; INTRAVENOUS; SUBCUTANEOUS at 13:09

## 2021-04-10 RX ADMIN — CEFAZOLIN SODIUM 2 G: 2 INJECTION, SOLUTION INTRAVENOUS at 00:40

## 2021-04-10 RX ADMIN — VENLAFAXINE HYDROCHLORIDE 300 MG: 150 CAPSULE, EXTENDED RELEASE ORAL at 08:31

## 2021-04-10 RX ADMIN — OXYCODONE HYDROCHLORIDE 10 MG: 10 TABLET ORAL at 08:40

## 2021-04-10 RX ADMIN — ACETAMINOPHEN 650 MG: 325 TABLET, FILM COATED ORAL at 13:09

## 2021-04-10 RX ADMIN — ACETAMINOPHEN 650 MG: 325 TABLET, FILM COATED ORAL at 20:48

## 2021-04-10 RX ADMIN — SODIUM CHLORIDE: 9 INJECTION, SOLUTION INTRAVENOUS at 23:57

## 2021-04-10 RX ADMIN — DOCUSATE SODIUM 50 MG AND SENNOSIDES 8.6 MG 1 TABLET: 8.6; 5 TABLET, FILM COATED ORAL at 20:48

## 2021-04-10 ASSESSMENT — ACTIVITIES OF DAILY LIVING (ADL)
ADLS_ACUITY_SCORE: 19

## 2021-04-10 NOTE — PROGRESS NOTES
"Plastic Surgery Progress Note    S: No acute events overnight. Stood up yesterday but felt lightheaded. Syncopal episode with ambulation today. Currently feels okay and resting in bed.    O:  /83 (BP Location: Right arm)   Pulse 94   Temp 97.5  F (36.4  C) (Oral)   Resp 20   Ht 1.778 m (5' 10\")   Wt 90.8 kg (200 lb 2.8 oz)   SpO2 96%   BMI 28.72 kg/m    Gen: NAD, AOx3  Chest: NLB on RA, RRR  Abdomen: soft, ND, incisions c/d/i. Drain s/s.  Groin: Symmetrical swelling without concern for hematoma. Drains s/s. Incisions intact. Dressing bolster in place. Forte in place.    JACINDA:  Left groin: 22cc  Right groin: 38cc  Abdomen: 20cc    A/P:  55 yo POD #2 s/p robotic assisted laparoscopic penile inversion vaginoplasty. Convalescing appropriately.  - Recommend ABD and mesh panties changes PRN  - Plan to remove prolene sutures, kerlix bolster, and neovaginal stent on Tues morning 4/13  - May ambulate with small steps  - Primary cares per Urology team    Pal Ortega MD   Surgery PGY-5  Please Page On-Call on [employer]    "

## 2021-04-10 NOTE — PROGRESS NOTES
S-Patient is post-op date 1. Patient complains of pain.     O-Patient overal doing well, tolerating regular diet without nausea. Pain is controlled with oxycodone and tylenol. Not passing dorian or having BM since surgery.      A/P  -Pain  -constipation  -Pain management with oxycodone and tylenol  -Continue monitering and caring for indwelling catheter and periphereal IV catheter   -Wound care-change ABD pads and mesh panty

## 2021-04-10 NOTE — PLAN OF CARE
VSS on RA. A&Ox4. CMA intact. Denies nausea, tolerating regular diet. Not yet passing gas. Vaginal bolster intact with moist serosang drainage, ABD pad changed x2, mesh panties in place. Pain in abdomen and groin adequately controlled this shift by Q4 oxycodone, scheduled tylenol and PRN IV Dilaudid x1.  Forte having adequate urine output. JPx3 with small amount of bloody output. R abdominal JACINDA leaking at site, dressing chamged x1. Abd lap sites with liquid bandage, ANIYAH and CDI. IVMF and intermittent IV Abx infusing into PIV. Pt 1-2 assist when out of bed d/t dizziness. Cont with plan of care.

## 2021-04-10 NOTE — PROGRESS NOTES
"Urology  Progress Note    - Afeb, VSS  - Out of bed yesterday but minimal walking as was feeling very lightheaded  - Denies nausea, vomiting but with minimal appetite  - Pain adequately controlled    Exam  /81 (BP Location: Right arm)   Pulse 86   Temp 97.9  F (36.6  C) (Oral)   Resp 16   Ht 1.778 m (5' 10\")   Wt 90.8 kg (200 lb 2.8 oz)   SpO2 92%   BMI 28.72 kg/m    No acute distress  Unlabored breathing  Abdomen soft, nontender, nondistended.   Incisions c/d/i covered with dermabond  Swelling and bruising improved from yesterday  Bolster and stent sutured in place  Abdominal and groin JPs serosanguinous  Fitch with clear/yellow urine in tubing    UOP 2825/1050  L groin JACINDA 22/0  R groin JACINDA 38/15  L abdominal JACINDA 20/0    Labs    WBC: 10.6 (9.5)  Hgb: 11.0 (11.5)  Cr: 1.29 (1.35)    Assessment/Plan  56 year old adult with PMHx JERRELL, depression, and anxiety now POD#2 s/p robotic assisted vaginoplasty for gender affirming surgery.     Neuro: scheduled tylenol, PRN oxy and dilaudid for pain control. PRN atarax for pain and anxiety Continue PTA venlafaxine and trazodone  CV: no acute concerns  Pulm: incentive spirometry while awake  FEN/GI: Regular, MIVF @ 75/hr; colace, miralax, senokot; PRN dulcolax suppository  Endo: no acute concerns  : continue fitch catheter, vaginal stent/bolster until Tuesday  Heme/ID: ancef 2 g q8h while bolster in place  Activity: ambulate with assist and small steps 4x/d  PPx: SCDs    Seen and examined with the chief resident and Dr. Galdamez. Will discuss with Dr. Venegas     ADDENDUM:  Paged by charge nurse that patient had an assisted fall while working with therapies. I went to examine the patient. She was lying on the floor awake, oriented and appropriately responding to questions. She had a controlled fall while working with therapies. Patient reports briefly feeling lightheaded and then losing consciousness for several seconds and was gently nena on the floor. She did not " strike her head. She denies any palpitations or funny chest sensations prior to this episode. She denies any sensation of the room spinning during this episode.     Vitals: Heart Rate 77, /73, Blood glucose of 140  Patient lying on floor, not diaphoretic, mild pallor, responds appropriately to questions, breathing comfortably, alert.    Plan:  - EKG now  - Resume half dose of home valium PRN

## 2021-04-10 NOTE — PLAN OF CARE
PT 7C: Holding - Continue to hold PT evaluation. Pt mobilizing well, but with syncopal episode again during OT today. Will continue to monitor status.

## 2021-04-10 NOTE — PROVIDER NOTIFICATION
Notified MD at 1805 regarding change in condition.      Spoke with: Emilia Mcmanus MD    Orders were obtained.    Comments: Pt C/O 10/10 abdominal cramping/sharp pains despite PRN oxycodone, IV dilaudid, & simethicone. MD notified and plan for X-ray. Will continue to monitor.

## 2021-04-10 NOTE — PROVIDER NOTIFICATION
Text paged this morning Dr. Ke Harrington after we settled pt in the bed per request from the pt's nurse to notify the primary team regarding increasing leaking in the left JACINDA site. Provider called back and spoke with this RN and stated that continue monitor the area. He thinks that it is due to increase movement by the patient. Informed NIKA Fish floor nurse who is in-charge of the patient of the provider's information. Will continue monitor the site.

## 2021-04-10 NOTE — PLAN OF CARE
"/74 (BP Location: Right arm)   Pulse 84   Temp 98.9  F (37.2  C) (Oral)   Resp 18   Ht 1.778 m (5' 10\")   Wt 90.8 kg (200 lb 2.8 oz)   SpO2 96%   BMI 28.72 kg/m    Assumed care from 6722-1234. VSS on RA. A&Ox4. Pain managed with scheduled tylenol, PRN IV dilaudid & PO oxycodone. Denies nausea, on regular diet, fair appetite. Left PIV infusing IVMF, dressing changed this shift. Abdominal lap sites with erythema, otherwise CDI & ANIYAH. Vaginal bolster intact, ABD over bolster changed Q4 hours with good amount of bloody drainage. Pubic area and labia with ecchymosis, blanchable erythema, and moderate edema. JACINDA x3 with scant output. Forte positional with AUO. Denies gas, awaiting ROBF, given simethicone x1 for gas pains. Had an assisted fall this AM with therapy, not out of bed since. Continue POC.   "

## 2021-04-11 LAB
ANION GAP SERPL CALCULATED.3IONS-SCNC: 5 MMOL/L (ref 3–14)
BUN SERPL-MCNC: 10 MG/DL (ref 7–30)
CALCIUM SERPL-MCNC: 8.2 MG/DL (ref 8.5–10.1)
CHLORIDE SERPL-SCNC: 109 MMOL/L (ref 94–109)
CO2 SERPL-SCNC: 25 MMOL/L (ref 20–32)
CREAT SERPL-MCNC: 1.16 MG/DL (ref 0.52–1.04)
ERYTHROCYTE [DISTWIDTH] IN BLOOD BY AUTOMATED COUNT: 12.4 % (ref 10–15)
GFR SERPL CREATININE-BSD FRML MDRD: 53 ML/MIN/{1.73_M2}
GLUCOSE SERPL-MCNC: 84 MG/DL (ref 70–99)
HCT VFR BLD AUTO: 28.4 % (ref 35–47)
HGB BLD-MCNC: 9.1 G/DL (ref 11.7–15.7)
MCH RBC QN AUTO: 29.2 PG (ref 26.5–33)
MCHC RBC AUTO-ENTMCNC: 32 G/DL (ref 31.5–36.5)
MCV RBC AUTO: 91 FL (ref 78–100)
PLATELET # BLD AUTO: 219 10E9/L (ref 150–450)
POTASSIUM SERPL-SCNC: 3.4 MMOL/L (ref 3.4–5.3)
RBC # BLD AUTO: 3.12 10E12/L (ref 3.8–5.2)
SODIUM SERPL-SCNC: 140 MMOL/L (ref 133–144)
WBC # BLD AUTO: 10.7 10E9/L (ref 4–11)

## 2021-04-11 PROCEDURE — 250N000013 HC RX MED GY IP 250 OP 250 PS 637: Performed by: STUDENT IN AN ORGANIZED HEALTH CARE EDUCATION/TRAINING PROGRAM

## 2021-04-11 PROCEDURE — 120N000002 HC R&B MED SURG/OB UMMC

## 2021-04-11 PROCEDURE — 250N000011 HC RX IP 250 OP 636: Performed by: STUDENT IN AN ORGANIZED HEALTH CARE EDUCATION/TRAINING PROGRAM

## 2021-04-11 PROCEDURE — 85027 COMPLETE CBC AUTOMATED: CPT | Performed by: STUDENT IN AN ORGANIZED HEALTH CARE EDUCATION/TRAINING PROGRAM

## 2021-04-11 PROCEDURE — 80048 BASIC METABOLIC PNL TOTAL CA: CPT | Performed by: STUDENT IN AN ORGANIZED HEALTH CARE EDUCATION/TRAINING PROGRAM

## 2021-04-11 PROCEDURE — 36415 COLL VENOUS BLD VENIPUNCTURE: CPT | Performed by: STUDENT IN AN ORGANIZED HEALTH CARE EDUCATION/TRAINING PROGRAM

## 2021-04-11 RX ADMIN — SIMETHICONE 80 MG: 80 TABLET, CHEWABLE ORAL at 11:58

## 2021-04-11 RX ADMIN — ACETAMINOPHEN 650 MG: 325 TABLET, FILM COATED ORAL at 13:40

## 2021-04-11 RX ADMIN — ACETAMINOPHEN 650 MG: 325 TABLET, FILM COATED ORAL at 20:25

## 2021-04-11 RX ADMIN — OXYCODONE HYDROCHLORIDE 10 MG: 10 TABLET ORAL at 12:58

## 2021-04-11 RX ADMIN — POLYETHYLENE GLYCOL 3350 17 G: 17 POWDER, FOR SOLUTION ORAL at 08:11

## 2021-04-11 RX ADMIN — DOCUSATE SODIUM 100 MG: 100 CAPSULE, LIQUID FILLED ORAL at 20:26

## 2021-04-11 RX ADMIN — ACETAMINOPHEN 650 MG: 325 TABLET, FILM COATED ORAL at 00:00

## 2021-04-11 RX ADMIN — DOCUSATE SODIUM 50 MG AND SENNOSIDES 8.6 MG 1 TABLET: 8.6; 5 TABLET, FILM COATED ORAL at 08:11

## 2021-04-11 RX ADMIN — CEFAZOLIN SODIUM 2 G: 2 INJECTION, SOLUTION INTRAVENOUS at 08:32

## 2021-04-11 RX ADMIN — CEFAZOLIN SODIUM 2 G: 2 INJECTION, SOLUTION INTRAVENOUS at 16:26

## 2021-04-11 RX ADMIN — ACETAMINOPHEN 650 MG: 325 TABLET, FILM COATED ORAL at 10:59

## 2021-04-11 RX ADMIN — OXYCODONE HYDROCHLORIDE 10 MG: 10 TABLET ORAL at 02:32

## 2021-04-11 RX ADMIN — ACETAMINOPHEN 650 MG: 325 TABLET, FILM COATED ORAL at 06:12

## 2021-04-11 RX ADMIN — VENLAFAXINE HYDROCHLORIDE 300 MG: 150 CAPSULE, EXTENDED RELEASE ORAL at 08:11

## 2021-04-11 RX ADMIN — DOCUSATE SODIUM 100 MG: 100 CAPSULE, LIQUID FILLED ORAL at 08:12

## 2021-04-11 RX ADMIN — OXYCODONE HYDROCHLORIDE 10 MG: 10 TABLET ORAL at 20:25

## 2021-04-11 RX ADMIN — CEFAZOLIN SODIUM 2 G: 2 INJECTION, SOLUTION INTRAVENOUS at 00:00

## 2021-04-11 RX ADMIN — DIAZEPAM 5 MG: 5 TABLET ORAL at 16:26

## 2021-04-11 RX ADMIN — DOCUSATE SODIUM 50 MG AND SENNOSIDES 8.6 MG 1 TABLET: 8.6; 5 TABLET, FILM COATED ORAL at 20:25

## 2021-04-11 ASSESSMENT — ACTIVITIES OF DAILY LIVING (ADL)
ADLS_ACUITY_SCORE: 19

## 2021-04-11 NOTE — PLAN OF CARE
Max temp 99.6 orally, AOVSS on RA. Pt having some mild chills overnight. A&Ox4. CMA intact. Denies nausea, tolerating regular diet. Pt now passing gas, cramping pain in stomach improved, managed with PRN Oxycodone x1 and scheduled Tylenol. Vaginal bolster intact with moist serosang drainage, ABD pad changed x2, mesh panties in place. Forte having adequate urine output. JPx3 with small amount of bloody output. R abdominal JACINDA leaking at site, dressing chamged x1. L groin JACINDA with old drainage at site. Abd lap sites with liquid bandage, ANIYAH and CDI. IVMF and intermittent IV Abx infusing into PIV. Pt 2 assist when out of bed d/t dizziness, plan to only get up with therapy d/t fall on 4/10. Cont with plan of care.

## 2021-04-11 NOTE — PROGRESS NOTES
"Urology  Progress Note    - Brief syncopal episode yesterday, EKG reassuring  - Gas pains yesterday evening, then passed flatus and feeling better today  - AFeb, VSS  - Feeling okay today  - Denies nausea, chest pain, shortness of breath  - Is feeling some pressure below    Exam  /80 (BP Location: Right arm)   Pulse 90   Temp 98.1  F (36.7  C) (Oral)   Resp 16   Ht 1.778 m (5' 10\")   Wt 90.8 kg (200 lb 2.8 oz)   SpO2 95%   BMI 28.72 kg/m    No acute distress  Unlabored breathing  Abdomen soft, nontender, nondistended.   Incisions c/d/i covered with dermabond  Swelling and bruising improved from yesterday  Bolster and stent sutured in place  Abdominal and groin JPs serosanguinous  Fitch with clear/yellow urine in tubing    UOP 2000/350  L groin JACINDA 3/10  R groin JACINDA 45/0  L abdominal JACINDA 10/0    Labs    WBC: (10.6)  Hgb: (11.0)  Cr: (1.29)    AM Labs Pending    AXR:  \"Impression:   1. Mild gaseous distention of the small and large bowel, most suggestive of postoperative ileus.  2. Scattered lucencies overlying the pelvis and liver, compatible with residual free air from recent robotic surgery.\"    Assessment/Plan  56 year old adult with PMHx JERRELL, depression, and anxiety now POD#3 s/p robotic assisted vaginoplasty for gender affirming surgery.     Neuro: scheduled tylenol, PRN oxy and dilaudid for pain control. PRN atarax for pain and anxiety Continue PTA venlafaxine and trazodone  CV: no acute concerns  Pulm: incentive spirometry while awake  FEN/GI: Regular, MIVF @ 75/hr; colace, miralax, senokot; PRN dulcolax suppository  Endo: no acute concerns  : continue fitch catheter, vaginal stent/bolster until Tuesday  Heme/ID: ancef 2 g q8h while bolster in place  Activity: ambulate with assist and small steps 4x/d  PPx: SCDs    Seen and examined with the chief resident and Dr. Galdamez. Will discuss with Dr. Theo Harrington MD  Urology Resident, PGY-2      "

## 2021-04-11 NOTE — PROGRESS NOTES
"Plastic Surgery Progress Note    S: No acute events overnight. Unable to sleep well due to insomnia. Feeling like bowels are starting to work and passing flatus.    O:  /80 (BP Location: Right arm)   Pulse 90   Temp 98.1  F (36.7  C) (Oral)   Resp 16   Ht 1.778 m (5' 10\")   Wt 90.8 kg (200 lb 2.8 oz)   SpO2 95%   BMI 28.72 kg/m    Gen: NAD, AOx3  Chest: NLB on RA, RRR  Abdomen: soft, ND, incisions c/d/i. Drain s/s.  Groin: Stable symmetrical swelling without concern for hematoma. Drains s/s. Incisions intact. Dressing bolster in place. Forte in place.    JACINDA:  Left groin: 3cc  Right groin: 45cc  Abdomen: 10cc    A/P:  57 yo POD #3 s/p robotic assisted laparoscopic penile inversion vaginoplasty. Convalescing appropriately.  - Recommend ABD and mesh panties changes PRN  - Plan to remove prolene sutures, kerlix bolster, and neovaginal stent on Tues morning 4/13  - May ambulate with small steps  - Primary cares per Urology team    Pal Ortega MD   Surgery PGY-5  Please Page On-Call on [employer]    "

## 2021-04-11 NOTE — PROGRESS NOTES
S-   -Pt reports pain under control with scheduled tylenol  -Pt stated they passed gas yesterday     O-  Pain level at 3 in scale of 10  Pain worsens with movement of perineum area     A  -Patient alert   -incision intact  -Vaginal bolster intact, ABD over bolster   -JACINDA#3 with 40 mL of blood output.     P  -Monitor for BM  -Pain management as scheduled   -Moniter JACINDA drain  -I&O  -Continue administering laxatives to encourage BM   -OT to ambulate pt to help with mobility and BM   continue with plan of care

## 2021-04-11 NOTE — PLAN OF CARE
"/74   Pulse 91   Temp 98.1  F (36.7  C) (Oral)   Resp 18   Ht 1.778 m (5' 10\")   Wt 90.8 kg (200 lb 2.8 oz)   SpO2 99%   BMI 28.72 kg/m    Assumed care from 1676-6908. VSS on RA. A&Ox4, anxious at times & given PRN Valium x1 this shift. Pain managed with scheduled tylenol and PRN oxycodone. Denies nausea, on regular diet, fair appetite. Left PIV infusing IVMF. Abdominal lap sites with erythema, otherwise CDI & ANIYAH. Vaginal bolster intact, ABD over bolster changed Q4 hours with good amount of bloody drainage. Pubic area and labia with ecchymosis, blanchable erythema, and moderate edema. JACINDA x3 with small amount output. Forte positional with AUO. Passing good amounts of, given simethicone x1 for gas pains, awaiting post op BM. Bedrest & up with therapies, activity restrictions in place. Continue POC.   "

## 2021-04-12 ENCOUNTER — APPOINTMENT (OUTPATIENT)
Dept: PHYSICAL THERAPY | Facility: CLINIC | Age: 56
DRG: 876 | End: 2021-04-12
Attending: STUDENT IN AN ORGANIZED HEALTH CARE EDUCATION/TRAINING PROGRAM
Payer: COMMERCIAL

## 2021-04-12 ENCOUNTER — APPOINTMENT (OUTPATIENT)
Dept: OCCUPATIONAL THERAPY | Facility: CLINIC | Age: 56
DRG: 876 | End: 2021-04-12
Attending: UROLOGY
Payer: COMMERCIAL

## 2021-04-12 LAB
ANION GAP SERPL CALCULATED.3IONS-SCNC: 4 MMOL/L (ref 3–14)
BUN SERPL-MCNC: 11 MG/DL (ref 7–30)
CALCIUM SERPL-MCNC: 8.2 MG/DL (ref 8.5–10.1)
CHLORIDE SERPL-SCNC: 110 MMOL/L (ref 94–109)
CO2 SERPL-SCNC: 24 MMOL/L (ref 20–32)
CREAT SERPL-MCNC: 1.08 MG/DL (ref 0.52–1.04)
ERYTHROCYTE [DISTWIDTH] IN BLOOD BY AUTOMATED COUNT: 12.2 % (ref 10–15)
GFR SERPL CREATININE-BSD FRML MDRD: 58 ML/MIN/{1.73_M2}
GLUCOSE SERPL-MCNC: 97 MG/DL (ref 70–99)
HCT VFR BLD AUTO: 30 % (ref 35–47)
HGB BLD-MCNC: 9.6 G/DL (ref 11.7–15.7)
MCH RBC QN AUTO: 29.4 PG (ref 26.5–33)
MCHC RBC AUTO-ENTMCNC: 32 G/DL (ref 31.5–36.5)
MCV RBC AUTO: 92 FL (ref 78–100)
PLATELET # BLD AUTO: 243 10E9/L (ref 150–450)
POTASSIUM SERPL-SCNC: 4.2 MMOL/L (ref 3.4–5.3)
RBC # BLD AUTO: 3.26 10E12/L (ref 3.8–5.2)
SODIUM SERPL-SCNC: 139 MMOL/L (ref 133–144)
WBC # BLD AUTO: 8.3 10E9/L (ref 4–11)

## 2021-04-12 PROCEDURE — 258N000003 HC RX IP 258 OP 636: Performed by: STUDENT IN AN ORGANIZED HEALTH CARE EDUCATION/TRAINING PROGRAM

## 2021-04-12 PROCEDURE — 250N000011 HC RX IP 250 OP 636: Performed by: STUDENT IN AN ORGANIZED HEALTH CARE EDUCATION/TRAINING PROGRAM

## 2021-04-12 PROCEDURE — 36415 COLL VENOUS BLD VENIPUNCTURE: CPT | Performed by: STUDENT IN AN ORGANIZED HEALTH CARE EDUCATION/TRAINING PROGRAM

## 2021-04-12 PROCEDURE — 80048 BASIC METABOLIC PNL TOTAL CA: CPT | Performed by: STUDENT IN AN ORGANIZED HEALTH CARE EDUCATION/TRAINING PROGRAM

## 2021-04-12 PROCEDURE — 97110 THERAPEUTIC EXERCISES: CPT | Mod: GP

## 2021-04-12 PROCEDURE — 120N000002 HC R&B MED SURG/OB UMMC

## 2021-04-12 PROCEDURE — 97162 PT EVAL MOD COMPLEX 30 MIN: CPT | Mod: GP

## 2021-04-12 PROCEDURE — 97530 THERAPEUTIC ACTIVITIES: CPT | Mod: GO

## 2021-04-12 PROCEDURE — 97530 THERAPEUTIC ACTIVITIES: CPT | Mod: GP

## 2021-04-12 PROCEDURE — 250N000013 HC RX MED GY IP 250 OP 250 PS 637: Performed by: STUDENT IN AN ORGANIZED HEALTH CARE EDUCATION/TRAINING PROGRAM

## 2021-04-12 PROCEDURE — 85027 COMPLETE CBC AUTOMATED: CPT | Performed by: STUDENT IN AN ORGANIZED HEALTH CARE EDUCATION/TRAINING PROGRAM

## 2021-04-12 RX ORDER — MAGNESIUM HYDROXIDE 1200 MG/15ML
LIQUID ORAL
Status: COMPLETED
Start: 2021-04-12 | End: 2021-04-13

## 2021-04-12 RX ADMIN — ACETAMINOPHEN 650 MG: 325 TABLET, FILM COATED ORAL at 21:08

## 2021-04-12 RX ADMIN — VENLAFAXINE HYDROCHLORIDE 300 MG: 150 CAPSULE, EXTENDED RELEASE ORAL at 08:41

## 2021-04-12 RX ADMIN — ACETAMINOPHEN 650 MG: 325 TABLET, FILM COATED ORAL at 08:41

## 2021-04-12 RX ADMIN — DIAZEPAM 5 MG: 5 TABLET ORAL at 12:50

## 2021-04-12 RX ADMIN — MAGNESIUM HYDROXIDE 30 ML: 400 SUSPENSION ORAL at 17:19

## 2021-04-12 RX ADMIN — CEFAZOLIN SODIUM 2 G: 2 INJECTION, SOLUTION INTRAVENOUS at 17:20

## 2021-04-12 RX ADMIN — OXYCODONE HYDROCHLORIDE 10 MG: 10 TABLET ORAL at 00:29

## 2021-04-12 RX ADMIN — CEFAZOLIN SODIUM 2 G: 2 INJECTION, SOLUTION INTRAVENOUS at 00:33

## 2021-04-12 RX ADMIN — ACETAMINOPHEN 650 MG: 325 TABLET, FILM COATED ORAL at 12:48

## 2021-04-12 RX ADMIN — CEFAZOLIN SODIUM 2 G: 2 INJECTION, SOLUTION INTRAVENOUS at 08:41

## 2021-04-12 RX ADMIN — SIMETHICONE 80 MG: 80 TABLET, CHEWABLE ORAL at 00:29

## 2021-04-12 RX ADMIN — DOCUSATE SODIUM 100 MG: 100 CAPSULE, LIQUID FILLED ORAL at 08:41

## 2021-04-12 RX ADMIN — SODIUM CHLORIDE: 9 INJECTION, SOLUTION INTRAVENOUS at 14:31

## 2021-04-12 RX ADMIN — ACETAMINOPHEN 650 MG: 325 TABLET, FILM COATED ORAL at 00:29

## 2021-04-12 RX ADMIN — TRAZODONE HYDROCHLORIDE 100 MG: 100 TABLET ORAL at 21:24

## 2021-04-12 RX ADMIN — SODIUM CHLORIDE: 9 INJECTION, SOLUTION INTRAVENOUS at 00:36

## 2021-04-12 RX ADMIN — OXYCODONE HYDROCHLORIDE 10 MG: 10 TABLET ORAL at 04:43

## 2021-04-12 RX ADMIN — DOCUSATE SODIUM 50 MG AND SENNOSIDES 8.6 MG 1 TABLET: 8.6; 5 TABLET, FILM COATED ORAL at 08:41

## 2021-04-12 RX ADMIN — OXYCODONE HYDROCHLORIDE 10 MG: 10 TABLET ORAL at 08:41

## 2021-04-12 RX ADMIN — ACETAMINOPHEN 650 MG: 325 TABLET, FILM COATED ORAL at 15:46

## 2021-04-12 RX ADMIN — POLYETHYLENE GLYCOL 3350 17 G: 17 POWDER, FOR SOLUTION ORAL at 08:41

## 2021-04-12 RX ADMIN — ACETAMINOPHEN 650 MG: 325 TABLET, FILM COATED ORAL at 04:42

## 2021-04-12 ASSESSMENT — ACTIVITIES OF DAILY LIVING (ADL)
ADLS_ACUITY_SCORE: 19

## 2021-04-12 NOTE — PROGRESS NOTES
"Plastic Surgery Progress Note    S: No acute events overnight. + BM. Still have not successfully ambulated.  O:  /75 (BP Location: Right arm)   Pulse 82   Temp 97.2  F (36.2  C) (Oral)   Resp 16   Ht 1.778 m (5' 10\")   Wt 90.8 kg (200 lb 2.8 oz)   SpO2 93%   BMI 28.72 kg/m    Gen: NAD, AOx3  Chest: NLB on RA, RRR  Abdomen: soft, ND, incisions c/d/i. Drain s/s.  Groin: Stable symmetrical swelling without concern for hematoma. Drains s/s. Incisions intact. Dressing bolster in place. Forte in place.    JACINDA:  Left groin: 20cc  Right groin: 80cc  Abdomen: 10cc    A/P:  57 yo POD #4 s/p robotic assisted laparoscopic penile inversion vaginoplasty. Convalescing appropriately.  - Recommend ABD and mesh panties changes PRN  - Plan to remove prolene sutures, kerlix bolster, and neovaginal stent on Tues morning 4/13  - May ambulate with small steps  - Primary cares per Urology team    Pal Ortega MD   Surgery PGY-5  Please Page On-Call on [employer]    "

## 2021-04-12 NOTE — PLAN OF CARE
VSS on RA. Pain managed with scheduled Tylenol and PRN Oxycodone. PRN Valium given x1 for anxiety. Denies nausea. Tolerating regular diet. IVF infusing into PIV @ 75 mL/hr. Getting IV Abx. Vaginal bolster intact, ABD changed x1. Bruising/edema to perineal area. Forte with adequate UOP. JACINDA x3 with small bloody output. Pt ambulated short distance with PT/OT today. Continue to monitor and with POC.

## 2021-04-12 NOTE — PROGRESS NOTES
"Urology  Progress Note    - AFeb, VSS  - Pain is well controlled on oral oxy alone  - Feels \"sweaty\" this AM (afebrile)  - Hasn't been able to move much  - Tolerating a diet without issue  - Having BMs, passing gas    Exam  /76 (BP Location: Right arm)   Pulse 84   Temp 97.3  F (36.3  C) (Oral)   Resp 14   Ht 1.778 m (5' 10\")   Wt 90.8 kg (200 lb 2.8 oz)   SpO2 95%   BMI 28.72 kg/m    No acute distress  Unlabored breathing  Abdomen soft, nontender, nondistended.   Incisions c/d/i covered with dermabond  Swelling and bruising improved from yesterday  Bolster and stent sutured in place  Abdominal and groin JPs serosanguinous  Fitch with clear/yellow urine in tubing    UOP 1400/NR  L groin JACINDA 20/NR  R groin JACINDA 80/NR  L abdominal JACINDA 10/NR    Labs    WBC: (10.7)  Hgb: (9.1) (11)  Cr: (1.16)    AM Labs Pending    AXR:  \"Impression:   1. Mild gaseous distention of the small and large bowel, most suggestive of postoperative ileus.  2. Scattered lucencies overlying the pelvis and liver, compatible with residual free air from recent robotic surgery.\"    Assessment/Plan  56 year old adult with PMHx JERRELL, depression, and anxiety now POD#4 s/p robotic assisted vaginoplasty for gender affirming surgery.     Neuro: scheduled tylenol, PRN oxy and dilaudid for pain control. PRN atarax for pain and anxiety Continue PTA venlafaxine and trazodone  CV: no acute concerns  Pulm: incentive spirometry while awake  FEN/GI: Regular, MIVF @ 75/hr; colace, miralax, senokot; PRN dulcolax suppository  Endo: no acute concerns  : continue fitch catheter, vaginal stent/bolster until Tuesday  Heme/ID: ancef 2 g q8h while bolster in place  Activity: ambulate with assist and small steps 4x/d, PT consult  PPx: SCDs  Dispo: Likely tomorrow after dressing removal    Seen and examined with the chief resident. Will discuss with Dr. Theo Joshi MD  PGY-3 Urology  Pager [phone]        "

## 2021-04-12 NOTE — PLAN OF CARE
Max temp 99.6 orally, AOVSS on RA. A&Ox4. Denies nausea, tolerating regular diet. Pt passing gas, cramping and discomfort in stomach and groin managed with PRN Oxycodone, PRN simethicone and scheduled Tylenol. Vaginal bolster intact with moist serosang drainage, ABD pad changed x2, mesh panties in place. Forte having adequate urine output. JPx3 with small amount of bloody output. Abd lap sites with liquid bandage, ANIYAH and CDI. IVMF and intermittent IV Abx infusing into PIV. Pt 2 assist when out of bed d/t dizziness, plan to only get up with therapy d/t fall on 4/10. Cont with plan of care.

## 2021-04-13 ENCOUNTER — APPOINTMENT (OUTPATIENT)
Dept: OCCUPATIONAL THERAPY | Facility: CLINIC | Age: 56
DRG: 876 | End: 2021-04-13
Attending: UROLOGY
Payer: COMMERCIAL

## 2021-04-13 ENCOUNTER — APPOINTMENT (OUTPATIENT)
Dept: PHYSICAL THERAPY | Facility: CLINIC | Age: 56
DRG: 876 | End: 2021-04-13
Attending: UROLOGY
Payer: COMMERCIAL

## 2021-04-13 LAB
ANION GAP SERPL CALCULATED.3IONS-SCNC: 5 MMOL/L (ref 3–14)
BUN SERPL-MCNC: 13 MG/DL (ref 7–30)
CALCIUM SERPL-MCNC: 8.2 MG/DL (ref 8.5–10.1)
CHLORIDE SERPL-SCNC: 110 MMOL/L (ref 94–109)
CO2 SERPL-SCNC: 26 MMOL/L (ref 20–32)
CREAT SERPL-MCNC: 1.12 MG/DL (ref 0.52–1.04)
ERYTHROCYTE [DISTWIDTH] IN BLOOD BY AUTOMATED COUNT: 12.5 % (ref 10–15)
GFR SERPL CREATININE-BSD FRML MDRD: 55 ML/MIN/{1.73_M2}
GLUCOSE SERPL-MCNC: 102 MG/DL (ref 70–99)
HCT VFR BLD AUTO: 28.9 % (ref 35–47)
HGB BLD-MCNC: 9.3 G/DL (ref 11.7–15.7)
MCH RBC QN AUTO: 29.4 PG (ref 26.5–33)
MCHC RBC AUTO-ENTMCNC: 32.2 G/DL (ref 31.5–36.5)
MCV RBC AUTO: 92 FL (ref 78–100)
PLATELET # BLD AUTO: 293 10E9/L (ref 150–450)
POTASSIUM SERPL-SCNC: 3.4 MMOL/L (ref 3.4–5.3)
RBC # BLD AUTO: 3.16 10E12/L (ref 3.8–5.2)
SODIUM SERPL-SCNC: 141 MMOL/L (ref 133–144)
WBC # BLD AUTO: 8.6 10E9/L (ref 4–11)

## 2021-04-13 PROCEDURE — 250N000013 HC RX MED GY IP 250 OP 250 PS 637: Performed by: STUDENT IN AN ORGANIZED HEALTH CARE EDUCATION/TRAINING PROGRAM

## 2021-04-13 PROCEDURE — 85027 COMPLETE CBC AUTOMATED: CPT | Performed by: STUDENT IN AN ORGANIZED HEALTH CARE EDUCATION/TRAINING PROGRAM

## 2021-04-13 PROCEDURE — 97116 GAIT TRAINING THERAPY: CPT | Mod: GP

## 2021-04-13 PROCEDURE — 97530 THERAPEUTIC ACTIVITIES: CPT | Mod: GO

## 2021-04-13 PROCEDURE — 120N000002 HC R&B MED SURG/OB UMMC

## 2021-04-13 PROCEDURE — 250N000011 HC RX IP 250 OP 636: Performed by: STUDENT IN AN ORGANIZED HEALTH CARE EDUCATION/TRAINING PROGRAM

## 2021-04-13 PROCEDURE — 97530 THERAPEUTIC ACTIVITIES: CPT | Mod: GP

## 2021-04-13 PROCEDURE — 258N000003 HC RX IP 258 OP 636: Performed by: STUDENT IN AN ORGANIZED HEALTH CARE EDUCATION/TRAINING PROGRAM

## 2021-04-13 PROCEDURE — 250N000009 HC RX 250

## 2021-04-13 PROCEDURE — 80048 BASIC METABOLIC PNL TOTAL CA: CPT | Performed by: STUDENT IN AN ORGANIZED HEALTH CARE EDUCATION/TRAINING PROGRAM

## 2021-04-13 PROCEDURE — 36415 COLL VENOUS BLD VENIPUNCTURE: CPT | Performed by: STUDENT IN AN ORGANIZED HEALTH CARE EDUCATION/TRAINING PROGRAM

## 2021-04-13 PROCEDURE — 999N000128 HC STATISTIC PERIPHERAL IV START W/O US GUIDANCE

## 2021-04-13 RX ORDER — MAGNESIUM HYDROXIDE 1200 MG/15ML
LIQUID ORAL
Status: COMPLETED
Start: 2021-04-13 | End: 2021-04-13

## 2021-04-13 RX ORDER — OXYCODONE HYDROCHLORIDE 5 MG/1
5 TABLET ORAL EVERY 6 HOURS PRN
Qty: 12 TABLET | Refills: 0 | Status: SHIPPED | OUTPATIENT
Start: 2021-04-13 | End: 2021-04-14

## 2021-04-13 RX ADMIN — ACETAMINOPHEN 650 MG: 325 TABLET, FILM COATED ORAL at 23:58

## 2021-04-13 RX ADMIN — VENLAFAXINE HYDROCHLORIDE 300 MG: 150 CAPSULE, EXTENDED RELEASE ORAL at 08:59

## 2021-04-13 RX ADMIN — SODIUM CHLORIDE 500 ML: 900 IRRIGANT IRRIGATION at 07:30

## 2021-04-13 RX ADMIN — ACETAMINOPHEN 650 MG: 325 TABLET, FILM COATED ORAL at 20:43

## 2021-04-13 RX ADMIN — ACETAMINOPHEN 650 MG: 325 TABLET, FILM COATED ORAL at 08:58

## 2021-04-13 RX ADMIN — Medication 3 MG: at 22:26

## 2021-04-13 RX ADMIN — ACETAMINOPHEN 650 MG: 325 TABLET, FILM COATED ORAL at 16:34

## 2021-04-13 RX ADMIN — ACETAMINOPHEN 650 MG: 325 TABLET, FILM COATED ORAL at 00:58

## 2021-04-13 RX ADMIN — SODIUM CHLORIDE: 9 INJECTION, SOLUTION INTRAVENOUS at 04:55

## 2021-04-13 RX ADMIN — DIAZEPAM 5 MG: 5 TABLET ORAL at 22:26

## 2021-04-13 RX ADMIN — HYDROCODONE BITARTRATE AND ACETAMINOPHEN: 500; 5 TABLET ORAL at 09:10

## 2021-04-13 RX ADMIN — ACETAMINOPHEN 650 MG: 325 TABLET, FILM COATED ORAL at 04:53

## 2021-04-13 RX ADMIN — CEFAZOLIN SODIUM 2 G: 2 INJECTION, SOLUTION INTRAVENOUS at 00:58

## 2021-04-13 ASSESSMENT — ACTIVITIES OF DAILY LIVING (ADL)
ADLS_ACUITY_SCORE: 19

## 2021-04-13 NOTE — PLAN OF CARE
POD 4. AVSS. A&O x 4. Supplies for stent removal on 4/13 are ready and in patient's room. L PIV infusing NS at 75 mL/hr. Forte patent, with adequate output. Passing gas and had large loose stool today. Oxycodone PRN for pain. Gave PRN trazodone for sleep. Abd JACINDA leaking at site, dressing changed x 2, minimal output. JACINDA x 2 groin, minimal output, ANIYAH. Mesh panties with kerlix and ABD covering, changed ABD x 2. Patient was able to stand and walk to the door way today without feeling dizziness and had a BP of 122/66. Has been getting up x 1 assist to pivot to commode. Recommend x 2 assist if going to walk.

## 2021-04-13 NOTE — PROGRESS NOTES
"Urology  Progress Note    - AFeb, VSS  - night was \"odd\" due to vivid dreams on trazodone    Exam  /77   Pulse 89   Temp 98  F (36.7  C) (Oral)   Resp 18   Ht 1.778 m (5' 10\")   Wt 90.8 kg (200 lb 2.8 oz)   SpO2 99%   BMI 28.72 kg/m    No acute distress  Unlabored breathing  Abdomen soft, nontender, nondistended.   Incisions c/d/i covered with dermabond  Swelling and bruising improved from yesterday  Bolster and stent sutured in place  Abdominal and groin JPs serosanguinous  Forte with clear/yellow urine in tubing    UOP 1325/625  L groin JACINDA 0/0  R groin JACINDA 50/0  L abdominal JACINDA 85/8    Labs    WBC: (8.3)  Hgb: (9.6)   Cr: (1.08)    AM Labs Pending    AXR:  \"Impression:   1. Mild gaseous distention of the small and large bowel, most suggestive of postoperative ileus.  2. Scattered lucencies overlying the pelvis and liver, compatible with residual free air from recent robotic surgery.\"    Assessment/Plan  56 year old adult with PMHx JERRELL, depression, and anxiety now POD#5 s/p robotic assisted vaginoplasty for gender affirming surgery.     Neuro: scheduled tylenol, PRN oxy and dilaudid for pain control. PRN atarax for pain and anxiety Continue PTA venlafaxine and trazodone  CV: no acute concerns  Pulm: incentive spirometry while awake  FEN/GI: Regular, discontinue MIVF @ 75/hr; colace, miralax, senokot; PRN dulcolax suppository  Endo: no acute concerns  : vaginal stent/bolster and drains to be removed today by plastics team, plan for TOV after  Heme/ID: discontinue ancef 2 g q8h  Activity: ambulate with assist and small steps 4x/d, appreciate PT consult  PPx: SCDs  Dispo: Possibly today if comfortable, voiding well with catheter out. Will need to clarify follow up plans with plastics team    Will discuss with Dr. Theo Vergara MD  PGY2 Urology    "

## 2021-04-13 NOTE — PROVIDER NOTIFICATION
Spoke with Dr. Vergara around 1390 regarding order:     Please perform trial of void     TOV today - please fill bladder with 300 ccs via fitch with outflow clamped (or as much as pt tolerates.) Then pull catheter, allow pt to void, record output and voided volume, and page urology on call [phone] with results (in and out.)      Notified MD that fitch was already removed by Plastics. Per Dr. Vergara, okay to cancel trial of void and monitor patient for voiding. When pt voids, please bladder scan for a post void residual and page Urology with volumes.

## 2021-04-13 NOTE — PLAN OF CARE
VSS on RA. Minimal pain, taking scheduled Tylenol. Denies nausea. Tolerating regular diet. PIV saline locked. Neovaginal stent/dressings and fitch removed by Plastics this AM. Pt unable to void since fitch removal, see provider notification note.LBM yesterday. Reviewed ordered perineal cares and JACINDA drain education with patient and partner. Per Urology, patient may be able to discharge later today if she is able to void. If pt stays in the hospital tonight, she would like to take Melatonin at bedtime instead of Trazodone. Continue to monitor and with POC.

## 2021-04-13 NOTE — PROGRESS NOTES
Plastic Surgery Progress Note    Had vivid dreams on trazodone. Otherwise pain tolerable.    Temp:  [98  F (36.7  C)-99.2  F (37.3  C)] 98.7  F (37.1  C)  Pulse:  [] 88  Resp:  [16-18] 18  BP: (116-136)/(73-82) 116/74  SpO2:  [90 %-99 %] 97 %  I/O last 3 completed shifts:  In: 2775 [P.O.:880; I.V.:1895]  Out: 1703 [Urine:1600; Drains:103]   Neovaginal stent removed at bedside.  Incisions CDI. Labia perfused. Perineal flap perfused. Clitoris perfused. Urethral plate perfused. Urine clear.  Vaignal cavity healing appropriately. No fecal material on digit after exam.    CBC  Recent Labs   Lab 04/12/21  0650 04/11/21  0821 04/10/21  0754 04/09/21  0724   WBC 8.3 10.7 10.6 9.5   RBC 3.26* 3.12* 3.62* 3.88   HGB 9.6* 9.1* 11.0* 11.5*   HCT 30.0* 28.4* 33.5* 34.5*   MCV 92 91 93 89   MCH 29.4 29.2 30.4 29.6   MCHC 32.0 32.0 32.8 33.3   RDW 12.2 12.4 12.6 12.4    219 215 253     BMP  Recent Labs   Lab 04/12/21  0650 04/11/21  0821 04/10/21  0754 04/09/21  0724    140 140 139   POTASSIUM 4.2 3.4 3.9 4.3   CHLORIDE 110* 109 107 108   SERA 8.2* 8.2* 8.3* 8.2*   CO2 24 25 29 26   BUN 11 10 10 14   CR 1.08* 1.16* 1.29* 1.35*   GLC 97 84 101* 124*       ASSESSMENT: POD 5 s/p robotic assisted penile inversion vaginoplasty with supplemental scrotal skin graft.    PLAN:   - Discontinue abx.  - Perineal cares.  - May ambulate.  - Abdominal and left labial drains removed.  - Needs drain teaching to manage right labial drain at home.  - May be discharged home or to rehab once urinating on her own.  - Follow up with plastic surgery o n4/16 to begin dilation. Orders for this have been placed.

## 2021-04-13 NOTE — PLAN OF CARE
Cared for pt from 8725-9983: VSS. Still not able to void. Urology team saw pt. Plan to keep bladder scanning pt and reassessing. Tylenol given, lots of pressure in bladder/abdomen. JACINDA x1 dressing, clean/dry/intact. Perineum with kerlix in place. Continue POC.

## 2021-04-13 NOTE — CONSULTS
Care Management Initial Consult    General Information  Assessment completed with:  patient        Primary Care Provider verified and updated as needed:   Verified PCP  Readmission within the last 30 days:   No        Advance Care Planning:     Not addressed       Communication Assessment  Patient's communication style: spoken language (English or Bilingual)    Hearing Difficulty or Deaf: no   Wear Glasses or Blind: yes    Cognitive  Cognitive/Neuro/Behavioral: WDL  Level of Consciousness: alert  Arousal Level: opens eyes spontaneously  Orientation: oriented x 4  Mood/Behavior: calm, cooperative, behavior appropriate to situation  Best Language: 0 - No aphasia  Speech: clear, logical    Living Environment:   People in home: significant other  /Tawny   Current living Arrangements: house      Able to return to prior arrangements:  TBD       Family/Social Support:  Care provided by:  Self, independent PTA  Provides care for:  No one else                Description of Support System:   Feels she has adequate support         Current Resources:   Patient receiving home care services:  No     Community Resources:    Equipment currently used at home: none  Supplies currently used at home:  none    Employment/Financial:  Employment Status:    Retired     Financial Concerns:  Currently no concerns           Lifestyle & Psychosocial Needs:        Socioeconomic History     Marital status:      Spouse name: Not on file     Number of children: Not on file     Years of education: Not on file     Highest education level: Not on file     Tobacco Use     Smoking status: Never Smoker     Smokeless tobacco: Never Used   Substance and Sexual Activity     Alcohol use: Yes     Comment: 2-3 drinks weekly     Drug use: No     Sexual activity: Yes     Partners: Female       Functional Status:  Prior to admission patient needed assistance:       No       Mental Health Status:      WNL    Chemical Dependency Status:            Not  addressed    Values/Beliefs:  Spiritual, Cultural Beliefs, Buddhism Practices, Values that affect care:    No             Additional Information:  This RNCC spoke with Sunni via  Phone, introduced role of RNCC as related to DC planning. Sunni sates that her OT session went well and she is confident she will do okay at home. Signicant other, Tawny is very attentive to her and her needs and is available to help. Sunni states Tawny will provide transport home when DC imminent.  Denies any significant concerns related to discharge.    Bri KENNYN RN CCM  RN Care Coordinator [address] [place] [id number]  [email or web address]   Office: [phone]   Pager: [phone]    To contact Weekend RNCC, dial [phone] and enter job code [phone] at prompt. This pager can not be contacted by text page or outside line.

## 2021-04-13 NOTE — PROGRESS NOTES
04/12/21 1334   Quick Adds   Type of Visit Initial PT Evaluation      Language English   Living Environment   People in home spouse  (Tawny)   Current Living Arrangements house   Home Accessibility stairs to enter home;stairs within home   Number of Stairs, Main Entrance 5   Stair Railings, Main Entrance railings on both sides of stairs   Number of Stairs, Within Home, Primary 10   Stair Railings, Within Home, Primary railings on both sides of stairs   Transportation Anticipated car, drives self   Living Environment Comments   (lives in 2 story home, only needs to access 1st floor)   Self-Care   Usual Activity Tolerance good   Current Activity Tolerance fair   Regular Exercise Yes   Activity/Exercise Type walking;strength training;running/jogging;biking   Exercise Amount/Frequency 30 mins   Equipment Currently Used at Home none   Activity/Exercise/Self-Care Comment Pt is usually IND for all mobility.    Disability/Function   Hearing Difficulty or Deaf no   Wear Glasses or Blind yes   Vision Management glasses   Concentrating, Remembering or Making Decisions Difficulty no   Difficulty Communicating no   Difficulty Eating/Swallowing no   Walking or Climbing Stairs Difficulty no   Dressing/Bathing Difficulty no   Toileting issues no   Doing Errands Independently Difficulty (such as shopping) no   Fall history within last six months yes   Number of times patient has fallen within last six months 2  (fall in January with sycopal episode and syncopal episode IP)   Change in Functional Status Since Onset of Current Illness/Injury yes   General Information   Onset of Illness/Injury or Date of Surgery 04/09/21   Referring Physician Pal Ortega MD   Patient/Family Therapy Goals Statement (PT) Return to previous level of function; ambulating without syncipal episodes.    Pertinent History of Current Problem (include personal factors and/or comorbidities that impact the POC) 57 yo POD #3 s/p robotic  assisted laparoscopic penile inversion vaginoplasty. Convalescing appropriately   Existing Precautions/Restrictions fall  (Sitting precautions - ok to sit on post buttocks)   Cognition   Orientation Status (Cognition) oriented x 4   Integumentary/Edema   Integumentary/Edema no deficits were identifed   Posture    Posture Forward head position   Range of Motion (ROM)   ROM Quick Adds ROM WFL   Strength   Strength Comments Grossly LE 4/5 to 5/5 as noted with mobility   Bed Mobility   Comment (Bed Mobility) CGA via log roll and use of bed railing   Transfers   Transfer Safety Comments Side sitting > standing with CGA   Gait/Stairs (Locomotion)   Comment (Gait/Stairs) Pt demo short steps EOB > recliner at CGA without AD   Balance   Balance Comments Dynamic balance impaired by hypotension and post op precautions   Sensory Examination   Sensory Perception patient reports no sensory changes   Clinical Impression   Criteria for Skilled Therapeutic Intervention yes, treatment indicated   PT Diagnosis (PT) Decreased endurance and decreased activity tolerance   Influenced by the following impairments Orthostatic hypotension, posture, balance, endurancepost op precautions   Functional limitations due to impairments Impaired access to community   Clinical Presentation Evolving/Changing   Clinical Presentation Rationale Status is not evolving/changing   Clinical Decision Making (Complexity) moderate complexity   Therapy Frequency (PT) Daily   Predicted Duration of Therapy Intervention (days/wks) 2 weeks   Planned Therapy Interventions (PT) balance training;bed mobility training;gait training;home exercise program;postural re-education;transfer training;stair training;strengthening;patient/family education   Anticipated Equipment Needs at Discharge (PT) other (see comments)  (May consider a walker if orthostatics do not resolve. )   Risk & Benefits of therapy have been explained evaluation/treatment results reviewed;care  plan/treatment goals reviewed;risks/benefits reviewed;current/potential barriers reviewed;participants voiced agreement with care plan;participants included;patient;spouse/significant other   PT Discharge Planning    PT Discharge Recommendation (DC Rec) Transitional Care Facility   PT Rationale for DC Rec Currently recommending TCU d/t pt's limited abiliyt to demonstrate home mobility tasks, however, Pt likely limited most by hypotension. Anticipate pt will be safe to return home with assist once mobility improves. Pt's home set up is well-equipped for pt. Pt is motivated to partcipate and eager to return home.    PT Brief overview of current status  Nursing: transfer with gaitbelt, monitor orthostatics. If need to ambulate, wheel chair follow.    Total Evaluation Time   Total Evaluation Time (Minutes) 10

## 2021-04-13 NOTE — PROVIDER NOTIFICATION
Notified Dr. Vergara that pt has been unable to void since fitch removal this AM. Small amount of bloody drainage noted in hat after voiding attempts. Pt states she feels like she needs to void, but can't. Bladder scanned for 92 mL. MD came to see patient and encouraged her to continue ambulating and drinking fluids. Continue to monitor per MD.

## 2021-04-14 ENCOUNTER — PATIENT OUTREACH (OUTPATIENT)
Dept: CARE COORDINATION | Facility: CLINIC | Age: 56
End: 2021-04-14

## 2021-04-14 ENCOUNTER — APPOINTMENT (OUTPATIENT)
Dept: OCCUPATIONAL THERAPY | Facility: CLINIC | Age: 56
DRG: 876 | End: 2021-04-14
Attending: UROLOGY
Payer: COMMERCIAL

## 2021-04-14 VITALS
TEMPERATURE: 98 F | DIASTOLIC BLOOD PRESSURE: 76 MMHG | BODY MASS INDEX: 28.66 KG/M2 | OXYGEN SATURATION: 99 % | HEIGHT: 70 IN | WEIGHT: 200.18 LBS | RESPIRATION RATE: 16 BRPM | SYSTOLIC BLOOD PRESSURE: 127 MMHG | HEART RATE: 82 BPM

## 2021-04-14 LAB
ANION GAP SERPL CALCULATED.3IONS-SCNC: 6 MMOL/L (ref 3–14)
BUN SERPL-MCNC: 16 MG/DL (ref 7–30)
CALCIUM SERPL-MCNC: 8.5 MG/DL (ref 8.5–10.1)
CHLORIDE SERPL-SCNC: 110 MMOL/L (ref 94–109)
CO2 SERPL-SCNC: 26 MMOL/L (ref 20–32)
COPATH REPORT: NORMAL
CREAT SERPL-MCNC: 1.03 MG/DL (ref 0.52–1.04)
ERYTHROCYTE [DISTWIDTH] IN BLOOD BY AUTOMATED COUNT: 12.5 % (ref 10–15)
GFR SERPL CREATININE-BSD FRML MDRD: 61 ML/MIN/{1.73_M2}
GLUCOSE SERPL-MCNC: 103 MG/DL (ref 70–99)
HCT VFR BLD AUTO: 28.1 % (ref 35–47)
HGB BLD-MCNC: 9.3 G/DL (ref 11.7–15.7)
MCH RBC QN AUTO: 29.4 PG (ref 26.5–33)
MCHC RBC AUTO-ENTMCNC: 33.1 G/DL (ref 31.5–36.5)
MCV RBC AUTO: 89 FL (ref 78–100)
PLATELET # BLD AUTO: 321 10E9/L (ref 150–450)
POTASSIUM SERPL-SCNC: 3.5 MMOL/L (ref 3.4–5.3)
RBC # BLD AUTO: 3.16 10E12/L (ref 3.8–5.2)
SODIUM SERPL-SCNC: 141 MMOL/L (ref 133–144)
WBC # BLD AUTO: 8.9 10E9/L (ref 4–11)

## 2021-04-14 PROCEDURE — 97535 SELF CARE MNGMENT TRAINING: CPT | Mod: GO | Performed by: OCCUPATIONAL THERAPIST

## 2021-04-14 PROCEDURE — 80048 BASIC METABOLIC PNL TOTAL CA: CPT | Performed by: STUDENT IN AN ORGANIZED HEALTH CARE EDUCATION/TRAINING PROGRAM

## 2021-04-14 PROCEDURE — 36415 COLL VENOUS BLD VENIPUNCTURE: CPT | Performed by: STUDENT IN AN ORGANIZED HEALTH CARE EDUCATION/TRAINING PROGRAM

## 2021-04-14 PROCEDURE — 250N000013 HC RX MED GY IP 250 OP 250 PS 637: Performed by: STUDENT IN AN ORGANIZED HEALTH CARE EDUCATION/TRAINING PROGRAM

## 2021-04-14 PROCEDURE — 85027 COMPLETE CBC AUTOMATED: CPT | Performed by: STUDENT IN AN ORGANIZED HEALTH CARE EDUCATION/TRAINING PROGRAM

## 2021-04-14 RX ORDER — ESTRADIOL 0.1 MG/D
2 FILM, EXTENDED RELEASE TRANSDERMAL
Qty: 8 PATCH | Refills: 0 | COMMUNITY
Start: 2021-04-15

## 2021-04-14 RX ORDER — POLYETHYLENE GLYCOL 3350 17 G/17G
17 POWDER, FOR SOLUTION ORAL 2 TIMES DAILY PRN
Qty: 510 G | Refills: 1 | Status: SHIPPED | OUTPATIENT
Start: 2021-04-14

## 2021-04-14 RX ORDER — OXYCODONE HYDROCHLORIDE 5 MG/1
5 TABLET ORAL EVERY 6 HOURS PRN
Qty: 12 TABLET | Refills: 0 | Status: SHIPPED | OUTPATIENT
Start: 2021-04-14 | End: 2021-04-30

## 2021-04-14 RX ORDER — AMOXICILLIN 250 MG
1-2 CAPSULE ORAL 2 TIMES DAILY
Qty: 45 TABLET | Refills: 1 | Status: SHIPPED | OUTPATIENT
Start: 2021-04-14 | End: 2021-05-10

## 2021-04-14 RX ORDER — IBUPROFEN 400 MG/1
800 TABLET, FILM COATED ORAL EVERY 8 HOURS PRN
Qty: 42 TABLET | Refills: 0 | Status: SHIPPED | OUTPATIENT
Start: 2021-04-14

## 2021-04-14 RX ADMIN — POLYETHYLENE GLYCOL 3350 17 G: 17 POWDER, FOR SOLUTION ORAL at 07:53

## 2021-04-14 RX ADMIN — VENLAFAXINE HYDROCHLORIDE 300 MG: 150 CAPSULE, EXTENDED RELEASE ORAL at 07:53

## 2021-04-14 RX ADMIN — ACETAMINOPHEN 650 MG: 325 TABLET, FILM COATED ORAL at 07:52

## 2021-04-14 RX ADMIN — ACETAMINOPHEN 650 MG: 325 TABLET, FILM COATED ORAL at 12:50

## 2021-04-14 RX ADMIN — ACETAMINOPHEN 650 MG: 325 TABLET, FILM COATED ORAL at 04:10

## 2021-04-14 ASSESSMENT — ACTIVITIES OF DAILY LIVING (ADL)
ADLS_ACUITY_SCORE: 19

## 2021-04-14 NOTE — PLAN OF CARE
Occupational Therapy Discharge Summary    Reason for therapy discharge:    All goals and outcomes met, no further needs identified.    Progress towards therapy goal(s). See goals on Care Plan in [facility] electronic health record for goal details.  Goals met    Therapy recommendation(s):    No further therapy is recommended.

## 2021-04-14 NOTE — PLAN OF CARE
"POD #6 Robotic assisted laparoscopic penile inversion vaginoplasty. Abd lap sited CDI and ANIYAH. Pt is alert and oriented x4, denies pain, on scheduled Tylenol. VSS, on RA afebrile. LS clear bilaterally. Voiding spontaneously without difficulty. Bowel sound normoactive, LBM 4/12. R groin JACINDA with minimal output (5cc). Perineum with Kerlix in place. On regular diet, tolerating well. PIV SL. Up ad rohan to BSC. Slept most of the shift. Tentative discharge today.  Will continue with POC and notify MD with changes or concerns.   /76 (BP Location: Right arm)   Pulse 82   Temp 98  F (36.7  C) (Oral)   Resp 16   Ht 1.778 m (5' 10\")   Wt 90.8 kg (200 lb 2.8 oz)   SpO2 99%   BMI 28.72 kg/m        "

## 2021-04-14 NOTE — PROGRESS NOTES
"Urology  Progress Note    - AFeb, VSS  - Forte removed yesterday and now urinating well    Exam  /76 (BP Location: Right arm)   Pulse 82   Temp 98  F (36.7  C) (Oral)   Resp 16   Ht 1.778 m (5' 10\")   Wt 90.8 kg (200 lb 2.8 oz)   SpO2 99%   BMI 28.72 kg/m    No acute distress  Unlabored breathing  Abdomen soft, nontender, nondistended.   Incisions c/d/i covered with dermabond  JACINDA Drain ssf    /450 +  L abdominal JACINDA 5/5    Labs:  WBC: (8.6)  Hgb: (9.3)   Cr: (1.12)    AM Labs Pending    Assessment/Plan  56 year old adult with PMHx JERRELL, depression, and anxiety now POD#6 s/p robotic assisted vaginoplasty for gender affirming surgery.     Neuro: scheduled tylenol, PRN oxy and dilaudid for pain control. PRN atarax for pain and anxiety Continue PTA venlafaxine and trazodone  CV: no acute concerns  Pulm: incentive spirometry while awake  FEN/GI: Regular, colace, miralax, senokot; PRN dulcolax suppository  Endo: no acute concerns  : Voiding spontaneously  Heme/ID: Antibiotics completed, monitor for fevers and leukocytosis  Activity: ambulate with assist and small steps 4x/d, appreciate PT consult  PPx: SCDs  Dispo: Discharge today. Follow up with plastics as scheduled to begin dilation    Will discuss with Dr. Theo Pereira MD  Urology PGY-4      "

## 2021-04-14 NOTE — PROGRESS NOTES
Plastic Surgery Progress Note    No acute events. Ambulating better. Able to void yesterday.    Temp:  [96.7  F (35.9  C)-98  F (36.7  C)] 98  F (36.7  C)  Pulse:  [62-90] 82  Resp:  [16] 16  BP: (118-131)/(70-80) 127/76  SpO2:  [97 %-100 %] 99 %  I/O last 3 completed shifts:  In: 1090 [P.O.:1090]  Out: 810 [Urine:800; Drains:10]   Incisions CDI. Labia perfused. Perineal flap perfused. Clitoris perfused. Urethral plate perfused. Urine clear.  Vaignal cavity healing appropriately.    CBC  Recent Labs   Lab 04/14/21 0725 04/13/21  0714 04/12/21  0650 04/11/21  0821   WBC 8.9 8.6 8.3 10.7   RBC 3.16* 3.16* 3.26* 3.12*   HGB 9.3* 9.3* 9.6* 9.1*   HCT 28.1* 28.9* 30.0* 28.4*   MCV 89 92 92 91   MCH 29.4 29.4 29.4 29.2   MCHC 33.1 32.2 32.0 32.0   RDW 12.5 12.5 12.2 12.4    293 243 219     BMP  Recent Labs   Lab 04/14/21 0725 04/13/21 0714 04/12/21  0650 04/11/21  0821    141 139 140   POTASSIUM 3.5 3.4 4.2 3.4   CHLORIDE 110* 110* 110* 109   SERA 8.5 8.2* 8.2* 8.2*   CO2 26 26 24 25   BUN 16 13 11 10   CR 1.03 1.12* 1.08* 1.16*   * 102* 97 84       ASSESSMENT: POD 6 s/p robotic assisted penile inversion vaginoplasty with supplemental scrotal skin graft.    PLAN:   - Perineal cares.  - May ambulate.  - Abdominal and left labial drains removed.  - Needs drain teaching to manage right labial drain at home.  - May be discharged home or to rehab  - Follow up with plastic surgery on 4/16 to begin dilation. Orders for this have been placed.

## 2021-04-14 NOTE — DISCHARGE SUMMARY
[facility]    Patient: Sunni Flores    MRN: [id number]   : 1965         Date of Admission:  2021   Date of Discharge::  2021  Admitting Physician:  Meet Venegas MD  Discharge Physician:  Aurea Steel PA-C             Admission Diagnoses:   Gender dysphoria in adult [F64.0]    Past Medical History:   Diagnosis Date     Anxiety      Depression      Gender dysphoria      JERRELL (obstructive sleep apnea)              Discharge Diagnosis:     Gender dysphoria in adult [F64.0]  Postop pain    Past Medical History:   Diagnosis Date     Anxiety      Depression      Gender dysphoria      JERRELL (obstructive sleep apnea)           Procedures:     Procedure(s): 21 -   PROCEDURES PERFORMED: Robotic assisted laparoscopic penile inversion vaginoplasty with supplemental scrotal skin graft to include the followin.  Robotic assisted laparoscopic construction of artifical vagina with graft.  2.  Bilateral simple orchiectomy.  3.  Scrotectomy.  4.  Total penectomy.  5.  Clitoroplasty.  6.  Urethroplasty.  7.  Augmentation of vaginal introitus with adjacent perineal tissue transfer, size 3 x 6 cm.  8.  Robotic assisted laparoscopic harvest of a wide anterior peritoneal flap and inset over the neovaginal scrotal skin graft in the manner of an extraperitoneal inset of an omental flap.  9.  Robotic assisted laparoscopic intraabdominal neovaginal colpopexy.  10.  Bilateral labiaplasty via adjacent tissue transfer of scrotal and mons skin flaps, size 12 x 5 cm on left and 12 x 5 cm on right.  - SURGEON: Tank Dawson MD (Plastic Surgery)    PROCEDURE:   Robotic Assisted Laparoscopic Penile inversion vaginoplasty with supplemental scrotal skin graft, which includes the followin. Total Penectomy  2. Urethroplasty (Male Anterior One Stage)  3. Robotic Assisted Laparoscopy, surgical, colpopexy (suspension of vaginal apex)  4. Robotic Assisted Laparoscopic Construction of  Artificial Vagina With Graft  5. Scrotectomy  6. Bilateral labiaplasty via adjacent tissue transfer of scrotal and mons skin flaps, size 12 x 5 cm on left and 12 x 5 cm on right.   7. Clitoroplasty  8. Augmentation of vaginal introitus via adjacent tissue transfer of perineal flap, size 2 x 2 cm  9. Bilateral Simple Scrotal Orchiectomy  10. Creation of Peritoneal Flaps (in the manner of omental flaps) to provide blood supply for the neovaginal lining  11. Excision of Intra-abdominal Peritoneal Polyps.  - SURGEON: Meet Venegas MD (Urology)            Medications Prior to Admission:     Medications Prior to Admission   Medication Sig Dispense Refill Last Dose     acetaminophen (TYLENOL) 325 MG tablet Take 325-650 mg by mouth every 6 hours as needed for mild pain   4/7/2021 at 1200     diazepam (VALIUM) 10 MG tablet as needed    4/8/2021 at 0530     Docusate Sodium (COLACE PO) Take by mouth 2 times daily   4/7/2021 at 0800     lidocaine-prilocaine (EMLA) 2.5-2.5 % external cream as needed   2 Past Month at Unknown time     methylfolate (DEPLIN) 15 MG TABS tablet Take 15 mg by mouth every morning    Past Week at Unknown time     spironolactone (ALDACTONE) 100 MG tablet Take 1 tablet (100 mg) by mouth daily (Patient taking differently: Take 100 mg by mouth every morning ) 30 tablet 5 Past Month at Unknown time     TRAZODONE HCL  mg nightly as needed for sleep or depression    Past Week at Unknown time     venlafaxine (EFFEXOR-XR) 150 MG 24 hr capsule Take 300 mg by mouth daily   4/8/2021 at 0530     [DISCONTINUED] estradiol (VIVELLE-DOT) 0.1 MG/24HR bi-weekly patch Place 2 patches onto the skin twice a week (Patient taking differently: Place 2 patches onto the skin twice a week ) 16 patch 5 Past Month at Unknown time     [DISCONTINUED] ibuprofen (ADVIL/MOTRIN) 400 MG tablet Take 2 tablets by mouth as needed    Past Month at Unknown time             Discharge Medications:     Current Discharge Medication List       START taking these medications    Details   oxyCODONE (ROXICODONE) 5 MG tablet Take 1 tablet (5 mg) by mouth every 6 hours as needed for severe pain  Qty: 12 tablet, Refills: 0    Associated Diagnoses: Gender dysphoria in adult      polyethylene glycol (MIRALAX) 17 GM/Dose powder Take 17 g by mouth 2 times daily as needed for constipation  Qty: 510 g, Refills: 1    Associated Diagnoses: Postoperative pain      senna-docusate (SENOKOT-S/PERICOLACE) 8.6-50 MG tablet Take 1-2 tablets by mouth 2 times daily To prevent constipation  Qty: 45 tablet, Refills: 1    Associated Diagnoses: Postoperative pain         CONTINUE these medications which have CHANGED    Details   estradiol (VIVELLE-DOT) 0.1 MG/24HR bi-weekly patch Place 2 patches onto the skin twice a week (STOP until restarted by Medical team)  Qty: 8 patch, Refills: 0    Associated Diagnoses: Gender dysphoria in adolescent and adult      ibuprofen (ADVIL/MOTRIN) 400 MG tablet Take 2 tablets (800 mg) by mouth every 8 hours as needed for pain  Qty: 42 tablet, Refills: 0    Associated Diagnoses: Postoperative pain         CONTINUE these medications which have NOT CHANGED    Details   acetaminophen (TYLENOL) 325 MG tablet Take 325-650 mg by mouth every 6 hours as needed for mild pain      diazepam (VALIUM) 10 MG tablet as needed       Docusate Sodium (COLACE PO) Take by mouth 2 times daily      lidocaine-prilocaine (EMLA) 2.5-2.5 % external cream as needed   Refills: 2      methylfolate (DEPLIN) 15 MG TABS tablet Take 15 mg by mouth every morning       spironolactone (ALDACTONE) 100 MG tablet Take 1 tablet (100 mg) by mouth daily  Qty: 30 tablet, Refills: 5    Associated Diagnoses: Gender dysphoria in adolescent and adult      TRAZODONE HCL  mg nightly as needed for sleep or depression       venlafaxine (EFFEXOR-XR) 150 MG 24 hr capsule Take 300 mg by mouth daily                   Consultations:   Consultation during this admission received:   PHYSICAL THERAPY  ADULT IP CONSULT  OCCUPATIONAL THERAPY ADULT IP CONSULT  VASCULAR ACCESS CARE ADULT IP CONSULT  CARE MANAGEMENT / SOCIAL WORK IP CONSULT          Brief History of Illness:   Reason for admission requiring a surgical or invasive procedure:   Gender dysphoria in adult [F64.0]   The patient underwent the following procedure(s):   See above   There were no immediate complications during this procedure.    Please refer to the full operative summary for details.           Hospital Course:   The patient's hospital course:    Per protocol, Sunni Flores's neovaginal stent, Kerlix vaginal bolster and Forte catheter remained in place until POD#5.  While in place, she was continued on prophylactic IV ancef.  She was allowed to ambulate but was encouraged to take small steps (to keep the bolster in place). Her stent and bolster were successfully removed on POD#5 then she underwent a voiding trial, which was successful.     On POD#2 Ms. Moeller had a syncopal episode with attempting ambulation - she collapsed to the ground but sustained no injuries or head trauma.  She was evaluated with an EKG which showed NSR (hr 84 bpm).  Her scheduled valium dose was reduced.  There were no further syncopal concerns.     Her course was otherwise unremarkable.   On POD #6 she was ambulating without assitance, tolerating the discharge diet, had pain controlled with PO medications to go home with, and was requiring no IV medications or fluids. She was discharged to home with pericares instructions and her right labial drain.  She was given appropriate contact information, follow-up and instructions as seen below in the discharge paperwork.  She will follow up in the plastic surgery clinic in 48 hours.          Discharge Labs:     No results found for: PSA  Recent Labs   Lab 04/14/21  0725 04/13/21  0714   WBC 8.9 8.6   HGB 9.3* 9.3*    293     Recent Labs   Lab 04/14/21  0725 04/13/21  0714    141   POTASSIUM 3.5 3.4   CHLORIDE 110*  110*   CO2 26 26   BUN 16 13   CR 1.03 1.12*   * 102*   SERA 8.5 8.2*     No lab results found in last 7 days.    Invalid input(s): URINEBLOOD  Results for orders placed or performed in visit on 04/05/21   Urine Culture Aerobic Bacterial    Specimen: Urine clean catch; Midstream Urine   Result Value Ref Range    Specimen Description Midstream Urine     Special Requests Specimen received in preservative     Culture Micro No growth             Discharge Instructions and Follow-Up:   Diet:  - Regular/ home diet    Activity:   - Avoid tub baths, swimming, submerging wounds in water  - Avoid activities that put direct pressure on your surgical incisions (such as riding a bicycle)  - Nothing per vagina (unless directed by your medical team)  - No strenuous exercise for 6 weeks.   - No lifting, pushing, pulling more than 10 pounds for 6 weeks. Take care when pushing with your arms to stand up.  - Do not strain your belly area.  When you bend, sit up or twice, you could strain the area around your incision.    - Do not strain with bowel movements.    - Do not drive until you can press the brake pedal quickly and fully without pain.   - Do not operate a motor vehicle while taking narcotic pain medications.     Medications:   1) PAIN: Oxycodone is a narcotic medication that has been prescribed for pain.  Narcotics will cause sleepiness and constipation and can become addictive, therefore it is best to stop or reduce them as soon as you can.  Any left over narcotics should be disposed of with an Authorized  for unneeded medications.  Contact your [facility]'s or [facility] government's household trash and recycling service to learn about medication disposal options and guidelines for your area.  If you decide to store this medication at home it should be kept in a locked cabinet to prevent access to children or visitors. To reduce your narcotic use, take both Tylenol (acetaminophen 625mg) and ibuprofen (600mg) as drected.   These have been prescribed for you.  Do not take more than 4,000mg of Tylenol (acetaminophen/ APAP) from all sources in any 24 hour period since this can cause liver damage.  Do not take more than 2400mg of ibuprofen in any 24 hour period since this can cause kidney damage. Never drive, operate machinery or drink alcoholic beverages while you are taking narcotic pain medications.      2) CONSTIPATION: Pericolace (senna/docusate sodium) can be taken twice daily for prevention of constipation since surgery, pain medications and bladder spasm medications can all make you constipated.  Please reduce or stop pericolace if you develop loose stools. Other over the counter solutions such as prune juice, miralax, fiber products, senna, and dulcolax can also be used. If you are taking the pericolace but still have not had a bowel movement in 3 days, start over-the-counter Milk of Magnesia taken twice daily until you have a nice bowel movement.  Call the office with any concerns.     Incisions:   - Daily showering is important, and you may get incisions wet starting 48 hours after surgery.  - Do not scrub incisions or soak in a bath, pool, hot tub, etc. Until wounds have fully healed AND directed by your medical team  - The wound dressing should be removed 48 hours after surgery.   - The purple skin glue on your incisions will flake off with time and should not be scrubbed.  Also avoid applying lotions or ointments to these areas.  - If steri-strips were used you may wash over them normally, as you would wash your remaining skin.  Steri-strips should fall off on their own within 5-10 days.   - It is preferable for the incisions to be left uncovered, but you may cover with gauze if needed for comfort or to protect clothing from drainage.     Tubes / drains:  Right labial drain to bulb suction   - Your nurse will show you how to care for your labial drain.   - Please record daily drain outputs and bring this information with  "you when you follow up with Dr. Dawson later this week    Vaginoplasty perineal cares to perform three times daily and as needed after BM & urination:   - Remove dressings.   - Gently irrigate incisions and surgical site with mixture of betadine and normal saline. Irrigate into the vaginal cavity with moderate pressure, shooting the betadine solution into the cavity.   - Place a small roll of kerlix covered with Adaptic between the labia majora.   - Place an ABD over this.   - Mesh panties on to apply gentle pressure to dressing.     Supplies:  - Please discharge patient with betadine, kerlix, Adaptic and ABD pads to last 1 week    Follow-Up:   - Schedule an appointment to be seen by your primary care provider within 7-10 days of discharge for a postoperative checkup.   - Follow up with Dr. Dawson as scheduled on Friday 4/16/21 to remove your drain and begin vaginal dilation.  Bring your diary of drain outputs with you to your appointment  - Call or return sooner than your regularly scheduled visit if you develop any of the following:  Fever (greater than 101.3F), uncontrolled pain, uncontrolled nausea or vomiting, concerns about bowel function, as well as increased redness, swelling, drainage from your wound or any concerns about urinating or urinary catheter drainage.      Here are some phone numbers where you can reach us:  - Monday through Friday, 8am to 4:30pm - as long as it is not a holiday, IT IS BEST to call the Urology Clinic Triage Line at: [phone] with any non-emergent urology concerns.    - If it is a night, weekend, or holiday call the [facility] number at [phone] and ask the  to page the \"urology resident on call\".  Typically, the on-call provider should return your call within 30 minutes.  Please page the on-call provider again if you haven't been contacted as expected.  Rarely, the on-call provider will be unable to promptly return a call due to a hospital emergency.  If you have " "paged twice and are still not contacted, ask the hospital  to page the \"urology CHIEF-RESIDENT on call\".  - FOR EMERGENCIES, always call 911 or go to the Emergency Department         Discharge Disposition:     Discharged to home      Attestation: I have reviewed today's vital signs, notes, medications, labs and imaging.    Kiah Steel PA-C  Urology Physician Assistant  Personal Pager: [phone]    Please call Job Code:   [phone] to reach the Urology resident or PA on call - Weekdays  [phone] to reach the Urology resident or PA on call - Weeknights and weekends    April 14, 2021         "

## 2021-04-14 NOTE — PLAN OF CARE
POD 5. AVSS. A&O x 4. L PIV SL. Voids spont, adequate output, passing gas, no BM today. Oxycodone PRN for pain. Gave PRN melatonin and valium for sleep. JACINDA R groin, minimal output, ANIYAH. Mesh panties with kerlix, adaptic and ABD covering. Up SBA/independent to commode. Regular diet, tolerating well. Hoping to discharge home tomorrow.

## 2021-04-14 NOTE — PLAN OF CARE
VSS on RA. Up ad rohan. Regular diet, denies nausea. Pain managed with scheduled tylenol. Perineum with some swelling and slight bruising, unchanged from yesterday. Pt independent with perineum dressings. Abdominal lap sites clean/dry/intact. R groin JACINDA with scant output. Education given  to pt and partner Tawny about JACINDA drain and perineum dressings. Voiding spontaneously. No BM, +gas.  Pt discharged with all belongings. Pt brought down to discharge pharmacy and partner Tawny bringing her home.

## 2021-04-14 NOTE — PLAN OF CARE
PT 7C:     Physical Therapy Discharge Summary    Reason for therapy discharge:    Discharged to home.    Progress towards therapy goal(s). See goals on Care Plan in [facility] electronic health record for goal details.  Goals partially met.  Barriers to achieving goals:   discharge from facility.    Therapy recommendation(s):    Continued therapy is recommended.  Rationale/Recommendations:  Pt is still below baseline and would benefit from PT to increase endurance and activity tolerance. Pt woudl additionally benefit from pelvic floor PT when cleared by MD..

## 2021-04-15 ENCOUNTER — VIRTUAL VISIT (OUTPATIENT)
Dept: PSYCHOLOGY | Facility: CLINIC | Age: 56
End: 2021-04-15
Payer: COMMERCIAL

## 2021-04-15 DIAGNOSIS — F43.22 ADJUSTMENT DISORDER WITH ANXIOUS MOOD: ICD-10-CM

## 2021-04-15 DIAGNOSIS — F64.0 GENDER DYSPHORIA IN ADULT: Primary | ICD-10-CM

## 2021-04-15 PROCEDURE — 90837 PSYTX W PT 60 MINUTES: CPT | Mod: GT | Performed by: PSYCHOLOGIST

## 2021-04-15 NOTE — PROGRESS NOTES
Video start time: 3:05pm  Video end time: 4:00pm    Telemedicine Visit: The patient's condition can be safely assessed and treated via synchronous audio and visual telemedicine encounter.      Reason for Telemedicine Visit: COVID 19    Originating Site (Patient Location): Patient's home    Distant Site (Provider Location): Provider Remote Setting    Consent:  The patient/guardian has verbally consented to: the potential risks and benefits of telemedicine (video visit) versus in person care; bill my insurance or make self-payment for services provided; and responsibility for payment of non-covered services.     Mode of Communication:  Video Conference via Welzoo    As the provider I attest to compliance with applicable laws and regulations related to telemedicine.          Program [facility] Human Sexuality  [facility]  [address], [address]  [place] [address]  Outpatient Progress Note      Session Date: 4/15/21  Client Name: Sunni Flores (she/her pronouns)  YOB: 1965  MRN: [id number]  Provider: Aicha Velasquez, PhD, LP  Type of Session: Individual   Present in Session: Clent only  Number of Minutes: 52 min  Treatment Plan Due: 9/18/2019    Health Maintenance Summary - Mental Health Treatment Plan       Status Date      MENTAL HEALTH TX PLAN Overdue 3/22/2020      Done 4/22/2019      Done 10/5/2018 See Scan     Done 9/18/2017           Current Symptoms/Status:   Sunni reports a history of gender dysphoria and has continued social and medical gender transition.  History of major depressive disorder, moderate, recurrent. Continued labile mood, rumination, passive suicidal ideation, low energy, negative thinking.     Progress Toward Treatment Goals:   Working on integration of self, negative self-talk and self-compassion, rumination, negative mood symptoms    Intervention & Response:   Interpersonal, client-centered, and CBT techniques utilized to address client's current status.      Discussed her reactions to vaginoplasty surgery last week. Stated she has been recovering well. Stated she has been emotionally working through how she has been looking forward to having a vagina and now she does. Stated she is having bladder pressure and working on urination. Discussed her time at the hospital and confusing medical care. Stated she had underestimated the recovery.     Assignment:   Explore identity feelings  Resolve historical relationships, integration of self      Diagnosis:    302.5 (F64.0) - Gender dysphoria in adults  Moderate Episode of Recurrent Major Depressive Disorder      Interactive Complexity:  None.       Plan / Need for Future Services:    Return for individual therapy in 2-3 weeks.                 Sara Velasquez, PhD, LP, CST    Program in Human Sexuality, [facility]  Department of Family Medicine and Community Health  [employer]

## 2021-04-15 NOTE — PROGRESS NOTES
The patient is scheduled for clinic follow up within 24-48 hours of hospital discharge. No post-hospital call is needed at this time.        Adrienne Chavarria CMA,  Post Hospital Discharge Team

## 2021-04-16 ENCOUNTER — OFFICE VISIT (OUTPATIENT)
Dept: PLASTIC SURGERY | Facility: CLINIC | Age: 56
End: 2021-04-16
Payer: COMMERCIAL

## 2021-04-16 VITALS
DIASTOLIC BLOOD PRESSURE: 79 MMHG | HEART RATE: 77 BPM | TEMPERATURE: 98.1 F | HEIGHT: 70 IN | WEIGHT: 203 LBS | BODY MASS INDEX: 29.06 KG/M2 | SYSTOLIC BLOOD PRESSURE: 119 MMHG

## 2021-04-16 DIAGNOSIS — F64.0 GENDER DYSPHORIA IN ADULT: Primary | ICD-10-CM

## 2021-04-16 PROCEDURE — 99024 POSTOP FOLLOW-UP VISIT: CPT | Performed by: PLASTIC SURGERY

## 2021-04-16 ASSESSMENT — MIFFLIN-ST. JEOR: SCORE: 1596.05

## 2021-04-16 ASSESSMENT — PAIN SCALES - GENERAL: PAINLEVEL: MODERATE PAIN (4)

## 2021-04-16 NOTE — LETTER
"4/16/2021       RE: Sunni Flores  [address] [facility] [address]     Dear Colleague,    Thank you for referring your patient, Sunni Flores, to the [facility] PLASTIC AND RECONSTRUCTIVE SURGERY CLINIC [place] at [facility]. Please see a copy of my visit note below.    Patient returns for a postoperative follow-up.    INTERVAL HISTORY: Pain is tolerable.  Able to urinate on her own and completely empty the bladder.  Bowel movements are normal.    PHYSICAL EXAMINATION:  /79 (BP Location: Right arm, Patient Position: Chair, Cuff Size: Adult Regular)   Pulse 77   Temp 98.1  F (36.7  C) (Oral)   Ht 1.778 m (5' 10\")   Wt 92.1 kg (203 lb)   BMI 29.13 kg/m    Genital examination was performed in the presence of a chaperone.  Incisions intact and healing appropriately.  Clitoris viable.  Urethral meatus healthy.  Overall vulva mostly feminized, but with diverging labia anteriorly resulting in incompletely feminized vulva.  Digital examination of the vaginal cavity reveals well-healing vaginal cavity with smooth walls.  Abdomen is nontender and soft.    ASSESSMENT: 1 week status post robot-assisted penile inversion vaginoplasty with supplemental scrotal skin graft.    PLAN: Patient is to start dilating 3 times daily 15 minutes each time for the next 3 months.  She was taught how to do this in the clinic today.  She is to do this with the smallest dilator.  She is to take sitz bath's and irrigate the perineum as needed.  New dressing supplies were given.  Return to clinic in 2 weeks for a 3-week follow-up.  If anterior incisions are healed at that time, we will begin stimulating the clitoris.    20 minutes spent on the date of the encounter performing chart review, history and physical, documentation, review of tests, communication with other healthcare professionals, and placing orders as noted above.    Again, thank you for allowing me to participate in the " care of your patient.      Sincerely,    Tank Dawson MD

## 2021-04-16 NOTE — NURSING NOTE
"Chief Complaint   Patient presents with     Surgical Followup     1wk post op vaginoplasty, DOS 4/8       Vitals:    04/16/21 1436   BP: 119/79   BP Location: Right arm   Patient Position: Chair   Cuff Size: Adult Regular   Pulse: 77   Temp: 98.1  F (36.7  C)   TempSrc: Oral   Weight: 92.1 kg (203 lb)   Height: 1.778 m (5' 10\")       Body mass index is 29.13 kg/m .    Krunal Barker, EMT    "

## 2021-04-22 ENCOUNTER — OFFICE VISIT (OUTPATIENT)
Dept: PLASTIC SURGERY | Facility: CLINIC | Age: 56
End: 2021-04-22
Payer: COMMERCIAL

## 2021-04-22 DIAGNOSIS — Z98.890 POSTOPERATIVE STATE: Primary | ICD-10-CM

## 2021-04-22 PROCEDURE — 99024 POSTOP FOLLOW-UP VISIT: CPT

## 2021-04-22 NOTE — LETTER
4/22/2021       RE: Sunni Flores  [address] [facility] [address]     Dear Colleague,    Thank you for referring your patient, Sunni Flores, to the [facility] PLASTIC AND RECONSTRUCTIVE SURGERY CLINIC [place] at [facility]. Please see a copy of my visit note below.    Pt. comes into clinic today at the request of Dr. Tank Dawson.    This service provided today was under the supervising provider of the day Dr. Guardado, who was available if needed.    Reason for visit: Post op visit.  Pt returns 2 weeks after penile inversion vaginoplasty.     Pt reports TID dilation has been going well. States she has been taking sitz baths or completing iodine and water rinses after each dilation and PRN after urinating. Pt reports wound is causing mild discomfort. Rates pain 2 on 0-10 scale. Pt has been completing once daily dressing changes. Pt reports dehiscence on posterior aspect has increased in size.     On exam no sign of infection, no purulence.  Clitoris is viable and sensate to light touch. Incisions are healing appropriately.  Digital examination of the vaginal cavity reveals a very good depth and well-healing site of sidewalls. Pt able to dilate with yellow dilator. Bilateral posterior labial wounds. Pt to continue with sitz baths, daily dressing changes, and TID dilation.    Follow up as scheduled.     Marguerite Ross RN  Care Coordinator

## 2021-04-22 NOTE — PROGRESS NOTES
Pt. comes into clinic today at the request of Dr. Tank Dawson.    This service provided today was under the supervising provider of the day Dr. Guardado, who was available if needed.    Reason for visit: Post op visit.  Pt returns 2 weeks after penile inversion vaginoplasty.     Pt reports TID dilation has been going well. States she has been taking sitz baths or completing iodine and water rinses after each dilation and PRN after urinating. Pt reports wound is causing mild discomfort. Rates pain 2 on 0-10 scale. Pt has been completing once daily dressing changes. Pt reports dehiscence on posterior aspect has increased in size.     On exam no sign of infection, no purulence.  Clitoris is viable and sensate to light touch. Incisions are healing appropriately.  Digital examination of the vaginal cavity reveals a very good depth and well-healing site of sidewalls. Pt able to dilate with yellow dilator. Bilateral posterior labial wounds. Pt to continue with sitz baths, daily dressing changes, and TID dilation.    Follow up as scheduled.     Marguerite Ross RN  Care Coordinator

## 2021-04-25 ENCOUNTER — HEALTH MAINTENANCE LETTER (OUTPATIENT)
Age: 56
End: 2021-04-25

## 2021-04-26 NOTE — PROGRESS NOTES
"Patient returns for a postoperative follow-up.    INTERVAL HISTORY: Pain is tolerable.  Able to urinate on her own and completely empty the bladder.  Bowel movements are normal.    PHYSICAL EXAMINATION:  /79 (BP Location: Right arm, Patient Position: Chair, Cuff Size: Adult Regular)   Pulse 77   Temp 98.1  F (36.7  C) (Oral)   Ht 1.778 m (5' 10\")   Wt 92.1 kg (203 lb)   BMI 29.13 kg/m    Genital examination was performed in the presence of a chaperone.  Incisions intact and healing appropriately.  Clitoris viable.  Urethral meatus healthy.  Overall vulva mostly feminized, but with diverging labia anteriorly resulting in incompletely feminized vulva.  Digital examination of the vaginal cavity reveals well-healing vaginal cavity with smooth walls.  Abdomen is nontender and soft.    ASSESSMENT: 1 week status post robot-assisted penile inversion vaginoplasty with supplemental scrotal skin graft.    PLAN: Patient is to start dilating 3 times daily 15 minutes each time for the next 3 months.  She was taught how to do this in the clinic today.  She is to do this with the smallest dilator.  She is to take sitz bath's and irrigate the perineum as needed.  New dressing supplies were given.  Return to clinic in 2 weeks for a 3-week follow-up.  If anterior incisions are healed at that time, we will begin stimulating the clitoris.    20 minutes spent on the date of the encounter performing chart review, history and physical, documentation, review of tests, communication with other healthcare professionals, and placing orders as noted above.  "

## 2021-04-30 ENCOUNTER — OFFICE VISIT (OUTPATIENT)
Dept: PLASTIC SURGERY | Facility: CLINIC | Age: 56
End: 2021-04-30
Payer: COMMERCIAL

## 2021-04-30 VITALS
HEIGHT: 70 IN | WEIGHT: 200 LBS | BODY MASS INDEX: 28.63 KG/M2 | SYSTOLIC BLOOD PRESSURE: 106 MMHG | HEART RATE: 100 BPM | OXYGEN SATURATION: 98 % | DIASTOLIC BLOOD PRESSURE: 68 MMHG

## 2021-04-30 DIAGNOSIS — F64.0 GENDER DYSPHORIA IN ADULT: Primary | ICD-10-CM

## 2021-04-30 PROCEDURE — 99024 POSTOP FOLLOW-UP VISIT: CPT | Performed by: PLASTIC SURGERY

## 2021-04-30 ASSESSMENT — MIFFLIN-ST. JEOR: SCORE: 1582.44

## 2021-04-30 ASSESSMENT — PAIN SCALES - GENERAL: PAINLEVEL: MILD PAIN (3)

## 2021-04-30 NOTE — NURSING NOTE
"Chief Complaint   Patient presents with     Surgical Followup     Post op.       Vitals:    04/30/21 1356   BP: 106/68   Pulse: 100   SpO2: 98%   Weight: 90.7 kg (200 lb)   Height: 1.778 m (5' 10\")       Body mass index is 28.7 kg/m .    Romeo Ashley LPN      "

## 2021-04-30 NOTE — LETTER
"4/30/2021       RE: Sunni Flores  [address] [facility] [address]     Dear Colleague,    Thank you for referring your patient, Sunni Flores, to the [facility] PLASTIC AND RECONSTRUCTIVE SURGERY CLINIC [place] at [facility]. Please see a copy of my visit note below.    Patient returns for a postoperative follow-up.    INTERVAL HISTORY: Been dilating appropriately. Reports large wound break down along posterior vulva. Denies fever or chills. Bowel movements normal and able to urinate into the toilet bowl.    PHYSICAL EXAMINATION:  /68   Pulse 100   Ht 1.778 m (5' 10\")   Wt 90.7 kg (200 lb)   SpO2 98%   BMI 28.70 kg/m    Genital examination was performed in the presence of a chaperone.  There is a large posterior vulvar wound dehiscence measuring 10 x 5 cm with the perineal flap hanging posteriorly. There is no purulence, but exposed subcutaneous tissue. There is fibrinous exudate. Clitoris is sensate and viable. Urethral meatus healthy appearing.  Vaginal exam with good depth and width.    ASSESSMENT: 3 weeks status post robot-assisted penile inversion vaginoplasty with supplemental scrotal skin graft.  This is complicated by a large posterior vulva wound dehiscence.    PLAN: Start TID WTD dressing changes to the wound. This was taught today to show patient how to fold the perineal flap into the introitus. Continue dilation efforts. Continue showering and sitz baths. Avoid pressure on surgical site. Will treat wound conservatively to allow as much secondary healing as possible. May need surgical revision of posterior introitus at time of labiaplasty. Follow up in 1 week.    20 minutes spent on the date of the encounter performing chart review, history and physical, documentation, review of tests, communication with other healthcare professionals, and placing orders as noted above.    Again, thank you for allowing me to participate in the care of your " patient.      Sincerely,    Tank Dawson MD

## 2021-04-30 NOTE — PROGRESS NOTES
"Patient returns for a postoperative follow-up.    INTERVAL HISTORY: Been dilating appropriately. Reports large wound break down along posterior vulva. Denies fever or chills. Bowel movements normal and able to urinate into the toilet bowl.    PHYSICAL EXAMINATION:  /68   Pulse 100   Ht 1.778 m (5' 10\")   Wt 90.7 kg (200 lb)   SpO2 98%   BMI 28.70 kg/m    Genital examination was performed in the presence of a chaperone.  There is a large posterior vulvar wound dehiscence measuring 10 x 5 cm with the perineal flap hanging posteriorly. There is no purulence, but exposed subcutaneous tissue. There is fibrinous exudate. Clitoris is sensate and viable. Urethral meatus healthy appearing.  Vaginal exam with good depth and width.    ASSESSMENT: 3 weeks status post robot-assisted penile inversion vaginoplasty with supplemental scrotal skin graft.  This is complicated by a large posterior vulva wound dehiscence.    PLAN: Start TID WTD dressing changes to the wound. This was taught today to show patient how to fold the perineal flap into the introitus. Continue dilation efforts. Continue showering and sitz baths. Avoid pressure on surgical site. Will treat wound conservatively to allow as much secondary healing as possible. May need surgical revision of posterior introitus at time of labiaplasty. Follow up in 1 week.    20 minutes spent on the date of the encounter performing chart review, history and physical, documentation, review of tests, communication with other healthcare professionals, and placing orders as noted above.  "

## 2021-05-04 ENCOUNTER — VIRTUAL VISIT (OUTPATIENT)
Dept: PSYCHOLOGY | Facility: CLINIC | Age: 56
End: 2021-05-04
Payer: COMMERCIAL

## 2021-05-04 DIAGNOSIS — F64.0 GENDER DYSPHORIA IN ADOLESCENT AND ADULT: Primary | ICD-10-CM

## 2021-05-04 DIAGNOSIS — F43.22 ADJUSTMENT DISORDER WITH ANXIOUS MOOD: ICD-10-CM

## 2021-05-04 PROCEDURE — 90837 PSYTX W PT 60 MINUTES: CPT | Mod: GT | Performed by: PSYCHOLOGIST

## 2021-05-04 PROCEDURE — 99207 PR NO BILLABLE SERVICE THIS VISIT: CPT | Performed by: PSYCHOLOGIST

## 2021-05-04 NOTE — PROGRESS NOTES
Video start time: 3:03pm  Video end time: 4:00pm    Telemedicine Visit: The patient's condition can be safely assessed and treated via synchronous audio and visual telemedicine encounter.      Reason for Telemedicine Visit: COVID 19    Originating Site (Patient Location): Patient's home    Distant Site (Provider Location): Provider Remote Setting    Consent:  The patient/guardian has verbally consented to: the potential risks and benefits of telemedicine (video visit) versus in person care; bill my insurance or make self-payment for services provided; and responsibility for payment of non-covered services.     Mode of Communication:  Video Conference via [employer]cresta    As the provider I attest to compliance with applicable laws and regulations related to telemedicine.          Program in Human Sexuality  [facility]  [address], [address]  [place] [address]  Outpatient Progress Note      Session Date: 5/04/21  Client Name: Sunni Flores (she/her pronouns)  YOB: 1965  MRN: [id number]  Provider: Aicha Velasquez, PhD, LP  Type of Session: Individual   Present in Session: Clent only  Number of Minutes: 57 min  Treatment Plan Due: 9/18/2019    Health Maintenance Summary - Mental Health Treatment Plan       Status Date      MENTAL HEALTH TX PLAN Overdue 3/22/2020      Done 4/22/2019      Done 10/5/2018 See Scan     Done 9/18/2017           Current Symptoms/Status:   Sunni reports a history of gender dysphoria and has continued social and medical gender transition.  History of major depressive disorder, moderate, recurrent. Continued labile mood, rumination, passive suicidal ideation, low energy, negative thinking.     Progress Toward Treatment Goals:   Working on integration of self, negative self-talk and self-compassion, rumination, negative mood symptoms    Intervention & Response:   Interpersonal, client-centered, and CBT techniques utilized to address client's current status.      Stated she has had some complications in her recovery from vaginoplasty. Stated she has been slowing down and having to ask for help. Stataed she has felt happy with her surgery and is feeling a shift and release from gender dysphoria.Stated she had been feeling depressed and reckoning with her depression as recurrent and always there. That sometimes she needs to address her depression and up her coping strategies other than times when she is less depressed. Stated the complications and her levels of pain impacted her mood.     Discussed her progress in therapy and her therapy goals. Reflected on if she needs a gender specialist anymore. Discussed her goal to talk about sexuality and doing that in this therapy. Will continue with sessions through June and then will end.     Assignment:   Explore identity feelings  Resolve historical relationships, integration of self      Diagnosis:    302.5 (F64.0) - Gender dysphoria in adults  Moderate Episode of Recurrent Major Depressive Disorder      Interactive Complexity:  None.       Plan / Need for Future Services:    Return for individual therapy in 2-3 weeks.                 Sara Velasquez, PhD, LP, CST    Program in Human Sexuality, [facility]  Department of Family Medicine and Community Health  [employer]

## 2021-05-06 ENCOUNTER — PATIENT OUTREACH (OUTPATIENT)
Dept: PLASTIC SURGERY | Facility: CLINIC | Age: 56
End: 2021-05-06

## 2021-05-06 ENCOUNTER — DOCUMENTATION ONLY (OUTPATIENT)
Dept: PLASTIC SURGERY | Facility: CLINIC | Age: 56
End: 2021-05-06

## 2021-05-06 DIAGNOSIS — F64.0 GENDER DYSPHORIA IN ADULT: Primary | ICD-10-CM

## 2021-05-06 RX ORDER — SULFAMETHOXAZOLE/TRIMETHOPRIM 800-160 MG
1 TABLET ORAL 2 TIMES DAILY
Qty: 14 TABLET | Refills: 0 | Status: SHIPPED | OUTPATIENT
Start: 2021-05-06 | End: 2021-05-13

## 2021-05-06 NOTE — PATIENT INSTRUCTIONS
Spoke with pt regarding plan for UA. Pt will obtain this locally through [employer]. Order faxed. Pt will be contacted when results have been reviewed. Pt states understanding and denies any additional questions or concerns. Marguerite JOSHI RN, BSN

## 2021-05-06 NOTE — PROGRESS NOTES
Faxed  lab order to [phone] - [facility]. Lab to fax results back ASAP to [phone], Attn: Dr. Tank Dawson.    Romeo Ashley LPN

## 2021-05-07 ENCOUNTER — OFFICE VISIT (OUTPATIENT)
Dept: PLASTIC SURGERY | Facility: CLINIC | Age: 56
End: 2021-05-07
Payer: COMMERCIAL

## 2021-05-07 DIAGNOSIS — Z98.890 POSTOPERATIVE STATE: Primary | ICD-10-CM

## 2021-05-07 PROCEDURE — 99024 POSTOP FOLLOW-UP VISIT: CPT

## 2021-05-07 NOTE — LETTER
5/7/2021       RE: Sunni Flores  [address] [facility] [address]     Dear Colleague,    Thank you for referring your patient, Sunni Flores, to the [facility] PLASTIC AND RECONSTRUCTIVE SURGERY CLINIC [place] at [facility]. Please see a copy of my visit note below.    Pt. comes into clinic today at the request of Dr. Tank Dawson.    This service provided today was under the supervising provider of the day Dr. Levin, who was available if needed.    Reason for visit: Post op visit. 4 weeks status post robot-assisted penile inversion vaginoplasty with supplemental scrotal skin graft.    Pt was experiencing cloudy urine, burning sensation, and urgency. Pt completed UA yesterday and began course of Bactrim. Pt states her symptoms have improved since beginning antibiotics. Pt has been completing dressing changes 7-8 times daily. Reports these are going well. Pt has had some fullness after bowel movements. States bowel movements are soft but she feels backed up. Is taking once daily Senna and Miralax. Pt is completing TID dilation and sitz baths. Reports this is going well.     On exam  large posterior vulvar wound dehiscence measuring 10 x 5 cm with the perineal flap hanging posteriorly. There is no purulence, but exposed subcutaneous tissue. There is fibrinous exudate. Clitoris is sensate and viable. Urethral meatus healthy appearing.  Vaginal exam with good depth and width.    Pt to continue dressing changes, dilation, and sitz baths. Pt can increase Miralax and try fiber supplement such as Citrucel to help bulk stools. Pt to reach out if UA symptoms are not resolving. Follow up as scheduled.     Marguerite Ross RN  Care Coordinator

## 2021-05-07 NOTE — PROGRESS NOTES
Pt. comes into clinic today at the request of Dr. Tank Dawson.    This service provided today was under the supervising provider of the day Dr. Levin, who was available if needed.    Reason for visit: Post op visit. 4 weeks status post robot-assisted penile inversion vaginoplasty with supplemental scrotal skin graft.    Pt was experiencing cloudy urine, burning sensation, and urgency. Pt completed UA yesterday and began course of Bactrim. Pt states her symptoms have improved since beginning antibiotics. Pt has been completing dressing changes 7-8 times daily. Reports these are going well. Pt has had some fullness after bowel movements. States bowel movements are soft but she feels backed up. Is taking once daily Senna and Miralax. Pt is completing TID dilation and sitz baths. Reports this is going well.     On exam  large posterior vulvar wound dehiscence measuring 10 x 5 cm with the perineal flap hanging posteriorly. There is no purulence, but exposed subcutaneous tissue. There is fibrinous exudate. Clitoris is sensate and viable. Urethral meatus healthy appearing.  Vaginal exam with good depth and width.    Pt to continue dressing changes, dilation, and sitz baths. Pt can increase Miralax and try fiber supplement such as Citrucel to help bulk stools. Pt to reach out if UA symptoms are not resolving. Follow up as scheduled.     Marguerite Ross RN  Care Coordinator

## 2021-05-10 DIAGNOSIS — G89.18 POSTOPERATIVE PAIN: ICD-10-CM

## 2021-05-10 RX ORDER — AMOXICILLIN 250 MG
1-2 CAPSULE ORAL 2 TIMES DAILY
Qty: 45 TABLET | Refills: 1 | Status: SHIPPED | OUTPATIENT
Start: 2021-05-10 | End: 2021-12-14

## 2021-05-11 ENCOUNTER — OFFICE VISIT (OUTPATIENT)
Dept: PLASTIC SURGERY | Facility: CLINIC | Age: 56
End: 2021-05-11
Payer: COMMERCIAL

## 2021-05-11 VITALS
HEIGHT: 70 IN | SYSTOLIC BLOOD PRESSURE: 126 MMHG | DIASTOLIC BLOOD PRESSURE: 69 MMHG | HEART RATE: 92 BPM | WEIGHT: 196.6 LBS | OXYGEN SATURATION: 100 % | BODY MASS INDEX: 28.15 KG/M2

## 2021-05-11 DIAGNOSIS — G89.18 POSTOPERATIVE PAIN: ICD-10-CM

## 2021-05-11 DIAGNOSIS — F64.0 GENDER DYSPHORIA IN ADULT: Primary | ICD-10-CM

## 2021-05-11 PROCEDURE — 99024 POSTOP FOLLOW-UP VISIT: CPT | Performed by: PLASTIC SURGERY

## 2021-05-11 ASSESSMENT — PAIN SCALES - GENERAL: PAINLEVEL: MODERATE PAIN (5)

## 2021-05-11 ASSESSMENT — MIFFLIN-ST. JEOR: SCORE: 1567.02

## 2021-05-11 NOTE — NURSING NOTE
"Chief Complaint   Patient presents with     RECHECK     Post op appt.       Vitals:    05/11/21 1457   BP: 126/69   BP Location: Right arm   Patient Position: Sitting   Cuff Size: Adult Regular   Pulse: 92   SpO2: 100%   Weight: 89.2 kg (196 lb 9.6 oz)   Height: 1.778 m (5' 10\")       Body mass index is 28.21 kg/m .                            AIXA BEVERLY EMT    "

## 2021-05-11 NOTE — LETTER
"5/11/2021       RE: Sunni Flores  [address] [facility] [address]     Dear Colleague,    Thank you for referring your patient, Sunni Flores, to the [facility] PLASTIC AND RECONSTRUCTIVE SURGERY CLINIC [place] at [facility]. Please see a copy of my visit note below.    Patient returns for a postoperative follow-up.    INTERVAL HISTORY: Reports she is doing well with dressing changes and dilation.  Clitoris is sensate.    PHYSICAL EXAMINATION:  /69 (BP Location: Right arm, Patient Position: Sitting, Cuff Size: Adult Regular)   Pulse 92   Ht 1.778 m (5' 10\")   Wt 89.2 kg (196 lb 9.6 oz)   SpO2 100%   BMI 28.21 kg/m    General examination performed in the presence of a chaperone.  Posterior vulvar wound now measuring 5 x 4 cm with healthy granulation tissue developing.  No sign of infection.  Other aspects of the examination stable.    ASSESSMENT: 4.5 weeks status post robot-assist penile inversion vaginoplasty with supplemental scrotal skin graft.  This is complicated by a large posterior vulva wound dehiscence.    PLAN: Wound was treated with silver nitrate cautery today.  Continue dressing changes and dilation.  Return to clinic in 1 week for follow-up.    Again, thank you for allowing me to participate in the care of your patient.      Sincerely,    Tank Dawson MD      "

## 2021-05-13 NOTE — PROGRESS NOTES
"Patient returns for a postoperative follow-up.    INTERVAL HISTORY: Reports she is doing well with dressing changes and dilation.  Clitoris is sensate.    PHYSICAL EXAMINATION:  /69 (BP Location: Right arm, Patient Position: Sitting, Cuff Size: Adult Regular)   Pulse 92   Ht 1.778 m (5' 10\")   Wt 89.2 kg (196 lb 9.6 oz)   SpO2 100%   BMI 28.21 kg/m    General examination performed in the presence of a chaperone.  Posterior vulvar wound now measuring 5 x 4 cm with healthy granulation tissue developing.  No sign of infection.  Other aspects of the examination stable.    ASSESSMENT: 4.5 weeks status post robot-assist penile inversion vaginoplasty with supplemental scrotal skin graft.  This is complicated by a large posterior vulva wound dehiscence.    PLAN: Wound was treated with silver nitrate cautery today.  Continue dressing changes and dilation.  Return to clinic in 1 week for follow-up.  "

## 2021-05-14 ENCOUNTER — OFFICE VISIT (OUTPATIENT)
Dept: PLASTIC SURGERY | Facility: CLINIC | Age: 56
End: 2021-05-14
Payer: COMMERCIAL

## 2021-05-14 VITALS
TEMPERATURE: 98.4 F | WEIGHT: 197 LBS | OXYGEN SATURATION: 100 % | DIASTOLIC BLOOD PRESSURE: 76 MMHG | SYSTOLIC BLOOD PRESSURE: 119 MMHG | HEART RATE: 90 BPM | BODY MASS INDEX: 28.2 KG/M2 | HEIGHT: 70 IN

## 2021-05-14 DIAGNOSIS — F64.0 GENDER DYSPHORIA IN ADULT: Primary | ICD-10-CM

## 2021-05-14 PROCEDURE — 99024 POSTOP FOLLOW-UP VISIT: CPT | Performed by: PLASTIC SURGERY

## 2021-05-14 ASSESSMENT — PAIN SCALES - GENERAL: PAINLEVEL: MILD PAIN (2)

## 2021-05-14 ASSESSMENT — MIFFLIN-ST. JEOR: SCORE: 1568.84

## 2021-05-14 NOTE — NURSING NOTE
"Chief Complaint   Patient presents with     Follow Up     Post robot-assisted penile inversion vaginoplasty follow up, DOS:04/08/2021       Vitals:    05/14/21 1257   BP: 119/76   BP Location: Right arm   Patient Position: Sitting   Cuff Size: Adult Regular   Pulse: 90   Temp: 98.4  F (36.9  C)   TempSrc: Oral   SpO2: 100%   Weight: 89.4 kg (197 lb)   Height: 1.778 m (5' 10\")       Body mass index is 28.27 kg/m .       Sonia Small CMA    "

## 2021-05-14 NOTE — LETTER
"5/14/2021       RE: Sunni Flores  [address] [facility] [address]     Dear Colleague,    Thank you for referring your patient, Sunni Flores, to the [facility] PLASTIC AND RECONSTRUCTIVE SURGERY CLINIC [place] at [facility]. Please see a copy of my visit note below.    Patient returns for a postoperative follow-up.    INTERVAL HISTORY: Continues to do well with dilation dressing changes.  Has persistent erection type sensation at the midline genitalia.  This has improved, but has not resolved.    PHYSICAL EXAMINATION:  /76 (BP Location: Right arm, Patient Position: Sitting, Cuff Size: Adult Regular)   Pulse 90   Temp 98.4  F (36.9  C) (Oral)   Ht 1.778 m (5' 10\")   Wt 89.4 kg (197 lb)   SpO2 100%   BMI 28.27 kg/m    Posterior vulva wound stable.  Appears healthy.  No hematoma palpable within.    ASSESSMENT: 5 weeks status post robot-assist penile inversion vaginoplasty with supplemental scrotal skin graft.  This is complicated by a large posterior vulva wound dehiscence.    PLAN: Wound treated with silver nitrate cautery for second time today.  Continue dilation and dressing changes.  We discussed the possibility of sensing remnant erectile tissue at the bases of the cavernosal excisions.  This should improve slowly.  No intervention recommended today.  I will see the patient back next week for likely another round of silver nitrate cautery.    We also discussed the possibility of needing revision of the peritoneal flap in the form of excision, advancing the peritoneal flap, or jumping man flap.    Again, thank you for allowing me to participate in the care of your patient.      Sincerely,    Tank Dawson MD  "

## 2021-05-14 NOTE — PROGRESS NOTES
"Patient returns for a postoperative follow-up.    INTERVAL HISTORY: Continues to do well with dilation dressing changes.  Has persistent erection type sensation at the midline genitalia.  This has improved, but has not resolved.    PHYSICAL EXAMINATION:  /76 (BP Location: Right arm, Patient Position: Sitting, Cuff Size: Adult Regular)   Pulse 90   Temp 98.4  F (36.9  C) (Oral)   Ht 1.778 m (5' 10\")   Wt 89.4 kg (197 lb)   SpO2 100%   BMI 28.27 kg/m    Posterior vulva wound stable.  Appears healthy.  No hematoma palpable within.    ASSESSMENT: 5 weeks status post robot-assist penile inversion vaginoplasty with supplemental scrotal skin graft.  This is complicated by a large posterior vulva wound dehiscence.    PLAN: Wound treated with silver nitrate cautery for second time today.  Continue dilation and dressing changes.  We discussed the possibility of sensing remnant erectile tissue at the bases of the cavernosal excisions.  This should improve slowly.  No intervention recommended today.  I will see the patient back next week for likely another round of silver nitrate cautery.    We also discussed the possibility of needing revision of the peritoneal flap in the form of excision, advancing the peritoneal flap, or jumping man flap.  "

## 2021-05-18 ENCOUNTER — OFFICE VISIT (OUTPATIENT)
Dept: PLASTIC SURGERY | Facility: CLINIC | Age: 56
End: 2021-05-18
Payer: COMMERCIAL

## 2021-05-18 VITALS
OXYGEN SATURATION: 100 % | HEART RATE: 89 BPM | SYSTOLIC BLOOD PRESSURE: 125 MMHG | BODY MASS INDEX: 28.24 KG/M2 | DIASTOLIC BLOOD PRESSURE: 75 MMHG | WEIGHT: 197.3 LBS | HEIGHT: 70 IN

## 2021-05-18 DIAGNOSIS — F64.0 GENDER DYSPHORIA IN ADULT: Primary | ICD-10-CM

## 2021-05-18 PROCEDURE — 99024 POSTOP FOLLOW-UP VISIT: CPT | Performed by: PLASTIC SURGERY

## 2021-05-18 ASSESSMENT — PAIN SCALES - GENERAL: PAINLEVEL: NO PAIN (0)

## 2021-05-18 ASSESSMENT — MIFFLIN-ST. JEOR: SCORE: 1570.2

## 2021-05-18 NOTE — PROGRESS NOTES
"Patient returns for a postoperative follow-up.    INTERVAL HISTORY: No acute changes.    PHYSICAL EXAMINATION:  /75 (BP Location: Left arm, Patient Position: Sitting, Cuff Size: Adult Regular)   Pulse 89   Ht 1.778 m (5' 10\")   Wt 89.5 kg (197 lb 4.8 oz)   SpO2 100%   BMI 28.31 kg/m    Posterior vulva wound with healthy granulation tissue and smaller appearance than last time.  There is epithelialization at the edges of the wound.  Clitoris is sensate and viable.  Urethral meatus is healthy.  Anteriorly diverging labia, but overall feminine appearance.  Visual examination of the vaginal cavity reveals a healthy vaginal cavity.    ASSESSMENT: 6 weeks status post robot-assist penile inversion vaginoplasty with supplemental scrotal skin graft.  Posterior vulva wound healing.    PLAN: Wound treated with silver nitrate cautery for third time today.  Continue dilation and dressing changes.  Patient may upsize dilator.  Patient was instructed on how to do this.  Continue with wet-to-dry dressing changes to the wound.  She may follow-up with Dr. Venegas in 2 weeks for possibly another round of silver nitrate cautery to the wound.  Patient was counseled on expecting 3 times daily dilation for the first 3 months either with the orange or purple dilator.  She will then perform twice daily dilation for the following 3 months possibly with the purple dilator.  She can then perform maintenance dilation at 3 times per week.  I also counseled her on the optional feminizing labioplasty.  She may potentially need revision of her perineal flap at that time.  This she is to discuss this with Dr. Venegas at their next couple of visits together.    We also discussed the possibility of needing revision of the peritoneal flap in the form of excision, advancing the peritoneal flap, or jumping man flap.  "

## 2021-05-18 NOTE — NURSING NOTE
"Chief Complaint   Patient presents with     RECHECK     Post op discuss path.       Vitals:    05/18/21 1544   BP: 125/75   BP Location: Left arm   Patient Position: Sitting   Cuff Size: Adult Regular   Pulse: 89   SpO2: 100%   Weight: 89.5 kg (197 lb 4.8 oz)   Height: 1.778 m (5' 10\")       Body mass index is 28.31 kg/m .                            AIXA BEVERLY, EMT    "

## 2021-05-18 NOTE — LETTER
"5/18/2021       RE: Sunni Flores  [address] [facility] [address]     Dear Colleague,    Thank you for referring your patient, Sunni Flores, to the [facility] PLASTIC AND RECONSTRUCTIVE SURGERY CLINIC [place] at [facility]. Please see a copy of my visit note below.    Patient returns for a postoperative follow-up.    INTERVAL HISTORY: No acute changes.    PHYSICAL EXAMINATION:  /75 (BP Location: Left arm, Patient Position: Sitting, Cuff Size: Adult Regular)   Pulse 89   Ht 1.778 m (5' 10\")   Wt 89.5 kg (197 lb 4.8 oz)   SpO2 100%   BMI 28.31 kg/m    Posterior vulva wound with healthy granulation tissue and smaller appearance than last time.  There is epithelialization at the edges of the wound.  Clitoris is sensate and viable.  Urethral meatus is healthy.  Anteriorly diverging labia, but overall feminine appearance.  Visual examination of the vaginal cavity reveals a healthy vaginal cavity.    ASSESSMENT: 6 weeks status post robot-assist penile inversion vaginoplasty with supplemental scrotal skin graft.  Posterior vulva wound healing.    PLAN: Wound treated with silver nitrate cautery for third time today.  Continue dilation and dressing changes.  Patient may upsize dilator.  Patient was instructed on how to do this.  Continue with wet-to-dry dressing changes to the wound.  She may follow-up with Dr. Venegas in 2 weeks for possibly another round of silver nitrate cautery to the wound.  Patient was counseled on expecting 3 times daily dilation for the first 3 months either with the orange or purple dilator.  She will then perform twice daily dilation for the following 3 months possibly with the purple dilator.  She can then perform maintenance dilation at 3 times per week.  I also counseled her on the optional feminizing labioplasty.  She may potentially need revision of her perineal flap at that time.  This she is to discuss this with Dr. Venegas " at their next couple of visits together.    We also discussed the possibility of needing revision of the peritoneal flap in the form of excision, advancing the peritoneal flap, or jumping man flap.    Again, thank you for allowing me to participate in the care of your patient.      Sincerely,    Tank Dawson MD

## 2021-05-20 ENCOUNTER — VIRTUAL VISIT (OUTPATIENT)
Dept: PSYCHOLOGY | Facility: CLINIC | Age: 56
End: 2021-05-20
Payer: COMMERCIAL

## 2021-05-20 DIAGNOSIS — F64.0 GENDER DYSPHORIA IN ADULT: ICD-10-CM

## 2021-05-20 DIAGNOSIS — F43.22 ADJUSTMENT DISORDER WITH ANXIOUS MOOD: Primary | ICD-10-CM

## 2021-05-20 PROCEDURE — 90837 PSYTX W PT 60 MINUTES: CPT | Mod: GT | Performed by: PSYCHOLOGIST

## 2021-05-20 PROCEDURE — 99207 PR NO BILLABLE SERVICE THIS VISIT: CPT | Performed by: PSYCHOLOGIST

## 2021-05-20 NOTE — PROGRESS NOTES
Video start time: 3:06pm  Video end time: 4:00pm    Telemedicine Visit: The patient's condition can be safely assessed and treated via synchronous audio and visual telemedicine encounter.      Reason for Telemedicine Visit: COVID 19    Originating Site (Patient Location): Patient's home    Distant Site (Provider Location): Provider Remote Setting    Consent:  The patient/guardian has verbally consented to: the potential risks and benefits of telemedicine (video visit) versus in person care; bill my insurance or make self-payment for services provided; and responsibility for payment of non-covered services.     Mode of Communication:  Video Conference via [employer]    As the provider I attest to compliance with applicable laws and regulations related to telemedicine.          Program in Human Sexuality  [facility]  [address], [address]  [place] [address]  Outpatient Progress Note      Session Date: 5/20/21  Client Name: Sunni Flores (she/her pronouns)  YOB: 1965  MRN: [id number]  Provider: Aicha Velasquez, PhD, LP  Type of Session: Individual   Present in Session: Clent only  Number of Minutes: 54 min  Treatment Plan Due: 9/18/2019    Health Maintenance Summary - Mental Health Treatment Plan       Status Date      MENTAL HEALTH TX PLAN Overdue 3/22/2020      Done 4/22/2019      Done 10/5/2018 See Scan     Done 9/18/2017           Current Symptoms/Status:   Sunni reports a history of gender dysphoria and has continued social and medical gender transition.  History of major depressive disorder, moderate, recurrent. Continued labile mood, rumination, passive suicidal ideation, low energy, negative thinking.     Progress Toward Treatment Goals:   Working on integration of self, negative self-talk and self-compassion, rumination, negative mood symptoms    Intervention & Response:   Interpersonal, client-centered, and CBT techniques utilized to address client's current status.      Stated she has had some complications in her recovery from vaginoplasty. Stated she has had some phantom erection pain and she is trying to patient with herself. Stated she is happy she had surgery but frustrated with recovery.    Working on negative self talk. Feeling more congruent and less self critical.    Discussed her sexual history and avoidance of sex with partners. Stated she did not have sex with her wife who passed away.Stated she has a lot of shame about that.Stated she has not been sexual with her current partner, mainly because of dysphoria but scared to reinitiate intimacy. Has not masturbated yet, but has explored her clitoris and vagina.Discussed masturbation and recovery and working on building arousal. Assigned masturbation prior to partner sex.    Discussed her progress in therapy and her therapy goals. Reflected on if she needs a gender specialist anymore. Discussed her goal to talk about sexuality and doing that in this therapy. Will continue with sessions through June and then will end.     Assignment:   Explore identity feelings  Resolve historical relationships, integration of self      Diagnosis:    302.5 (F64.0) - Gender dysphoria in adults  Moderate Episode of Recurrent Major Depressive Disorder      Interactive Complexity:  None.       Plan / Need for Future Services:    Return for individual therapy in 2-3 weeks.                 Sara Velasquez, PhD, LP, CST    Program in Human Sexuality, [facility]  Department of Family Medicine and Community Health  [employer]

## 2021-05-25 ENCOUNTER — OFFICE VISIT (OUTPATIENT)
Dept: PLASTIC SURGERY | Facility: CLINIC | Age: 56
End: 2021-05-25
Payer: COMMERCIAL

## 2021-05-25 DIAGNOSIS — Z98.890 POSTOPERATIVE STATE: Primary | ICD-10-CM

## 2021-05-25 PROCEDURE — 99207 PR NO CHARGE NURSE ONLY: CPT

## 2021-05-25 NOTE — PROGRESS NOTES
Pt. comes into clinic today at the request of Dr. Meet Venegas.    This service provided today was under the supervising provider of the day Dr. Campbell, who was available if needed.    Pt returns 7 weeks status post robot-assist penile inversion vaginoplasty with supplemental scrotal skin graft.    Pt reports she has upsized successfully to the orange dilator. Denies any concerns.     Posterior vulva wound with healthy granulation tissue and smaller appearance than previous exam. Clitoris is sensate and viable.  Urethral meatus is healthy.  Visual examination of the vaginal cavity reveals a healthy vaginal cavity.     Wound treated with silver nitrate cautery for fourth time today.  Continue dilation and dressing changes.     Follow up in 1 week for repeat silver nitrate.    Marguerite Ross RN  Care Coordinator

## 2021-05-25 NOTE — LETTER
5/25/2021       RE: Sunni Flores  [address] [facility] [address]     Dear Colleague,    Thank you for referring your patient, Sunni Flores, to the [facility] PLASTIC AND RECONSTRUCTIVE SURGERY CLINIC [place] at [facility]. Please see a copy of my visit note below.    Pt. comes into clinic today at the request of Dr. Meet Venegas.    This service provided today was under the supervising provider of the day Dr. Campbell, who was available if needed.    Pt returns 7 weeks status post robot-assist penile inversion vaginoplasty with supplemental scrotal skin graft.    Pt reports she has upsized successfully to the orange dilator. Denies any concerns.     Posterior vulva wound with healthy granulation tissue and smaller appearance than previous exam. Clitoris is sensate and viable.  Urethral meatus is healthy.  Visual examination of the vaginal cavity reveals a healthy vaginal cavity.     Wound treated with silver nitrate cautery for fourth time today.  Continue dilation and dressing changes.     Follow up in 1 week for repeat silver nitrate.    Marguerite Ross RN  Care Coordinator

## 2021-06-01 ENCOUNTER — OFFICE VISIT (OUTPATIENT)
Dept: PLASTIC SURGERY | Facility: CLINIC | Age: 56
End: 2021-06-01
Payer: COMMERCIAL

## 2021-06-01 DIAGNOSIS — Z98.890 POSTOPERATIVE STATE: Primary | ICD-10-CM

## 2021-06-01 PROCEDURE — 99207 PR NO CHARGE NURSE ONLY: CPT

## 2021-06-01 NOTE — LETTER
6/1/2021       RE: Sunni Flores  [address] [facility] [address]     Dear Colleague,    Thank you for referring your patient, Sunni Flores, to the [facility] PLASTIC AND RECONSTRUCTIVE SURGERY CLINIC [place] at [facility]. Please see a copy of my visit note below.    Pt. comes into clinic today at the request of Dr. Meet Venegas.     This service provided today was under the supervising provider of the day Dr. Campbell, who was available if needed.     Pt returns 8 weeks status post robot-assist penile inversion vaginoplasty with supplemental scrotal skin graft.     Pt reports she has upsized successfully to the orange dilator. Denies any concerns.      Posterior vulva wound with healthy granulation tissue and smaller appearance than previous exam. Clitoris is sensate and viable.  Urethral meatus is healthy.  Visual examination of the vaginal cavity reveals a healthy vaginal cavity.      Wound treated with silver nitrate cautery for fourth time today.  Continue dilation and dressing changes.      Follow up with Dr. Venegas.     Marguerite Ross RN  Care Coordinator      Again, thank you for allowing me to participate in the care of your patient.      Sincerely,    Marguerite Ross RN

## 2021-06-03 ENCOUNTER — VIRTUAL VISIT (OUTPATIENT)
Dept: PSYCHOLOGY | Facility: CLINIC | Age: 56
End: 2021-06-03
Payer: COMMERCIAL

## 2021-06-03 DIAGNOSIS — F33.1 MODERATE EPISODE OF RECURRENT MAJOR DEPRESSIVE DISORDER (H): ICD-10-CM

## 2021-06-03 DIAGNOSIS — F64.0 GENDER DYSPHORIA IN ADULT: Primary | ICD-10-CM

## 2021-06-03 PROCEDURE — 90837 PSYTX W PT 60 MINUTES: CPT | Mod: GT | Performed by: PSYCHOLOGIST

## 2021-06-03 PROCEDURE — 99207 PR NO BILLABLE SERVICE THIS VISIT: CPT | Performed by: PSYCHOLOGIST

## 2021-06-03 NOTE — PROGRESS NOTES
Video start time: 3:06pm  Video end time: 4:00pm    Telemedicine Visit: The patient's condition can be safely assessed and treated via synchronous audio and visual telemedicine encounter.      Reason for Telemedicine Visit: COVID 19    Originating Site (Patient Location): Patient's home    Distant Site (Provider Location): Provider Remote Setting    Consent:  The patient/guardian has verbally consented to: the potential risks and benefits of telemedicine (video visit) versus in person care; bill my insurance or make self-payment for services provided; and responsibility for payment of non-covered services.     Mode of Communication:  Video Conference via [employer]    As the provider I attest to compliance with applicable laws and regulations related to telemedicine.          Program in Human Sexuality  [facility]  [address], [address]  [place] [address]  Outpatient Progress Note      Session Date: 6/03/21  Client Name: Sunni Flores (she/her pronouns)  YOB: 1965  MRN: [id number]  Provider: Aicha Velasquez, PhD, LP  Type of Session: Individual   Present in Session: Clent only  Number of Minutes: 54 min  Treatment Plan Due: 9/18/2019    Health Maintenance Summary - Mental Health Treatment Plan       Status Date      MENTAL HEALTH TX PLAN Overdue 3/22/2020      Done 4/22/2019      Done 10/5/2018 See Scan     Done 9/18/2017           Current Symptoms/Status:   Sunni reports a history of gender dysphoria and has continued social and medical gender transition.  History of major depressive disorder, moderate, recurrent. Continued labile mood, rumination, passive suicidal ideation, low energy, negative thinking.     Progress Toward Treatment Goals:   Working on integration of self, negative self-talk and self-compassion, rumination, negative mood symptoms    Intervention & Response:   Interpersonal, client-centered, and CBT techniques utilized to address client's current status.      Stated she is coming to terms with being a depressed person. Stated she is 8 weeks out from surgery. Stated she still has a wound that is healing and she feels down about that. Stated she has been thinking about her mother-in-law passed away a few months ago and found out she was left out of the will. Discovered her father's wife had passed away and his estate went to his kids from his second marriage. Stated she was not emotionally intimate with him that he was emotionally closed off. Explored grief and feelings about acceptance of change.    Masturbated and was able to feel clitoral arousal. Stated she has some sexual desisre and she has had some fun and pleasure with exploring sexuality.     Assignment:   Explore identity feelings  Resolve historical relationships, integration of self  Explore sexuality      Diagnosis:    302.5 (F64.0) - Gender dysphoria in adults  Moderate Episode of Recurrent Major Depressive Disorder      Interactive Complexity:  None.       Plan / Need for Future Services:    Return for individual therapy in 2-3 weeks.                 Sara Velasquez, PhD, LP, CST    Program in Human Sexuality, [facility]  Department of Family Medicine and Community Health  [employer]

## 2021-06-08 ENCOUNTER — OFFICE VISIT (OUTPATIENT)
Dept: PLASTIC SURGERY | Facility: CLINIC | Age: 56
End: 2021-06-08
Payer: COMMERCIAL

## 2021-06-08 VITALS
HEART RATE: 86 BPM | OXYGEN SATURATION: 100 % | HEIGHT: 70 IN | WEIGHT: 203 LBS | SYSTOLIC BLOOD PRESSURE: 118 MMHG | DIASTOLIC BLOOD PRESSURE: 76 MMHG | BODY MASS INDEX: 29.06 KG/M2

## 2021-06-08 DIAGNOSIS — F64.9 GENDER DYSPHORIA: Primary | ICD-10-CM

## 2021-06-08 PROCEDURE — 99024 POSTOP FOLLOW-UP VISIT: CPT | Performed by: UROLOGY

## 2021-06-08 ASSESSMENT — PAIN SCALES - GENERAL: PAINLEVEL: NO PAIN (0)

## 2021-06-08 ASSESSMENT — MIFFLIN-ST. JEOR: SCORE: 1596.05

## 2021-06-08 NOTE — LETTER
6/8/2021       RE: Sunni Flores  [address] [facility] [address]     Dear Colleague,    Thank you for referring your patient, Sunni Flores, to the [facility] PLASTIC AND RECONSTRUCTIVE SURGERY CLINIC [place] at [facility]. Please see a copy of my visit note below.    Clinic Visit Note - POSTOP     Sunni Flores is a 56 year old transgender female who is s/p robotic-assisted laparoscopic penile inversion vaginoplasty on 4/8/2021, now 8 weeks post-op.      Interval history:   Thinks wound has been healing rapidly in the past 2 weeks  She has been using the orange dilator, using it three times a day. It goes easy until the last 1-0.5 inches, is able to get it in all the way with some pressure (7am, after lunch, 7pm)  Minimal discharge now  Voiding well - occasional spray, other times goes downward  Is masturbating, does have good clitoral sensation, unable to orgasm      Exam:  Posterior vulva wound with healthy granulation tissue and smaller appearance than previous exam.   Clitoris is sensate and viable.    Urethral meatus is healthy.  Digital exam of the vaginal cavity reveals a healthy vaginal cavity. full depth and width without stenosis. Finger does not reach apex of vagina.     A/P   Wound dehiscence after vaginoplasty     F/U:   - Recommend wound healing and 3 months post-op prior to sexual activity  - Followup July 13 in [facility] clinic.  - Also will get her in to see breast surgeon to resume workup towards top surgery.    Meet Venegas MD

## 2021-06-08 NOTE — PROGRESS NOTES
Clinic Visit Note - POSTOP     Sunni Flores is a 56 year old transgender female who is s/p robotic-assisted laparoscopic penile inversion vaginoplasty on 4/8/2021, now 8 weeks post-op.      Interval history:   Thinks wound has been healing rapidly in the past 2 weeks  She has been using the orange dilator, using it three times a day. It goes easy until the last 1-0.5 inches, is able to get it in all the way with some pressure (7am, after lunch, 7pm)  Minimal discharge now  Voiding well - occasional spray, other times goes downward  Is masturbating, does have good clitoral sensation, unable to orgasm      Exam:  Posterior vulva wound with healthy granulation tissue and smaller appearance than previous exam.   Clitoris is sensate and viable.    Urethral meatus is healthy.  Digital exam of the vaginal cavity reveals a healthy vaginal cavity. full depth and width without stenosis. Finger does not reach apex of vagina.     A/P   Wound dehiscence after vaginoplasty     F/U:   - Recommend wound healing and 3 months post-op prior to sexual activity  - Followup July 13 in [facility] clinic.  - Also will get her in to see breast surgeon to resume workup towards top surgery.    Meet Venegas MD

## 2021-06-08 NOTE — NURSING NOTE
"Chief Complaint   Patient presents with     RECHECK     Post op visit.       Vitals:    06/08/21 1252   BP: 118/76   Pulse: 86   SpO2: 100%   Weight: 92.1 kg (203 lb)   Height: 1.778 m (5' 10\")       Body mass index is 29.13 kg/m .    Romeo Ashley LPN      "

## 2021-07-13 ENCOUNTER — OFFICE VISIT (OUTPATIENT)
Dept: PLASTIC SURGERY | Facility: CLINIC | Age: 56
End: 2021-07-13
Payer: COMMERCIAL

## 2021-07-13 VITALS
HEART RATE: 84 BPM | WEIGHT: 204.9 LBS | DIASTOLIC BLOOD PRESSURE: 75 MMHG | BODY MASS INDEX: 29.33 KG/M2 | HEIGHT: 70 IN | OXYGEN SATURATION: 98 % | SYSTOLIC BLOOD PRESSURE: 117 MMHG

## 2021-07-13 DIAGNOSIS — F64.0 GENDER DYSPHORIA IN ADULT: Primary | ICD-10-CM

## 2021-07-13 PROCEDURE — 99213 OFFICE O/P EST LOW 20 MIN: CPT | Performed by: UROLOGY

## 2021-07-13 ASSESSMENT — PAIN SCALES - GENERAL: PAINLEVEL: NO PAIN (0)

## 2021-07-13 ASSESSMENT — MIFFLIN-ST. JEOR: SCORE: 1604.67

## 2021-07-13 NOTE — LETTER
"7/13/2021       RE: Sunni Flores  [address] [facility] [address]     Dear Colleague,    Thank you for referring your patient, Sunni Flores, to the [facility] PLASTIC AND RECONSTRUCTIVE SURGERY CLINIC [place] at [facility]. Please see a copy of my visit note below.    HPI:  Sunni Flores is a 56 year old transgender female.  She underwent penile inversion vaginoplasty 4/8/2021  Her depth is good.  She is still working on dilation.  Her posterior wound has healed but left with a large asymmetric scar.    Exam:  /75 (BP Location: Right arm, Patient Position: Sitting, Cuff Size: Adult Large)   Pulse 84   Ht 1.778 m (5' 10\")   Wt 92.9 kg (204 lb 14.4 oz)   SpO2 98%   BMI 29.40 kg/m    S/p vaginoplasty  Clitoris healthy  Labia are asymmetric  Canal is deep and healthy  Posterior scar where prior wound was with a perineal flap which is dangling    Assessment & Plan   S/p vaginoplasty for gender dysphoria    I recommend followup in October to discuss posterior introitus repair.    Until then, I recommend aggressive dilation BID to try to increase depth and width.    Meet Venegas MD  Reconstructive Urology  [facility]      20 minutes spent on the date of the encounter doing chart review, history and exam, documentation and further activities per the note          Again, thank you for allowing me to participate in the care of your patient.      Sincerely,    Meet Venegas MD      "

## 2021-07-13 NOTE — PROGRESS NOTES
"HPI:  Sunni Flores is a 56 year old transgender female.  She underwent penile inversion vaginoplasty 4/8/2021  Her depth is good.  She is still working on dilation.  Her posterior wound has healed but left with a large asymmetric scar.    Exam:  /75 (BP Location: Right arm, Patient Position: Sitting, Cuff Size: Adult Large)   Pulse 84   Ht 1.778 m (5' 10\")   Wt 92.9 kg (204 lb 14.4 oz)   SpO2 98%   BMI 29.40 kg/m    S/p vaginoplasty  Clitoris healthy  Labia are asymmetric  Canal is deep and healthy  Posterior scar where prior wound was with a perineal flap which is dangling    Assessment & Plan   S/p vaginoplasty for gender dysphoria    I recommend followup in October to discuss posterior introitus repair.    Until then, I recommend aggressive dilation BID to try to increase depth and width.    Meet Venegas MD  Reconstructive Urology  [facility]      20 minutes spent on the date of the encounter doing chart review, history and exam, documentation and further activities per the note      "

## 2021-07-13 NOTE — NURSING NOTE
"Chief Complaint   Patient presents with     Surgical Followup     3 month post op.        Vitals:    07/13/21 1424   BP: 117/75   BP Location: Right arm   Patient Position: Sitting   Cuff Size: Adult Large   Pulse: 84   SpO2: 98%   Weight: 92.9 kg (204 lb 14.4 oz)   Height: 1.778 m (5' 10\")       Body mass index is 29.4 kg/m .          BULL STANLEY EMT    "

## 2021-07-15 ENCOUNTER — PATIENT OUTREACH (OUTPATIENT)
Dept: PLASTIC SURGERY | Facility: CLINIC | Age: 56
End: 2021-07-15

## 2021-07-15 NOTE — PATIENT INSTRUCTIONS
Spoke with pt and relayed that we can place a referral for outside breast augmentation but we are not able to expedite scheduling of a consult. Pt states understanding and denies any additional questions or concerns. Marguerite JOSHI RN, BSN

## 2021-08-31 NOTE — PROGRESS NOTES
Pt. comes into clinic today at the request of Dr. Meet Venegas.     This service provided today was under the supervising provider of the day Dr. Campbell, who was available if needed.     Pt returns 8 weeks status post robot-assist penile inversion vaginoplasty with supplemental scrotal skin graft.     Pt reports she has upsized successfully to the orange dilator. Denies any concerns.      Posterior vulva wound with healthy granulation tissue and smaller appearance than previous exam. Clitoris is sensate and viable.  Urethral meatus is healthy.  Visual examination of the vaginal cavity reveals a healthy vaginal cavity.      Wound treated with silver nitrate cautery for fourth time today.  Continue dilation and dressing changes.      Follow up with Dr. Venegas.     Marguerite Ross, RN  Care Coordinator

## 2021-09-14 ENCOUNTER — DOCUMENTATION ONLY (OUTPATIENT)
Dept: FAMILY MEDICINE | Facility: CLINIC | Age: 56
End: 2021-09-14

## 2021-09-14 NOTE — PROGRESS NOTES
Call to patient regarding a needed appointment.  They informed me that they have transferred their care to [facility] in [place].  Please note that there will be no further refills given.    Mira Turner CMA

## 2021-09-27 ENCOUNTER — PRE VISIT (OUTPATIENT)
Dept: UROLOGY | Facility: CLINIC | Age: 56
End: 2021-09-27

## 2021-09-27 NOTE — TELEPHONE ENCOUNTER
Reason for visit: Follow up     Relevant information: 6 month post op vaginoplasty    Records/imaging/labs/orders: in EPIC    Pt called: no    At Rooming: normal

## 2021-10-09 ENCOUNTER — HEALTH MAINTENANCE LETTER (OUTPATIENT)
Age: 56
End: 2021-10-09

## 2021-10-13 ENCOUNTER — TELEPHONE (OUTPATIENT)
Dept: UROLOGY | Facility: CLINIC | Age: 56
End: 2021-10-13

## 2021-10-13 ENCOUNTER — OFFICE VISIT (OUTPATIENT)
Dept: UROLOGY | Facility: CLINIC | Age: 56
End: 2021-10-13
Payer: COMMERCIAL

## 2021-10-13 VITALS
DIASTOLIC BLOOD PRESSURE: 84 MMHG | SYSTOLIC BLOOD PRESSURE: 122 MMHG | HEIGHT: 70 IN | WEIGHT: 210 LBS | HEART RATE: 90 BPM | BODY MASS INDEX: 30.06 KG/M2

## 2021-10-13 DIAGNOSIS — F64.9 GENDER DYSPHORIA: Primary | ICD-10-CM

## 2021-10-13 DIAGNOSIS — T81.31XS POSTOPERATIVE WOUND DEHISCENCE, SEQUELA: ICD-10-CM

## 2021-10-13 DIAGNOSIS — N90.60: ICD-10-CM

## 2021-10-13 PROCEDURE — 99207 PR NO CHARGE NURSE ONLY: CPT

## 2021-10-13 PROCEDURE — 99214 OFFICE O/P EST MOD 30 MIN: CPT | Performed by: UROLOGY

## 2021-10-13 RX ORDER — CEPHALEXIN 500 MG/1
500 CAPSULE ORAL
COMMUNITY
Start: 2021-10-01 | End: 2021-12-14

## 2021-10-13 RX ORDER — CEFAZOLIN SODIUM 2 G/50ML
2 SOLUTION INTRAVENOUS
Status: CANCELLED | OUTPATIENT
Start: 2021-10-13

## 2021-10-13 RX ORDER — CYCLOBENZAPRINE HCL 5 MG
TABLET ORAL
COMMUNITY
Start: 2021-10-01 | End: 2021-12-14

## 2021-10-13 RX ORDER — CEFAZOLIN SODIUM 2 G/50ML
2 SOLUTION INTRAVENOUS SEE ADMIN INSTRUCTIONS
Status: CANCELLED | OUTPATIENT
Start: 2021-10-13

## 2021-10-13 ASSESSMENT — PATIENT HEALTH QUESTIONNAIRE - PHQ9: SUM OF ALL RESPONSES TO PHQ QUESTIONS 1-9: 4

## 2021-10-13 ASSESSMENT — MIFFLIN-ST. JEOR: SCORE: 1627.8

## 2021-10-13 ASSESSMENT — PAIN SCALES - GENERAL: PAINLEVEL: NO PAIN (0)

## 2021-10-13 NOTE — NURSING NOTE
"Chief Complaint   Patient presents with     Follow Up     s/p vaginoplasty       Blood pressure 122/84, pulse 90, height 1.778 m (5' 10\"), weight 95.3 kg (210 lb), not currently breastfeeding. Body mass index is 30.13 kg/m .    Patient Active Problem List   Diagnosis     Gender dysphoria in adult     Obstructive sleep apnea syndrome     Generalized anxiety disorder     Moderate episode of recurrent major depressive disorder (H)     Fibrocystic breast changes       Allergies   Allergen Reactions     Allopurinol Other (See Comments)     Patient is positive for HLA-B 58:01. Increased risk of allopurinol-induced severe cutaneous reactions.      Clopidogrel Other (See Comments)     Patient is a [id number] metabolizer. Clopidogrel would be predicted to lack efficacy       Current Outpatient Medications   Medication Sig Dispense Refill     cephALEXin (KEFLEX) 500 MG capsule Take 500 mg by mouth       cyclobenzaprine (FLEXERIL) 5 MG tablet 3 (three) times a day as needed.       acetaminophen (TYLENOL) 325 MG tablet Take 325-650 mg by mouth every 6 hours as needed for mild pain       diazepam (VALIUM) 10 MG tablet as needed        Docusate Sodium (COLACE PO) Take by mouth 2 times daily       estradiol (VIVELLE-DOT) 0.1 MG/24HR bi-weekly patch Place 2 patches onto the skin twice a week (STOP until restarted by Medical team) 8 patch 0     ibuprofen (ADVIL/MOTRIN) 400 MG tablet Take 2 tablets (800 mg) by mouth every 8 hours as needed for pain 42 tablet 0     lidocaine-prilocaine (EMLA) 2.5-2.5 % external cream as needed   2     methylfolate (DEPLIN) 15 MG TABS tablet Take 15 mg by mouth every morning        polyethylene glycol (MIRALAX) 17 GM/Dose powder Take 17 g by mouth 2 times daily as needed for constipation 510 g 1     senna-docusate (SENOKOT-S/PERICOLACE) 8.6-50 MG tablet Take 1-2 tablets by mouth 2 times daily To prevent constipation 45 tablet 1     TRAZODONE HCL  mg nightly as needed for sleep or depression        " venlafaxine (EFFEXOR-XR) 150 MG 24 hr capsule Take 300 mg by mouth daily         Social History     Tobacco Use     Smoking status: Never Smoker     Smokeless tobacco: Never Used   Substance Use Topics     Alcohol use: Yes     Comment: 2-3 drinks weekly     Drug use: No       Paulino Beltre  10/13/2021  10:36 AM

## 2021-10-13 NOTE — LETTER
"10/13/2021       RE: Sunni Flores  [address] [facility] [address]     Dear Colleague,    Thank you for referring your patient, Sunni Flores, to the [facility] [place] at [facility]. Please see a copy of my visit note below.    Sunni Flores is a 56 year old adult who is 6 months s/p penile inversion vaginoplasty with skin graft  She developed an impressive posterior wound but the would has healed.  There is asymmetric labia and scarring at posterior introitus.  Able to dilate with purple dilator.  She has a scarred area just above her clitoris. The scar is tender and painful.    /84   Pulse 90   Ht 1.778 m (5' 10\")   Wt 95.3 kg (210 lb)   BMI 30.13 kg/m      Exam:  NAD  Wounds have healed.  Clitoris healthy but just superior to it, there is a knot which is tender to palpation - perhaps a form of Keloid.  Anterior labia are divergent.  Posterior introitus is assymmetric.  Her labial flaps have healed, but one side is more raised than the other, and the posterior introitus is basically just a sheet of scar tissue which healed due to secondary intention.  Her vaginal cavity is well healed.      A/P   Gender dysphoria  S/p vaginoplasty  Has bothersome anterior scar which causes pain just above clitoris.  Has scarring at posterior introitus due to large wound which has now healed with secondary intention. Her labia are very asymmetric and dysphoric due to this scarring.    Anterior scar revision and posterior labiaplasty.  Stop dilation x 5-7 days.  Outpatient  No catheters  Also needs second stage feminizing labiaplasty at some point but we discussed that this would be unsafe to do anterior Z plasty simultaneous with posterior repair for skin blood supply issues.      30 minutes spent on the date of the encounter doing chart review, history and exam, documentation and further activities per the note        Meet Venegas MD    "

## 2021-10-13 NOTE — CONFIDENTIAL NOTE
Patient is scheduled for surgery with Dr. Venegas     Spoke with: Janett MAHER spoke to patient in clinic face to face     Date of Surgery: Friday 12/17/21    Location: [facility] OR    Informed patient they will need an adult  Yes    Pre op with Provider anabela    H&P: Scheduled with Patient will schedule with PCP    Pre-procedure COVID-19 Test: Patient will schedule close to home.     Additional imaging/appointments: na    Surgery packet: Janett MAHER gave to patient in clinic 10/13/21     Additional comments: na

## 2021-10-13 NOTE — PROGRESS NOTES
"Sunni Flores is a 56 year old adult who is 6 months s/p penile inversion vaginoplasty with skin graft  She developed an impressive posterior wound but the would has healed.  There is asymmetric labia and scarring at posterior introitus.  Able to dilate with purple dilator.  She has a scarred area just above her clitoris. The scar is tender and painful.    /84   Pulse 90   Ht 1.778 m (5' 10\")   Wt 95.3 kg (210 lb)   BMI 30.13 kg/m      Exam:  NAD  Wounds have healed.  Clitoris healthy but just superior to it, there is a knot which is tender to palpation - perhaps a form of Keloid.  Anterior labia are divergent.  Posterior introitus is assymmetric.  Her labial flaps have healed, but one side is more raised than the other, and the posterior introitus is basically just a sheet of scar tissue which healed due to secondary intention.  Her vaginal cavity is well healed.      A/P   Gender dysphoria  S/p vaginoplasty  Has bothersome anterior scar which causes pain just above clitoris.  Has scarring at posterior introitus due to large wound which has now healed with secondary intention. Her labia are very asymmetric and dysphoric due to this scarring.    Anterior scar revision and posterior labiaplasty.  Stop dilation x 5-7 days.  Outpatient  No catheters  Also needs second stage feminizing labiaplasty at some point but we discussed that this would be unsafe to do anterior Z plasty simultaneous with posterior repair for skin blood supply issues.      30 minutes spent on the date of the encounter doing chart review, history and exam, documentation and further activities per the note        Meet Venegas MD  "

## 2021-10-13 NOTE — PROGRESS NOTES
Pre Op Teaching Flowsheet                                        Pre and Post op Patient Education  Relevant Diagnosis: Gender Dysphoria  Teaching Topic:  Pre and post op teaching for Vaginoplasty revision  Person Involved in teaching: Patient      Motivation Level: High  Asks Questions: Yes  Eager to Learn:  Yes  Cooperative: Yes  Receptive (willing/able to accept information):  Yes  Patient demonstrates understanding of the following:  Date and time of surgery:  12/1721  Location of surgery: [facility] OR  History and Physical and any other testing necessary prior to surgery Pre Op Physical with: PCP on [facility]  Required time line for completion of History and Physical and any pre-op testing: No more than 30 days prior to surgery date    NPO Guidelines: NPO per Anesthesia Guidelines : Reviewed (surgery packet for further information).    Patient demonstrates understanding of the following:  Patient understands the need for a responsible adult to drive them home and someone to stay with them for the first 24 hours post-operatively: Partner  Pre-op bowel prep: N/A  Pre-op showering/scrub information with Hibiclens Soap: Yes  Medications to take the day of surgery: Pre op Physical for instruction.   Blood thinner medications discussed and when to stop (if applicable):  Yes  Diabetes medication management (if applicable):  Patient to discuss with Primary Care Provider  Discussed pain control after surgery: pain scale, pain medications and pain management techniques  Infection Prevention: Patient demonstrates understanding of the following:  Patient instructed on hand hygiene:  Yes  Surgical procedure site care taught: Yes  Signs and symptoms of infection taught:  Yes  Wound care will be taught at the time of discharge.    Urine anylisis and Urine Culture ordered on:  PCP   Pre op Covid testing on: 12/13/21  Post-op follow-up: 01/05/21  Discussed how to contact the hospital, nurse, and clinic scheduling staff if  necessary.     Surgical instructions given to patient in clinic: yes.   Instructional materials used/given/mailed: Before your surgery packet , Medications to avoid before surgery , Showering or Bathing instructions before surgery  and What to expect after surgery    Total time with patient: 10 minutes  Janett Castro RN

## 2021-11-08 DIAGNOSIS — Z01.812 PRE-PROCEDURE LAB EXAM: Primary | ICD-10-CM

## 2021-11-25 DIAGNOSIS — Z11.59 ENCOUNTER FOR SCREENING FOR OTHER VIRAL DISEASES: ICD-10-CM

## 2021-12-13 ENCOUNTER — PRE VISIT (OUTPATIENT)
Dept: UROLOGY | Facility: CLINIC | Age: 56
End: 2021-12-13
Payer: COMMERCIAL

## 2021-12-13 ENCOUNTER — PATIENT OUTREACH (OUTPATIENT)
Dept: UROLOGY | Facility: CLINIC | Age: 56
End: 2021-12-13
Payer: COMMERCIAL

## 2021-12-13 NOTE — TELEPHONE ENCOUNTER
FUTURE VISIT INFORMATION      SURGERY INFORMATION:    Date: 2021    [place] [facility]    Surgeon:  Meet Venegas MD    Anesthesia Type:  General    Procedure: BILATERAL LABIAPLASTY. LABIAL SCAR EXCISION.    Consult: 10/13/2021    RECORDS REQUESTED FROM:       Primary Care Provider:  Paul Swift M.D.  ([place])  [address] [address]    Phone: [phone]    Fax: [phone]      Pertinent Medical History: JERRELL    Most recent EKG+ Tracin2021

## 2021-12-13 NOTE — TELEPHONE ENCOUNTER
Reason for visit: Post op follow up     Relevant information: s/p bilateral labiaplasty    Records/imaging/labs/orders: in EPIC    Pt called: no    At Rooming: normal

## 2021-12-14 ENCOUNTER — VIRTUAL VISIT (OUTPATIENT)
Dept: SURGERY | Facility: CLINIC | Age: 56
End: 2021-12-14
Payer: COMMERCIAL

## 2021-12-14 ENCOUNTER — PRE VISIT (OUTPATIENT)
Dept: SURGERY | Facility: CLINIC | Age: 56
End: 2021-12-14

## 2021-12-14 ENCOUNTER — ANESTHESIA EVENT (OUTPATIENT)
Dept: SURGERY | Facility: AMBULATORY SURGERY CENTER | Age: 56
End: 2021-12-14
Payer: COMMERCIAL

## 2021-12-14 DIAGNOSIS — Z01.818 PRE-OP EXAMINATION: Primary | ICD-10-CM

## 2021-12-14 DIAGNOSIS — F64.9 GENDER DYSPHORIA: ICD-10-CM

## 2021-12-14 PROCEDURE — 99213 OFFICE O/P EST LOW 20 MIN: CPT | Mod: 95 | Performed by: PHYSICIAN ASSISTANT

## 2021-12-14 ASSESSMENT — LIFESTYLE VARIABLES: TOBACCO_USE: 0

## 2021-12-14 ASSESSMENT — ENCOUNTER SYMPTOMS: SEIZURES: 0

## 2021-12-14 ASSESSMENT — PAIN SCALES - GENERAL: PAINLEVEL: NO PAIN (0)

## 2021-12-14 NOTE — H&P (VIEW-ONLY)
Pre-Operative H & P     CC:  Preoperative exam to assess for increased cardiopulmonary risk while undergoing surgery and anesthesia.    Date of Encounter: 12/14/2021  Primary Care Physician:  Paul Swift     Reason for visit:   Encounter Diagnoses   Name Primary?     Pre-op examination Yes     Gender dysphoria          HPI  Sunni Flores is a 56 year old adult who presents for pre-operative H & P in preparation for BILATERAL LABIAPLASTY. LABIAL SCAR EXCISION. with Dr. Venegas on 12/17/21 at [facility].     Ms. Flores has a past medical history significant for history of JERRELL, obesity, anxiety, depression and gender dysphoria. She underwent penile inversion vaginoplasty with supplemental scrotal skin graft by Dr. Dawson and Dr. Venegas on 4/8/21. She developed a posterior wound that has now healed. She was seen by Dr. Venegas on 10/13/21 and they discussed her scarring and treatment options. The patient is now scheduled for the procedure as above.     History is obtained from the patient and chart review      Hx of abnormal bleeding or anti-platelet use: none    Menstrual history: No LMP recorded. (Menstrual status: Reassignment).:     Prior to Admission Medications  Current Outpatient Medications   Medication Sig Dispense Refill     acetaminophen (TYLENOL) 325 MG tablet Take 325-650 mg by mouth every 6 hours as needed for mild pain       diazepam (VALIUM) 10 MG tablet as needed        estradiol (VIVELLE-DOT) 0.1 MG/24HR bi-weekly patch Place 2 patches onto the skin twice a week (STOP until restarted by Medical team) 8 patch 0     ibuprofen (ADVIL/MOTRIN) 400 MG tablet Take 2 tablets (800 mg) by mouth every 8 hours as needed for pain 42 tablet 0     lidocaine-prilocaine (EMLA) 2.5-2.5 % external cream as needed   2     methylfolate (DEPLIN) 15 MG TABS tablet Take 15 mg by mouth every morning        polyethylene glycol (MIRALAX) 17 GM/Dose powder Take 17 g by mouth 2 times daily as needed for  constipation 510 g 1     TRAZODONE HCL  mg nightly as needed for sleep or depression        venlafaxine (EFFEXOR-XR) 150 MG 24 hr capsule Take 300 mg by mouth every morning          Family History  Family History   Problem Relation Age of Onset     Hypertension Father      Myocardial Infarction Father 79     Anesthesia Reaction No family hx of      Deep Vein Thrombosis (DVT) No family hx of        The complete review of systems is negative other than noted in the HPI or here.   Preprocedure Note     Last edited 21 by Sara Manuel PA-C  Date of Service 21  Creation Time 21  Status: Signed             Anesthesia Pre-Procedure Evaluation    Patient: Sunni Flores   MRN: [id number] : 1965        Preoperative Diagnosis: Gender dysphoria [F64.9]  Asymmetric labia majora [N90.60]  Postoperative wound dehiscence, sequela [T81.31XS]    Procedure : Procedure(s):  BILATERAL LABIAPLASTY. LABIAL SCAR EXCISION.  Video Visit       Past Medical History:   Diagnosis Date     Anxiety      Depression      Gender dysphoria      JERRELL (obstructive sleep apnea)     resolved with weight loss      Past Surgical History:   Procedure Laterality Date     AS REPAIR SLIDING INGUINAL HERNIA       COLONOSCOPY       DENTAL SURGERY      wisdom teeth     Supra pectoral breast implants  10/08/2021     VAGINOPLASTY WITH PERITONEAL FLAP, ROBOTIC ASSISTED N/A 2021    Procedure: VAGINOPLASTY, ROBOTIC ASSISTED, excisional biopsy of peritoneum;  Surgeon: Meet Venegas MD;  Location: [facility] OR     VASECTOMY        Allergies   Allergen Reactions     Allopurinol Other (See Comments)     Patient is positive for HLA-B 58:01. Increased risk of allopurinol-induced severe cutaneous reactions.      Clopidogrel Other (See Comments)     Patient is a NXZ5V10 metabolizer. Clopidogrel would be predicted to lack efficacy      Social History     Tobacco Use     Smoking status: Never Smoker     Smokeless  tobacco: Never Used   Substance Use Topics     Alcohol use: Yes     Comment: 2-3 drinks weekly      Wt Readings from Last 1 Encounters:   10/13/21 95.3 kg (210 lb)        Anesthesia Evaluation   Pt has had prior anesthetic. Type: General.    No history of anesthetic complications       ROS/MED HX  ENT/Pulmonary: Comment: Hx JERRELL, resolved with wt loss     (-) tobacco use and asthma   Neurologic:  - neg neurologic ROS  (-) no seizures and no CVA   Cardiovascular: Comment: Able to go up several flights of stairs and multiple blocks.     History of syncope with higher doses of aldactone    (+) -----Previous cardiac testing   Echo: Date: Results:    Stress Test: Date: Results:    ECG Reviewed: Date: 1/9/21 Results:  Sinus tachycardia  Otherwise normal ECG  No previous ECGs available  Ventricular rate 106 bpm    Cath: Date: Results:      METS/Exercise Tolerance: >4 METS    Hematologic:  - neg hematologic  ROS  (-) history of blood clots and history of blood transfusion   Musculoskeletal:  - neg musculoskeletal ROS     GI/Hepatic:  - neg GI/hepatic ROS  (-) GERD and liver disease   Renal/Genitourinary: Comment: Taking estrogen    (+) renal disease, type: CRI, Pt does not require dialysis,     Endo:     (+) Obesity,  (-) Type II DM   Psychiatric/Substance Use: Comment: Gender dysphoria     (+) psychiatric history anxiety and depression     Infectious Disease:  - neg infectious disease ROS     Malignancy:  - neg malignancy ROS     Other:  - neg other ROS          Virtual visit -  No vitals were obtained       Physical Exam  Constitutional: Awake, alert, cooperative, no apparent distress, and appears stated age.  Eyes: Pupils equal  HENT: Normocephalic  Respiratory: non labored breathing   Neurologic: Awake, alert, oriented to name, place and time.   Neuropsychiatric: Calm, cooperative. Normal affect.         PRIOR LABS/DIAGNOSTIC STUDIES:   All labs and imaging personally reviewed   Specimen:  Blood - Blood, Venous   Ref  Range & Units 2 mo ago Comments   Potassium, P 3.6 - 5.2 mmol/L 4.7     Sodium, P 135 - 145 mmol/L 138     Chloride, P 98 - 107 mmol/L 105     Bicarbonate, P 22 - 29 mmol/L 25     Anion Gap, P 7 - 15 8     BUN (Blood Urea Nitrogen), P 6 - 21 mg/dL 22 High      Creatinine, P 0.59 - 1.04 mg/dL 1.23 High      eGFR-Black/African American >=60 mL/min/BSA 57 Low     ----ADDITIONAL INFORMATION----   Estimated GFR calculated using the 2009 CKD_EPI creatinine   equation.   eGFR Non-Black/African American >=60 mL/min/BSA 49 Low     ----ADDITIONAL INFORMATION----   Estimated GFR calculated using the 2009 CKD_EPI creatinine   equation.   Calcium, Total, P 8.6 - 10.0 mg/dL 9     Glucose, P 70 - 140 mg/dL 74     Resulting Agency  [facility]    Specimen Collected: 10/06/21 11:25 AM Last Resulted: 10/06/21  3:10 PM   CBC with Differential, Blood  Specimen:  Blood - Blood, Venous   Ref Range & Units 2 mo ago   Hemoglobin 11.6 - 15.0 g/dL 13.5    Hematocrit 35.5 - 44.9 % 40.7    Erythrocytes 3.92 - 5.13 x10(12)/L  4.56    MCV 78.2 - 97.9 fL 89.3    RBC Distrib Width 12.2 - 16.1 % 13    Platelet Count 157 - 371 x10(9)/L 224    Leukocytes 3.4 - 9.6 x10(9)/L 6.7    Neutrophils 1.56 - 6.45 x10(9)/L 4.24    Lymphocytes 0.95 - 3.07 x10(9)/L 1.78    Monocytes 0.26 - 0.81 x10(9)/L 0.44    Eosinophils 0.03 - 0.48 x10(9)/L  0.2    Basophils 0.01 - 0.08 x10(9)/L 0.04    Resulting Agency  [facility]   Specimen Collected: 10/06/21 11:25 AM Last Resulted: 10/06/21 12:02 PM       EKG/ stress test - if available please see in ROS above       The patient's records and results personally reviewed by this provider.     Outside records reviewed from: care everywhere      ASSESSMENT and PLAN  Sunni Flores is a 56 year old adult who is scheduled for BILATERAL LABIAPLASTY. LABIAL SCAR EXCISION. on 12/17/21 by Dr. Venegas in treatment of Gender dysphoria.  PAC referral for risk assessment and optimization for anesthesia with comorbid conditions of history of JERRELL,  obesity, CKD stage 3, anxiety and depression:    She has the following specific operative considerations:   # VTE risk: 0.26%  # Risk of PONV score = 3.  If > 2, anti-emetic intervention recommended.     CARDIAC: METS >4,  She is able to go up several flights of stairs and walk multiple blocks. She denies any cardiac symptoms.      # RCRI : low risk surgery.  0.4% risk of major adverse cardiac event.     #  History of syncope w/ higher doses of aldactone. Last dose 3/25/21.        PULMONARY:     # History of JERRELL, resolved with 65# wt loss    # Never smoked    # Denies URI symptoms.      GI:    # Denies GERD     /RENAL:     # Creatinine 1.23 on 10/6/21. Will require close monitoring of renal function during periop period.    # S/p vaginoplasty - now with scarring - procedure as above. Will complete UA/UC per surgical team.      ENDO: BMI 30    # Taking estrogen    # No DM      NEURO/PSYCH:      # Anxiety, depression, gender dysphoria - continue Effexor, trazodone, valium.      ** Patient's visit was done virtually today.  A full physical exam was not completed.  Please refer to the physical examination documented by the anesthesiologist in the anesthesia record on the day of surgery. **      The patient is optimize and an acceptable candidate for the proposed procedure. Arrival time, NPO, shower and medication instructions provided by nursing staff today.      Video-Visit Details    Type of service:  Video Visit    Patient verbally consented to video service today: YES      Video Start Time: 8:01  Video End Time (time video stopped): 8:11    Originating Location (pt. Location): Home    Distant Location (provider location):  [facility]     Mode of Communication:  Video Conference via [employer]      On the day of service:     Prep time: 2 minutes  Visit time: 10 minutes  Documentation time: 10 minutes  ------------------------------------------  Total time: 22 minutes      Sara ESQUIVEL  VIJI Manuel  Preoperative Assessment Center  [facility]  Clinic and Surgery Center  Phone: [phone]  Fax: [phone]

## 2021-12-14 NOTE — PROGRESS NOTES
Sunni is a 56 year old who is being evaluated via a billable video visit.      How would you like to obtain your AVS? MyChart  If the video visit is dropped, the invitation should be resent by: Text to cell phone: [phone]      HPI         Review of Systems         Physical Exam

## 2021-12-14 NOTE — ANESTHESIA PREPROCEDURE EVALUATION
Anesthesia Pre-Procedure Evaluation    Patient: Sunni Flores   MRN: [id number] : 1965        Preoperative Diagnosis: Gender dysphoria [F64.9]  Asymmetric labia majora [N90.60]  Postoperative wound dehiscence, sequela [T81.31XS]    Procedure : Procedure(s):  BILATERAL LABIAPLASTY. LABIAL SCAR EXCISION.  Video Visit       Past Medical History:   Diagnosis Date     Anxiety      Depression      Gender dysphoria      JERRELL (obstructive sleep apnea)     resolved with weight loss      Past Surgical History:   Procedure Laterality Date     AS REPAIR SLIDING INGUINAL HERNIA       COLONOSCOPY       DENTAL SURGERY      wisdom teeth     Supra pectoral breast implants  10/08/2021     VAGINOPLASTY WITH PERITONEAL FLAP, ROBOTIC ASSISTED N/A 2021    Procedure: VAGINOPLASTY, ROBOTIC ASSISTED, excisional biopsy of peritoneum;  Surgeon: Meet Venegas MD;  Location: [facility] OR     VASECTOMY        Allergies   Allergen Reactions     Allopurinol Other (See Comments)     Patient is positive for HLA-B 58:01. Increased risk of allopurinol-induced severe cutaneous reactions.      Clopidogrel Other (See Comments)     Patient is a [id number] metabolizer. Clopidogrel would be predicted to lack efficacy      Social History     Tobacco Use     Smoking status: Never Smoker     Smokeless tobacco: Never Used   Substance Use Topics     Alcohol use: Yes     Comment: 2-3 drinks weekly      Wt Readings from Last 1 Encounters:   10/13/21 95.3 kg (210 lb)        Anesthesia Evaluation   Pt has had prior anesthetic. Type: General.    No history of anesthetic complications       ROS/MED HX  ENT/Pulmonary: Comment: Hx JERRELL, resolved with wt loss     (-) tobacco use and asthma   Neurologic:  - neg neurologic ROS  (-) no seizures and no CVA   Cardiovascular: Comment: Able to go up several flights of stairs and multiple blocks.     History of syncope with higher doses of aldactone    (+) -----Previous cardiac testing   Echo: Date: Results:    Stress  Test: Date: Results:    ECG Reviewed: Date: 1/9/21 Results:  Sinus tachycardia  Otherwise normal ECG  No previous ECGs available  Ventricular rate 106 bpm    Cath: Date: Results:      METS/Exercise Tolerance: >4 METS    Hematologic:  - neg hematologic  ROS  (-) history of blood clots and history of blood transfusion   Musculoskeletal:  - neg musculoskeletal ROS     GI/Hepatic:  - neg GI/hepatic ROS  (-) GERD and liver disease   Renal/Genitourinary: Comment: Taking estrogen    (+) renal disease, type: CRI, Pt does not require dialysis,     Endo:     (+) Obesity,  (-) Type II DM   Psychiatric/Substance Use: Comment: Gender dysphoria     (+) psychiatric history anxiety and depression     Infectious Disease:  - neg infectious disease ROS     Malignancy:  - neg malignancy ROS     Other:  - neg other ROS          Physical Exam    Airway  airway exam normal           Respiratory Devices and Support         Dental  no notable dental history         Cardiovascular   cardiovascular exam normal          Pulmonary   pulmonary exam normal                OUTSIDE LABS:  CBC:   Lab Results   Component Value Date    WBC 8.9 04/14/2021    WBC 8.6 04/13/2021    HGB 9.3 (L) 04/14/2021    HGB 9.3 (L) 04/13/2021    HCT 28.1 (L) 04/14/2021    HCT 28.9 (L) 04/13/2021     04/14/2021     04/13/2021     BMP:   Lab Results   Component Value Date     04/14/2021     04/13/2021    POTASSIUM 3.5 04/14/2021    POTASSIUM 3.4 04/13/2021    CHLORIDE 110 (H) 04/14/2021    CHLORIDE 110 (H) 04/13/2021    CO2 26 04/14/2021    CO2 26 04/13/2021    BUN 16 04/14/2021    BUN 13 04/13/2021    CR 1.03 04/14/2021    CR 1.12 (H) 04/13/2021     (H) 04/14/2021     (H) 04/13/2021     COAGS: No results found for: PTT, INR, FIBR  POC:   Lab Results   Component Value Date     (H) 04/10/2021     HEPATIC:   Lab Results   Component Value Date    ALBUMIN 4.2 01/10/2019    PROTTOTAL 7.8 01/10/2019    ALT 33 01/10/2019    AST 13  01/10/2019    ALKPHOS 76 01/10/2019    BILITOTAL 0.4 01/10/2019     OTHER:   Lab Results   Component Value Date    SERA 8.5 04/14/2021       Anesthesia Plan    ASA Status:  2   NPO Status:  NPO Appropriate    Anesthesia Type: MAC.     - Reason for MAC: straight local not clinically adequate   Induction: Intravenous.   Maintenance: TIVA.        Consents    Anesthesia Plan(s) and associated risks, benefits, and realistic alternatives discussed. Questions answered and patient/representative(s) expressed understanding.     - Discussed: Risks, Benefits and Alternatives for BOTH SEDATION and the PROCEDURE were discussed     - Discussed with:  Patient         Postoperative Care    Pain management: Oral pain medications.   PONV prophylaxis: Ondansetron (or other 5HT-3), Dexamethasone or Solumedrol     Comments:              PAC Discussion and Assessment    ASA Classification: 2  Case is suitable for: ASC  Anesthetic techniques and relevant risks discussed: GA                  PAC Resident/NP Anesthesia Assessment: Sunni Flores is a 56 year old adult who is scheduled for BILATERAL LABIAPLASTY. LABIAL SCAR EXCISION. on 12/17/21 by Dr. Venegas in treatment of Gender dysphoria.  PAC referral for risk assessment and optimization for anesthesia with comorbid conditions of history of JERRELL, obesity, CKD stage 3, anxiety and depression:    She has the following specific operative considerations:   # VTE risk: 0.26%  # Risk of PONV score = 3.  If > 2, anti-emetic intervention recommended.     CARDIAC: METS >4,  She is able to go up several flights of stairs and walk multiple blocks. She denies any cardiac symptoms.      # RCRI : low risk surgery.  0.4% risk of major adverse cardiac event.     #  History of syncope w/ higher doses of aldactone. Last dose 3/25/21.        PULMONARY:     # History of JERRELL, resolved with 65# wt loss    # Never smoked    # Denies URI symptoms.      GI:    # Denies GERD     /RENAL:     # Creatinine 1.23 on  10/6/21. Will require close monitoring of renal function during periop period.    # S/p vaginoplasty - now with scarring - procedure as above. Will complete UA/UC per surgical team.      ENDO: BMI 30    # Taking estrogen    # No DM      NEURO/PSYCH:      # Anxiety, depression, gender dysphoria - continue Effexor, trazodone, valium.    **Please refer to the physical examination documented by the anesthesiologist in the anesthesia record on the day of surgery**    Patient is optimized and is acceptable candidate for the proposed procedure.  No further diagnostic evaluation is needed.     For further details of assessment, testing, and physical exam please see H and P completed on same date.    Sara Manuel PA-C      Mid-Level Provider/Resident: Sara Manuel PA-C  Date: 12/14/21                                 Sara Manuel PA-C

## 2021-12-14 NOTE — PATIENT INSTRUCTIONS
Preparing for Your Surgery      Name:  Sunni Flores   MRN:  [id number]   :  1965   Today's Date:  2021         Arriving for surgery:  Surgery date:  21  Arrival time:  8:30 am    Restrictions due to COVID 19:  One consistent visitor is allowed per patient  No ill visitors  All visitors must wear face mask     parking is available for anyone with mobility limitations or disabilities. (Monday- Friday 7 am- 5 pm)    Please come to:    [facility]  [address] [place] [id number]    Please check in on the 5th floor at the Ambulatory Surgery Center       What can I eat or drink?    -  You may eat and drink normally until 8 hours before surgery. (Until 2:10 am)  -  You may have clear liquids up to 4 hours before surgery. (Until 6:10 am)  Examples of clear liquids:  Water  Clear broth  Juices (apple, white grape, white cranberry  and cider) without pulp  Noncarbonated, powder based beverages  (lemonade and Roman-Aid)  Sodas (Sprite, 7-Up, ginger ale and seltzer)  Coffee or tea (without milk or cream)  Gatorade    --No alcohol for at least 24 hours before surgery    Which medicines can I take?    Hold Aspirin for 7 days before surgery.   Hold Multivitamins for 7 days before surgery.  Hold Supplements for 7 days before surgery.  * Hold Ibuprofen (Advil, Motrin) for 1 day before surgery--unless otherwise directed by surgeon.  Hold Naproxen (Aleve) for 4 days before surgery.    -  DO NOT take the following medications the day of surgery:  Methylfolate (Deplin), Miralax,     -  PLEASE TAKE the following medications the day of surgery   Venlafaxine (Effexor-XR),   Acetaminophen (Tylenol) if needed  Diazepam (Valium) if needed    How do I prepare myself?  - Please take 2 showers before surgery using Scrubcare or Hibiclens soap.    Use this soap only from the neck to your toes.     Leave the soap on your skin for one minute--then rinse thoroughly.      You may use your  own shampoo and conditioner; no other hair products.   - Please remove all jewelry and body piercings.  - No lotions, deodorants or fragrance.  - No makeup or fingernail polish.   - Bring your ID and insurance card.    -If you have a Deep Brain Stimulator, a Spinal Cord Stimulator or any implanted Neuro Device you must bring the remote to the Surgery Center         - All patients are required to have a Covid-19 test within 4 days of surgery/procedure.      -Patients will be contacted by the [facility] scheduling team within 1 week of surgery to make an appointment.      - Patients may call the Scheduling team at [phone] if they have not been scheduled within 4 days of  surgery.      ALL PATIENTS ARE REQUIRED TO HAVE A RESPONSIBLE ADULT TO DRIVE AND BE IN ATTENDANCE WITH THEM FOR 24 HOURS FOLLOWING SURGERY       Questions or Concerns:    -For questions regarding the day of surgery please contact the Ambulatory Surgery Center at [phone].    -If you have health changes between today and your surgery please contact your surgeon.     For questions after surgery please call your surgeons office.

## 2021-12-14 NOTE — H&P
Pre-Operative H & P     CC:  Preoperative exam to assess for increased cardiopulmonary risk while undergoing surgery and anesthesia.    Date of Encounter: 12/14/2021  Primary Care Physician:  Paul Swift     Reason for visit:   Encounter Diagnoses   Name Primary?     Pre-op examination Yes     Gender dysphoria          HPI  Sunni Flores is a 56 year old adult who presents for pre-operative H & P in preparation for BILATERAL LABIAPLASTY. LABIAL SCAR EXCISION. with Dr. Venegas on 12/17/21 at [facility].     Ms. Flores has a past medical history significant for history of JERRELL, obesity, anxiety, depression and gender dysphoria. She underwent penile inversion vaginoplasty with supplemental scrotal skin graft by Dr. Dawson and Dr. Venegas on 4/8/21. She developed a posterior wound that has now healed. She was seen by Dr. Venegas on 10/13/21 and they discussed her scarring and treatment options. The patient is now scheduled for the procedure as above.     History is obtained from the patient and chart review      Hx of abnormal bleeding or anti-platelet use: none    Menstrual history: No LMP recorded. (Menstrual status: Reassignment).:     Prior to Admission Medications  Current Outpatient Medications   Medication Sig Dispense Refill     acetaminophen (TYLENOL) 325 MG tablet Take 325-650 mg by mouth every 6 hours as needed for mild pain       diazepam (VALIUM) 10 MG tablet as needed        estradiol (VIVELLE-DOT) 0.1 MG/24HR bi-weekly patch Place 2 patches onto the skin twice a week (STOP until restarted by Medical team) 8 patch 0     ibuprofen (ADVIL/MOTRIN) 400 MG tablet Take 2 tablets (800 mg) by mouth every 8 hours as needed for pain 42 tablet 0     lidocaine-prilocaine (EMLA) 2.5-2.5 % external cream as needed   2     methylfolate (DEPLIN) 15 MG TABS tablet Take 15 mg by mouth every morning        polyethylene glycol (MIRALAX) 17 GM/Dose powder Take 17 g by mouth 2 times daily as needed for  constipation 510 g 1     TRAZODONE HCL  mg nightly as needed for sleep or depression        venlafaxine (EFFEXOR-XR) 150 MG 24 hr capsule Take 300 mg by mouth every morning          Family History  Family History   Problem Relation Age of Onset     Hypertension Father      Myocardial Infarction Father 79     Anesthesia Reaction No family hx of      Deep Vein Thrombosis (DVT) No family hx of        The complete review of systems is negative other than noted in the HPI or here.   Preprocedure Note     Last edited 21 by Sara Manuel PA-C  Date of Service 21  Creation Time 21  Status: Signed             Anesthesia Pre-Procedure Evaluation    Patient: Sunni Flores   MRN: [id number] : 1965        Preoperative Diagnosis: Gender dysphoria [F64.9]  Asymmetric labia majora [N90.60]  Postoperative wound dehiscence, sequela [T81.31XS]    Procedure : Procedure(s):  BILATERAL LABIAPLASTY. LABIAL SCAR EXCISION.  Video Visit       Past Medical History:   Diagnosis Date     Anxiety      Depression      Gender dysphoria      JERRELL (obstructive sleep apnea)     resolved with weight loss      Past Surgical History:   Procedure Laterality Date     AS REPAIR SLIDING INGUINAL HERNIA       COLONOSCOPY       DENTAL SURGERY      wisdom teeth     Supra pectoral breast implants  10/08/2021     VAGINOPLASTY WITH PERITONEAL FLAP, ROBOTIC ASSISTED N/A 2021    Procedure: VAGINOPLASTY, ROBOTIC ASSISTED, excisional biopsy of peritoneum;  Surgeon: Meet Venegas MD;  Location: [facility] OR     VASECTOMY        Allergies   Allergen Reactions     Allopurinol Other (See Comments)     Patient is positive for HLA-B 58:01. Increased risk of allopurinol-induced severe cutaneous reactions.      Clopidogrel Other (See Comments)     Patient is a [id number] metabolizer. Clopidogrel would be predicted to lack efficacy      Social History     Tobacco Use     Smoking status: Never Smoker     Smokeless  tobacco: Never Used   Substance Use Topics     Alcohol use: Yes     Comment: 2-3 drinks weekly      Wt Readings from Last 1 Encounters:   10/13/21 95.3 kg (210 lb)        Anesthesia Evaluation   Pt has had prior anesthetic. Type: General.    No history of anesthetic complications       ROS/MED HX  ENT/Pulmonary: Comment: Hx JERRELL, resolved with wt loss     (-) tobacco use and asthma   Neurologic:  - neg neurologic ROS  (-) no seizures and no CVA   Cardiovascular: Comment: Able to go up several flights of stairs and multiple blocks.     History of syncope with higher doses of aldactone    (+) -----Previous cardiac testing   Echo: Date: Results:    Stress Test: Date: Results:    ECG Reviewed: Date: 1/9/21 Results:  Sinus tachycardia  Otherwise normal ECG  No previous ECGs available  Ventricular rate 106 bpm    Cath: Date: Results:      METS/Exercise Tolerance: >4 METS    Hematologic:  - neg hematologic  ROS  (-) history of blood clots and history of blood transfusion   Musculoskeletal:  - neg musculoskeletal ROS     GI/Hepatic:  - neg GI/hepatic ROS  (-) GERD and liver disease   Renal/Genitourinary: Comment: Taking estrogen    (+) renal disease, type: CRI, Pt does not require dialysis,     Endo:     (+) Obesity,  (-) Type II DM   Psychiatric/Substance Use: Comment: Gender dysphoria     (+) psychiatric history anxiety and depression     Infectious Disease:  - neg infectious disease ROS     Malignancy:  - neg malignancy ROS     Other:  - neg other ROS          Virtual visit -  No vitals were obtained       Physical Exam  Constitutional: Awake, alert, cooperative, no apparent distress, and appears stated age.  Eyes: Pupils equal  HENT: Normocephalic  Respiratory: non labored breathing   Neurologic: Awake, alert, oriented to name, place and time.   Neuropsychiatric: Calm, cooperative. Normal affect.         PRIOR LABS/DIAGNOSTIC STUDIES:   All labs and imaging personally reviewed   Specimen:  Blood - Blood, Venous   Ref  Range & Units 2 mo ago Comments   Potassium, P 3.6 - 5.2 mmol/L 4.7     Sodium, P 135 - 145 mmol/L 138     Chloride, P 98 - 107 mmol/L 105     Bicarbonate, P 22 - 29 mmol/L 25     Anion Gap, P 7 - 15 8     BUN (Blood Urea Nitrogen), P 6 - 21 mg/dL 22 High      Creatinine, P 0.59 - 1.04 mg/dL 1.23 High      eGFR-Black/African American >=60 mL/min/BSA 57 Low     ----ADDITIONAL INFORMATION----   Estimated GFR calculated using the 2009 CKD_EPI creatinine   equation.   eGFR Non-Black/African American >=60 mL/min/BSA 49 Low     ----ADDITIONAL INFORMATION----   Estimated GFR calculated using the 2009 CKD_EPI creatinine   equation.   Calcium, Total, P 8.6 - 10.0 mg/dL 9     Glucose, P 70 - 140 mg/dL 74     Resulting Agency  [facility]    Specimen Collected: 10/06/21 11:25 AM Last Resulted: 10/06/21  3:10 PM   CBC with Differential, Blood  Specimen:  Blood - Blood, Venous   Ref Range & Units 2 mo ago   Hemoglobin 11.6 - 15.0 g/dL 13.5    Hematocrit 35.5 - 44.9 % 40.7    Erythrocytes 3.92 - 5.13 x10(12)/L  4.56    MCV 78.2 - 97.9 fL 89.3    RBC Distrib Width 12.2 - 16.1 % 13    Platelet Count 157 - 371 x10(9)/L 224    Leukocytes 3.4 - 9.6 x10(9)/L 6.7    Neutrophils 1.56 - 6.45 x10(9)/L 4.24    Lymphocytes 0.95 - 3.07 x10(9)/L 1.78    Monocytes 0.26 - 0.81 x10(9)/L 0.44    Eosinophils 0.03 - 0.48 x10(9)/L  0.2    Basophils 0.01 - 0.08 x10(9)/L 0.04    Resulting Agency  [facility]   Specimen Collected: 10/06/21 11:25 AM Last Resulted: 10/06/21 12:02 PM       EKG/ stress test - if available please see in ROS above       The patient's records and results personally reviewed by this provider.     Outside records reviewed from: care everywhere      ASSESSMENT and PLAN  Sunni Floers is a 56 year old adult who is scheduled for BILATERAL LABIAPLASTY. LABIAL SCAR EXCISION. on 12/17/21 by Dr. Venegas in treatment of Gender dysphoria.  PAC referral for risk assessment and optimization for anesthesia with comorbid conditions of history of JERRELL,  obesity, CKD stage 3, anxiety and depression:    She has the following specific operative considerations:   # VTE risk: 0.26%  # Risk of PONV score = 3.  If > 2, anti-emetic intervention recommended.     CARDIAC: METS >4,  She is able to go up several flights of stairs and walk multiple blocks. She denies any cardiac symptoms.      # RCRI : low risk surgery.  0.4% risk of major adverse cardiac event.     #  History of syncope w/ higher doses of aldactone. Last dose 3/25/21.        PULMONARY:     # History of JERRELL, resolved with 65# wt loss    # Never smoked    # Denies URI symptoms.      GI:    # Denies GERD     /RENAL:     # Creatinine 1.23 on 10/6/21. Will require close monitoring of renal function during periop period.    # S/p vaginoplasty - now with scarring - procedure as above. Will complete UA/UC per surgical team.      ENDO: BMI 30    # Taking estrogen    # No DM      NEURO/PSYCH:      # Anxiety, depression, gender dysphoria - continue Effexor, trazodone, valium.      ** Patient's visit was done virtually today.  A full physical exam was not completed.  Please refer to the physical examination documented by the anesthesiologist in the anesthesia record on the day of surgery. **      The patient is optimize and an acceptable candidate for the proposed procedure. Arrival time, NPO, shower and medication instructions provided by nursing staff today.      Video-Visit Details    Type of service:  Video Visit    Patient verbally consented to video service today: YES      Video Start Time: 8:01  Video End Time (time video stopped): 8:11    Originating Location (pt. Location): Home    Distant Location (provider location):  [facility]OPER[facility]     Mode of Communication:  Video Conference via C[employer]ctions      On the day of service:     Prep time: 2 minutes  Visit time: 10 minutes  Documentation time: 10 minutes  ------------------------------------------  Total time: 22 minutes      Sara ESQUIVEL  VIJI Manuel  Preoperative Assessment Center  [facility]  Phone: [phone]  Fax: [phone]

## 2021-12-15 ENCOUNTER — TRANSFERRED RECORDS (OUTPATIENT)
Dept: HEALTH INFORMATION MANAGEMENT | Facility: CLINIC | Age: 56
End: 2021-12-15
Payer: COMMERCIAL

## 2021-12-17 ENCOUNTER — HOSPITAL ENCOUNTER (OUTPATIENT)
Facility: AMBULATORY SURGERY CENTER | Age: 56
End: 2021-12-17
Attending: UROLOGY
Payer: COMMERCIAL

## 2021-12-17 ENCOUNTER — ANESTHESIA (OUTPATIENT)
Dept: SURGERY | Facility: AMBULATORY SURGERY CENTER | Age: 56
End: 2021-12-17
Payer: COMMERCIAL

## 2021-12-17 VITALS
OXYGEN SATURATION: 100 % | RESPIRATION RATE: 16 BRPM | DIASTOLIC BLOOD PRESSURE: 65 MMHG | SYSTOLIC BLOOD PRESSURE: 107 MMHG | BODY MASS INDEX: 29.41 KG/M2 | HEART RATE: 72 BPM | TEMPERATURE: 97 F | WEIGHT: 205 LBS

## 2021-12-17 DIAGNOSIS — N90.60: ICD-10-CM

## 2021-12-17 DIAGNOSIS — T81.31XS POSTOPERATIVE WOUND DEHISCENCE, SEQUELA: ICD-10-CM

## 2021-12-17 PROCEDURE — 12042 INTMD RPR N-HF/GENIT2.6-7.5: CPT | Mod: XS,KX

## 2021-12-17 PROCEDURE — 11421 EXC H-F-NK-SP B9+MARG 0.6-1: CPT | Mod: XS | Performed by: UROLOGY

## 2021-12-17 PROCEDURE — 12042 INTMD RPR N-HF/GENIT2.6-7.5: CPT | Mod: XS | Performed by: UROLOGY

## 2021-12-17 PROCEDURE — 56800 PLASTIC REPAIR INTROITUS: CPT | Mod: KX | Performed by: UROLOGY

## 2021-12-17 PROCEDURE — 14041 TIS TRNFR F/C/C/M/N/A/G/H/F: CPT | Mod: KX | Performed by: UROLOGY

## 2021-12-17 PROCEDURE — 14041 TIS TRNFR F/C/C/M/N/A/G/H/F: CPT | Mod: KX

## 2021-12-17 PROCEDURE — 11423 EXC H-F-NK-SP B9+MARG 2.1-3: CPT | Mod: XS | Performed by: UROLOGY

## 2021-12-17 PROCEDURE — 11421 EXC H-F-NK-SP B9+MARG 0.6-1: CPT | Mod: XS,KX

## 2021-12-17 PROCEDURE — 11423 EXC H-F-NK-SP B9+MARG 2.1-3: CPT | Mod: XS,KX

## 2021-12-17 PROCEDURE — 56800 PLASTIC REPAIR INTROITUS: CPT | Mod: KX

## 2021-12-17 RX ORDER — FENTANYL CITRATE 50 UG/ML
25 INJECTION, SOLUTION INTRAMUSCULAR; INTRAVENOUS
Status: DISCONTINUED | OUTPATIENT
Start: 2021-12-17 | End: 2021-12-18 | Stop reason: HOSPADM

## 2021-12-17 RX ORDER — ONDANSETRON 2 MG/ML
INJECTION INTRAMUSCULAR; INTRAVENOUS PRN
Status: DISCONTINUED | OUTPATIENT
Start: 2021-12-17 | End: 2021-12-17

## 2021-12-17 RX ORDER — CEFAZOLIN SODIUM 2 G/50ML
2 SOLUTION INTRAVENOUS
Status: DISCONTINUED | OUTPATIENT
Start: 2021-12-17 | End: 2021-12-17 | Stop reason: HOSPADM

## 2021-12-17 RX ORDER — OXYCODONE HYDROCHLORIDE 5 MG/1
5 TABLET ORAL EVERY 4 HOURS PRN
Status: DISCONTINUED | OUTPATIENT
Start: 2021-12-17 | End: 2021-12-18 | Stop reason: HOSPADM

## 2021-12-17 RX ORDER — DEXAMETHASONE SODIUM PHOSPHATE 4 MG/ML
INJECTION, SOLUTION INTRA-ARTICULAR; INTRALESIONAL; INTRAMUSCULAR; INTRAVENOUS; SOFT TISSUE PRN
Status: DISCONTINUED | OUTPATIENT
Start: 2021-12-17 | End: 2021-12-17

## 2021-12-17 RX ORDER — LIDOCAINE HYDROCHLORIDE 20 MG/ML
INJECTION, SOLUTION INFILTRATION; PERINEURAL PRN
Status: DISCONTINUED | OUTPATIENT
Start: 2021-12-17 | End: 2021-12-17

## 2021-12-17 RX ORDER — FENTANYL CITRATE 50 UG/ML
25 INJECTION, SOLUTION INTRAMUSCULAR; INTRAVENOUS EVERY 5 MIN PRN
Status: DISCONTINUED | OUTPATIENT
Start: 2021-12-17 | End: 2021-12-17 | Stop reason: HOSPADM

## 2021-12-17 RX ORDER — SODIUM CHLORIDE, SODIUM LACTATE, POTASSIUM CHLORIDE, CALCIUM CHLORIDE 600; 310; 30; 20 MG/100ML; MG/100ML; MG/100ML; MG/100ML
INJECTION, SOLUTION INTRAVENOUS CONTINUOUS
Status: DISCONTINUED | OUTPATIENT
Start: 2021-12-17 | End: 2021-12-18 | Stop reason: HOSPADM

## 2021-12-17 RX ORDER — PROPOFOL 10 MG/ML
INJECTION, EMULSION INTRAVENOUS PRN
Status: DISCONTINUED | OUTPATIENT
Start: 2021-12-17 | End: 2021-12-17

## 2021-12-17 RX ORDER — EPHEDRINE SULFATE 50 MG/ML
INJECTION, SOLUTION INTRAMUSCULAR; INTRAVENOUS; SUBCUTANEOUS PRN
Status: DISCONTINUED | OUTPATIENT
Start: 2021-12-17 | End: 2021-12-17

## 2021-12-17 RX ORDER — OXYCODONE HYDROCHLORIDE 5 MG/1
5 TABLET ORAL EVERY 6 HOURS PRN
Qty: 12 TABLET | Refills: 0 | Status: SHIPPED | OUTPATIENT
Start: 2021-12-17 | End: 2021-12-20

## 2021-12-17 RX ORDER — ONDANSETRON 2 MG/ML
4 INJECTION INTRAMUSCULAR; INTRAVENOUS EVERY 30 MIN PRN
Status: DISCONTINUED | OUTPATIENT
Start: 2021-12-17 | End: 2021-12-18 | Stop reason: HOSPADM

## 2021-12-17 RX ORDER — HYDROMORPHONE HYDROCHLORIDE 1 MG/ML
0.2 INJECTION, SOLUTION INTRAMUSCULAR; INTRAVENOUS; SUBCUTANEOUS EVERY 5 MIN PRN
Status: DISCONTINUED | OUTPATIENT
Start: 2021-12-17 | End: 2021-12-17 | Stop reason: HOSPADM

## 2021-12-17 RX ORDER — SODIUM CHLORIDE, SODIUM LACTATE, POTASSIUM CHLORIDE, CALCIUM CHLORIDE 600; 310; 30; 20 MG/100ML; MG/100ML; MG/100ML; MG/100ML
INJECTION, SOLUTION INTRAVENOUS CONTINUOUS
Status: DISCONTINUED | OUTPATIENT
Start: 2021-12-17 | End: 2021-12-17 | Stop reason: HOSPADM

## 2021-12-17 RX ORDER — FENTANYL CITRATE 50 UG/ML
INJECTION, SOLUTION INTRAMUSCULAR; INTRAVENOUS PRN
Status: DISCONTINUED | OUTPATIENT
Start: 2021-12-17 | End: 2021-12-17

## 2021-12-17 RX ORDER — ONDANSETRON 4 MG/1
4 TABLET, ORALLY DISINTEGRATING ORAL EVERY 30 MIN PRN
Status: DISCONTINUED | OUTPATIENT
Start: 2021-12-17 | End: 2021-12-18 | Stop reason: HOSPADM

## 2021-12-17 RX ORDER — LIDOCAINE 40 MG/G
CREAM TOPICAL
Status: DISCONTINUED | OUTPATIENT
Start: 2021-12-17 | End: 2021-12-17 | Stop reason: HOSPADM

## 2021-12-17 RX ORDER — AMOXICILLIN 250 MG
1 CAPSULE ORAL 2 TIMES DAILY
Qty: 20 TABLET | Refills: 0 | Status: SHIPPED | OUTPATIENT
Start: 2021-12-17 | End: 2021-12-27

## 2021-12-17 RX ORDER — BUPIVACAINE HYDROCHLORIDE AND EPINEPHRINE 2.5; 5 MG/ML; UG/ML
INJECTION, SOLUTION INFILTRATION; PERINEURAL PRN
Status: DISCONTINUED | OUTPATIENT
Start: 2021-12-17 | End: 2021-12-17 | Stop reason: HOSPADM

## 2021-12-17 RX ORDER — CEFAZOLIN SODIUM 2 G/50ML
2 SOLUTION INTRAVENOUS SEE ADMIN INSTRUCTIONS
Status: DISCONTINUED | OUTPATIENT
Start: 2021-12-17 | End: 2021-12-17 | Stop reason: HOSPADM

## 2021-12-17 RX ORDER — PROPOFOL 10 MG/ML
INJECTION, EMULSION INTRAVENOUS CONTINUOUS PRN
Status: DISCONTINUED | OUTPATIENT
Start: 2021-12-17 | End: 2021-12-17

## 2021-12-17 RX ORDER — ACETAMINOPHEN 325 MG/1
975 TABLET ORAL ONCE
Status: COMPLETED | OUTPATIENT
Start: 2021-12-17 | End: 2021-12-17

## 2021-12-17 RX ADMIN — SODIUM CHLORIDE, SODIUM LACTATE, POTASSIUM CHLORIDE, CALCIUM CHLORIDE: 600; 310; 30; 20 INJECTION, SOLUTION INTRAVENOUS at 11:17

## 2021-12-17 RX ADMIN — OXYCODONE HYDROCHLORIDE 5 MG: 5 TABLET ORAL at 12:51

## 2021-12-17 RX ADMIN — EPHEDRINE SULFATE 5 MG: 50 INJECTION, SOLUTION INTRAMUSCULAR; INTRAVENOUS; SUBCUTANEOUS at 11:58

## 2021-12-17 RX ADMIN — EPHEDRINE SULFATE 5 MG: 50 INJECTION, SOLUTION INTRAMUSCULAR; INTRAVENOUS; SUBCUTANEOUS at 12:08

## 2021-12-17 RX ADMIN — FENTANYL CITRATE 100 MCG: 50 INJECTION, SOLUTION INTRAMUSCULAR; INTRAVENOUS at 11:29

## 2021-12-17 RX ADMIN — ACETAMINOPHEN 975 MG: 325 TABLET ORAL at 09:23

## 2021-12-17 RX ADMIN — EPHEDRINE SULFATE 5 MG: 50 INJECTION, SOLUTION INTRAMUSCULAR; INTRAVENOUS; SUBCUTANEOUS at 12:12

## 2021-12-17 RX ADMIN — Medication 100 MCG: at 12:13

## 2021-12-17 RX ADMIN — DEXAMETHASONE SODIUM PHOSPHATE 4 MG: 4 INJECTION, SOLUTION INTRA-ARTICULAR; INTRALESIONAL; INTRAMUSCULAR; INTRAVENOUS; SOFT TISSUE at 11:25

## 2021-12-17 RX ADMIN — EPHEDRINE SULFATE 5 MG: 50 INJECTION, SOLUTION INTRAMUSCULAR; INTRAVENOUS; SUBCUTANEOUS at 12:14

## 2021-12-17 RX ADMIN — LIDOCAINE HYDROCHLORIDE 80 MG: 20 INJECTION, SOLUTION INFILTRATION; PERINEURAL at 11:25

## 2021-12-17 RX ADMIN — PROPOFOL 200 MG: 10 INJECTION, EMULSION INTRAVENOUS at 11:25

## 2021-12-17 RX ADMIN — PROPOFOL 200 MCG/KG/MIN: 10 INJECTION, EMULSION INTRAVENOUS at 11:25

## 2021-12-17 RX ADMIN — EPHEDRINE SULFATE 5 MG: 50 INJECTION, SOLUTION INTRAMUSCULAR; INTRAVENOUS; SUBCUTANEOUS at 12:01

## 2021-12-17 RX ADMIN — ONDANSETRON 4 MG: 2 INJECTION INTRAMUSCULAR; INTRAVENOUS at 11:25

## 2021-12-17 NOTE — OP NOTE
OPERATIVE REPORT - 12/17/21    PREOPERATIVE DIAGNOSIS: Asymmetric labia, gender dysphoria, post-operative wound dehiscence, posterior introitus scar  POSTOPERATIVE DIAGNOSIS:  Same    PROCEDURES PERFORMED:   1. Right Labiaplasty with adjacent tissue transfer via Y-V repair. (5x6cm)  2. Excision of aberrant perineal flap (2x3cm) and anterior scar (1x1cm)  3. Posterior introitus repair    STAFF SURGEON: Meet Venegas MD   ASSISTANT: Ke Harrington MD  ANESTHESIA: General and Local  ESTIMATED BLOOD LOSS: < 10 mL.   IV FLUIDS: see dictated anesthesia record  COMPLICATIONS: None.   SPECIMEN:    None    SIGNIFICANT FINDINGS: Anterior and posterior scars, excised and primarily closed. Right labiaplasty with Y-V repair    BRIEF OPERATIVE INDICATIONS:   Sunni Flores is a 56 year old adult who underwent prior penile inversion vaginoplasty with skin graft, post-operative course complicated by wound dehiscence with subsequent anterior and posterior scars as well as asymmetric labia who presents for revision today. After a discussion of all risks, benefits and alternatives, she elected to proceed with the above listed procedures.    DESCRIPTION OF OPERATION:     After verification of informed consent, patient was brought back to the operating room and transferred to the operating room table. After the induction of adequate anesthesia, she was positioned in dorsal lithotomy position and prepped and draped in standard sterile fashion. A timeout was then performed verifying the correct patient, procedure and operative site. Preoperative antibiotics were administered prior to the start of the case.    First we turned our attention to the posterior scar. The perineal skin flap was malpositioned due to prior dehiscence. The tissue to be excised measuring 2x3 cm was marked and excised using a scalpel and electrocautery. Hemostasis was achieved with electrocautery, and the deep dermal layer was closed with interrupted 3-0 vicryl  sutures, and the skin closed with 3-0 vicryl placed in an interrupted horizontal mattress fashion.    We then turned our attention to the right labia. The labiaplasty was done using a Y-V plasty technique. The dimensions measured approximately 5 cm wide, and the length of the incision was 6 cm. Scar was excised from the most posterior aspect of the Y incision.  This was marked to advance her right labia posteriorly to create symmetric labia. Careful attention was taken to preserve the blood supply of the right labia with the dissection. The deep dermal layers were closed with interrupted 3-0 vicryl sutures, and the skin closed with 3-0 vicryl placed in an interrupted horizontal mattress fashion.    Next, we addressed the anterior scar. The scar size was 1x1 cm. The scar was marked and excised using a scalpel and electrocautery. Hemostasis was achieved with electrocautery, and the deep dermal layer was closed with interrupted 3-0 vicryl sutures, and the skin closed with 3-0 vicryl placed in a simple running fashion.    Bacitracin and fluffs were applied. She was transferred back to the supine position. This concluded the procedure. She tolerated the procedure well without any immediate complications and was transported back to the PACU in stable condition.    POST-OP PLAN:  PACU then home  Resume dilation on Monday 12/20/21 with serially increasing size  Follow up appointment on 1/5/2021    As attending surgeon, Meet MARIN MD, was scrubbed and present for the entire procedure.

## 2021-12-17 NOTE — BRIEF OP NOTE
[facility]    Brief Operative Note    Pre-operative diagnosis: Gender dysphoria [F64.9]  Asymmetric labia majora [N90.60]  Postoperative wound dehiscence, sequela [T81.31XS]  Post-operative diagnosis Same as pre-operative diagnosis    Procedure: Procedure(s):  Right Labiaplasty, Anterior and Posterior Scar Repair,  Surgeon: Surgeon(s) and Role:     * Meet Venegas MD - Primary     * Ke Harrington MD - Resident - Assisting  Anesthesia: General   Estimated Blood Loss: Less than 10 ml    Drains: None  Specimens: * No specimens in log *  Findings:   None.  Complications: None.  Implants: * No implants in log *

## 2021-12-17 NOTE — ANESTHESIA POSTPROCEDURE EVALUATION
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    Anesthesia Type:  MAC    Note:  Disposition: Outpatient   Postop Pain Control: Uneventful            Sign Out: Well controlled pain   PONV: No   Neuro/Psych: Uneventful            Sign Out: Acceptable/Baseline neuro status   Airway/Respiratory: Uneventful            Sign Out: Acceptable/Baseline resp. status   CV/Hemodynamics: Uneventful            Sign Out: Acceptable CV status; No obvious hypovolemia; No obvious fluid overload   Other NRE: NONE   DID A NON-ROUTINE EVENT OCCUR? No           Last vitals:  Vitals Value Taken Time   BP 98/64 12/17/21 1254   Temp 36.1  C (97  F) 12/17/21 1254   Pulse 67 12/17/21 1255   Resp 6 12/17/21 1255   SpO2 98 % 12/17/21 1255   Vitals shown include unvalidated device data.    Electronically Signed By: Corbin Celeste MD, MD  December 17, 2021  1:57 PM

## 2021-12-17 NOTE — ANESTHESIA CARE TRANSFER NOTE
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    Anesthesia Type:   MAC     Note:    Oropharynx: oropharynx clear of all foreign objects  Level of Consciousness: awake  Oxygen Supplementation: nasal cannula    Independent Airway: airway patency satisfactory and stable  Dentition: dentition unchanged  Vital Signs Stable: post-procedure vital signs reviewed and stable  Report to RN Given: handoff report given  Patient transferred to: PACU    Handoff Report: Identifed the Patient, Identified the Reponsible Provider, Reviewed the pertinent medical history, Discussed the surgical course, Reviewed Intra-OP anesthesia mangement and issues during anesthesia, Set expectations for post-procedure period and Allowed opportunity for questions and acknowledgement of understanding      Vitals:  Vitals Value Taken Time   BP     Temp     Pulse 70 12/17/21 1239   Resp 22 12/17/21 1239   SpO2 99 % 12/17/21 1239   Vitals shown include unvalidated device data.    Electronically Signed By: STERLIGN Villar CRNA  December 17, 2021  12:40 PM

## 2021-12-17 NOTE — DISCHARGE INSTRUCTIONS
"[facility]  Home Care Following Anesthesia  For 24 hours after surgery:  1. Get plenty of rest.  A responsible adult must stay with you for at least 24 hours after you leave the surgery center.  2. Do not drive or use heavy equipment.  If you have weakness or tingling, don't drive or use heavy equipment until this feeling goes away.   3. Do not drink alcohol.   4. Avoid strenuous or risky activities.  Ask for help when climbing stairs.  5. You may feel lightheaded.  IF so, sit for a few minutes before standing.  Have someone help you get up.   6. If you have nausea (feel sick to your stomach): Drink only clear liquids such as apple juice, ginger ale, broth or 7-Up.  Rest may also help.  Be sure to drink enough fluids.  Move to a regular diet as you feel able.   7. You may have a slight fever.  Call the doctor if your fever is over 100 F (37.7 C) (taken under the tongue) or lasts longer than 24 hours.  8. You may have a dry mouth, a sore throat, muscle aches or trouble sleeping. These should go away after 24 hours.  9. Do not make important or legal decisions.   10. It is recommended to avoid smoking.        Today you received a Marcaine or bupivacaine block to numb the nerves near your surgery site.  This is a block using local anesthetic or \"numbing\" medication injected around the nerves to anesthetize or \"numb\" the area supplied by those nerves.  This block is injected into the muscle layer near your surgical site.  The medication may numb the location where you had surgery for 6-18 hours, but may last up to 24 hours.  If your surgical site is an arm or leg you should be careful with your affected limb, since it is possible to injure your limb without being aware of it due to the numbing.  Until full feeling returns, you should guard against bumping or hitting your limb, and avoid extreme hot or cold temperatures on the skin.  As the block wears off, the feeling will return as " a tingling or prickly sensation near your surgical site.  You will experience more discomfort from your incision as the feeling returns.  You may want to take a pain pill (a narcotic or Tylenol if this was prescribed by your surgeon) when you start to experience mild pain before the pain beccomes more severe.  If your pain medications do not control your pain you should notifiy your surgeon.    Tips for taking pain medications  To get the best pain relief possible, remember these points:    Take pain medications as directed, before pain becomes severe.    Pain medication can upset your stomach: taking it with food may help.    Constipation is a common side effect of pain medication. Drink plenty of  fluids.    Eat foods high in fiber. Take a stool softener if recommended by your doctor or pharmacist.    Do not drink alcohol, drive or operate machinery while taking pain medications.    Ask about other ways to control pain, such as with heat, ice or relaxation.    Tylenol/Acetaminophen Consumption  To help encourage the safe use of acetaminophen, the makers of TYLENOL  have lowered the maximum daily dose for single-ingredient Extra Strength TYLENOL  (acetaminophen) products sold in the [place] from 8 pills per day (4,000 mg) to 6 pills per day (3,000 mg). The dosing interval has also changed from 2 pills every 4-6 hours to 2 pills every 6 hours.    If you feel your pain relief is insufficient, you may take Tylenol/Acetaminophen in addition to your narcotic pain medication.     Be careful not to exceed 3,000 mg of Tylenol/Acetaminophen in a 24 hour period from all sources.    If you are taking extra strength Tylenol/acetaminophen (500 mg), the maximum dose is 6 tablets in 24 hours.    If you are taking regular strength acetaminophen (325 mg), the maximum dose is 9 tablets in 24 hours.    Call a doctor for any of the followin. Signs of infection (fever, growing tenderness at the surgery site, a large amount of  drainage or bleeding, severe pain, foul-smelling drainage, redness, swelling).  2. It has been over 8 to 10 hours since surgery and you are still not able to urinate (pass water).  3. Headache for over 24 hours.  4. Numbness, tingling or weakness the day after surgery (if you had spinal anesthesia).  5. Signs of Covid-19 infection (temperature over 100 degrees, shortness of breath, cough, loss of taste/smell, generalized body aches, persistent headache, chills, sore throat, nausea/vomiting/diarrhea)  Your doctor is:       Dr. Meet Venegas, Prostate and Urology: [phone]               Or dial [phone] and ask for the resident on call for:  Prostate Urology  For emergency care, call the:  [facility]:  [phone] (TTY for hearing impaired: [phone])

## 2022-01-05 ENCOUNTER — OFFICE VISIT (OUTPATIENT)
Dept: UROLOGY | Facility: CLINIC | Age: 57
End: 2022-01-05
Payer: COMMERCIAL

## 2022-01-05 VITALS
WEIGHT: 210 LBS | HEART RATE: 88 BPM | DIASTOLIC BLOOD PRESSURE: 79 MMHG | BODY MASS INDEX: 30.06 KG/M2 | SYSTOLIC BLOOD PRESSURE: 117 MMHG | HEIGHT: 70 IN

## 2022-01-05 DIAGNOSIS — F64.9 GENDER DYSPHORIA: Primary | ICD-10-CM

## 2022-01-05 PROCEDURE — 99024 POSTOP FOLLOW-UP VISIT: CPT | Performed by: UROLOGY

## 2022-01-05 ASSESSMENT — PAIN SCALES - GENERAL: PAINLEVEL: NO PAIN (0)

## 2022-01-05 ASSESSMENT — MIFFLIN-ST. JEOR: SCORE: 1627.8

## 2022-01-05 NOTE — PATIENT INSTRUCTIONS
Follow up as needed with any questions or concerns.     It was a pleasure meeting with you today.  Thank you for allowing me and my team the privilege of caring for you today.  YOU are the reason we are here, and I truly hope we provided you with the excellent service you deserve.  Please let us know if there is anything else we can do for you so that we can be sure you are leaving completely satisfied with your care experience.        Lucy Amezquita, CARL

## 2022-01-05 NOTE — LETTER
1/5/2022       RE: Sunni Flores  [address] [facility] [address]     Dear Colleague,    Thank you for referring your patient, Sunni Flores, to the [facility] [place] [facility]. Please see a copy of my visit note below.    Sunni Flores is a 57 year old transgender female s/p full depth penile inversion vaginoplasty  She had a posterior wound dehiscence.  We took her to OR 12/17/21 - for right labiaplasty with YVplasty and excision of perineal flap and anterior scar  She is healing well  Back to dilating.  She had a minor wound dehiscence posteriorly (the inferior aspect of the YV plasty).      Vital signs reviewed    Exam:  Minor wound dehiscence. Silver nitrate applied.  No tenderness or evidence of cellulitis  No hematoma  Otherwise, anterior scar appears well.  Perineal flap excision well healed.  There is very subtle asymmetry given this dehiscence    A/P   Doing well after vaginoplasty revision  She is overall happy with result.  I did apply silver nitrate today, which she tolerated.  She should continue to dilate.  Will let me know if issues arise.    Meet Venegas MD

## 2022-01-05 NOTE — NURSING NOTE
"Chief Complaint   Patient presents with     Follow Up     s/p labiaplasty       Height 1.778 m (5' 10\"), weight 95.3 kg (210 lb), not currently breastfeeding. Body mass index is 30.13 kg/m .    Patient Active Problem List   Diagnosis     Gender dysphoria in adult     Obstructive sleep apnea syndrome     Generalized anxiety disorder     Moderate episode of recurrent major depressive disorder (H)     Fibrocystic breast changes     Asymmetric labia majora     Postoperative wound dehiscence, sequela       Allergies   Allergen Reactions     Allopurinol Other (See Comments)     Patient is positive for HLA-B 58:01. Increased risk of allopurinol-induced severe cutaneous reactions.      Clopidogrel Other (See Comments)     Patient is a [id number] metabolizer. Clopidogrel would be predicted to lack efficacy       Current Outpatient Medications   Medication Sig Dispense Refill     acetaminophen (TYLENOL) 325 MG tablet Take 325-650 mg by mouth every 6 hours as needed for mild pain       diazepam (VALIUM) 10 MG tablet as needed        estradiol (VIVELLE-DOT) 0.1 MG/24HR bi-weekly patch Place 2 patches onto the skin twice a week (STOP until restarted by Medical team) 8 patch 0     ibuprofen (ADVIL/MOTRIN) 400 MG tablet Take 2 tablets (800 mg) by mouth every 8 hours as needed for pain 42 tablet 0     lidocaine-prilocaine (EMLA) 2.5-2.5 % external cream as needed   2     methylfolate (DEPLIN) 15 MG TABS tablet Take 15 mg by mouth every morning        polyethylene glycol (MIRALAX) 17 GM/Dose powder Take 17 g by mouth 2 times daily as needed for constipation 510 g 1     TRAZODONE HCL  mg nightly as needed for sleep or depression        venlafaxine (EFFEXOR-XR) 150 MG 24 hr capsule Take 300 mg by mouth every morning          Social History     Tobacco Use     Smoking status: Never Smoker     Smokeless tobacco: Never Used   Substance Use Topics     Alcohol use: Yes     Comment: 2-3 drinks weekly     Drug use: No       Harlan Kimball" EMT  1/5/2022  10:02 AM

## 2022-01-10 NOTE — PROGRESS NOTES
Sunni Flores is a 57 year old transgender female s/p full depth penile inversion vaginoplasty  She had a posterior wound dehiscence.  We took her to OR 12/17/21 - for right labiaplasty with YVplasty and excision of perineal flap and anterior scar  She is healing well  Back to dilating.  She had a minor wound dehiscence posteriorly (the inferior aspect of the YV plasty).      Vital signs reviewed    Exam:  Minor wound dehiscence. Silver nitrate applied.  No tenderness or evidence of cellulitis  No hematoma  Otherwise, anterior scar appears well.  Perineal flap excision well healed.  There is very subtle asymmetry given this dehiscence    A/P   Doing well after vaginoplasty revision  She is overall happy with result.  I did apply silver nitrate today, which she tolerated.  She should continue to dilate.  Will let me know if issues arise.    Meet Venegas MD

## 2022-05-21 ENCOUNTER — HEALTH MAINTENANCE LETTER (OUTPATIENT)
Age: 57
End: 2022-05-21

## 2022-09-17 ENCOUNTER — HEALTH MAINTENANCE LETTER (OUTPATIENT)
Age: 57
End: 2022-09-17

## 2023-05-21 NOTE — PROGRESS NOTES
"Sunni Flores is a 55 year old adult who is being evaluated via a billable video visit.      The patient has been notified of following:     \"This video visit will be conducted via a call between you and your physician/provider. We have found that certain health care needs can be provided without the need for an in-person physical exam.  This service lets us provide the care you need with a video conversation.  If a prescription is necessary we can send it directly to your pharmacy.  If lab work is needed we can place an order for that and you can then stop by our lab to have the test done at a later time.    If during the course of the call the physician/provider feels a video visit is not appropriate, you will not be charged for this service.\"     Patient has given verbal consent for Video visit? Yes    Patient would like the video invitation sent by: Send to e-mail at: [email or web address]    Video Start Time: 3:05 PM    Sunni Flores complains of  No chief complaint on file.      I have reviewed and updated the patient's Past Medical History, Social History, Family History and Medication List.    ALLERGIES  Allopurinol and Clopidogrel    Additional provider notes:     [facility]  [address], [address]  [place] [address]  Outpatient Progress Note      Session Date: 4/02/20  Client Name: Sunni Flores (she/her pronouns)  YOB: 1965  MRN: [id number]  Provider: Aicha Velasquez, PhD,   Type of Session: Individual   Present in Session: Client only  Number of Minutes: 55  Treatment Plan Due: 9/18/2019    Health Maintenance Summary - Mental Health Treatment Plan       Status Date      MENTAL HEALTH TX PLAN Overdue 3/22/2020      Done 4/22/2019      Done 10/5/2018 See Scan     Done 9/18/2017           Current Symptoms/Status:   Sunni reports lifelong discomfort with her sex assigned at birth, and identifies as a woman. At intake, Sunni also noted concerns about " sexual functioning (erection/orgasm) though these have subsided as gender has been further explored. She indicated that, since her wife  in 2016, she increasingly desired to further explore gender concerns and has been supported in this by her current partner, Tawny. Sunni has taken steps to come out as transgender to her daughters and peer group, and has been met with support. She reports a desire to further explore options for social and medical transition.     Progress Toward Treatment Goals:   Continues social and medical transition, planning for surgery. Working on integration of self, negative self-talk and self-compassion, excited about upcoming surgery and also apprehensive about timing and scheduling    Intervention & Response:   Interpersonal, client-centered, and CBT techniques utilized to address client's current status.     Sunni stated she is struggling with her mother, and the feeling that her mother is not happy for her. Talked about how she thinks her mother has not internalized her as a woman and does not realize how happy sunni is now that she has transitioned.  Stated she had asked her mother about wanting her mother to understand how happy she has been since she transitioned. Stated her mother was able to say she sees her happiness and that felt positive to her.    Talked about anxiety about potential rescheduling to her surgery. Surgery scheduled for [facility].    Continues to work on self-compassion. Talked about coping with Covid and slowing down.     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       Plan / Need for Future Services:    Return for individual therapy in 2-3 weeks.           Aicha Velasquez, PhD, LP, CST      Video-Visit Details    Type of service:  Video Visit    Video End Time  (time video stopped): 4:05pm      Sara Velasquez, PhD, LP    Coordinator, Transgender Health Services  [facility], [facility]  Department of Family Medicine and Community Health  [employer] [facility] [employer]    Originating Location (pt. Location): Home    Distant Location (provider location):  [facility]     Mode of Communication:  Video Conference via [employer]             MD Chicas

## 2023-06-04 ENCOUNTER — HEALTH MAINTENANCE LETTER (OUTPATIENT)
Age: 58
End: 2023-06-04

## 2024-02-24 ENCOUNTER — HEALTH MAINTENANCE LETTER (OUTPATIENT)
Age: 59
End: 2024-02-24

## 2024-07-13 ENCOUNTER — HEALTH MAINTENANCE LETTER (OUTPATIENT)
Age: 59
End: 2024-07-13
